# Patient Record
Sex: MALE | Race: WHITE | NOT HISPANIC OR LATINO
[De-identification: names, ages, dates, MRNs, and addresses within clinical notes are randomized per-mention and may not be internally consistent; named-entity substitution may affect disease eponyms.]

---

## 2017-02-16 ENCOUNTER — APPOINTMENT (OUTPATIENT)
Dept: ELECTROPHYSIOLOGY | Facility: CLINIC | Age: 82
End: 2017-02-16

## 2017-03-06 ENCOUNTER — APPOINTMENT (OUTPATIENT)
Dept: ELECTROPHYSIOLOGY | Facility: CLINIC | Age: 82
End: 2017-03-06

## 2017-03-30 ENCOUNTER — APPOINTMENT (OUTPATIENT)
Dept: SURGERY | Facility: CLINIC | Age: 82
End: 2017-03-30

## 2017-06-13 ENCOUNTER — NON-APPOINTMENT (OUTPATIENT)
Age: 82
End: 2017-06-13

## 2017-06-13 ENCOUNTER — APPOINTMENT (OUTPATIENT)
Dept: ELECTROPHYSIOLOGY | Facility: CLINIC | Age: 82
End: 2017-06-13

## 2017-06-13 VITALS — HEART RATE: 79 BPM | DIASTOLIC BLOOD PRESSURE: 72 MMHG | SYSTOLIC BLOOD PRESSURE: 136 MMHG

## 2017-09-18 ENCOUNTER — APPOINTMENT (OUTPATIENT)
Dept: ELECTROPHYSIOLOGY | Facility: CLINIC | Age: 82
End: 2017-09-18
Payer: COMMERCIAL

## 2017-09-18 PROCEDURE — 93294 REM INTERROG EVL PM/LDLS PM: CPT

## 2017-10-12 ENCOUNTER — APPOINTMENT (OUTPATIENT)
Dept: SURGERY | Facility: CLINIC | Age: 82
End: 2017-10-12
Payer: COMMERCIAL

## 2017-10-12 PROCEDURE — 99213 OFFICE O/P EST LOW 20 MIN: CPT | Mod: 25

## 2017-10-12 PROCEDURE — 31575 DIAGNOSTIC LARYNGOSCOPY: CPT

## 2017-12-01 ENCOUNTER — EMERGENCY (EMERGENCY)
Facility: HOSPITAL | Age: 82
LOS: 1 days | Discharge: HOME | End: 2017-12-01
Attending: EMERGENCY MEDICINE | Admitting: EMERGENCY MEDICINE
Payer: MEDICARE

## 2017-12-01 VITALS
HEART RATE: 65 BPM | SYSTOLIC BLOOD PRESSURE: 162 MMHG | RESPIRATION RATE: 16 BRPM | DIASTOLIC BLOOD PRESSURE: 100 MMHG | OXYGEN SATURATION: 94 %

## 2017-12-01 VITALS
TEMPERATURE: 98 F | DIASTOLIC BLOOD PRESSURE: 83 MMHG | HEART RATE: 76 BPM | SYSTOLIC BLOOD PRESSURE: 205 MMHG | RESPIRATION RATE: 18 BRPM | OXYGEN SATURATION: 94 %

## 2017-12-01 LAB
ALBUMIN SERPL ELPH-MCNC: 4.4 G/DL — SIGNIFICANT CHANGE UP (ref 3.3–5)
ALP SERPL-CCNC: 96 U/L — SIGNIFICANT CHANGE UP (ref 40–120)
ALT FLD-CCNC: 20 U/L RC — SIGNIFICANT CHANGE UP (ref 10–45)
ANION GAP SERPL CALC-SCNC: 15 MMOL/L — SIGNIFICANT CHANGE UP (ref 5–17)
APTT BLD: 45.4 SEC — HIGH (ref 27.5–37.4)
AST SERPL-CCNC: 24 U/L — SIGNIFICANT CHANGE UP (ref 10–40)
BASOPHILS # BLD AUTO: 0 K/UL — SIGNIFICANT CHANGE UP (ref 0–0.2)
BASOPHILS NFR BLD AUTO: 0.6 % — SIGNIFICANT CHANGE UP (ref 0–2)
BILIRUB SERPL-MCNC: 0.5 MG/DL — SIGNIFICANT CHANGE UP (ref 0.2–1.2)
BUN SERPL-MCNC: 18 MG/DL — SIGNIFICANT CHANGE UP (ref 7–23)
CALCIUM SERPL-MCNC: 9.4 MG/DL — SIGNIFICANT CHANGE UP (ref 8.4–10.5)
CHLORIDE SERPL-SCNC: 101 MMOL/L — SIGNIFICANT CHANGE UP (ref 96–108)
CO2 SERPL-SCNC: 26 MMOL/L — SIGNIFICANT CHANGE UP (ref 22–31)
CREAT SERPL-MCNC: 1.2 MG/DL — SIGNIFICANT CHANGE UP (ref 0.5–1.3)
EOSINOPHIL # BLD AUTO: 0 K/UL — SIGNIFICANT CHANGE UP (ref 0–0.5)
EOSINOPHIL NFR BLD AUTO: 0.7 % — SIGNIFICANT CHANGE UP (ref 0–6)
GLUCOSE SERPL-MCNC: 91 MG/DL — SIGNIFICANT CHANGE UP (ref 70–99)
HCT VFR BLD CALC: 45.5 % — SIGNIFICANT CHANGE UP (ref 39–50)
HGB BLD-MCNC: 14.8 G/DL — SIGNIFICANT CHANGE UP (ref 13–17)
INR BLD: 2.56 RATIO — HIGH (ref 0.88–1.16)
LYMPHOCYTES # BLD AUTO: 0.7 K/UL — LOW (ref 1–3.3)
LYMPHOCYTES # BLD AUTO: 10.1 % — LOW (ref 13–44)
MCHC RBC-ENTMCNC: 32.6 GM/DL — SIGNIFICANT CHANGE UP (ref 32–36)
MCHC RBC-ENTMCNC: 34 PG — SIGNIFICANT CHANGE UP (ref 27–34)
MCV RBC AUTO: 104 FL — HIGH (ref 80–100)
MONOCYTES # BLD AUTO: 0.9 K/UL — SIGNIFICANT CHANGE UP (ref 0–0.9)
MONOCYTES NFR BLD AUTO: 12.1 % — SIGNIFICANT CHANGE UP (ref 2–14)
NEUTROPHILS # BLD AUTO: 5.6 K/UL — SIGNIFICANT CHANGE UP (ref 1.8–7.4)
NEUTROPHILS NFR BLD AUTO: 76.5 % — SIGNIFICANT CHANGE UP (ref 43–77)
PLATELET # BLD AUTO: 153 K/UL — SIGNIFICANT CHANGE UP (ref 150–400)
POTASSIUM SERPL-MCNC: 5.1 MMOL/L — SIGNIFICANT CHANGE UP (ref 3.5–5.3)
POTASSIUM SERPL-SCNC: 5.1 MMOL/L — SIGNIFICANT CHANGE UP (ref 3.5–5.3)
PROT SERPL-MCNC: 8.2 G/DL — SIGNIFICANT CHANGE UP (ref 6–8.3)
PROTHROM AB SERPL-ACNC: 28.2 SEC — HIGH (ref 9.8–12.7)
RBC # BLD: 4.36 M/UL — SIGNIFICANT CHANGE UP (ref 4.2–5.8)
RBC # FLD: 12.8 % — SIGNIFICANT CHANGE UP (ref 10.3–14.5)
SODIUM SERPL-SCNC: 142 MMOL/L — SIGNIFICANT CHANGE UP (ref 135–145)
WBC # BLD: 7.3 K/UL — SIGNIFICANT CHANGE UP (ref 3.8–10.5)
WBC # FLD AUTO: 7.3 K/UL — SIGNIFICANT CHANGE UP (ref 3.8–10.5)

## 2017-12-01 PROCEDURE — 85027 COMPLETE CBC AUTOMATED: CPT

## 2017-12-01 PROCEDURE — 99284 EMERGENCY DEPT VISIT MOD MDM: CPT

## 2017-12-01 PROCEDURE — 99284 EMERGENCY DEPT VISIT MOD MDM: CPT | Mod: 25

## 2017-12-01 PROCEDURE — 93971 EXTREMITY STUDY: CPT | Mod: 26

## 2017-12-01 PROCEDURE — 85610 PROTHROMBIN TIME: CPT

## 2017-12-01 PROCEDURE — 80053 COMPREHEN METABOLIC PANEL: CPT

## 2017-12-01 PROCEDURE — 85730 THROMBOPLASTIN TIME PARTIAL: CPT

## 2017-12-01 PROCEDURE — 93971 EXTREMITY STUDY: CPT

## 2017-12-01 RX ORDER — LABETALOL HCL 100 MG
10 TABLET ORAL ONCE
Qty: 0 | Refills: 0 | Status: COMPLETED | OUTPATIENT
Start: 2017-12-01 | End: 2017-12-01

## 2017-12-01 NOTE — ED PROVIDER NOTE - PROGRESS NOTE DETAILS
discussed results of ct findings with family and pt. Note hematoma is the cause of swelling. Instructed pt and family on return precautions including rapidly expanding hematoma and compartment syndrome. Will recheck BP and treat in the ED if needed. F/u with PMD for hematoma and high BP. discussed results of ultrasound findings with family and pt. Noted likely hematoma is the cause of swelling. Instructed pt and family on return precautions including rapidly expanding hematoma and described signs/symptoms of compartment syndrome. Repeat BP remains elevated but patient asymptomatic, will have f/u with PMD for hematoma and high BP.  Compartments remain soft without change in size noted on exam.

## 2017-12-01 NOTE — ED PROVIDER NOTE - MEDICAL DECISION MAKING DETAILS
86 y/o M pt with PMHx of a-fib, HTN presents to the ED for nontraumatic left calf pain since yesterday. Sent in by PMD to r/o DVT. Nonspecific physical exam and pt already anti coagulated. Plan: labs, US LLE and re-evaluate

## 2017-12-01 NOTE — ED PROVIDER NOTE - LOWER EXTREMITY EXAM, LEFT
increased varicosities with point tenderness to the calf that is not extending to the popiteal fascia

## 2017-12-01 NOTE — ED ADULT NURSE NOTE - OBJECTIVE STATEMENT
85y male pt, hx of A-fib, valve replacement, HTN on Coumadin, BIBA from Dr's office r/o LLE DVT. Pt states that he started having calf pain since yesterday with minimal redness and swelling. Pt has been compliant with medications, had INR checked today, was told it was therapeutic level. No chest pain, sob, no other complaints. Normally ambulating without assistance.

## 2017-12-01 NOTE — ED PROVIDER NOTE - CARDIAC, MLM
Normal rate, regular rhythm.  Heart sounds S1, S2.  No murmurs, rubs or gallops. Irregular rate and rhythm

## 2017-12-01 NOTE — ED PROVIDER NOTE - OBJECTIVE STATEMENT
84 y/o M pt with PMHx of a-fib, HTN, PSHx of pacemaker, AVR c/o left leg pain since yesterday with no radiation. Pt went to PMD, suspects a DVT and referred pt to the ED. Pt is compliant with his medication. Pt gets INR levels checked once a week.  Denies SOB or any other complaints. Current medication: coumadin. 86 y/o M pt with PMHx of a-fib, HTN, PSHx of pacemaker, AVR c/o left leg pain since yesterday with no radiation. Pt went to PMD, suspects a DVT and referred pt to the ED. Pt is compliant with his medication. Pt gets INR levels checked once a week.  Denies SOB or any other complaints.  Pain is mild.  No noted trauma.  Current medication: coumadin.

## 2017-12-01 NOTE — ED PROVIDER NOTE - CARE PLAN
Principal Discharge DX:	Hematoma of left lower extremity, initial encounter  Secondary Diagnosis:	Hypertension

## 2017-12-01 NOTE — ED PROVIDER NOTE - PMH
A-fib    BPH (Benign Prostatic Hyperplasia)    Cardiac Dysrhythmia    Dyslipidemia    Hx TIA  x 8yrs    Hypertension

## 2018-01-10 ENCOUNTER — NON-APPOINTMENT (OUTPATIENT)
Age: 83
End: 2018-01-10

## 2018-01-10 ENCOUNTER — APPOINTMENT (OUTPATIENT)
Dept: ELECTROPHYSIOLOGY | Facility: CLINIC | Age: 83
End: 2018-01-10
Payer: COMMERCIAL

## 2018-01-10 VITALS — DIASTOLIC BLOOD PRESSURE: 74 MMHG | SYSTOLIC BLOOD PRESSURE: 185 MMHG | HEART RATE: 69 BPM

## 2018-01-10 PROCEDURE — 93279 PRGRMG DEV EVAL PM/LDLS PM: CPT

## 2018-03-04 ENCOUNTER — INPATIENT (INPATIENT)
Facility: HOSPITAL | Age: 83
LOS: 9 days | Discharge: HOME | DRG: 579 | End: 2018-03-14
Attending: INTERNAL MEDICINE | Admitting: STUDENT IN AN ORGANIZED HEALTH CARE EDUCATION/TRAINING PROGRAM
Payer: COMMERCIAL

## 2018-03-04 VITALS
SYSTOLIC BLOOD PRESSURE: 150 MMHG | HEART RATE: 70 BPM | OXYGEN SATURATION: 100 % | RESPIRATION RATE: 20 BRPM | WEIGHT: 160.06 LBS | DIASTOLIC BLOOD PRESSURE: 69 MMHG | TEMPERATURE: 98 F

## 2018-03-04 DIAGNOSIS — R13.10 DYSPHAGIA, UNSPECIFIED: ICD-10-CM

## 2018-03-04 DIAGNOSIS — N17.9 ACUTE KIDNEY FAILURE, UNSPECIFIED: ICD-10-CM

## 2018-03-04 DIAGNOSIS — E78.5 HYPERLIPIDEMIA, UNSPECIFIED: ICD-10-CM

## 2018-03-04 DIAGNOSIS — N18.9 CHRONIC KIDNEY DISEASE, UNSPECIFIED: ICD-10-CM

## 2018-03-04 DIAGNOSIS — Z29.9 ENCOUNTER FOR PROPHYLACTIC MEASURES, UNSPECIFIED: ICD-10-CM

## 2018-03-04 DIAGNOSIS — I10 ESSENTIAL (PRIMARY) HYPERTENSION: ICD-10-CM

## 2018-03-04 DIAGNOSIS — I48.91 UNSPECIFIED ATRIAL FIBRILLATION: ICD-10-CM

## 2018-03-04 DIAGNOSIS — L03.90 CELLULITIS, UNSPECIFIED: ICD-10-CM

## 2018-03-04 DIAGNOSIS — R91.8 OTHER NONSPECIFIC ABNORMAL FINDING OF LUNG FIELD: ICD-10-CM

## 2018-03-04 DIAGNOSIS — N40.0 BENIGN PROSTATIC HYPERPLASIA WITHOUT LOWER URINARY TRACT SYMPTOMS: ICD-10-CM

## 2018-03-04 DIAGNOSIS — L03.116 CELLULITIS OF LEFT LOWER LIMB: ICD-10-CM

## 2018-03-04 LAB
ALBUMIN SERPL ELPH-MCNC: 3.7 G/DL — SIGNIFICANT CHANGE UP (ref 3.3–5)
ALP SERPL-CCNC: 69 U/L — SIGNIFICANT CHANGE UP (ref 40–120)
ALT FLD-CCNC: 14 U/L RC — SIGNIFICANT CHANGE UP (ref 10–45)
ANION GAP SERPL CALC-SCNC: 12 MMOL/L — SIGNIFICANT CHANGE UP (ref 5–17)
APTT BLD: 42.1 SEC — HIGH (ref 27.5–37.4)
AST SERPL-CCNC: 21 U/L — SIGNIFICANT CHANGE UP (ref 10–40)
BASOPHILS # BLD AUTO: 0.1 K/UL — SIGNIFICANT CHANGE UP (ref 0–0.2)
BILIRUB SERPL-MCNC: 1.6 MG/DL — HIGH (ref 0.2–1.2)
BUN SERPL-MCNC: 28 MG/DL — HIGH (ref 7–23)
CALCIUM SERPL-MCNC: 9 MG/DL — SIGNIFICANT CHANGE UP (ref 8.4–10.5)
CHLORIDE SERPL-SCNC: 98 MMOL/L — SIGNIFICANT CHANGE UP (ref 96–108)
CK MB BLD-MCNC: 5.2 % — HIGH (ref 0–3.5)
CK MB CFR SERPL CALC: 9.1 NG/ML — HIGH (ref 0–6.7)
CK SERPL-CCNC: 174 U/L — SIGNIFICANT CHANGE UP (ref 30–200)
CO2 SERPL-SCNC: 26 MMOL/L — SIGNIFICANT CHANGE UP (ref 22–31)
CREAT SERPL-MCNC: 1.42 MG/DL — HIGH (ref 0.5–1.3)
CRP SERPL-MCNC: 10.5 MG/DL — HIGH (ref 0–0.4)
EOSINOPHIL # BLD AUTO: 0 K/UL — SIGNIFICANT CHANGE UP (ref 0–0.5)
ERYTHROCYTE [SEDIMENTATION RATE] IN BLOOD: 19 MM/HR — SIGNIFICANT CHANGE UP (ref 0–20)
GAS PNL BLDV: SIGNIFICANT CHANGE UP
GLUCOSE SERPL-MCNC: 81 MG/DL — SIGNIFICANT CHANGE UP (ref 70–99)
HCT VFR BLD CALC: 41.2 % — SIGNIFICANT CHANGE UP (ref 39–50)
HGB BLD-MCNC: 13.4 G/DL — SIGNIFICANT CHANGE UP (ref 13–17)
INR BLD: 5.01 RATIO — CRITICAL HIGH (ref 0.88–1.16)
LYMPHOCYTES # BLD AUTO: 0.4 K/UL — LOW (ref 1–3.3)
LYMPHOCYTES # BLD AUTO: 1 % — LOW (ref 13–44)
MCHC RBC-ENTMCNC: 32.4 GM/DL — SIGNIFICANT CHANGE UP (ref 32–36)
MCHC RBC-ENTMCNC: 33.5 PG — SIGNIFICANT CHANGE UP (ref 27–34)
MCV RBC AUTO: 103 FL — HIGH (ref 80–100)
MONOCYTES # BLD AUTO: 1.3 K/UL — HIGH (ref 0–0.9)
MONOCYTES NFR BLD AUTO: 5 % — SIGNIFICANT CHANGE UP (ref 2–14)
NEUTROPHILS # BLD AUTO: 20.7 K/UL — HIGH (ref 1.8–7.4)
NEUTROPHILS NFR BLD AUTO: 90 % — HIGH (ref 43–77)
PLATELET # BLD AUTO: 141 K/UL — LOW (ref 150–400)
POTASSIUM SERPL-MCNC: 5.3 MMOL/L — SIGNIFICANT CHANGE UP (ref 3.5–5.3)
POTASSIUM SERPL-SCNC: 5.3 MMOL/L — SIGNIFICANT CHANGE UP (ref 3.5–5.3)
PROT SERPL-MCNC: 7 G/DL — SIGNIFICANT CHANGE UP (ref 6–8.3)
PROTHROM AB SERPL-ACNC: 55.9 SEC — HIGH (ref 9.8–12.7)
RBC # BLD: 4 M/UL — LOW (ref 4.2–5.8)
RBC # FLD: 12.2 % — SIGNIFICANT CHANGE UP (ref 10.3–14.5)
SODIUM SERPL-SCNC: 136 MMOL/L — SIGNIFICANT CHANGE UP (ref 135–145)
TROPONIN T SERPL-MCNC: 0.02 NG/ML — SIGNIFICANT CHANGE UP (ref 0–0.06)
WBC # BLD: 22.5 K/UL — HIGH (ref 3.8–10.5)
WBC # FLD AUTO: 22.5 K/UL — HIGH (ref 3.8–10.5)

## 2018-03-04 PROCEDURE — 93970 EXTREMITY STUDY: CPT | Mod: 26

## 2018-03-04 PROCEDURE — 99285 EMERGENCY DEPT VISIT HI MDM: CPT | Mod: 25,GC

## 2018-03-04 PROCEDURE — 71045 X-RAY EXAM CHEST 1 VIEW: CPT | Mod: 26

## 2018-03-04 PROCEDURE — 73590 X-RAY EXAM OF LOWER LEG: CPT | Mod: 26,LT

## 2018-03-04 PROCEDURE — 93010 ELECTROCARDIOGRAM REPORT: CPT

## 2018-03-04 PROCEDURE — 99223 1ST HOSP IP/OBS HIGH 75: CPT | Mod: GC

## 2018-03-04 RX ORDER — ACETAMINOPHEN 500 MG
1000 TABLET ORAL ONCE
Qty: 0 | Refills: 0 | Status: COMPLETED | OUTPATIENT
Start: 2018-03-04 | End: 2018-03-04

## 2018-03-04 RX ORDER — TAMSULOSIN HYDROCHLORIDE 0.4 MG/1
0.4 CAPSULE ORAL AT BEDTIME
Qty: 0 | Refills: 0 | Status: DISCONTINUED | OUTPATIENT
Start: 2018-03-04 | End: 2018-03-14

## 2018-03-04 RX ORDER — AMPICILLIN SODIUM AND SULBACTAM SODIUM 250; 125 MG/ML; MG/ML
3 INJECTION, POWDER, FOR SUSPENSION INTRAMUSCULAR; INTRAVENOUS EVERY 6 HOURS
Qty: 0 | Refills: 0 | Status: DISCONTINUED | OUTPATIENT
Start: 2018-03-05 | End: 2018-03-05

## 2018-03-04 RX ORDER — FUROSEMIDE 40 MG
40 TABLET ORAL ONCE
Qty: 0 | Refills: 0 | Status: COMPLETED | OUTPATIENT
Start: 2018-03-04 | End: 2018-03-04

## 2018-03-04 RX ORDER — AMPICILLIN SODIUM AND SULBACTAM SODIUM 250; 125 MG/ML; MG/ML
3 INJECTION, POWDER, FOR SUSPENSION INTRAMUSCULAR; INTRAVENOUS ONCE
Qty: 0 | Refills: 0 | Status: COMPLETED | OUTPATIENT
Start: 2018-03-04 | End: 2018-03-04

## 2018-03-04 RX ORDER — FUROSEMIDE 40 MG
40 TABLET ORAL DAILY
Qty: 0 | Refills: 0 | Status: DISCONTINUED | OUTPATIENT
Start: 2018-03-05 | End: 2018-03-06

## 2018-03-04 RX ORDER — VANCOMYCIN HCL 1 G
1000 VIAL (EA) INTRAVENOUS ONCE
Qty: 0 | Refills: 0 | Status: COMPLETED | OUTPATIENT
Start: 2018-03-04 | End: 2018-03-04

## 2018-03-04 RX ORDER — VERAPAMIL HCL 240 MG
120 CAPSULE, EXTENDED RELEASE PELLETS 24 HR ORAL
Qty: 0 | Refills: 0 | Status: DISCONTINUED | OUTPATIENT
Start: 2018-03-04 | End: 2018-03-08

## 2018-03-04 RX ORDER — FINASTERIDE 5 MG/1
5 TABLET, FILM COATED ORAL DAILY
Qty: 0 | Refills: 0 | Status: DISCONTINUED | OUTPATIENT
Start: 2018-03-04 | End: 2018-03-14

## 2018-03-04 RX ORDER — AMPICILLIN SODIUM AND SULBACTAM SODIUM 250; 125 MG/ML; MG/ML
INJECTION, POWDER, FOR SUSPENSION INTRAMUSCULAR; INTRAVENOUS
Qty: 0 | Refills: 0 | Status: DISCONTINUED | OUTPATIENT
Start: 2018-03-04 | End: 2018-03-05

## 2018-03-04 RX ORDER — VERAPAMIL HCL 240 MG
240 CAPSULE, EXTENDED RELEASE PELLETS 24 HR ORAL DAILY
Qty: 0 | Refills: 0 | Status: DISCONTINUED | OUTPATIENT
Start: 2018-03-04 | End: 2018-03-04

## 2018-03-04 RX ORDER — ACETAMINOPHEN 500 MG
650 TABLET ORAL EVERY 6 HOURS
Qty: 0 | Refills: 0 | Status: DISCONTINUED | OUTPATIENT
Start: 2018-03-04 | End: 2018-03-14

## 2018-03-04 RX ORDER — WARFARIN SODIUM 2.5 MG/1
0 TABLET ORAL
Qty: 0 | Refills: 0 | COMMUNITY

## 2018-03-04 RX ORDER — VANCOMYCIN HCL 1 G
VIAL (EA) INTRAVENOUS
Qty: 0 | Refills: 0 | Status: DISCONTINUED | OUTPATIENT
Start: 2018-03-04 | End: 2018-03-04

## 2018-03-04 RX ORDER — VANCOMYCIN HCL 1 G
1000 VIAL (EA) INTRAVENOUS EVERY 24 HOURS
Qty: 0 | Refills: 0 | Status: DISCONTINUED | OUTPATIENT
Start: 2018-03-04 | End: 2018-03-06

## 2018-03-04 RX ORDER — ATORVASTATIN CALCIUM 80 MG/1
10 TABLET, FILM COATED ORAL AT BEDTIME
Qty: 0 | Refills: 0 | Status: DISCONTINUED | OUTPATIENT
Start: 2018-03-04 | End: 2018-03-14

## 2018-03-04 RX ORDER — FUROSEMIDE 40 MG
20 TABLET ORAL ONCE
Qty: 0 | Refills: 0 | Status: DISCONTINUED | OUTPATIENT
Start: 2018-03-04 | End: 2018-03-04

## 2018-03-04 RX ADMIN — Medication 250 MILLIGRAM(S): at 14:52

## 2018-03-04 RX ADMIN — Medication 40 MILLIGRAM(S): at 22:10

## 2018-03-04 RX ADMIN — AMPICILLIN SODIUM AND SULBACTAM SODIUM 200 GRAM(S): 250; 125 INJECTION, POWDER, FOR SUSPENSION INTRAMUSCULAR; INTRAVENOUS at 23:56

## 2018-03-04 RX ADMIN — Medication 120 MILLIGRAM(S): at 22:10

## 2018-03-04 RX ADMIN — FINASTERIDE 5 MILLIGRAM(S): 5 TABLET, FILM COATED ORAL at 18:20

## 2018-03-04 RX ADMIN — ATORVASTATIN CALCIUM 10 MILLIGRAM(S): 80 TABLET, FILM COATED ORAL at 22:10

## 2018-03-04 RX ADMIN — Medication 1000 MILLIGRAM(S): at 03:54

## 2018-03-04 RX ADMIN — Medication 400 MILLIGRAM(S): at 13:36

## 2018-03-04 RX ADMIN — Medication 400 MILLIGRAM(S): at 03:54

## 2018-03-04 RX ADMIN — Medication 100 MILLIGRAM(S): at 03:54

## 2018-03-04 RX ADMIN — TAMSULOSIN HYDROCHLORIDE 0.4 MILLIGRAM(S): 0.4 CAPSULE ORAL at 22:10

## 2018-03-04 RX ADMIN — AMPICILLIN SODIUM AND SULBACTAM SODIUM 200 GRAM(S): 250; 125 INJECTION, POWDER, FOR SUSPENSION INTRAMUSCULAR; INTRAVENOUS at 16:27

## 2018-03-04 NOTE — ED ADULT NURSE NOTE - OBJECTIVE STATEMENT
86 yo complaining of SOB and leg swelling with a opening on left lower leg "it hurts and some liquid is coming off of my leg, I also feel like I cannot breathe" 2L o2 for comfort, pt was 89%pulse ox.   IV placed on left AC 20G, blood drawn, sent to lab. Family at the bedside. Pt alerted and oriented x3, no signs of acute distress noted at this moment. Will continue to monitor closely. Heart sounds normal, lungs clear, abdomen soft, non distended.     MD note:  86 yo male with hx of aortic valve replacement, afib on coumadin pacemaker presenting with 1 day hx of left leg pain and swelling.  denies any trauma.  despite triage note, patient does not endorse any shortness of breath besides his baseline.  describes as hot achy pain, 5/10 in severity with no radaition.  did not take anything for pain.

## 2018-03-04 NOTE — H&P ADULT - NSHPSOCIALHISTORY_GEN_ALL_CORE
Patient lives at home with wife. He smoked for >50 years, about 1PPD but quit 15 years ago. He does not drink any alcohol.

## 2018-03-04 NOTE — ED ADULT NURSE REASSESSMENT NOTE - NS ED NURSE REASSESS COMMENT FT1
received report from Alena ARDON. pt resting comfortably in stretcher. A&Ox4. VSS. pt reports pain of L leg minimally relieved by pain medication administered by previous RN. MD aware. pt admitted to medicine, pending bed assignment. pt remains on 1L NC, 99%, nonlabored breathing. plan of care discussed. safety and comfort measures maintained.

## 2018-03-04 NOTE — H&P ADULT - PROBLEM SELECTOR PLAN 2
-Pt with Pro BNP, pleural effusions, BARON, LE edema  -Obtain TTE  -Gentle diuresis with Lasix as pt is Lasix naive  -Optimize medications if TTE consistent with HF -Pt with Pro BNP, pleural effusions, BARON, LE edema  -Obtain TTE  -Gentle diuresis with Lasix as pt is Lasix naive  -Optimize medications if TTE consistent with HF  -Troponins negative -Pt with Pro BNP, pleural effusions, BARON, LE edema  -Obtain TTE  -Gentle diuresis with Lasix as pt is Lasix naive  -Optimize medications if TTE consistent with HF  -Troponins negative  -Observe on telemetry  -Strict I's and O's  -Daily weights -+orthopnea, crackles, b/l pitting edema, b/l pleural effusions with elevated Pro BNP, grossly fluid overloaded with high suspicion for heart failure  -Obtain TTE  -will give 1 dose of IV lasix 20mg IV for today and monitor creatinine, consider additional dosing tomorrow  -Optimize medications if TTE consistent with HF  -Troponins negative  -Observe on telemetry  -Strict I's and O's  -Daily weights -+orthopnea, crackles, b/l pitting edema, b/l pleural effusions with elevated Pro BNP, grossly fluid overloaded with high suspicion for heart failure  -Obtain TTE  -will give 1 dose of IV lasix 40mg IV for today and monitor creatinine, consider additional dosing tomorrow  -Troponin negative x1 but no chest pain, will send 2nd level  -Observe on telemetry  -Strict I's and O's  -Daily weights

## 2018-03-04 NOTE — H&P ADULT - PROBLEM SELECTOR PLAN 1
-Patient with erythema, pain, and warmth in LE; erythema is not well demarcated  -X-ray LLE without evidence of nec fasciitis or osteomyelitis  -Patient s/p 1 dose of Clindamycin in ED  -Microbial etiologies include staph aureus vs strep; however, lack of well demarcation points away from strep  -Will start patient on Vanco  -Daily CBC with diff  -ESR, CRP  -Consider MRI to r/o osteo -Patient with erythema, pain, and warmth in LE; erythema is not well demarcated  -X-ray LLE without evidence of nec fasciitis or osteomyelitis  -Patient s/p 1 dose of Clindamycin in ED  -Microbial etiologies include staph aureus vs strep; however, lack of well demarcation points away from strep  -Will start patient on Vanco 1 gram now and then by level given roxana and age  -Daily CBC with diff  -ESR, CRP  -Consider MRI to r/o osteo erythema, swelling, tenderness of LLE consistent with cellulitis in setting of leukocytosis  -X-ray LLE without evidence of nec fasciitis or osteomyelitis  -Patient s/p 1 dose of Clindamycin in ED  -Will start patient on Vanco 1 gram q24, vanc trough pre 3rd level, monitor   creatinine  -start unasyn 3 gram q6  -f/u blood culture  -trend cbc  -ESR, CRP  -consider MRI however would have to followup with cardiology to see if pacemaker is MRI compatible, otherwise will need triple phase bone scan erythema, swelling, tenderness of LLE consistent with cellulitis in setting of leukocytosis, no fluctucance or sign of drainable abcess on exax  -X-ray LLE without evidence of nec fasciitis or osteomyelitis  -Patient s/p 1 dose of Clindamycin in ED  -Will start patient on Vanco 1 gram q24, vanc trough pre 3rd level, monitor   creatinine  -start unasyn 3 gram q6  -f/u blood culture  -trend cbc  -send ESR, CRP, if significantly elevated, consider MRI however would have to followup with cardiology to see if pacemaker is MRI compatible, otherwise will need triple phase bone scan

## 2018-03-04 NOTE — ED PROVIDER NOTE - OBJECTIVE STATEMENT
84 yo male with hx of aortic valve replacement, afib on coumadin pacemaker presenting with 1 day hx of left leg pain and swelling.  denies any trauma.  despite triage note, patient does not endorse any shortness of breath besides his baseline.  describes as hot achy pain, 5/10 in severity with no radaition.  did not take anything for pain.    pcp- dr abarca

## 2018-03-04 NOTE — ED PROVIDER NOTE - NS ED ROS FT
Constitutional: no fever  Eyes: no conjunctivitis  Ears: no ear pain   Nose: no nasal congestion, Mouth/Throat: no throat pain, Neck: no stiffness  Cardiovascular: no chest pain  Chest: no cough  Gastrointestinal: no abdominal pain, no vomiting and diarrhea  MSK: no joint pain +leg pain  : no dysuria  Skin: + rash  Neuro: no LOC

## 2018-03-04 NOTE — H&P ADULT - PROBLEM SELECTOR PLAN 3
-c/w with home lisinopril and B blocker -c/w with home B blocker, hold home lisinopril given HAYLIE -stable  -c/w with home B blocker  -hold home lisinopril given HAYLIE

## 2018-03-04 NOTE — ED PROVIDER NOTE - ATTENDING CONTRIBUTION TO CARE
Attending MD Ramirez. Agree with above.  Pt is an 85 yr old male with pmhx of aortic valve replacement, afib on Coumadin, pacer who presents to ED with complaint of 1 day L leg pain/swelling.  Denies hx of trauma.  Pt denies SOB increased from baseline.  Endorses a ‘hot achy’ pain that is 5/10 in severity without radiation from localized region.  Has not taken anything for this pain prior to presentation. No current CP. Attending MD Ramirez. Agree with above.  Pt is an 85 yr old male with pmhx of aortic valve replacement, afib on Coumadin, pacer who presents to ED with complaint of 1 day L leg pain/swelling.  Denies hx of trauma.  Pt denies SOB increased from baseline.  Endorses a ‘hot achy’ pain that is 5/10 in severity without radiation from localized region.  Has not taken anything for this pain prior to presentation. No current CP.  Pt's duplex negative for DVT.  WBC and exam c/w sig cellulitis.  Intact distal pulses, cap refill, sensation and warmth.  Warrants admission for tx of LLE cellulitis.

## 2018-03-04 NOTE — ED PROVIDER NOTE - MEDICAL DECISION MAKING DETAILS
86 yo male with leg pain and swelling; rule out dvt vs infection already on coumadin; labs ultrasound fluids --> admit

## 2018-03-04 NOTE — H&P ADULT - ASSESSMENT
85 y.o. M with PMH of HTN, HLD, TIA, throat cancer s/p radiation and chemo (2011), aortic valve replacement, afib on coumadin, BPH, s/p PMM who presents  with 1 day of L leg pain redness, pain, and swelling in the setting of cellulitis who also presents with BARON, LE edema, pleural effusion, and elevated Pro-BNP in setting of possible acute heart failure exacerbation 85 y.o. M with PMH of HTN, HLD, TIA, throat cancer s/p radiation and chemo (2011), aortic valve replacement, afib on coumadin, BPH, s/p PMM who presents  with 1 day of L leg redness, pain, and swelling in the setting of cellulitis who also presents with BARON, LE edema, pleural effusion, and elevated Pro-BNP in setting of possible acute heart failure exacerbation 85 y.o. M with PMH of HTN, HLD, TIA, throat cancer s/p radiation and chemo (2011), aortic valve replacement, afib on coumadin, BPH, s/p PMM who presents  with 1 day of L leg redness, pain, and swelling with leukocytosis BARON, LE edema, b/l pleural effusion, and elevated Pro-BNP in setting of possible acute heart failure exacerbation and left leg cellulitis.

## 2018-03-04 NOTE — H&P ADULT - NSHPLABSRESULTS_GEN_ALL_CORE
.                        13.4   22.5  )-----------( 141      ( 04 Mar 2018 03:22 )             41.2     Hgb Trend: 13.4<--  03-04    136  |  98  |  28<H>  ----------------------------<  81  5.3   |  26  |  1.42<H>    Ca    9.0      04 Mar 2018 03:23    TPro  7.0  /  Alb  3.7  /  TBili  1.6<H>  /  DBili  x   /  AST  21  /  ALT  14  /  AlkPhos  69  03-04    Creatinine Trend: 1.42<--  PT/INR - ( 04 Mar 2018 03:23 )   PT: 55.9 sec;   INR: 5.01 ratio         PTT - ( 04 Mar 2018 03:23 )  PTT:42.1 sec      Imaging reviewed personally.    Serum Pro-Brain Natriuretic Peptide: 4277 pg/mL (03.04.18 @ 03:23) personally reviewed labs:                         13.4   22.5  )-----------( 141      ( 04 Mar 2018 03:22 )             41.2     Hgb Trend: 13.4<--  03-04    136  |  98  |  28<H>  ----------------------------<  81  5.3   |  26  |  1.42<H>    Ca    9.0      04 Mar 2018 03:23    TPro  7.0  /  Alb  3.7  /  TBili  1.6<H>  /  DBili  x   /  AST  21  /  ALT  14  /  AlkPhos  69  03-04    Creatinine Trend: 1.42<--  PT/INR - ( 04 Mar 2018 03:23 )   PT: 55.9 sec;   INR: 5.01 ratio    PTT - ( 04 Mar 2018 03:23 )  PTT:42.1 sec  Serum Pro-Brain Natriuretic Peptide: 4277 pg/mL (03.04.18 @ 03:23)    personally reviewed CXR: IMPRESSION:  Small to moderate bilateral pleural effusions with adjacent passive   atelectasis.    personally reviewed EKG: personally reviewed labs:                         13.4   22.5  )-----------( 141      ( 04 Mar 2018 03:22 )             41.2     Hgb Trend: 13.4<--  03-04    136  |  98  |  28<H>  ----------------------------<  81  5.3   |  26  |  1.42<H>    Ca    9.0      04 Mar 2018 03:23    TPro  7.0  /  Alb  3.7  /  TBili  1.6<H>  /  DBili  x   /  AST  21  /  ALT  14  /  AlkPhos  69  03-04    Creatinine Trend: 1.42<--  PT/INR - ( 04 Mar 2018 03:23 )   PT: 55.9 sec;   INR: 5.01 ratio    PTT - ( 04 Mar 2018 03:23 )  PTT:42.1 sec  Serum Pro-Brain Natriuretic Peptide: 4277 pg/mL (03.04.18 @ 03:23)    personally reviewed CXR: IMPRESSION:  Small to moderate bilateral pleural effusions with adjacent passive atelectasis.    LLE xray :IMPRESSION:  No acute fracture or dislocation.  Diffuse soft tissue edema.  No tracking soft tissue gas collections, radiopaque foreign bodies, or   gross radiographic evidence for osteomyelitis.      VA duplex :IMPRESSION: No evidence of bilateral lower extremity deep venous thrombosis.      personally reviewed EKG: pacing with Interventricular delay, Q waves in v2-v4

## 2018-03-04 NOTE — H&P ADULT - NSHPREVIEWOFSYSTEMS_GEN_ALL_CORE
REVIEW OF SYSTEMS:    CONSTITUTIONAL: No weakness, fevers or chills  EYES/ENT: No visual changes;  No vertigo or throat pain   NECK: No pain or stiffness  RESPIRATORY: No cough, wheezing, hemoptysis; Positive for shortness of breath  CARDIOVASCULAR: No chest pain or palpitations  GASTROINTESTINAL: No abdominal or epigastric pain. No nausea, vomiting, or hematemesis; No diarrhea or constipation. No melena or hematochezia.  GENITOURINARY: No dysuria, frequency or hematuria  NEUROLOGICAL: No numbness or weakness  SKIN: No itching, rashes REVIEW OF SYSTEMS:    CONSTITUTIONAL: No weakness, fevers or chills  EYES/ENT: No visual changes;  No vertigo or throat pain   NECK: No pain or stiffness  RESPIRATORY: No cough, wheezing, hemoptysis; Positive for shortness of breath  CARDIOVASCULAR: No chest pain or palpitations  GASTROINTESTINAL: No abdominal or epigastric pain. No nausea, vomiting, or hematemesis; No diarrhea or constipation. No melena or hematochezia.  GENITOURINARY: No dysuria, frequency or hematuria  NEUROLOGICAL: No numbness or weakness  ENDOCRINE: No changes in temperature weight loss  BACK: no back pain

## 2018-03-04 NOTE — H&P ADULT - PROBLEM SELECTOR PLAN 5
-c/w Verapamil  -Given supratherapeutic INR, will hold coumadin dose tonight. Pt without active signs of bleeding; therefore, does not require Vitamin K or reversal agent -c/w Verapamil  -Given supratherapeutic INR, will hold coumadin dose (takes 1mg at home, 2mg on sundays) Pt without active signs of bleeding; therefore, does not require Vitamin K or reversal agent -c/w Verapamil 240mg ER  -Given supratherapeutic INR, will hold coumadin dose (takes 1mg at home, 2mg on sundays) Pt without active signs of bleeding; therefore, does not require Vitamin K or reversal agent  -monitor on tele

## 2018-03-04 NOTE — H&P ADULT - NSHPPHYSICALEXAM_GEN_ALL_CORE
General: WN/WD NAD  Neurology: A&Ox3, L sided facial droop present  Eyes: PERRLA/ EOMI, Gross vision intact  ENT/Neck: Neck supple, trachea midline, No JVD, Gross hearing intact  Respiratory: Scattered expiratory wheezes, mild bibasilar crackles  CV: RRR, S1S2, no murmurs, rubs or gallops  Abdominal: Soft, NT, ND +BS,   Extremities: 2+ LE edema b/l, L leg with erythema that is not well demarcated with area of desquamated skin   Skin: No Rashes, Hematoma, Ecchymosis: General: WN/WD NAD  Neurology: A&Ox3,   Eyes: PERRLA/ EOMI, Gross vision intact  ENT/Neck: Neck supple, trachea midline, No JVD, Gross hearing intact  Respiratory: ctab,  bibasilar crackles  CV: RRR, S1S2, no murmurs, rubs or gallops  Abdominal: Soft, NT, ND +BS,   Extremities: 2+ b/l LE pitting edema, L leg with erythema, tenderness from on anterior aspect spreading from 2cm above knee to ankle, not well demarcated General: NAD on 3L NC  Neurology: A&Ox3,   Eyes: PERRLA/ EOMI, Gross vision intact  ENT/Neck: Neck supple, trachea midline, No JVD, Gross hearing intact  Respiratory: ctab,  bibasilar crackles  CV: RRR, S1S2, no murmurs, rubs or gallops  Abdominal: Soft, NT, ND +BS,   Extremities: 2+ b/l LE pitting edema, L leg with erythema, tenderness from on anterior aspect spreading from 2cm above knee to ankle, not well demarcated. Not tense, No fluctuance or sign of abscess

## 2018-03-04 NOTE — H&P ADULT - HISTORY OF PRESENT ILLNESS
85 y.o. M with PMH of HTN, HLD, TIA, throat cancer s/p radiation and chemo (2011), aortic valve replacement, afib on coumadin, BPH, s/p PMM who presents  with 1 day of L leg pain redness, pain, and swelling. Patient has no prior history of cellulitis, 85 y.o. M with PMH of HTN, HLD, TIA, throat cancer s/p radiation and chemo (2011), aortic valve replacement, afib on coumadin, BPH, s/p PMM who presents  with 1 day of L leg pain redness, pain, and swelling. Patient has no prior history of cellulitis, but does have history of hematoma to that leg a few months ago. Patient denies trauma to the area. Patient denies fevers or chills at home. Of note, patient's family have noted patient to have become increasingly short of breath with exertion. Patient at baseline, due to his history of throat cancer, sleeps propped up on a few pillows, and so it is unable to tell whether patient is orthopneic. Patient denies PND. He does not carry a prior diagnosis of heart failure. Of note, patient had some outpatient imaging performed which indicated lung nodules and a pericardial effusion for which he is being worked up. Patient denies N/V, diarrhea, dysuria, hematuria, or melena. Pt endorses dysphagia has been present s/p radiation 7 years ago.     In the ED, patient's CBC was notable for leukocytosis of 22K with 4% bands, as well as mild thrombocytopenia of 141K. Pro-BNP was elevated to 4277. CXR was performed which showed small to moderate b/l pleural effusions. Lactate was somewhat elevated to 2.2.  X-ray of LLE was remarkable for soft tissue edema; no soft tissue gas collection or gross radiographic evidence for osteomyelitis. Doppler did not indicate DVT. Patient was treated with Clindamycin x 1 dose and given IV Tylenol for pain. 85 y.o. M with PMH of HTN, HLD, TIA, throat cancer s/p radiation and chemo (2011), aortic valve replacement, afib on coumadin, BPH, s/p PMM who presents  with 1 day of L leg redness, pain, and swelling. Patient has no prior history of cellulitis, but does have history of hematoma to that leg a few months ago. Patient denies trauma to the area. Patient denies fevers or chills at home. Of note, patient's family have noted patient to have become increasingly short of breath with exertion. Patient at baseline, due to his history of throat cancer, sleeps propped up on a few pillows, and so it is unable to tell whether patient is orthopneic. Patient denies PND. He does not carry a prior diagnosis of heart failure. Of note, patient had some outpatient imaging performed which indicated lung nodules and a pericardial effusion for which he is being worked up. Patient denies N/V, diarrhea, dysuria, hematuria, or melena. Pt endorses dysphagia has been present s/p radiation 7 years ago.     In the ED, patient's CBC was notable for leukocytosis of 22K with 4% bands, as well as mild thrombocytopenia of 141K. Pro-BNP was elevated to 4277. CXR was performed which showed small to moderate b/l pleural effusions. Lactate was somewhat elevated to 2.2.  X-ray of LLE was remarkable for soft tissue edema; no soft tissue gas collection or gross radiographic evidence for osteomyelitis. Doppler did not indicate DVT. Patient was treated with Clindamycin x 1 dose and given IV Tylenol for pain. 85 y.o. M with PMH of HTN, HLD, TIA, throat cancer s/p radiation and chemo (2011), aortic valve replacement, afib on coumadin, BPH, s/p PMM who presents  with 1 day of L leg redness, pain, and swelling. Patient has no prior history of cellulitis, but does have history of hematoma to that leg a few months ago. Patient a blister that popped earlier this week. Patient denies trauma to the area. Patient denies fevers or chills at home. Of note, patient's family have noted patient to have become increasingly short of breath with exertion. Patient at baseline, due to his history of throat cancer, sleeps propped up on a few pillows, and so it is unable to tell whether patient is orthopneic. Patient denies PND. He does not carry a prior diagnosis of heart failure. Of note, patient had some outpatient imaging performed which indicated lung nodules and a pericardial effusion for which he is being worked up. Patient denies N/V, diarrhea, dysuria, hematuria, or melena. Pt endorses dysphagia has been present s/p radiation 7 years ago.     In the ED, patient's CBC was notable for leukocytosis of 22K with 4% bands, as well as mild thrombocytopenia of 141K. Pro-BNP was elevated to 4277. CXR was performed which showed small to moderate b/l pleural effusions. Lactate was somewhat elevated to 2.2.  X-ray of LLE was remarkable for soft tissue edema; no soft tissue gas collection or gross radiographic evidence for osteomyelitis. Doppler did not indicate DVT. Patient was treated with Clindamycin x 1 dose and given IV Tylenol for pain.

## 2018-03-04 NOTE — ED PROVIDER NOTE - PHYSICAL EXAMINATION
skin- left leg- area of exposed mucosa with erythema tracking toward the groin with tenderness and swelling of anterior shin, right leg has 5 cm annular area of erythema with no mucosal exposure or tenderness

## 2018-03-04 NOTE — PROCEDURE NOTE - ADDITIONAL PROCEDURE DETAILS
UROLOGY PROGRESS NOTE:     Called to see patient for difficult rivas placement.  Unsuccessful attempt by nursing staff.  18F coude rivas placed under typical sterile technique.  No complications.  Patient tolerated procedure well.  ~700cc clear yellow urine drained.  Rivas plan as per primary team.

## 2018-03-04 NOTE — H&P ADULT - PROBLEM SELECTOR PLAN 8
-On coumadin with supratherapeutic INR -Ordered speech and swallow, f/u results  -Mechanical soft diet

## 2018-03-05 DIAGNOSIS — D69.6 THROMBOCYTOPENIA, UNSPECIFIED: ICD-10-CM

## 2018-03-05 LAB
ANION GAP SERPL CALC-SCNC: 17 MMOL/L — SIGNIFICANT CHANGE UP (ref 5–17)
BASOPHILS # BLD AUTO: 0 K/UL — SIGNIFICANT CHANGE UP (ref 0–0.2)
BASOPHILS NFR BLD AUTO: 0.1 % — SIGNIFICANT CHANGE UP (ref 0–2)
BUN SERPL-MCNC: 31 MG/DL — HIGH (ref 7–23)
CALCIUM SERPL-MCNC: 9 MG/DL — SIGNIFICANT CHANGE UP (ref 8.4–10.5)
CHLORIDE SERPL-SCNC: 99 MMOL/L — SIGNIFICANT CHANGE UP (ref 96–108)
CHLORIDE UR-SCNC: 61 MMOL/L — SIGNIFICANT CHANGE UP
CK MB BLD-MCNC: 3.7 % — HIGH (ref 0–3.5)
CK MB CFR SERPL CALC: 6.9 NG/ML — HIGH (ref 0–6.7)
CK SERPL-CCNC: 188 U/L — SIGNIFICANT CHANGE UP (ref 30–200)
CO2 SERPL-SCNC: 23 MMOL/L — SIGNIFICANT CHANGE UP (ref 22–31)
CREAT ?TM UR-MCNC: 62 MG/DL — SIGNIFICANT CHANGE UP
CREAT SERPL-MCNC: 1.43 MG/DL — HIGH (ref 0.5–1.3)
EOSINOPHIL # BLD AUTO: 0 K/UL — SIGNIFICANT CHANGE UP (ref 0–0.5)
EOSINOPHIL NFR BLD AUTO: 0.1 % — SIGNIFICANT CHANGE UP (ref 0–6)
GLUCOSE SERPL-MCNC: 106 MG/DL — HIGH (ref 70–99)
HCT VFR BLD CALC: 40.1 % — SIGNIFICANT CHANGE UP (ref 39–50)
HGB BLD-MCNC: 13.1 G/DL — SIGNIFICANT CHANGE UP (ref 13–17)
INR BLD: 6.5 RATIO — CRITICAL HIGH (ref 0.88–1.16)
LYMPHOCYTES # BLD AUTO: 0.5 K/UL — LOW (ref 1–3.3)
LYMPHOCYTES # BLD AUTO: 3.4 % — LOW (ref 13–44)
MAGNESIUM SERPL-MCNC: 1.5 MG/DL — LOW (ref 1.6–2.6)
MCHC RBC-ENTMCNC: 32.6 GM/DL — SIGNIFICANT CHANGE UP (ref 32–36)
MCHC RBC-ENTMCNC: 33.9 PG — SIGNIFICANT CHANGE UP (ref 27–34)
MCV RBC AUTO: 104 FL — HIGH (ref 80–100)
MONOCYTES # BLD AUTO: 1.2 K/UL — HIGH (ref 0–0.9)
MONOCYTES NFR BLD AUTO: 8.7 % — SIGNIFICANT CHANGE UP (ref 2–14)
NEUTROPHILS # BLD AUTO: 12.3 K/UL — HIGH (ref 1.8–7.4)
NEUTROPHILS NFR BLD AUTO: 87.8 % — HIGH (ref 43–77)
PHOSPHATE SERPL-MCNC: 3.2 MG/DL — SIGNIFICANT CHANGE UP (ref 2.5–4.5)
PLATELET # BLD AUTO: 91 K/UL — LOW (ref 150–400)
POTASSIUM SERPL-MCNC: 5.3 MMOL/L — SIGNIFICANT CHANGE UP (ref 3.5–5.3)
POTASSIUM SERPL-SCNC: 5.3 MMOL/L — SIGNIFICANT CHANGE UP (ref 3.5–5.3)
PROTHROM AB SERPL-ACNC: 72.9 SEC — HIGH (ref 9.8–12.7)
RBC # BLD: 3.86 M/UL — LOW (ref 4.2–5.8)
RBC # FLD: 12.2 % — SIGNIFICANT CHANGE UP (ref 10.3–14.5)
SODIUM SERPL-SCNC: 139 MMOL/L — SIGNIFICANT CHANGE UP (ref 135–145)
SODIUM UR-SCNC: 49 MMOL/L — SIGNIFICANT CHANGE UP
TROPONIN T SERPL-MCNC: 0.03 NG/ML — SIGNIFICANT CHANGE UP (ref 0–0.06)
WBC # BLD: 14 K/UL — HIGH (ref 3.8–10.5)
WBC # FLD AUTO: 14 K/UL — HIGH (ref 3.8–10.5)

## 2018-03-05 PROCEDURE — 99233 SBSQ HOSP IP/OBS HIGH 50: CPT | Mod: GC

## 2018-03-05 PROCEDURE — 76775 US EXAM ABDO BACK WALL LIM: CPT | Mod: 26

## 2018-03-05 RX ORDER — PHYTONADIONE (VIT K1) 5 MG
2.5 TABLET ORAL ONCE
Qty: 0 | Refills: 0 | Status: COMPLETED | OUTPATIENT
Start: 2018-03-05 | End: 2018-03-05

## 2018-03-05 RX ORDER — MAGNESIUM SULFATE 500 MG/ML
1 VIAL (ML) INJECTION ONCE
Qty: 0 | Refills: 0 | Status: COMPLETED | OUTPATIENT
Start: 2018-03-05 | End: 2018-03-05

## 2018-03-05 RX ORDER — CEFAZOLIN SODIUM 1 G
1000 VIAL (EA) INJECTION EVERY 12 HOURS
Qty: 0 | Refills: 0 | Status: DISCONTINUED | OUTPATIENT
Start: 2018-03-05 | End: 2018-03-11

## 2018-03-05 RX ADMIN — Medication 100 GRAM(S): at 08:57

## 2018-03-05 RX ADMIN — Medication 40 MILLIGRAM(S): at 05:19

## 2018-03-05 RX ADMIN — Medication 120 MILLIGRAM(S): at 05:19

## 2018-03-05 RX ADMIN — Medication 650 MILLIGRAM(S): at 15:02

## 2018-03-05 RX ADMIN — AMPICILLIN SODIUM AND SULBACTAM SODIUM 200 GRAM(S): 250; 125 INJECTION, POWDER, FOR SUSPENSION INTRAMUSCULAR; INTRAVENOUS at 05:19

## 2018-03-05 RX ADMIN — Medication 2.5 MILLIGRAM(S): at 09:40

## 2018-03-05 RX ADMIN — Medication 250 MILLIGRAM(S): at 15:02

## 2018-03-05 RX ADMIN — Medication 100 MILLIGRAM(S): at 21:26

## 2018-03-05 RX ADMIN — Medication 120 MILLIGRAM(S): at 17:11

## 2018-03-05 RX ADMIN — ATORVASTATIN CALCIUM 10 MILLIGRAM(S): 80 TABLET, FILM COATED ORAL at 21:27

## 2018-03-05 RX ADMIN — TAMSULOSIN HYDROCHLORIDE 0.4 MILLIGRAM(S): 0.4 CAPSULE ORAL at 21:27

## 2018-03-05 RX ADMIN — FINASTERIDE 5 MILLIGRAM(S): 5 TABLET, FILM COATED ORAL at 13:36

## 2018-03-05 RX ADMIN — Medication 100 MILLIGRAM(S): at 11:39

## 2018-03-05 NOTE — PROGRESS NOTE ADULT - PROBLEM SELECTOR PLAN 1
-erythema, swelling, tenderness of LLE consistent with cellulitis in setting of leukocytosis, no fluctucance or sign of drainable abcess on exam  -X-ray LLE without evidence of nec fasciitis or osteomyelitis  -Patient s/p 1 dose of Clindamycin in ED  - Vanco 1 gram q24, vanc trough pre 3rd level, monitor   creatinine  -d/donald Unasyn and beginning Ancef for better MSSA and strep coverage  -f/u blood culture  -trend cbc

## 2018-03-05 NOTE — PROGRESS NOTE ADULT - PROBLEM SELECTOR PLAN 3
-stable  -c/w with home B blocker  -hold home lisinopril given HAYLIE -Plts 141 K during admission, now plts dropped to 91K   -Pt not on any agents derived from heparin  -Could be 2/2 to drug side effect, unasyn has thrombocytopenia as reported side effect, Vanco could also cause thrombocytopenia, although more rare  -Unasyn d/donald; switching to Ancef and keeping Vanco  -Daily CBC  -Continue to monitor

## 2018-03-05 NOTE — PROGRESS NOTE ADULT - PROBLEM SELECTOR PLAN 7
-c/w with home finasteride -creatinine 1.4, baseline creatinine is 1.0-1.1, likely due to cardiorenal vs infection  -Avoid nephrotoxins  -hold ACE-I  -bladder scan to r/o retention

## 2018-03-05 NOTE — PROGRESS NOTE ADULT - PROBLEM SELECTOR PLAN 9
-f/u read of PET scan that was obtained as outpatient -Ordered speech and swallow, f/u results  -Mechanical soft diet

## 2018-03-05 NOTE — PROGRESS NOTE ADULT - PROBLEM SELECTOR PLAN 6
-creatinine 1.4, baseline creatinine is 1.0-1.1, likely due to cardiorenal vs infection  -Avoid nephrotoxins  -hold ACE-I  -bladder scan to r/o retention -c/w Verapamil 240mg ER  -Given supratherapeutic INR, will hold coumadin dose (takes 1mg at home, 2mg on sundays). Given hematuria, administered 2.5 mg Vitamin K.   -monitor on tele

## 2018-03-05 NOTE — PROGRESS NOTE ADULT - SUBJECTIVE AND OBJECTIVE BOX
Patient is a 85y old  Male who presents with a chief complaint of Swelling/Pain in left leg, SOB (04 Mar 2018 13:51)      Overnight Events:      REVIEW OF SYSTEMS:  CONSTITUTIONAL: No weakness, fevers or chills  EYES/ENT: No visual changes, no throat pain   RESPIRATORY: No cough, wheezing, hemoptysis; No shortness of breath  CARDIOVASCULAR: No chest pain or palpitations  GASTROINTESTINAL: No abdominal, nausea, vomiting, or hematemesis; No diarrhea or constipation. No melena or hematochezia.  GENITOURINARY: No dysuria, frequency or hematuria  NEUROLOGICAL: No dizziness, numbness, or weakness  SKIN: No itching, burning, rashes, or lesions   All other review of systems is negative unless indicated above.    VITAL SIGNS:  Vital Signs Last 24 Hrs  T(C): 36.7 (05 Mar 2018 09:00), Max: 36.9 (05 Mar 2018 04:53)  T(F): 98 (05 Mar 2018 09:00), Max: 98.5 (05 Mar 2018 04:53)  HR: 60 (05 Mar 2018 09:00) (60 - 61)  BP: 136/70 (05 Mar 2018 09:00) (116/71 - 146/75)  BP(mean): --  RR: 18 (05 Mar 2018 09:00) (18 - 18)  SpO2: 98% (05 Mar 2018 09:00) (96% - 98%)    PHYSICAL EXAM:   GENERAL: no acute distress  HEENT: NC/AT, EOMI, neck supple, MMM  RESPIRATORY: LCTAB/L, no rhonchi, rales, or wheezing  CARDIOVASCULAR: RRR, no murmurs, gallops, rubs  ABDOMINAL: soft, non-tender, non-distended, positive bowel sounds   EXTREMITIES: no clubbing, cyanosis, or edema  NEUROLOGICAL: alert and oriented x 3, non-focal  SKIN: no rashes or lesions   MUSCULOSKELETAL: no gross joint deformity                          13.1   14.0  )-----------( 91       ( 05 Mar 2018 07:00 )             40.1     03-05    139  |  99  |  31<H>  ----------------------------<  106<H>  5.3   |  23  |  1.43<H>    Ca    9.0      05 Mar 2018 07:00  Phos  3.2     03-05  Mg     1.5     03-05    TPro  7.0  /  Alb  3.7  /  TBili  1.6<H>  /  DBili  x   /  AST  21  /  ALT  14  /  AlkPhos  69  03-04    PT/INR - ( 05 Mar 2018 07:00 )   PT: 72.9 sec;   INR: 6.50 ratio         PTT - ( 04 Mar 2018 03:23 )  PTT:42.1 sec    CAPILLARY BLOOD GLUCOSE        I&O's Summary    04 Mar 2018 07:01  -  05 Mar 2018 07:00  --------------------------------------------------------  IN: 200 mL / OUT: 1000 mL / NET: -800 mL    05 Mar 2018 07:01  -  05 Mar 2018 11:28  --------------------------------------------------------  IN: 180 mL / OUT: 350 mL / NET: -170 mL        MEDICATIONS  (STANDING):  atorvastatin 10 milliGRAM(s) Oral at bedtime  ceFAZolin   IVPB 1000 milliGRAM(s) IV Intermittent every 12 hours  finasteride 5 milliGRAM(s) Oral daily  furosemide   Injectable 40 milliGRAM(s) IV Push daily  tamsulosin 0.4 milliGRAM(s) Oral at bedtime  vancomycin  IVPB 1000 milliGRAM(s) IV Intermittent every 24 hours  verapamil 120 milliGRAM(s) Oral two times a day Patient is a 85y old  Male who presents with a chief complaint of Swelling/Pain in left leg, SOB (04 Mar 2018 13:51)      Overnight Events:  No acute events ON.     REVIEW OF SYSTEMS:  CONSTITUTIONAL: No weakness, fevers or chills  EYES/ENT: No visual changes, no throat pain   RESPIRATORY: No cough, wheezing, hemoptysis; No shortness of breath  CARDIOVASCULAR: No chest pain or palpitations  GASTROINTESTINAL: No abdominal, nausea, vomiting, or hematemesis; No diarrhea or constipation. No melena or hematochezia.  GENITOURINARY: No dysuria, frequency or hematuria  NEUROLOGICAL: No dizziness, numbness, or weakness  SKIN: No itching, burning, rashes, or lesions   All other review of systems is negative unless indicated above.    VITAL SIGNS:  Vital Signs Last 24 Hrs  T(C): 36.7 (05 Mar 2018 09:00), Max: 36.9 (05 Mar 2018 04:53)  T(F): 98 (05 Mar 2018 09:00), Max: 98.5 (05 Mar 2018 04:53)  HR: 60 (05 Mar 2018 09:00) (60 - 61)  BP: 136/70 (05 Mar 2018 09:00) (116/71 - 146/75)  BP(mean): --  RR: 18 (05 Mar 2018 09:00) (18 - 18)  SpO2: 98% (05 Mar 2018 09:00) (96% - 98%)    PHYSICAL EXAM:   GENERAL: no acute distress  HEENT: NC/AT, EOMI, neck supple, MMM  RESPIRATORY: Mild bibasilar crackles  CARDIOVASCULAR: RRR, no m/r/g  ABDOMINAL: soft, non-tender, non-distended, positive bowel sounds   EXTREMITIES: no clubbing, cyanosis, or edema  NEUROLOGICAL: alert and oriented x 3, non-focal  SKIN: Mild erythema and desquamated skin on LLE  MUSCULOSKELETAL: no gross joint deformity                          13.1   14.0  )-----------( 91       ( 05 Mar 2018 07:00 )             40.1     03-05    139  |  99  |  31<H>  ----------------------------<  106<H>  5.3   |  23  |  1.43<H>    Ca    9.0      05 Mar 2018 07:00  Phos  3.2     03-05  Mg     1.5     03-05    TPro  7.0  /  Alb  3.7  /  TBili  1.6<H>  /  DBili  x   /  AST  21  /  ALT  14  /  AlkPhos  69  03-04    PT/INR - ( 05 Mar 2018 07:00 )   PT: 72.9 sec;   INR: 6.50 ratio         PTT - ( 04 Mar 2018 03:23 )  PTT:42.1 sec    CAPILLARY BLOOD GLUCOSE        I&O's Summary    04 Mar 2018 07:01  -  05 Mar 2018 07:00  --------------------------------------------------------  IN: 200 mL / OUT: 1000 mL / NET: -800 mL    05 Mar 2018 07:01  -  05 Mar 2018 11:28  --------------------------------------------------------  IN: 180 mL / OUT: 350 mL / NET: -170 mL        MEDICATIONS  (STANDING):  atorvastatin 10 milliGRAM(s) Oral at bedtime  ceFAZolin   IVPB 1000 milliGRAM(s) IV Intermittent every 12 hours  finasteride 5 milliGRAM(s) Oral daily  furosemide   Injectable 40 milliGRAM(s) IV Push daily  tamsulosin 0.4 milliGRAM(s) Oral at bedtime  vancomycin  IVPB 1000 milliGRAM(s) IV Intermittent every 24 hours  verapamil 120 milliGRAM(s) Oral two times a day

## 2018-03-05 NOTE — PROGRESS NOTE ADULT - PROBLEM SELECTOR PLAN 5
-c/w Verapamil 240mg ER  -Given supratherapeutic INR, will hold coumadin dose (takes 1mg at home, 2mg on sundays). Given hematuria, administered 2.5 mg Vitamin K.   -monitor on tele -c/w home atorvastatin

## 2018-03-05 NOTE — PROGRESS NOTE ADULT - PROBLEM SELECTOR PLAN 8
-Ordered speech and swallow, f/u results  -Mechanical soft diet -c/w with home finasteride  -s/p coudet tip rivas  -Renal US without evidence of hydronephrosis

## 2018-03-06 LAB
ANION GAP SERPL CALC-SCNC: 14 MMOL/L — SIGNIFICANT CHANGE UP (ref 5–17)
APTT BLD: 36.8 SEC — SIGNIFICANT CHANGE UP (ref 27.5–37.4)
BASOPHILS # BLD AUTO: 0 K/UL — SIGNIFICANT CHANGE UP (ref 0–0.2)
BASOPHILS NFR BLD AUTO: 0.1 % — SIGNIFICANT CHANGE UP (ref 0–2)
BUN SERPL-MCNC: 31 MG/DL — HIGH (ref 7–23)
CALCIUM SERPL-MCNC: 8.7 MG/DL — SIGNIFICANT CHANGE UP (ref 8.4–10.5)
CHLORIDE SERPL-SCNC: 99 MMOL/L — SIGNIFICANT CHANGE UP (ref 96–108)
CO2 SERPL-SCNC: 26 MMOL/L — SIGNIFICANT CHANGE UP (ref 22–31)
CREAT SERPL-MCNC: 1.34 MG/DL — HIGH (ref 0.5–1.3)
EOSINOPHIL # BLD AUTO: 0 K/UL — SIGNIFICANT CHANGE UP (ref 0–0.5)
EOSINOPHIL NFR BLD AUTO: 0.2 % — SIGNIFICANT CHANGE UP (ref 0–6)
GLUCOSE SERPL-MCNC: 104 MG/DL — HIGH (ref 70–99)
HCT VFR BLD CALC: 37.4 % — LOW (ref 39–50)
HGB BLD-MCNC: 12.2 G/DL — LOW (ref 13–17)
INR BLD: 2.02 RATIO — HIGH (ref 0.88–1.16)
LYMPHOCYTES # BLD AUTO: 0.6 K/UL — LOW (ref 1–3.3)
LYMPHOCYTES # BLD AUTO: 4.4 % — LOW (ref 13–44)
MAGNESIUM SERPL-MCNC: 1.6 MG/DL — SIGNIFICANT CHANGE UP (ref 1.6–2.6)
MCHC RBC-ENTMCNC: 32.6 GM/DL — SIGNIFICANT CHANGE UP (ref 32–36)
MCHC RBC-ENTMCNC: 33.6 PG — SIGNIFICANT CHANGE UP (ref 27–34)
MCV RBC AUTO: 103 FL — HIGH (ref 80–100)
MONOCYTES # BLD AUTO: 1.1 K/UL — HIGH (ref 0–0.9)
MONOCYTES NFR BLD AUTO: 8.1 % — SIGNIFICANT CHANGE UP (ref 2–14)
NEUTROPHILS # BLD AUTO: 11.7 K/UL — HIGH (ref 1.8–7.4)
NEUTROPHILS NFR BLD AUTO: 87.1 % — HIGH (ref 43–77)
PHOSPHATE SERPL-MCNC: 3.2 MG/DL — SIGNIFICANT CHANGE UP (ref 2.5–4.5)
PLATELET # BLD AUTO: 107 K/UL — LOW (ref 150–400)
POTASSIUM SERPL-MCNC: 4.4 MMOL/L — SIGNIFICANT CHANGE UP (ref 3.5–5.3)
POTASSIUM SERPL-SCNC: 4.4 MMOL/L — SIGNIFICANT CHANGE UP (ref 3.5–5.3)
PROTHROM AB SERPL-ACNC: 22.1 SEC — HIGH (ref 9.8–12.7)
RBC # BLD: 3.62 M/UL — LOW (ref 4.2–5.8)
RBC # FLD: 12.2 % — SIGNIFICANT CHANGE UP (ref 10.3–14.5)
SODIUM SERPL-SCNC: 139 MMOL/L — SIGNIFICANT CHANGE UP (ref 135–145)
UUN UR-MCNC: 417 MG/DL — SIGNIFICANT CHANGE UP
VANCOMYCIN TROUGH SERPL-MCNC: 9.4 UG/ML — LOW (ref 10–20)
WBC # BLD: 13.4 K/UL — HIGH (ref 3.8–10.5)
WBC # FLD AUTO: 13.4 K/UL — HIGH (ref 3.8–10.5)

## 2018-03-06 PROCEDURE — 99232 SBSQ HOSP IP/OBS MODERATE 35: CPT

## 2018-03-06 PROCEDURE — 93355 ECHO TRANSESOPHAGEAL (TEE): CPT

## 2018-03-06 PROCEDURE — 99233 SBSQ HOSP IP/OBS HIGH 50: CPT | Mod: GC

## 2018-03-06 RX ORDER — FUROSEMIDE 40 MG
40 TABLET ORAL
Qty: 0 | Refills: 0 | Status: DISCONTINUED | OUTPATIENT
Start: 2018-03-06 | End: 2018-03-08

## 2018-03-06 RX ORDER — POLYETHYLENE GLYCOL 3350 17 G/17G
17 POWDER, FOR SOLUTION ORAL DAILY
Qty: 0 | Refills: 0 | Status: DISCONTINUED | OUTPATIENT
Start: 2018-03-06 | End: 2018-03-14

## 2018-03-06 RX ORDER — VANCOMYCIN HCL 1 G
750 VIAL (EA) INTRAVENOUS EVERY 12 HOURS
Qty: 0 | Refills: 0 | Status: DISCONTINUED | OUTPATIENT
Start: 2018-03-06 | End: 2018-03-08

## 2018-03-06 RX ORDER — SENNA PLUS 8.6 MG/1
2 TABLET ORAL DAILY
Qty: 0 | Refills: 0 | Status: DISCONTINUED | OUTPATIENT
Start: 2018-03-06 | End: 2018-03-14

## 2018-03-06 RX ORDER — DOCUSATE SODIUM 100 MG
100 CAPSULE ORAL DAILY
Qty: 0 | Refills: 0 | Status: DISCONTINUED | OUTPATIENT
Start: 2018-03-06 | End: 2018-03-14

## 2018-03-06 RX ORDER — WARFARIN SODIUM 2.5 MG/1
1 TABLET ORAL ONCE
Qty: 0 | Refills: 0 | Status: COMPLETED | OUTPATIENT
Start: 2018-03-06 | End: 2018-03-06

## 2018-03-06 RX ADMIN — Medication 120 MILLIGRAM(S): at 17:23

## 2018-03-06 RX ADMIN — ATORVASTATIN CALCIUM 10 MILLIGRAM(S): 80 TABLET, FILM COATED ORAL at 22:49

## 2018-03-06 RX ADMIN — Medication 100 MILLIGRAM(S): at 05:17

## 2018-03-06 RX ADMIN — Medication 150 MILLIGRAM(S): at 22:49

## 2018-03-06 RX ADMIN — Medication 40 MILLIGRAM(S): at 17:23

## 2018-03-06 RX ADMIN — Medication 40 MILLIGRAM(S): at 05:16

## 2018-03-06 RX ADMIN — Medication 100 MILLIGRAM(S): at 17:23

## 2018-03-06 RX ADMIN — SENNA PLUS 2 TABLET(S): 8.6 TABLET ORAL at 11:35

## 2018-03-06 RX ADMIN — TAMSULOSIN HYDROCHLORIDE 0.4 MILLIGRAM(S): 0.4 CAPSULE ORAL at 22:49

## 2018-03-06 RX ADMIN — Medication 250 MILLIGRAM(S): at 16:24

## 2018-03-06 RX ADMIN — WARFARIN SODIUM 1 MILLIGRAM(S): 2.5 TABLET ORAL at 22:49

## 2018-03-06 RX ADMIN — Medication 120 MILLIGRAM(S): at 05:17

## 2018-03-06 RX ADMIN — FINASTERIDE 5 MILLIGRAM(S): 5 TABLET, FILM COATED ORAL at 11:35

## 2018-03-06 NOTE — PROGRESS NOTE ADULT - PROBLEM SELECTOR PLAN 3
-Improving  -Could be 2/2 to drug side effect, unasyn has thrombocytopenia as reported side effect, also potentially infection driven  -Unasyn d/donald; switching to Ancef and keeping Vanco  -Daily CBC  -Continue to monitor

## 2018-03-06 NOTE — PROGRESS NOTE ADULT - SUBJECTIVE AND OBJECTIVE BOX
Patient is a 85y old  Male who presents with a chief complaint of Swelling/Pain in left leg, SOB (04 Mar 2018 13:51)      Overnight Events:      REVIEW OF SYSTEMS:  CONSTITUTIONAL: No weakness, fevers or chills  EYES/ENT: No visual changes, no throat pain   RESPIRATORY: No cough, wheezing, hemoptysis; No shortness of breath  CARDIOVASCULAR: No chest pain or palpitations  GASTROINTESTINAL: No abdominal, nausea, vomiting, or hematemesis; No diarrhea or constipation. No melena or hematochezia.  GENITOURINARY: No dysuria, frequency or hematuria  NEUROLOGICAL: No dizziness, numbness, or weakness  SKIN: No itching, burning, rashes, or lesions   All other review of systems is negative unless indicated above.    VITAL SIGNS:  Vital Signs Last 24 Hrs  T(C): 36.8 (06 Mar 2018 04:00), Max: 36.8 (06 Mar 2018 04:00)  T(F): 98.2 (06 Mar 2018 04:00), Max: 98.2 (06 Mar 2018 04:00)  HR: 64 (06 Mar 2018 04:00) (60 - 66)  BP: 117/66 (06 Mar 2018 04:00) (117/56 - 139/69)  BP(mean): --  RR: 18 (06 Mar 2018 04:00) (17 - 18)  SpO2: 98% (06 Mar 2018 04:00) (95% - 98%)    PHYSICAL EXAM:   GENERAL: no acute distress  HEENT: NC/AT, EOMI, neck supple, MMM  RESPIRATORY: LCTAB/L, no rhonchi, rales, or wheezing  CARDIOVASCULAR: RRR, no murmurs, gallops, rubs  ABDOMINAL: soft, non-tender, non-distended, positive bowel sounds   EXTREMITIES: no clubbing, cyanosis, or edema  NEUROLOGICAL: alert and oriented x 3, non-focal  SKIN: no rashes or lesions   MUSCULOSKELETAL: no gross joint deformity                          12.2   13.4  )-----------( 107      ( 06 Mar 2018 07:13 )             37.4     03-06    139  |  99  |  31<H>  ----------------------------<  104<H>  4.4   |  26  |  1.34<H>    Ca    8.7      06 Mar 2018 07:13  Phos  3.2     03-06  Mg     1.6     03-06      PT/INR - ( 06 Mar 2018 07:13 )   PT: 22.1 sec;   INR: 2.02 ratio         PTT - ( 06 Mar 2018 07:13 )  PTT:36.8 sec    CAPILLARY BLOOD GLUCOSE        I&O's Summary    05 Mar 2018 07:01  -  06 Mar 2018 07:00  --------------------------------------------------------  IN: 940 mL / OUT: 1250 mL / NET: -310 mL        MEDICATIONS  (STANDING):  atorvastatin 10 milliGRAM(s) Oral at bedtime  ceFAZolin   IVPB 1000 milliGRAM(s) IV Intermittent every 12 hours  finasteride 5 milliGRAM(s) Oral daily  furosemide   Injectable 40 milliGRAM(s) IV Push daily  tamsulosin 0.4 milliGRAM(s) Oral at bedtime  vancomycin  IVPB 1000 milliGRAM(s) IV Intermittent every 24 hours  verapamil 120 milliGRAM(s) Oral two times a day Patient is a 85y old  Male who presents with a chief complaint of Swelling/Pain in left leg, SOB (04 Mar 2018 13:51)      Overnight Events:  Pt endorses he feels better and has reduced pain in his LLE. Telemetry shows V pacing; he had 6 beats of wide complex.     REVIEW OF SYSTEMS:  CONSTITUTIONAL: No weakness, fevers or chills  EYES/ENT: No visual changes, no throat pain   RESPIRATORY: No cough, wheezing, hemoptysis; No shortness of breath  CARDIOVASCULAR: No chest pain or palpitations  GASTROINTESTINAL: No abdominal, nausea, vomiting, or hematemesis; No diarrhea or constipation. No melena or hematochezia.  GENITOURINARY: No dysuria, frequency or hematuria  NEUROLOGICAL: No dizziness, numbness, or weakness  SKIN: No itching, burning, rashes, or lesions   All other review of systems is negative unless indicated above.    VITAL SIGNS:  Vital Signs Last 24 Hrs  T(C): 36.8 (06 Mar 2018 04:00), Max: 36.8 (06 Mar 2018 04:00)  T(F): 98.2 (06 Mar 2018 04:00), Max: 98.2 (06 Mar 2018 04:00)  HR: 64 (06 Mar 2018 04:00) (60 - 66)  BP: 117/66 (06 Mar 2018 04:00) (117/56 - 139/69)  BP(mean): --  RR: 18 (06 Mar 2018 04:00) (17 - 18)  SpO2: 98% (06 Mar 2018 04:00) (95% - 98%)    PHYSICAL EXAM:   GENERAL: no acute distress  HEENT: NC/AT, EOMI, neck supple, MMM  RESPIRATORY: Still has some appreciable crackles at bases b/l.   CARDIOVASCULAR: RRR, no murmurs, gallops, rubs  ABDOMINAL: soft, non-tender, non-distended, positive bowel sounds   EXTREMITIES: no clubbing, cyanosis, or edema  NEUROLOGICAL: alert and oriented x 3, non-focal  SKIN: Improving erythema on LLE; trace edema on LE b/l.    MUSCULOSKELETAL: no gross joint deformity                          12.2   13.4  )-----------( 107      ( 06 Mar 2018 07:13 )             37.4     03-06    139  |  99  |  31<H>  ----------------------------<  104<H>  4.4   |  26  |  1.34<H>    Ca    8.7      06 Mar 2018 07:13  Phos  3.2     03-06  Mg     1.6     03-06      PT/INR - ( 06 Mar 2018 07:13 )   PT: 22.1 sec;   INR: 2.02 ratio         PTT - ( 06 Mar 2018 07:13 )  PTT:36.8 sec    CAPILLARY BLOOD GLUCOSE        I&O's Summary    05 Mar 2018 07:01  -  06 Mar 2018 07:00  --------------------------------------------------------  IN: 940 mL / OUT: 1250 mL / NET: -310 mL        MEDICATIONS  (STANDING):  atorvastatin 10 milliGRAM(s) Oral at bedtime  ceFAZolin   IVPB 1000 milliGRAM(s) IV Intermittent every 12 hours  finasteride 5 milliGRAM(s) Oral daily  furosemide   Injectable 40 milliGRAM(s) IV Push daily  tamsulosin 0.4 milliGRAM(s) Oral at bedtime  vancomycin  IVPB 1000 milliGRAM(s) IV Intermittent every 24 hours  verapamil 120 milliGRAM(s) Oral two times a day

## 2018-03-06 NOTE — CONSULT NOTE ADULT - SUBJECTIVE AND OBJECTIVE BOX
Wound SURGERY CONSULT NOTE    HPI:  85 y.o. M with PMH of HTN, HLD, TIA, throat cancer s/p radiation and chemo (2011), aortic valve replacement, afib on coumadin, BPH, s/p PMM who presents  with 1 day of L leg redness, pain, and swelling. Patient has no prior history of cellulitis, but does have history of hematoma to that leg a few months ago. Patient a blister that popped earlier this week. Patient denies current trauma to the area. Pt noted many yrs ago as a teen in [place] he had an injury down to the bone in this area on his shin.  Patient denies fevers or chills at home. Of note, patient's family have noted patient to have become increasingly short of breath with exertion. Patient at baseline, due to his history of throat cancer, sleeps propped up on a few pillows, and so it is unable to tell whether patient is orthopneic. Patient denies PND. He does not carry a prior diagnosis of heart failure. Of note, patient had some outpatient imaging performed which indicated lung nodules and a pericardial effusion for which he is being worked up. Patient denies N/V, diarrhea, dysuria, hematuria, or melena. Pt endorses dysphagia has been present s/p radiation 7 years ago.  No f/c/s    In the ED, patient's CBC was notable for leukocytosis of 22K with 4% bands, as well as mild thrombocytopenia of 141K. Pro-BNP was elevated to 4277. CXR was performed which showed small to moderate b/l pleural effusions. Lactate was somewhat elevated to 2.2.  X-ray of LLE was remarkable for soft tissue edema; no soft tissue gas collection or gross radiographic evidence for osteomyelitis. Doppler did not indicate DVT. Patient was treated with Clindamycin x 1 dose and given IV Tylenol for pain. Wound is less painful currently, but has some yellow grey drainage, 'coating'.  DSD placed while awaiting wound consult.      PAST MEDICAL & SURGICAL HISTORY:  A-fib  TIA  x 8yrs  BPH (Benign Prostatic Hyperplasia)  Hypertension  Dyslipidemia  Cardiac Dysrhythmia  S/p AVR   Throat cancer s/p radiation and chemo   s/p Excision of Basal Cell Carcinoma of Nose  s/p cardioversion  of Atrial Fibrillation  s/p PMM      REVIEW OF SYSTEMS    Skin/Musculoskeletal:	see HPI  All other systems negative    MEDICATIONS  (STANDING):  atorvastatin 10 milliGRAM(s) Oral at bedtime  ceFAZolin   IVPB 1000 milliGRAM(s) IV Intermittent every 12 hours  docusate sodium 100 milliGRAM(s) Oral daily  finasteride 5 milliGRAM(s) Oral daily  furosemide   Injectable 40 milliGRAM(s) IV Push daily  senna 2 Tablet(s) Oral daily  tamsulosin 0.4 milliGRAM(s) Oral at bedtime  vancomycin  IVPB 1000 milliGRAM(s) IV Intermittent every 24 hours  verapamil 120 milliGRAM(s) Oral two times a day    MEDICATIONS  (PRN):  acetaminophen   Tablet 650 milliGRAM(s) Oral every 6 hours PRN for pain  polyethylene glycol 3350 17 Gram(s) Oral daily PRN Constipation    No Known Allergies    SOCIAL HISTORY:  ; Denies current smoking (stopped many years ago, denies ETOH, drugs    FAMILY HISTORY:  No pertinent family history in first degree relatives      Vital Signs Last 24 Hrs  T(C): 36.8 (06 Mar 2018 04:00), Max: 36.8 (06 Mar 2018 04:00)  T(F): 98.2 (06 Mar 2018 04:00), Max: 98.2 (06 Mar 2018 04:00)  HR: 64 (06 Mar 2018 04:00) (61 - 66)  BP: 117/66 (06 Mar 2018 04:00) (117/56 - 139/69)  BP(mean): --  RR: 18 (06 Mar 2018 04:00) (17 - 18)  SpO2: 98% (06 Mar 2018 04:00) (95% - 98%)    NAD / A&Ox3  WD/ WN/ WG  Versa Care P500 bed    Cardiovascular: RRR     Respiratory: CTA    Gastrointestinal soft NT/ND (+)BS    Neurology  strength & sensation grossly intact    Musculoskeletal/Vascular: FROM x4  Mild edema  (+) DP/PT pulses palpable  (+) mild hemosiderin staining  no deformities/ contractures  Wound cleansed of biofilm and serous material to reveal moist & granular wound  3.3cm x 3.5cm x 0.1cm  Scant serosanguinous drainage  No odor, erythema, increased warmth, tenderness, induration, fluctuance    Skin: Dry, frail,  ecchymosis w/o hematoma    LABS:                        12.2   13.4  )-----------( 107      ( 06 Mar 2018 07:13 )             37.4     03-06    139  |  99  |  31<H>  ----------------------------<  104<H>  4.4   |  26  |  1.34<H>    Ca    8.7      06 Mar 2018 07:13  Phos  3.2     03-06  Mg     1.6     03-06      PT/INR - ( 06 Mar 2018 07:13 )   PT: 22.1 sec;   INR: 2.02 ratio    PTT - ( 06 Mar 2018 07:13 )  PTT:36.8 sec      Albumin, Serum: 3.7 g/dL (03-04 @ 03:23)        RADIOLOGY & ADDITIONAL STUDIES:  < from: VA Duplex Lower Ext Vein Scan, Bilat (03.04.18 @ 04:50) >  FINDINGS:    There is normal compressibility of the bilateral common femoral, femoral   and popliteal veins. Limited evaluation of the right posterior tibial and   peroneal veins secondary to soft tissue edema, however, bilateral calf   veins are compressible.    Doppler examination shows normal spontaneous and phasic flow.    Diffuse bilateral soft tissue edema is noted.    IMPRESSION:     No evidence of bilateral lower extremity deep venous thrombosis.    Xray Tibia + Fibula 2 Views, Left (03.04.18 @ 03:23) >  FINDINGS:  No acute fracture or dislocation.  Preserved joint spaces.  Diffuse soft tissue edema.  No tracking soft tissue gas collections, radiopaque foreign bodies, or   gross radiographic evidence for osteomyelitis.    IMPRESSION:  No acute fracture or dislocation.  Diffuse soft tissue edema.  No tracking soft tissue gas collections, radiopaque foreign bodies, or   gross radiographic evidence for osteomyelitis.      Cultures:  Culture - Blood (03.04.18 @ 05:28)    Specimen Source: .Blood Blood-Venous    Culture Results:   No growth to date.

## 2018-03-06 NOTE — PROGRESS NOTE ADULT - PROBLEM SELECTOR PLAN 6
-c/w Verapamil 240mg ER  -INR 2 mg s/p dose of 2.5 mg Vitamin K  -will dose coumadin tonight   -monitor on tele

## 2018-03-06 NOTE — PHYSICAL THERAPY INITIAL EVALUATION ADULT - PERTINENT HX OF CURRENT PROBLEM, REHAB EVAL
85 y.o. M with PMH of HTN, HLD, TIA, throat cancer s/p radiation and chemo (2011), aortic valve replacement, afib on coumadin, BPH, s/p PMM who presents  with 1 day of L leg redness, pain, and swelling with leukocytosis BARON, LE edema, b/l pleural effusion, and elevated Pro-BNP in setting of possible acute heart failure exacerbation and left leg cellulitis.

## 2018-03-06 NOTE — PROGRESS NOTE ADULT - PROBLEM SELECTOR PLAN 1
-Improving  -erythema, swelling, tenderness of LLE consistent with cellulitis in setting of leukocytosis, no fluctuance or sign of drainable abcess on exam  -X-ray LLE without evidence of nec fasciitis or osteomyelitis  -Patient s/p 1 dose of Clindamycin in ED  - Vanco 1 gram q24, vanc trough pre 3rd level, monitor   creatinine  -d/donald Unasyn, now Ancef for better MSSA and strep coverage  -Will maintain antibiotic coverage for 5-7 days  -f/u blood culture  -trend cbc  -wound care following

## 2018-03-06 NOTE — PROGRESS NOTE ADULT - PROBLEM SELECTOR PLAN 7
-creatinine 1.4, baseline creatinine is 1.0-1.1, likely due to cardiorenal vs infection  -Avoid nephrotoxins  -hold ACE-I

## 2018-03-06 NOTE — PROGRESS NOTE ADULT - PROBLEM SELECTOR PLAN 8
-c/w with home finasteride, initiated flomax this hospitalization  -s/p coudet tip rivas  -Renal US without evidence of hydronephrosis

## 2018-03-06 NOTE — PHYSICAL THERAPY INITIAL EVALUATION ADULT - ACTIVE RANGE OF MOTION EXAMINATION, REHAB EVAL
bilateral lower extremity Active ROM was WNL (within normal limits)/except LLE hip flexion/bilateral  lower extremity Active ROM was WFL (within functional limits)

## 2018-03-06 NOTE — PHYSICAL THERAPY INITIAL EVALUATION ADULT - PLANNED THERAPY INTERVENTIONS, PT EVAL
gait training/stairs; Pt will neg 5 steps ind pendently using handrails in 2 weeks/strengthening/balance training

## 2018-03-06 NOTE — PROGRESS NOTE ADULT - PROBLEM SELECTOR PLAN 2
-+orthopnea, crackles, b/l pitting edema, b/l pleural effusions with elevated Pro BNP, grossly fluid overloaded with high suspicion for heart failure  -f/u TTE  -c/w with Lasix 40 mg IV  -Troponin negative x 3  -Observe on telemetry  -Strict I's and O's  -Daily weights

## 2018-03-06 NOTE — CONSULT NOTE ADULT - ATTENDING COMMENTS
Above noted  84 yo male, former smoker, retired [employer], with possible traumatic wound of left lower leg, and history of Coumadin use  Soft eschar mechanically debrided, with probable very small liquified hematoma residual noted  No odor  no cellulitis  Base of wound is clean  Wound  is found in area of probable stasis dermatitis, present bilaterally in a pre tibial location  H/o old traumatic wound in this area, with report that skin was always "different"  Wound is very superficial, no bone visible, but tibia in close proximity  Status d/w daughter and patient, in Canadian and English  Op f/u info provided Above noted  84 yo male, former smoker, retired [employer], with possible traumatic wound of left lower leg, and history of Coumadin use  Using nasal O2 at bedrest, but denies SOB  Soft eschar mechanically debrided, with probable very small liquified hematoma residual noted  No odor  no cellulitis  Base of wound is clean  Wound  is found in area of probable stasis dermatitis, present bilaterally in a pre tibial location  H/o old traumatic wound in this area, with report that skin was always "different"  Wound is very superficial, no bone visible, but tibia in close proximity  Status d/w daughter and patient, in [place] and English  Op f/u info provided

## 2018-03-07 LAB
ANION GAP SERPL CALC-SCNC: 12 MMOL/L — SIGNIFICANT CHANGE UP (ref 5–17)
APTT BLD: 31.9 SEC — SIGNIFICANT CHANGE UP (ref 27.5–37.4)
BUN SERPL-MCNC: 25 MG/DL — HIGH (ref 7–23)
CALCIUM SERPL-MCNC: 8.8 MG/DL — SIGNIFICANT CHANGE UP (ref 8.4–10.5)
CHLORIDE SERPL-SCNC: 97 MMOL/L — SIGNIFICANT CHANGE UP (ref 96–108)
CO2 SERPL-SCNC: 29 MMOL/L — SIGNIFICANT CHANGE UP (ref 22–31)
CREAT SERPL-MCNC: 1.27 MG/DL — SIGNIFICANT CHANGE UP (ref 0.5–1.3)
FOLATE SERPL-MCNC: 6.1 NG/ML — SIGNIFICANT CHANGE UP (ref 4.8–24.2)
GLUCOSE SERPL-MCNC: 96 MG/DL — SIGNIFICANT CHANGE UP (ref 70–99)
HCT VFR BLD CALC: 36.7 % — LOW (ref 39–50)
HGB BLD-MCNC: 12.1 G/DL — LOW (ref 13–17)
INR BLD: 1.36 RATIO — HIGH (ref 0.88–1.16)
MAGNESIUM SERPL-MCNC: 1.8 MG/DL — SIGNIFICANT CHANGE UP (ref 1.6–2.6)
MCHC RBC-ENTMCNC: 33.1 GM/DL — SIGNIFICANT CHANGE UP (ref 32–36)
MCHC RBC-ENTMCNC: 34.1 PG — HIGH (ref 27–34)
MCV RBC AUTO: 103 FL — HIGH (ref 80–100)
PHOSPHATE SERPL-MCNC: 3.2 MG/DL — SIGNIFICANT CHANGE UP (ref 2.5–4.5)
PLATELET # BLD AUTO: 130 K/UL — LOW (ref 150–400)
POTASSIUM SERPL-MCNC: 4 MMOL/L — SIGNIFICANT CHANGE UP (ref 3.5–5.3)
POTASSIUM SERPL-SCNC: 4 MMOL/L — SIGNIFICANT CHANGE UP (ref 3.5–5.3)
PROTHROM AB SERPL-ACNC: 14.9 SEC — HIGH (ref 9.8–12.7)
RBC # BLD: 3.56 M/UL — LOW (ref 4.2–5.8)
RBC # FLD: 12 % — SIGNIFICANT CHANGE UP (ref 10.3–14.5)
SODIUM SERPL-SCNC: 138 MMOL/L — SIGNIFICANT CHANGE UP (ref 135–145)
VIT B12 SERPL-MCNC: 1151 PG/ML — SIGNIFICANT CHANGE UP (ref 232–1245)
WBC # BLD: 10.8 K/UL — HIGH (ref 3.8–10.5)
WBC # FLD AUTO: 10.8 K/UL — HIGH (ref 3.8–10.5)

## 2018-03-07 PROCEDURE — 99233 SBSQ HOSP IP/OBS HIGH 50: CPT | Mod: GC

## 2018-03-07 RX ORDER — MAGNESIUM SULFATE 500 MG/ML
2 VIAL (ML) INJECTION ONCE
Qty: 0 | Refills: 0 | Status: COMPLETED | OUTPATIENT
Start: 2018-03-07 | End: 2018-03-07

## 2018-03-07 RX ORDER — METOPROLOL TARTRATE 50 MG
50 TABLET ORAL
Qty: 0 | Refills: 0 | Status: DISCONTINUED | OUTPATIENT
Start: 2018-03-08 | End: 2018-03-12

## 2018-03-07 RX ORDER — LISINOPRIL 2.5 MG/1
5 TABLET ORAL DAILY
Qty: 0 | Refills: 0 | Status: DISCONTINUED | OUTPATIENT
Start: 2018-03-07 | End: 2018-03-14

## 2018-03-07 RX ORDER — METOPROLOL TARTRATE 50 MG
25 TABLET ORAL ONCE
Qty: 0 | Refills: 0 | Status: COMPLETED | OUTPATIENT
Start: 2018-03-07 | End: 2018-03-07

## 2018-03-07 RX ORDER — ACETAMINOPHEN 500 MG
1000 TABLET ORAL ONCE
Qty: 0 | Refills: 0 | Status: COMPLETED | OUTPATIENT
Start: 2018-03-07 | End: 2018-03-07

## 2018-03-07 RX ORDER — WARFARIN SODIUM 2.5 MG/1
2 TABLET ORAL ONCE
Qty: 0 | Refills: 0 | Status: COMPLETED | OUTPATIENT
Start: 2018-03-07 | End: 2018-03-07

## 2018-03-07 RX ORDER — METOPROLOL TARTRATE 50 MG
25 TABLET ORAL DAILY
Qty: 0 | Refills: 0 | Status: DISCONTINUED | OUTPATIENT
Start: 2018-03-07 | End: 2018-03-07

## 2018-03-07 RX ADMIN — ATORVASTATIN CALCIUM 10 MILLIGRAM(S): 80 TABLET, FILM COATED ORAL at 21:53

## 2018-03-07 RX ADMIN — FINASTERIDE 5 MILLIGRAM(S): 5 TABLET, FILM COATED ORAL at 09:44

## 2018-03-07 RX ADMIN — Medication 40 MILLIGRAM(S): at 05:19

## 2018-03-07 RX ADMIN — Medication 400 MILLIGRAM(S): at 09:45

## 2018-03-07 RX ADMIN — LISINOPRIL 5 MILLIGRAM(S): 2.5 TABLET ORAL at 09:44

## 2018-03-07 RX ADMIN — Medication 25 MILLIGRAM(S): at 14:55

## 2018-03-07 RX ADMIN — Medication 100 MILLIGRAM(S): at 17:39

## 2018-03-07 RX ADMIN — Medication 50 GRAM(S): at 01:19

## 2018-03-07 RX ADMIN — Medication 120 MILLIGRAM(S): at 17:39

## 2018-03-07 RX ADMIN — Medication 40 MILLIGRAM(S): at 17:40

## 2018-03-07 RX ADMIN — TAMSULOSIN HYDROCHLORIDE 0.4 MILLIGRAM(S): 0.4 CAPSULE ORAL at 21:53

## 2018-03-07 RX ADMIN — Medication 150 MILLIGRAM(S): at 10:06

## 2018-03-07 RX ADMIN — Medication 25 MILLIGRAM(S): at 09:44

## 2018-03-07 RX ADMIN — SENNA PLUS 2 TABLET(S): 8.6 TABLET ORAL at 09:44

## 2018-03-07 RX ADMIN — Medication 150 MILLIGRAM(S): at 21:53

## 2018-03-07 RX ADMIN — Medication 120 MILLIGRAM(S): at 05:19

## 2018-03-07 RX ADMIN — Medication 100 MILLIGRAM(S): at 05:19

## 2018-03-07 RX ADMIN — WARFARIN SODIUM 2 MILLIGRAM(S): 2.5 TABLET ORAL at 21:53

## 2018-03-07 RX ADMIN — Medication 1000 MILLIGRAM(S): at 10:45

## 2018-03-07 NOTE — SWALLOW BEDSIDE ASSESSMENT ADULT - SLP PERTINENT HISTORY OF CURRENT PROBLEM
85 y.o. M with PMH of HTN, HLD, TIA, throat cancer s/p radiation and chemo (2011), aortic valve replacement, afib on coumadin, BPH, s/p PMM who presents  with 1 day of L leg redness, pain, and swelling. Patient has no prior history of cellulitis, but does have history of hematoma to that leg a few months ago. Patient a blister that popped earlier this week. Patient denies trauma to the area. Patient denies fevers or chills at home. Of note, patient's family have noted patient to have become increasingly short of breath with exertion. Patient at baseline, due to his history of throat cancer, sleeps propped up on a few pillows, and so it is unable to tell whether patient is orthopneic. Patient denies PND. He does not carry a prior diagnosis of heart failure.

## 2018-03-07 NOTE — PROGRESS NOTE ADULT - PROBLEM SELECTOR PLAN 2
-+orthopnea, crackles, b/l pitting edema, b/l pleural effusions with elevated Pro BNP, grossly fluid overloaded with high suspicion for heart failure  -f/u TTE  -c/w with Lasix 40 mg IV  -Troponin negative x 3  -Observe on telemetry  -Strict I's and O's  -Daily weights -+orthopnea, crackles, b/l pitting edema, b/l pleural effusions with elevated Pro BNP, grossly fluid overloaded with high suspicion for heart failure  -TTE showing mild to moderate MR, mild MS, TAVR, preserved systolic function, indeterminate diastolic function in the setting of afib and s/p PPM  -Given clinical presentation consistent with HF with evidence of MR, will optimize meds; restarte AceI and BB, d/c CCB as it is not ideal in HF   -c/w with Lasix 40 mg IV BID  -Observe on telemetry  -Strict I's and O's  -Daily weights

## 2018-03-07 NOTE — PROGRESS NOTE ADULT - PROBLEM SELECTOR PLAN 7
-creatinine 1.4, baseline creatinine is 1.0-1.1, likely due to cardiorenal vs infection  -Avoid nephrotoxins  -hold ACE-I -Improving  -creatinine 1.4, baseline creatinine is 1.0-1.1, likely due to cardiorenal vs infection  -Avoid nephrotoxins

## 2018-03-07 NOTE — PROGRESS NOTE ADULT - PROBLEM SELECTOR PLAN 1
-Erythema appears more pronounced today; however, leukocytosis resolving in setting of treatment with antibiotics.   - no fluctuance or sign of drainable abcess on exam  -X-ray LLE without evidence of nec fasciitis or osteomyelitis  - Changed vanco to   -d/donald Unasyn, now Ancef for better MSSA and strep coverage  -Will maintain antibiotic coverage for 5-7 days  -f/u blood culture  -trend cbc  -wound care following -Erythema appears more pronounced today; however, leukocytosis resolving in setting of treatment with antibiotics.   - no fluctuance or sign of drainable abscess on exam  -X-ray LLE without evidence of nec fasciitis or osteomyelitis  - Changed vanco to   -d/donald Unasyn, now Ancef for better MSSA and strep coverage  -Will maintain antibiotic coverage for 5-7 days  -f/u blood culture  -trend cbc  -wound care following

## 2018-03-07 NOTE — SWALLOW BEDSIDE ASSESSMENT ADULT - COMMENTS
Of note, patient had some outpatient imaging performed which indicated lung nodules and a pericardial effusion for which he is being worked up. Patient denies N/V, diarrhea, dysuria, hematuria, or melena. Pt endorses dysphagia has been present s/p radiation 7 years ago. In the ED, patient's CBC was notable for leukocytosis of 22K with 4% bands, as well as mild thrombocytopenia of 141K. Pro-BNP was elevated to 4277. CXR was performed which showed small to moderate b/l pleural effusions. Lactate was somewhat elevated to 2.2. X-ray of LLE was remarkable for soft tissue edema; no soft tissue gas collection or gross radiographic evidence for osteomyelitis. Doppler did not indicate DVT. Patient was treated with Clindamycin x 1 dose and given IV Tylenol for pain. 3/4 CXR: Small to moderate bilateral pleural effusions with adjacent passive atelectasis. XR: No acute fracture or dislocation. Diffuse soft tissue edema. No tracking soft tissue gas collections, radiopaque foreign bodies, or gross radiographic evidence for osteomyelitis.

## 2018-03-07 NOTE — SWALLOW BEDSIDE ASSESSMENT ADULT - SWALLOW EVAL: DIAGNOSIS
Attempted to see patient for bedside swallow evaluation. Pt off floor at echo. This service to f/u tomorrow, 3/7.
Case d/w MD. Hold off on evaluation at this time as pt with longstanding h/o dysphagia 2/2 H/N CA with no current concern for aspiration PNA.

## 2018-03-07 NOTE — PROGRESS NOTE ADULT - PROBLEM SELECTOR PLAN 6
-c/w Verapamil 240mg ER  -INR 2 mg s/p dose of 2.5 mg Vitamin K  -will dose coumadin tonight   -monitor on tele -c/w Verapamil 240mg ER; will d/c verapamil and optimize BB  -INR subtherapeutic today  -will dose coumadin tonight   -monitor on tele

## 2018-03-07 NOTE — PROGRESS NOTE ADULT - SUBJECTIVE AND OBJECTIVE BOX
Patient is a 85y old  Male who presents with a chief complaint of Swelling/Pain in left leg, SOB (04 Mar 2018 13:51)      Overnight Events:      REVIEW OF SYSTEMS:  CONSTITUTIONAL: No weakness, fevers or chills  EYES/ENT: No visual changes, no throat pain   RESPIRATORY: No cough, wheezing, hemoptysis; No shortness of breath  CARDIOVASCULAR: No chest pain or palpitations  GASTROINTESTINAL: No abdominal, nausea, vomiting, or hematemesis; No diarrhea or constipation. No melena or hematochezia.  GENITOURINARY: No dysuria, frequency or hematuria  NEUROLOGICAL: No dizziness, numbness, or weakness  SKIN: No itching, burning, rashes, or lesions   All other review of systems is negative unless indicated above.    VITAL SIGNS:  Vital Signs Last 24 Hrs  T(C): 36.5 (07 Mar 2018 04:08), Max: 36.7 (06 Mar 2018 20:41)  T(F): 97.7 (07 Mar 2018 04:08), Max: 98 (06 Mar 2018 20:41)  HR: 68 (07 Mar 2018 04:08) (64 - 78)  BP: 132/68 (07 Mar 2018 04:08) (118/62 - 136/61)  BP(mean): --  RR: 18 (07 Mar 2018 04:08) (17 - 18)  SpO2: 95% (07 Mar 2018 04:08) (94% - 98%)    PHYSICAL EXAM:   GENERAL: no acute distress  HEENT: NC/AT, EOMI, neck supple, MMM  RESPIRATORY: LCTAB/L, no rhonchi, rales, or wheezing  CARDIOVASCULAR: RRR, no murmurs, gallops, rubs  ABDOMINAL: soft, non-tender, non-distended, positive bowel sounds   EXTREMITIES: no clubbing, cyanosis, or edema  NEUROLOGICAL: alert and oriented x 3, non-focal  SKIN: no rashes or lesions   MUSCULOSKELETAL: no gross joint deformity                          12.1   10.8  )-----------( 130      ( 07 Mar 2018 06:56 )             36.7     03-07    138  |  97  |  25<H>  ----------------------------<  96  4.0   |  29  |  1.27    Ca    8.8      07 Mar 2018 06:56  Phos  3.2     03-07  Mg     1.8     03-07      PT/INR - ( 07 Mar 2018 06:56 )   PT: 14.9 sec;   INR: 1.36 ratio         PTT - ( 07 Mar 2018 06:56 )  PTT:31.9 sec    CAPILLARY BLOOD GLUCOSE        I&O's Summary    06 Mar 2018 07:01  -  07 Mar 2018 07:00  --------------------------------------------------------  IN: 600 mL / OUT: 1900 mL / NET: -1300 mL        MEDICATIONS  (STANDING):  acetaminophen  IVPB. 1000 milliGRAM(s) IV Intermittent once  atorvastatin 10 milliGRAM(s) Oral at bedtime  ceFAZolin   IVPB 1000 milliGRAM(s) IV Intermittent every 12 hours  docusate sodium 100 milliGRAM(s) Oral daily  finasteride 5 milliGRAM(s) Oral daily  furosemide   Injectable 40 milliGRAM(s) IV Push two times a day  lisinopril 5 milliGRAM(s) Oral daily  metoprolol succinate ER 25 milliGRAM(s) Oral daily  senna 2 Tablet(s) Oral daily  tamsulosin 0.4 milliGRAM(s) Oral at bedtime  vancomycin  IVPB 750 milliGRAM(s) IV Intermittent every 12 hours  verapamil 120 milliGRAM(s) Oral two times a day Patient is a 85y old  Male who presents with a chief complaint of Swelling/Pain in left leg, SOB (04 Mar 2018 13:51)      Overnight Events:  Pt with increased pain and redness in LLE yesterday after ambulating.     REVIEW OF SYSTEMS:  CONSTITUTIONAL: No weakness, fevers or chills  EYES/ENT: No visual changes, no throat pain   RESPIRATORY: No cough, wheezing, hemoptysis; No shortness of breath  CARDIOVASCULAR: No chest pain or palpitations  GASTROINTESTINAL: No abdominal, nausea, vomiting, or hematemesis; No diarrhea or constipation. No melena or hematochezia.  GENITOURINARY: No dysuria, frequency or hematuria  NEUROLOGICAL: No dizziness, numbness, or weakness  SKIN: Redness in LE bilaterally, wound on LLE   All other review of systems is negative unless indicated above.    VITAL SIGNS:  Vital Signs Last 24 Hrs  T(C): 36.5 (07 Mar 2018 04:08), Max: 36.7 (06 Mar 2018 20:41)  T(F): 97.7 (07 Mar 2018 04:08), Max: 98 (06 Mar 2018 20:41)  HR: 68 (07 Mar 2018 04:08) (64 - 78)  BP: 132/68 (07 Mar 2018 04:08) (118/62 - 136/61)  BP(mean): --  RR: 18 (07 Mar 2018 04:08) (17 - 18)  SpO2: 95% (07 Mar 2018 04:08) (94% - 98%)    PHYSICAL EXAM:   GENERAL: no acute distress  HEENT: NC/AT, EOMI, neck supple, MMM  RESPIRATORY: Mild crackles at bases b/l   CARDIOVASCULAR: RRR, no murmurs, gallops, rubs  ABDOMINAL: soft, non-tender, non-distended, positive bowel sounds   EXTREMITIES: no clubbing, cyanosis, or edema  NEUROLOGICAL: alert and oriented x 3, non-focal  SKIN: Erythema on LLE, leg wrapped in bandage; erythema also noted on dorsum of feet b/l  MUSCULOSKELETAL: no gross joint deformity                          12.1   10.8  )-----------( 130      ( 07 Mar 2018 06:56 )             36.7     03-07    138  |  97  |  25<H>  ----------------------------<  96  4.0   |  29  |  1.27    Ca    8.8      07 Mar 2018 06:56  Phos  3.2     03-07  Mg     1.8     03-07      PT/INR - ( 07 Mar 2018 06:56 )   PT: 14.9 sec;   INR: 1.36 ratio         PTT - ( 07 Mar 2018 06:56 )  PTT:31.9 sec    CAPILLARY BLOOD GLUCOSE        I&O's Summary    06 Mar 2018 07:01  -  07 Mar 2018 07:00  --------------------------------------------------------  IN: 600 mL / OUT: 1900 mL / NET: -1300 mL        MEDICATIONS  (STANDING):  acetaminophen  IVPB. 1000 milliGRAM(s) IV Intermittent once  atorvastatin 10 milliGRAM(s) Oral at bedtime  ceFAZolin   IVPB 1000 milliGRAM(s) IV Intermittent every 12 hours  docusate sodium 100 milliGRAM(s) Oral daily  finasteride 5 milliGRAM(s) Oral daily  furosemide   Injectable 40 milliGRAM(s) IV Push two times a day  lisinopril 5 milliGRAM(s) Oral daily  metoprolol succinate ER 25 milliGRAM(s) Oral daily  senna 2 Tablet(s) Oral daily  tamsulosin 0.4 milliGRAM(s) Oral at bedtime  vancomycin  IVPB 750 milliGRAM(s) IV Intermittent every 12 hours  verapamil 120 milliGRAM(s) Oral two times a day

## 2018-03-07 NOTE — PROGRESS NOTE ADULT - PROBLEM SELECTOR PLAN 8
-c/w with home finasteride, initiated flomax this hospitalization  -s/p coudet tip rivas  -Renal US without evidence of hydronephrosis -Plan to d/c rivas and attempt TOV tomorrow  -c/w with home finasteride, initiated flomax this hospitalization  -s/p coudet tip rivas  -Renal US without evidence of hydronephrosis

## 2018-03-08 LAB
ANION GAP SERPL CALC-SCNC: 11 MMOL/L — SIGNIFICANT CHANGE UP (ref 5–17)
APTT BLD: 41.1 SEC — HIGH (ref 27.5–37.4)
BUN SERPL-MCNC: 24 MG/DL — HIGH (ref 7–23)
CALCIUM SERPL-MCNC: 8.9 MG/DL — SIGNIFICANT CHANGE UP (ref 8.4–10.5)
CHLORIDE SERPL-SCNC: 93 MMOL/L — LOW (ref 96–108)
CO2 SERPL-SCNC: 33 MMOL/L — HIGH (ref 22–31)
CREAT SERPL-MCNC: 1.32 MG/DL — HIGH (ref 0.5–1.3)
GLUCOSE SERPL-MCNC: 78 MG/DL — SIGNIFICANT CHANGE UP (ref 70–99)
HCT VFR BLD CALC: 38.6 % — LOW (ref 39–50)
HGB BLD-MCNC: 12.6 G/DL — LOW (ref 13–17)
INR BLD: 1.31 RATIO — HIGH (ref 0.88–1.16)
MAGNESIUM SERPL-MCNC: 1.5 MG/DL — LOW (ref 1.6–2.6)
MCHC RBC-ENTMCNC: 32.8 GM/DL — SIGNIFICANT CHANGE UP (ref 32–36)
MCHC RBC-ENTMCNC: 33.6 PG — SIGNIFICANT CHANGE UP (ref 27–34)
MCV RBC AUTO: 103 FL — HIGH (ref 80–100)
PHOSPHATE SERPL-MCNC: 3.2 MG/DL — SIGNIFICANT CHANGE UP (ref 2.5–4.5)
PLATELET # BLD AUTO: 144 K/UL — LOW (ref 150–400)
POTASSIUM SERPL-MCNC: 3.9 MMOL/L — SIGNIFICANT CHANGE UP (ref 3.5–5.3)
POTASSIUM SERPL-SCNC: 3.9 MMOL/L — SIGNIFICANT CHANGE UP (ref 3.5–5.3)
PROTHROM AB SERPL-ACNC: 14.4 SEC — HIGH (ref 9.8–12.7)
RBC # BLD: 3.76 M/UL — LOW (ref 4.2–5.8)
RBC # FLD: 11.8 % — SIGNIFICANT CHANGE UP (ref 10.3–14.5)
SODIUM SERPL-SCNC: 137 MMOL/L — SIGNIFICANT CHANGE UP (ref 135–145)
VANCOMYCIN TROUGH SERPL-MCNC: 22.9 UG/ML — HIGH (ref 10–20)
WBC # BLD: 9.3 K/UL — SIGNIFICANT CHANGE UP (ref 3.8–10.5)
WBC # FLD AUTO: 9.3 K/UL — SIGNIFICANT CHANGE UP (ref 3.8–10.5)

## 2018-03-08 PROCEDURE — 99233 SBSQ HOSP IP/OBS HIGH 50: CPT | Mod: GC

## 2018-03-08 RX ORDER — VERAPAMIL HCL 240 MG
100 CAPSULE, EXTENDED RELEASE PELLETS 24 HR ORAL
Qty: 0 | Refills: 0 | Status: DISCONTINUED | OUTPATIENT
Start: 2018-03-08 | End: 2018-03-08

## 2018-03-08 RX ORDER — MAGNESIUM SULFATE 500 MG/ML
2 VIAL (ML) INJECTION ONCE
Qty: 0 | Refills: 0 | Status: COMPLETED | OUTPATIENT
Start: 2018-03-08 | End: 2018-03-08

## 2018-03-08 RX ORDER — VANCOMYCIN HCL 1 G
500 VIAL (EA) INTRAVENOUS EVERY 12 HOURS
Qty: 0 | Refills: 0 | Status: DISCONTINUED | OUTPATIENT
Start: 2018-03-08 | End: 2018-03-08

## 2018-03-08 RX ORDER — FUROSEMIDE 40 MG
40 TABLET ORAL DAILY
Qty: 0 | Refills: 0 | Status: DISCONTINUED | OUTPATIENT
Start: 2018-03-09 | End: 2018-03-09

## 2018-03-08 RX ORDER — VANCOMYCIN HCL 1 G
500 VIAL (EA) INTRAVENOUS EVERY 12 HOURS
Qty: 0 | Refills: 0 | Status: DISCONTINUED | OUTPATIENT
Start: 2018-03-08 | End: 2018-03-10

## 2018-03-08 RX ORDER — WARFARIN SODIUM 2.5 MG/1
2 TABLET ORAL ONCE
Qty: 0 | Refills: 0 | Status: COMPLETED | OUTPATIENT
Start: 2018-03-08 | End: 2018-03-08

## 2018-03-08 RX ORDER — VERAPAMIL HCL 240 MG
80 CAPSULE, EXTENDED RELEASE PELLETS 24 HR ORAL
Qty: 0 | Refills: 0 | Status: DISCONTINUED | OUTPATIENT
Start: 2018-03-08 | End: 2018-03-14

## 2018-03-08 RX ADMIN — Medication 50 GRAM(S): at 12:33

## 2018-03-08 RX ADMIN — Medication 100 MILLIGRAM(S): at 16:55

## 2018-03-08 RX ADMIN — Medication 50 MILLIGRAM(S): at 16:53

## 2018-03-08 RX ADMIN — LISINOPRIL 5 MILLIGRAM(S): 2.5 TABLET ORAL at 05:35

## 2018-03-08 RX ADMIN — TAMSULOSIN HYDROCHLORIDE 0.4 MILLIGRAM(S): 0.4 CAPSULE ORAL at 22:16

## 2018-03-08 RX ADMIN — ATORVASTATIN CALCIUM 10 MILLIGRAM(S): 80 TABLET, FILM COATED ORAL at 22:16

## 2018-03-08 RX ADMIN — FINASTERIDE 5 MILLIGRAM(S): 5 TABLET, FILM COATED ORAL at 12:32

## 2018-03-08 RX ADMIN — Medication 120 MILLIGRAM(S): at 05:35

## 2018-03-08 RX ADMIN — WARFARIN SODIUM 2 MILLIGRAM(S): 2.5 TABLET ORAL at 22:16

## 2018-03-08 RX ADMIN — Medication 100 MILLIGRAM(S): at 22:15

## 2018-03-08 RX ADMIN — Medication 40 MILLIGRAM(S): at 05:35

## 2018-03-08 RX ADMIN — Medication 80 MILLIGRAM(S): at 16:51

## 2018-03-08 RX ADMIN — Medication 50 MILLIGRAM(S): at 05:35

## 2018-03-08 RX ADMIN — Medication 100 MILLIGRAM(S): at 05:36

## 2018-03-08 NOTE — PROGRESS NOTE ADULT - PROBLEM SELECTOR PLAN 2
-Volume status improving; decreasing Lasix to 40 mg IV daily; if continued improvement, will transition to PO tomorrow  -TTE showing mild to moderate MR, mild MS, TAVR, preserved systolic function, indeterminate diastolic function in the setting of afib and s/p PPM  -Given clinical presentation consistent with HF with evidence of MR, will optimize meds; restarted AceI and BB, d/c CCB as it is not ideal in HF -Observe on telemetry  -Strict I's and O's  -Daily weights

## 2018-03-08 NOTE — PROGRESS NOTE ADULT - SUBJECTIVE AND OBJECTIVE BOX
called by team to replace rivas catheter on patient.  pt had rivas ([id number]) placed by urology service 3/4 for urinary retention->700cc returned.  rivas removed earlier today.  pt has voided 2x, small volumes. last bladder scan ~300cc.  pt sitting in bed.  no pain.  no suprapubic tenderness.    since bladder volume is not excessive and patient is not uncomfortable, continue to observe   re-evaluate later

## 2018-03-08 NOTE — PROGRESS NOTE ADULT - PROBLEM SELECTOR PLAN 6
-c/w Verapamil 240mg ER; will d/c verapamil and optimize BB  -INR subtherapeutic today  -will dose coumadin tonight   -monitor on tele

## 2018-03-08 NOTE — PROGRESS NOTE ADULT - PROBLEM SELECTOR PLAN 1
-Erythema stable, leukocytosis resolving in setting of treatment with antibiotics.   -X-ray LLE without evidence of nec fasciitis or osteomyelitis  -Vanco trough elevated to 23; held am dose of Vanco, now dosing is as follows: 500 mg Q12h   -d/donald Unasyn, now Ancef for better MSSA and strep coverage  -trend cbc  -wound care following

## 2018-03-08 NOTE — PROGRESS NOTE ADULT - SUBJECTIVE AND OBJECTIVE BOX
Patient is a 85y old  Male who presents with a chief complaint of Swelling/Pain in left leg, SOB (04 Mar 2018 13:51)      Overnight Events:      REVIEW OF SYSTEMS:  CONSTITUTIONAL: No weakness, fevers or chills  EYES/ENT: No visual changes, no throat pain   RESPIRATORY: No cough, wheezing, hemoptysis; No shortness of breath  CARDIOVASCULAR: No chest pain or palpitations  GASTROINTESTINAL: No abdominal, nausea, vomiting, or hematemesis; No diarrhea or constipation. No melena or hematochezia.  GENITOURINARY: No dysuria, frequency or hematuria  NEUROLOGICAL: No dizziness, numbness, or weakness  SKIN: No itching, burning, rashes, or lesions   All other review of systems is negative unless indicated above.    VITAL SIGNS:  Vital Signs Last 24 Hrs  T(C): 36.4 (08 Mar 2018 05:09), Max: 36.7 (07 Mar 2018 21:31)  T(F): 97.6 (08 Mar 2018 05:09), Max: 98 (07 Mar 2018 21:31)  HR: 61 (08 Mar 2018 05:09) (61 - 74)  BP: 120/62 (08 Mar 2018 05:09) (120/62 - 143/69)  BP(mean): --  RR: 18 (08 Mar 2018 05:09) (18 - 18)  SpO2: 93% (08 Mar 2018 05:09) (93% - 96%)    PHYSICAL EXAM:   GENERAL: no acute distress  HEENT: NC/AT, EOMI, neck supple, MMM  RESPIRATORY: LCTAB/L, no rhonchi, rales, or wheezing  CARDIOVASCULAR: RRR, no murmurs, gallops, rubs  ABDOMINAL: soft, non-tender, non-distended, positive bowel sounds   EXTREMITIES: no clubbing, cyanosis, or edema  NEUROLOGICAL: alert and oriented x 3, non-focal  SKIN: no rashes or lesions   MUSCULOSKELETAL: no gross joint deformity                          12.6   9.3   )-----------( 144      ( 08 Mar 2018 06:32 )             38.6     03-08    137  |  93<L>  |  24<H>  ----------------------------<  78  3.9   |  33<H>  |  1.32<H>    Ca    8.9      08 Mar 2018 06:31  Phos  3.2     03-08  Mg     1.5     03-08      PT/INR - ( 08 Mar 2018 06:32 )   PT: 14.4 sec;   INR: 1.31 ratio         PTT - ( 08 Mar 2018 06:32 )  PTT:41.1 sec    CAPILLARY BLOOD GLUCOSE        I&O's Summary    07 Mar 2018 07:01  -  08 Mar 2018 07:00  --------------------------------------------------------  IN: 1040 mL / OUT: 1760 mL / NET: -720 mL        MEDICATIONS  (STANDING):  atorvastatin 10 milliGRAM(s) Oral at bedtime  ceFAZolin   IVPB 1000 milliGRAM(s) IV Intermittent every 12 hours  docusate sodium 100 milliGRAM(s) Oral daily  finasteride 5 milliGRAM(s) Oral daily  furosemide   Injectable 40 milliGRAM(s) IV Push two times a day  lisinopril 5 milliGRAM(s) Oral daily  magnesium sulfate  IVPB 2 Gram(s) IV Intermittent once  metoprolol     tartrate 50 milliGRAM(s) Oral two times a day  senna 2 Tablet(s) Oral daily  tamsulosin 0.4 milliGRAM(s) Oral at bedtime  vancomycin  IVPB 500 milliGRAM(s) IV Intermittent every 12 hours  verapamil 100 milliGRAM(s) Oral two times a day Patient is a 85y old  Male who presents with a chief complaint of Swelling/Pain in left leg, SOB (04 Mar 2018 13:51)      Overnight Events:  Pt doing well ON. Telemetry shows A-fib, A paced with HR 60s-70s; 5 beats of wide complex.     REVIEW OF SYSTEMS:  CONSTITUTIONAL: No weakness, fevers or chills  EYES/ENT: No visual changes, no throat pain   RESPIRATORY: No cough, wheezing, hemoptysis; No shortness of breath  CARDIOVASCULAR: No chest pain or palpitations  GASTROINTESTINAL: No abdominal, nausea, vomiting, or hematemesis; No diarrhea or constipation. No melena or hematochezia.  GENITOURINARY: No dysuria, frequency or hematuria  NEUROLOGICAL: No dizziness, numbness, or weakness  SKIN: redness/pain in Left leg  All other review of systems is negative unless indicated above.    VITAL SIGNS:  Vital Signs Last 24 Hrs  T(C): 36.4 (08 Mar 2018 05:09), Max: 36.7 (07 Mar 2018 21:31)  T(F): 97.6 (08 Mar 2018 05:09), Max: 98 (07 Mar 2018 21:31)  HR: 61 (08 Mar 2018 05:09) (61 - 74)  BP: 120/62 (08 Mar 2018 05:09) (120/62 - 143/69)  BP(mean): --  RR: 18 (08 Mar 2018 05:09) (18 - 18)  SpO2: 93% (08 Mar 2018 05:09) (93% - 96%)    PHYSICAL EXAM:   GENERAL: no acute distress  HEENT: NC/AT, EOMI, neck supple, MMM  RESPIRATORY: LCTAB/L, no rhonchi, rales, or wheezing  CARDIOVASCULAR: RRR, no murmurs, gallops, rubs  ABDOMINAL: soft, non-tender, non-distended, positive bowel sounds   EXTREMITIES: no clubbing, cyanosis, or edema  NEUROLOGICAL: alert and oriented x 3, non-focal  SKIN: no rashes or lesions   MUSCULOSKELETAL: no gross joint deformity                          12.6   9.3   )-----------( 144      ( 08 Mar 2018 06:32 )             38.6     03-08    137  |  93<L>  |  24<H>  ----------------------------<  78  3.9   |  33<H>  |  1.32<H>    Ca    8.9      08 Mar 2018 06:31  Phos  3.2     03-08  Mg     1.5     03-08      PT/INR - ( 08 Mar 2018 06:32 )   PT: 14.4 sec;   INR: 1.31 ratio         PTT - ( 08 Mar 2018 06:32 )  PTT:41.1 sec    CAPILLARY BLOOD GLUCOSE        I&O's Summary    07 Mar 2018 07:01  -  08 Mar 2018 07:00  --------------------------------------------------------  IN: 1040 mL / OUT: 1760 mL / NET: -720 mL        MEDICATIONS  (STANDING):  atorvastatin 10 milliGRAM(s) Oral at bedtime  ceFAZolin   IVPB 1000 milliGRAM(s) IV Intermittent every 12 hours  docusate sodium 100 milliGRAM(s) Oral daily  finasteride 5 milliGRAM(s) Oral daily  furosemide   Injectable 40 milliGRAM(s) IV Push two times a day  lisinopril 5 milliGRAM(s) Oral daily  magnesium sulfate  IVPB 2 Gram(s) IV Intermittent once  metoprolol     tartrate 50 milliGRAM(s) Oral two times a day  senna 2 Tablet(s) Oral daily  tamsulosin 0.4 milliGRAM(s) Oral at bedtime  vancomycin  IVPB 500 milliGRAM(s) IV Intermittent every 12 hours  verapamil 100 milliGRAM(s) Oral two times a day

## 2018-03-08 NOTE — PROGRESS NOTE ADULT - PROBLEM SELECTOR PLAN 7
-Improving  -creatinine 1.4, baseline creatinine is 1.0-1.1, likely due to cardiorenal vs infection  -Avoid nephrotoxins

## 2018-03-08 NOTE — PROGRESS NOTE ADULT - PROBLEM SELECTOR PLAN 8
-D/donald rivas; will attempt TOV today  -c/w with home finasteride, initiated flomax this hospitalization  -Renal US without evidence of hydronephrosis

## 2018-03-09 LAB
ANION GAP SERPL CALC-SCNC: 12 MMOL/L — SIGNIFICANT CHANGE UP (ref 5–17)
APTT BLD: 33.2 SEC — SIGNIFICANT CHANGE UP (ref 27.5–37.4)
BUN SERPL-MCNC: 12 MG/DL — SIGNIFICANT CHANGE UP (ref 7–23)
CALCIUM SERPL-MCNC: 8.9 MG/DL — SIGNIFICANT CHANGE UP (ref 8.4–10.5)
CHLORIDE SERPL-SCNC: 105 MMOL/L — SIGNIFICANT CHANGE UP (ref 96–108)
CO2 SERPL-SCNC: 23 MMOL/L — SIGNIFICANT CHANGE UP (ref 22–31)
CREAT SERPL-MCNC: 0.68 MG/DL — SIGNIFICANT CHANGE UP (ref 0.5–1.3)
CULTURE RESULTS: SIGNIFICANT CHANGE UP
CULTURE RESULTS: SIGNIFICANT CHANGE UP
GLUCOSE SERPL-MCNC: 116 MG/DL — HIGH (ref 70–99)
HCT VFR BLD CALC: 40.3 % — SIGNIFICANT CHANGE UP (ref 39–50)
HGB BLD-MCNC: 14 G/DL — SIGNIFICANT CHANGE UP (ref 13–17)
INR BLD: 1.48 RATIO — HIGH (ref 0.88–1.16)
MAGNESIUM SERPL-MCNC: 1.7 MG/DL — SIGNIFICANT CHANGE UP (ref 1.6–2.6)
MCHC RBC-ENTMCNC: 28.6 PG — SIGNIFICANT CHANGE UP (ref 27–34)
MCHC RBC-ENTMCNC: 34.7 GM/DL — SIGNIFICANT CHANGE UP (ref 32–36)
MCV RBC AUTO: 82.5 FL — SIGNIFICANT CHANGE UP (ref 80–100)
PHOSPHATE SERPL-MCNC: 3.5 MG/DL — SIGNIFICANT CHANGE UP (ref 2.5–4.5)
PLATELET # BLD AUTO: 171 K/UL — SIGNIFICANT CHANGE UP (ref 150–400)
POTASSIUM SERPL-MCNC: 3.8 MMOL/L — SIGNIFICANT CHANGE UP (ref 3.5–5.3)
POTASSIUM SERPL-SCNC: 3.8 MMOL/L — SIGNIFICANT CHANGE UP (ref 3.5–5.3)
PROTHROM AB SERPL-ACNC: 16.3 SEC — HIGH (ref 9.8–12.7)
RBC # BLD: 4.88 M/UL — SIGNIFICANT CHANGE UP (ref 4.2–5.8)
RBC # FLD: 12.1 % — SIGNIFICANT CHANGE UP (ref 10.3–14.5)
SODIUM SERPL-SCNC: 140 MMOL/L — SIGNIFICANT CHANGE UP (ref 135–145)
SPECIMEN SOURCE: SIGNIFICANT CHANGE UP
SPECIMEN SOURCE: SIGNIFICANT CHANGE UP
VANCOMYCIN TROUGH SERPL-MCNC: 16.7 UG/ML — SIGNIFICANT CHANGE UP (ref 10–20)
WBC # BLD: 7.4 K/UL — SIGNIFICANT CHANGE UP (ref 3.8–10.5)
WBC # FLD AUTO: 7.4 K/UL — SIGNIFICANT CHANGE UP (ref 3.8–10.5)

## 2018-03-09 PROCEDURE — 99223 1ST HOSP IP/OBS HIGH 75: CPT

## 2018-03-09 PROCEDURE — 99233 SBSQ HOSP IP/OBS HIGH 50: CPT | Mod: GC

## 2018-03-09 RX ORDER — POTASSIUM CHLORIDE 20 MEQ
40 PACKET (EA) ORAL ONCE
Qty: 0 | Refills: 0 | Status: COMPLETED | OUTPATIENT
Start: 2018-03-09 | End: 2018-03-09

## 2018-03-09 RX ORDER — MAGNESIUM SULFATE 500 MG/ML
1 VIAL (ML) INJECTION ONCE
Qty: 0 | Refills: 0 | Status: COMPLETED | OUTPATIENT
Start: 2018-03-09 | End: 2018-03-09

## 2018-03-09 RX ORDER — WARFARIN SODIUM 2.5 MG/1
2 TABLET ORAL ONCE
Qty: 0 | Refills: 0 | Status: COMPLETED | OUTPATIENT
Start: 2018-03-09 | End: 2018-03-09

## 2018-03-09 RX ORDER — FUROSEMIDE 40 MG
40 TABLET ORAL DAILY
Qty: 0 | Refills: 0 | Status: DISCONTINUED | OUTPATIENT
Start: 2018-03-10 | End: 2018-03-13

## 2018-03-09 RX ADMIN — Medication 40 MILLIGRAM(S): at 05:24

## 2018-03-09 RX ADMIN — ATORVASTATIN CALCIUM 10 MILLIGRAM(S): 80 TABLET, FILM COATED ORAL at 21:09

## 2018-03-09 RX ADMIN — LISINOPRIL 5 MILLIGRAM(S): 2.5 TABLET ORAL at 05:25

## 2018-03-09 RX ADMIN — Medication 100 MILLIGRAM(S): at 23:48

## 2018-03-09 RX ADMIN — Medication 100 MILLIGRAM(S): at 05:24

## 2018-03-09 RX ADMIN — Medication 50 MILLIGRAM(S): at 05:26

## 2018-03-09 RX ADMIN — Medication 100 GRAM(S): at 08:08

## 2018-03-09 RX ADMIN — Medication 100 MILLIGRAM(S): at 17:17

## 2018-03-09 RX ADMIN — Medication 80 MILLIGRAM(S): at 05:25

## 2018-03-09 RX ADMIN — FINASTERIDE 5 MILLIGRAM(S): 5 TABLET, FILM COATED ORAL at 09:29

## 2018-03-09 RX ADMIN — Medication 50 MILLIGRAM(S): at 17:17

## 2018-03-09 RX ADMIN — Medication 650 MILLIGRAM(S): at 21:09

## 2018-03-09 RX ADMIN — Medication 40 MILLIEQUIVALENT(S): at 09:30

## 2018-03-09 RX ADMIN — Medication 80 MILLIGRAM(S): at 17:28

## 2018-03-09 RX ADMIN — TAMSULOSIN HYDROCHLORIDE 0.4 MILLIGRAM(S): 0.4 CAPSULE ORAL at 21:10

## 2018-03-09 RX ADMIN — Medication 650 MILLIGRAM(S): at 13:24

## 2018-03-09 RX ADMIN — WARFARIN SODIUM 2 MILLIGRAM(S): 2.5 TABLET ORAL at 21:10

## 2018-03-09 RX ADMIN — SENNA PLUS 2 TABLET(S): 8.6 TABLET ORAL at 09:29

## 2018-03-09 RX ADMIN — Medication 100 MILLIGRAM(S): at 10:38

## 2018-03-09 NOTE — CONSULT NOTE ADULT - ASSESSMENT
A/P: 84yo M w/ BLE PVD w/ LLE superficial Ulcer- ADAPTIC   BLE elevation  BLE ACE Wrapping  con't Nutrition (as tolerated)  con't Offloading   con't Pericare  Care as per medicine will follow w/ you  Upon discharge [facility] [address] [place] [address] [phone]  Seen w/ attng and D/w team  Thank you for this consult  Raquel Webb PA-C [id number]
85 y.o. M with PMH of HTN, HLD, TIA, throat cancer s/p radiation and chemo (2011), SONIDO 4/17, afib on coumadin, BPH, s/p PPM who presents with acute on chronic HFpEF and noted to have WCT.   ·	Volume status is improving. Agree with change to PO lasix to see how patient does.   ·	Tele reviewed. Short episodes of WCT. These episodes seem irregular. Patient has h/o AF w RVR and underlying conduction abnormality. On recent PPM checks rates would be as high as 200 bpm. These episodes seen on tele may represent AF abnormal conduction. This said patient has normal LV function and a recent ischemic evaluation in the form of a cath prior to his TAVR for which the family reports no significant blockages were seen. Therefore treatment for now would be to optimize medical therapy. Up titrate beta blocker as tolerated. Keep K >4 and Mg >2.   ·	Continue other cardiac therapies  ·	Discussed with family at bedside and on the phone.

## 2018-03-09 NOTE — PROGRESS NOTE ADULT - PROBLEM SELECTOR PLAN 2
-Volume status improving; decreasing Lasix to 40 mg IV daily; if continued improvement, will transition to PO tomorrow  -TTE showing mild to moderate MR, mild MS, TAVR, preserved systolic function, indeterminate diastolic function in the setting of afib and s/p PPM  -Given clinical presentation consistent with HF with evidence of MR, will optimize meds; restarted AceI and BB, d/c CCB as it is not ideal in HF   -Observe on telemetry  -Strict I's and O's  -Card following  -Daily weights

## 2018-03-09 NOTE — PROGRESS NOTE ADULT - SUBJECTIVE AND OBJECTIVE BOX
Patient is a 85y old  Male who presents with a chief complaint of Swelling/Pain in left leg, SOB (04 Mar 2018 13:51)      Overnight Events:  Patient failed TOV yesterday; rivas reinserted.     REVIEW OF SYSTEMS:  CONSTITUTIONAL: No weakness, fevers or chills  EYES/ENT: No visual changes, no throat pain   RESPIRATORY: No cough, wheezing, hemoptysis; No shortness of breath  CARDIOVASCULAR: No chest pain or palpitations  GASTROINTESTINAL: No abdominal, nausea, vomiting, or hematemesis; No diarrhea or constipation. No melena or hematochezia.  GENITOURINARY: No dysuria, frequency or hematuria  NEUROLOGICAL: No dizziness, numbness, or weakness  SKIN: No itching, burning, rashes, or lesions   All other review of systems is negative unless indicated above.    VITAL SIGNS:  Vital Signs Last 24 Hrs  T(C): 36.8 (09 Mar 2018 12:02), Max: 36.8 (09 Mar 2018 12:02)  T(F): 98.3 (09 Mar 2018 12:02), Max: 98.3 (09 Mar 2018 12:02)  HR: 65 (09 Mar 2018 12:02) (61 - 101)  BP: 118/65 (09 Mar 2018 12:02) (111/57 - 137/63)  BP(mean): --  RR: 18 (09 Mar 2018 12:02) (16 - 18)  SpO2: 95% (09 Mar 2018 12:02) (95% - 98%)    PHYSICAL EXAM:   GENERAL: no acute distress  HEENT: NC/AT, EOMI, neck supple, MMM  RESPIRATORY: LCTAB/L, no rhonchi, rales, or wheezing  CARDIOVASCULAR: RRR, no murmurs, gallops, rubs  ABDOMINAL: soft, non-tender, non-distended, positive bowel sounds   EXTREMITIES: no clubbing, cyanosis, or edema  NEUROLOGICAL: alert and oriented x 3, non-focal  SKIN: resolving erythema on LLE  MUSCULOSKELETAL: no gross joint deformity                          14.0   7.4   )-----------( 171      ( 09 Mar 2018 06:21 )             40.3     03-09    140  |  105  |  12  ----------------------------<  116<H>  3.8   |  23  |  0.68    Ca    8.9      09 Mar 2018 06:21  Phos  3.5     03-09  Mg     1.7     03-09      PT/INR - ( 09 Mar 2018 06:21 )   PT: 16.3 sec;   INR: 1.48 ratio         PTT - ( 09 Mar 2018 06:21 )  PTT:33.2 sec    CAPILLARY BLOOD GLUCOSE        I&O's Summary    08 Mar 2018 07:01  -  09 Mar 2018 07:00  --------------------------------------------------------  IN: 530 mL / OUT: 2020 mL / NET: -1490 mL    09 Mar 2018 07:01  -  09 Mar 2018 15:11  --------------------------------------------------------  IN: 560 mL / OUT: 800 mL / NET: -240 mL        MEDICATIONS  (STANDING):  atorvastatin 10 milliGRAM(s) Oral at bedtime  ceFAZolin   IVPB 1000 milliGRAM(s) IV Intermittent every 12 hours  docusate sodium 100 milliGRAM(s) Oral daily  finasteride 5 milliGRAM(s) Oral daily  lisinopril 5 milliGRAM(s) Oral daily  metoprolol     tartrate 50 milliGRAM(s) Oral two times a day  senna 2 Tablet(s) Oral daily  tamsulosin 0.4 milliGRAM(s) Oral at bedtime  vancomycin  IVPB 500 milliGRAM(s) IV Intermittent every 12 hours  verapamil 80 milliGRAM(s) Oral two times a day  warfarin 2 milliGRAM(s) Oral once

## 2018-03-09 NOTE — PROVIDER CONTACT NOTE (OTHER) - RECOMMENDATIONS
Assess pt review orders, call urology for rivas placement
Continue to monitor pt
Continue to monitor.
Notify MD

## 2018-03-09 NOTE — PROGRESS NOTE ADULT - PROBLEM SELECTOR PLAN 3
-Improving  -Could have been  2/2 to drug side effect, unasyn has thrombocytopenia as reported side effect, also potentially infection driven  -Unasyn d/donald; switching to Ancef and keeping Vanco  -Daily CBC  -Continue to monitor

## 2018-03-09 NOTE — PROGRESS NOTE ADULT - PROBLEM SELECTOR PLAN 6
-c/w Verapamil 80 mg BID; will taper off verapamil and optimize BB  -INR subtherapeutic today  -will dose coumadin tonight   -monitor on tele

## 2018-03-09 NOTE — CONSULT NOTE ADULT - SUBJECTIVE AND OBJECTIVE BOX
Chief Complaint: SOB, HFpEF, NSVT    HPI: HPI:  85 y.o. M with PMH of HTN, HLD, TIA, throat cancer s/p radiation and chemo (), SONIDO, afib on coumadin, BPH, s/p PPM who presents  with 1 day of L leg redness, pain, and swelling. Patient has no prior history of cellulitis, but does have history of hematoma to that leg a few months ago. Patient a blister that popped earlier this week. Patient denies trauma to the area. Patient denies fevers or chills at home. Of note, patient's family have noted patient to have become increasingly short of breath with exertion. Patient at baseline, due to his history of throat cancer, sleeps propped up on a few pillows, and so it is unable to tell whether patient is orthopneic. Patient denies PND. He does not carry a prior diagnosis of heart failure. Of note, patient had some outpatient imaging performed which indicated lung nodules and a pericardial effusion for which he is being worked up. Patient denies N/V, diarrhea, dysuria, hematuria, or melena. Pt endorses dysphagia has been present s/p radiation 7 years ago.     In the ED, patient's CBC was notable for leukocytosis of 22K with 4% bands, as well as mild thrombocytopenia of 141K. Pro-BNP was elevated to 4277. CXR was performed which showed small to moderate b/l pleural effusions. Lactate was somewhat elevated to 2.2.  X-ray of LLE was remarkable for soft tissue edema; no soft tissue gas collection or gross radiographic evidence for osteomyelitis. Doppler did not indicate DVT. Patient was treated with Clindamycin x 1 dose and given IV Tylenol for pain. (04 Mar 2018 13:51)    Patient has progressed well. His volume status has been optimized with IV diuretics and his breathing and LE edema have improved. On telemetry he was noted to have 2 runs of wide complex tachycardia. He denies symptoms related to this. No chest pain. No shortness of breath. No palpitations. No syncope.     PMH:   A-fib  Hx TIA  x 8yrs  BPH (Benign Prostatic Hyperplasia)  Hypertension  Dyslipidemia  Cardiac Dysrhythmia    PSH:   excision of Basal Cell Carcinoma of Nose  h/o cardioversion  of Atrial Fibrillation    Family History:  FAMILY HISTORY:  No pertinent family history in first degree relatives    Allergies:  No Known Allergies    Social History:  Smoking: No active.   Alcohol:  Drugs:    Medications:  acetaminophen   Tablet 650 milliGRAM(s) Oral every 6 hours PRN  atorvastatin 10 milliGRAM(s) Oral at bedtime  ceFAZolin   IVPB 1000 milliGRAM(s) IV Intermittent every 12 hours  docusate sodium 100 milliGRAM(s) Oral daily  finasteride 5 milliGRAM(s) Oral daily  lisinopril 5 milliGRAM(s) Oral daily  metoprolol     tartrate 50 milliGRAM(s) Oral two times a day  polyethylene glycol 3350 17 Gram(s) Oral daily PRN  senna 2 Tablet(s) Oral daily  tamsulosin 0.4 milliGRAM(s) Oral at bedtime  vancomycin  IVPB 500 milliGRAM(s) IV Intermittent every 12 hours  verapamil 80 milliGRAM(s) Oral two times a day  warfarin 2 milliGRAM(s) Oral once      Cardiovascular Diagnostic Testing:  ECG: AF demand V-pace.      Echo: < from: Transthoracic Echocardiogram (18 @ 13:14) >  1. Mitral annular calcification and calcified mitral  leaflets with decreased diastolic opening. Mild-moderate  mitral regurgitation. Peak mitral valve gradient equals 14  mm Hg, mean transmitral valve gradient equals 5 mm Hg,  consistent with mild  mitral stenosis.  2. Transcatheter aortic valve replacement. The valve  appears well-seated. Peak transaortic valve gradient equals  27 mm Hg, mean transaortic valve gradient equals 11 mm Hg,  which is probably normal in the presence of a transcatheter  aortic valve replacement.  Mild paravalvular aortic  regurgitation. There are two jets of paravalvular aortic  regurgitation at the 3 o'clock and 6 o'clock position.  3. Severely dilated left atrium.  LA volume index = 79  cc/m2.  4. Mild concentric left ventricular hypertrophy.  5. Overall preserved left ventricular systolic dysfunction.   Septal motion consistent with paced rhythm.  6. Unable to comment on diastolic function in the setting  of atrial fibrillation and paced rhythm  7. Enlarged right atrium. A device wire is noted in the  right heart.  8. Normal right ventricular size with decreased right  ventricular systolic function.  9. Estimated right ventricular systolic pressure equals 49  mm Hg, assuming right atrial pressure equals 8 mm Hg,  consistent with mild pulmonary hypertension.    < end of copied text >      Stress Testing:    Cath:     Imaging: < from: Xray Chest 1 View- PORTABLE-Urgent (18 @ 03:29) >  IMPRESSION:  Small to moderate bilateral pleural effusions with adjacent passive   atelectasis.      < end of copied text >      Labs:                        14.0   7.4   )-----------( 171      ( 09 Mar 2018 06:21 )             40.3         140  |  105  |  12  ----------------------------<  116<H>  3.8   |  23  |  0.68    Ca    8.9      09 Mar 2018 06:21  Phos  3.5       Mg     1.7           PT/INR - ( 09 Mar 2018 06:21 )   PT: 16.3 sec;   INR: 1.48 ratio         PTT - ( 09 Mar 2018 06:21 )  PTT:33.2 sec      Serum Pro-Brain Natriuretic Peptide: 4277 pg/mL ( @ 03:23)        Physical Exam:  T(C): 36.6 (18 @ 15:53), Max: 36.8 (18 @ 12:02)  HR: 74 (18 @ 15:53) (61 - 101)  BP: 139/75 (18 @ 15:53) (111/57 - 139/75)  RR: 18 (18 @ 15:53) (16 - 18)  SpO2: 100% (18 @ 15:53) (95% - 100%)  Wt(kg): --     @ 07:01  -   @ 07:00  --------------------------------------------------------  IN: 530 mL / OUT: 2020 mL / NET: -1490 mL     @ 07:01  -   @ 16:12  --------------------------------------------------------  IN: 560 mL / OUT: 800 mL / NET: -240 mL      Daily     Daily Weight in k.5 (09 Mar 2018 04:29)

## 2018-03-10 ENCOUNTER — TRANSCRIPTION ENCOUNTER (OUTPATIENT)
Age: 83
End: 2018-03-10

## 2018-03-10 LAB
ANION GAP SERPL CALC-SCNC: 13 MMOL/L — SIGNIFICANT CHANGE UP (ref 5–17)
APTT BLD: 35.9 SEC — SIGNIFICANT CHANGE UP (ref 27.5–37.4)
BUN SERPL-MCNC: 20 MG/DL — SIGNIFICANT CHANGE UP (ref 7–23)
CALCIUM SERPL-MCNC: 8.7 MG/DL — SIGNIFICANT CHANGE UP (ref 8.4–10.5)
CHLORIDE SERPL-SCNC: 94 MMOL/L — LOW (ref 96–108)
CO2 SERPL-SCNC: 30 MMOL/L — SIGNIFICANT CHANGE UP (ref 22–31)
CREAT SERPL-MCNC: 1.22 MG/DL — SIGNIFICANT CHANGE UP (ref 0.5–1.3)
GLUCOSE SERPL-MCNC: 85 MG/DL — SIGNIFICANT CHANGE UP (ref 70–99)
HCT VFR BLD CALC: 38.8 % — LOW (ref 39–50)
HGB BLD-MCNC: 13 G/DL — SIGNIFICANT CHANGE UP (ref 13–17)
INR BLD: 2.12 RATIO — HIGH (ref 0.88–1.16)
MAGNESIUM SERPL-MCNC: 1.8 MG/DL — SIGNIFICANT CHANGE UP (ref 1.6–2.6)
MCHC RBC-ENTMCNC: 33.6 GM/DL — SIGNIFICANT CHANGE UP (ref 32–36)
MCHC RBC-ENTMCNC: 34.6 PG — HIGH (ref 27–34)
MCV RBC AUTO: 103 FL — HIGH (ref 80–100)
PHOSPHATE SERPL-MCNC: 3 MG/DL — SIGNIFICANT CHANGE UP (ref 2.5–4.5)
PLATELET # BLD AUTO: 177 K/UL — SIGNIFICANT CHANGE UP (ref 150–400)
POTASSIUM SERPL-MCNC: 4.5 MMOL/L — SIGNIFICANT CHANGE UP (ref 3.5–5.3)
POTASSIUM SERPL-SCNC: 4.5 MMOL/L — SIGNIFICANT CHANGE UP (ref 3.5–5.3)
PROTHROM AB SERPL-ACNC: 23.2 SEC — HIGH (ref 9.8–12.7)
RBC # BLD: 3.76 M/UL — LOW (ref 4.2–5.8)
RBC # FLD: 11.6 % — SIGNIFICANT CHANGE UP (ref 10.3–14.5)
SODIUM SERPL-SCNC: 137 MMOL/L — SIGNIFICANT CHANGE UP (ref 135–145)
WBC # BLD: 7.1 K/UL — SIGNIFICANT CHANGE UP (ref 3.8–10.5)
WBC # FLD AUTO: 7.1 K/UL — SIGNIFICANT CHANGE UP (ref 3.8–10.5)

## 2018-03-10 PROCEDURE — 99232 SBSQ HOSP IP/OBS MODERATE 35: CPT | Mod: GC

## 2018-03-10 PROCEDURE — 99233 SBSQ HOSP IP/OBS HIGH 50: CPT

## 2018-03-10 RX ORDER — VERAPAMIL HCL 240 MG
1 CAPSULE, EXTENDED RELEASE PELLETS 24 HR ORAL
Qty: 60 | Refills: 0 | OUTPATIENT
Start: 2018-03-10 | End: 2018-04-08

## 2018-03-10 RX ORDER — VANCOMYCIN HCL 1 G
500 VIAL (EA) INTRAVENOUS EVERY 12 HOURS
Qty: 0 | Refills: 0 | Status: DISCONTINUED | OUTPATIENT
Start: 2018-03-10 | End: 2018-03-11

## 2018-03-10 RX ORDER — FUROSEMIDE 40 MG
1 TABLET ORAL
Qty: 30 | Refills: 0 | OUTPATIENT
Start: 2018-03-10 | End: 2018-04-08

## 2018-03-10 RX ORDER — VERAPAMIL HCL 240 MG
1 CAPSULE, EXTENDED RELEASE PELLETS 24 HR ORAL
Qty: 0 | Refills: 0 | COMMUNITY

## 2018-03-10 RX ORDER — LISINOPRIL 2.5 MG/1
1 TABLET ORAL
Qty: 0 | Refills: 0 | COMMUNITY
Start: 2018-03-10

## 2018-03-10 RX ORDER — LISINOPRIL 2.5 MG/1
1 TABLET ORAL
Qty: 0 | Refills: 0 | COMMUNITY

## 2018-03-10 RX ORDER — WARFARIN SODIUM 2.5 MG/1
2 TABLET ORAL ONCE
Qty: 0 | Refills: 0 | Status: COMPLETED | OUTPATIENT
Start: 2018-03-10 | End: 2018-03-10

## 2018-03-10 RX ADMIN — LISINOPRIL 5 MILLIGRAM(S): 2.5 TABLET ORAL at 05:07

## 2018-03-10 RX ADMIN — Medication 100 MILLIGRAM(S): at 05:06

## 2018-03-10 RX ADMIN — Medication 100 MILLIGRAM(S): at 16:02

## 2018-03-10 RX ADMIN — FINASTERIDE 5 MILLIGRAM(S): 5 TABLET, FILM COATED ORAL at 12:15

## 2018-03-10 RX ADMIN — Medication 50 MILLIGRAM(S): at 05:07

## 2018-03-10 RX ADMIN — WARFARIN SODIUM 2 MILLIGRAM(S): 2.5 TABLET ORAL at 22:35

## 2018-03-10 RX ADMIN — Medication 100 MILLIGRAM(S): at 17:02

## 2018-03-10 RX ADMIN — Medication 40 MILLIGRAM(S): at 05:09

## 2018-03-10 RX ADMIN — ATORVASTATIN CALCIUM 10 MILLIGRAM(S): 80 TABLET, FILM COATED ORAL at 22:34

## 2018-03-10 RX ADMIN — Medication 650 MILLIGRAM(S): at 12:15

## 2018-03-10 RX ADMIN — TAMSULOSIN HYDROCHLORIDE 0.4 MILLIGRAM(S): 0.4 CAPSULE ORAL at 22:35

## 2018-03-10 RX ADMIN — SENNA PLUS 2 TABLET(S): 8.6 TABLET ORAL at 22:35

## 2018-03-10 RX ADMIN — Medication 80 MILLIGRAM(S): at 17:02

## 2018-03-10 RX ADMIN — Medication 50 MILLIGRAM(S): at 17:01

## 2018-03-10 RX ADMIN — Medication 80 MILLIGRAM(S): at 05:07

## 2018-03-10 NOTE — DISCHARGE NOTE ADULT - PROVIDER TOKENS
FREE:[LAST:[Nehemias],FIRST:[],PHONE:[(   )    -],FAX:[(   )    -]] FREE:[LAST:[Brusonny],FIRST:[],PHONE:[(   )    -],FAX:[(   )    -]],FREE:[LAST:[UROLOGY],PHONE:[(   )    -],FAX:[(   )    -],ADDRESS:[PLEASE CALL  [phone] TO MAKE AN APPT FOR [facility] REMOVAL.]],TOKEN:'[id number]'

## 2018-03-10 NOTE — DISCHARGE NOTE ADULT - CARE PLAN
Principal Discharge DX:	Cellulitis  Goal:	Completed course of antibiotics  Assessment and plan of treatment:	You came in with cellulitis, or an infection of the skin, on your left leg. You received treatment with IV antibiotics for a week. The infection in your leg improved. You also were evaluated by wound care; they made recommendations about dressings to apply to your leg. Please see your primary care provider within a week of discharge so that he or she may continue to evaluate your progress.  Secondary Diagnosis:	Heart failure  Assessment and plan of treatment:	You were admitted with clinical signs of heart failure, such as shortness of breath and fluid in your lungs. Your medications were adjusted appropriately. Please continue taking medications as prescribed after discharge.  Secondary Diagnosis:	A-fib  Assessment and plan of treatment:	Please continue your medications, verapamil and metoprolol.  Secondary Diagnosis:	BPH (Benign Prostatic Hyperplasia)  Assessment and plan of treatment:	You have BPH, or an enlarged prostate. This makes it difficult for you to urinate. Please continue your home proscar as well as flomax, a new medication that was started in the hospital. Principal Discharge DX:	Cellulitis  Goal:	Completed course of antibiotics  Assessment and plan of treatment:	You came in with cellulitis, or an infection of the skin, on your left leg. You received treatment with IV antibiotics for a week. The infection in your leg improved. You also were evaluated by wound care; they made recommendations about dressings to apply to your leg. Please see your primary care provider within a week of discharge so that he or she may continue to evaluate your progress.  Secondary Diagnosis:	Heart failure  Assessment and plan of treatment:	You were admitted with clinical signs of heart failure, such as shortness of breath and fluid in your lungs. Your medications were adjusted appropriately. Please continue taking medications as prescribed after discharge.  Secondary Diagnosis:	A-fib  Assessment and plan of treatment:	Please continue your medications, verapamil and metoprolol.  Secondary Diagnosis:	BPH (Benign Prostatic Hyperplasia)  Assessment and plan of treatment:	You have BPH, or an enlarged prostate. This makes it difficult for you to urinate. Please continue your home proscar as well as flomax, a new medication that was started in the hospital. You will also be discharged with a rivas cathether. Please make an appointment with a Urologist at the [facility] at [phone] within 1 week after discharge. Principal Discharge DX:	Cellulitis  Goal:	Completed course of antibiotics  Assessment and plan of treatment:	You came in with cellulitis, or an infection of the skin, on your left leg. You received treatment with IV antibiotics for a week. The infection in your leg improved. You also were evaluated by wound care; they made recommendations about dressings to apply to your leg. Please see your primary care provider within a week of discharge so that he or she may continue to evaluate your progress.  Secondary Diagnosis:	Heart failure  Assessment and plan of treatment:	You were admitted with clinical signs of heart failure, such as shortness of breath and fluid in your lungs. Your medications were adjusted appropriately. Please continue taking medications as prescribed after discharge. Please STOP coumadin until the INR is checked on Friday 3/16.  Secondary Diagnosis:	A-fib  Assessment and plan of treatment:	Please continue your medications, verapamil and metoprolol.  Secondary Diagnosis:	BPH (Benign Prostatic Hyperplasia)  Assessment and plan of treatment:	You have BPH, or an enlarged prostate. This makes it difficult for you to urinate. Please continue your home proscar as well as flomax, a new medication that was started in the hospital. You will also be discharged with a rivas cathether. Please make an appointment with a Urologist at the [facility] at [phone] within 1 week after discharge. Principal Discharge DX:	Cellulitis  Goal:	Completed course of antibiotics  Assessment and plan of treatment:	You came in with cellulitis, or an infection of the skin, on your left leg. You received treatment with IV antibiotics for a week. The infection in your leg improved. You also were evaluated by wound care; they made recommendations about dressings to apply to your leg. Please see your primary care provider within a week of discharge so that he or she may continue to evaluate your progress.  Secondary Diagnosis:	Heart failure  Assessment and plan of treatment:	You were admitted with clinical signs of heart failure, such as shortness of breath and fluid in your lungs. Your medications were adjusted appropriately. Please continue taking medications as prescribed after discharge. Please STOP coumadin until the INR is checked on Friday 3/16.  Secondary Diagnosis:	A-fib  Assessment and plan of treatment:	Your Metoprolol was increased during this hospitalization to 150mg a day. Per discussion with Cardiology please continue to take Verapamil and Metoprolol as prescribed. Please follow up with your Cardiologist within 3-5 days to titrate these medications.    Of note, your Coumadin was stopped today because of INR level was elevated. It is important that you follow up with your primary care doctor on Friday for a repeat INR level to decide if your Coumadin can be continued.  Secondary Diagnosis:	BPH (Benign Prostatic Hyperplasia)  Assessment and plan of treatment:	You have BPH, or an enlarged prostate. This makes it difficult for you to urinate. Please continue your home proscar as well as flomax, a new medication that was started in the hospital. You will also be discharged with a rivas cathether. Please make an appointment with a Urologist at the [facility] at [phone] within 1 week after discharge.

## 2018-03-10 NOTE — DISCHARGE NOTE ADULT - CARE PROVIDERS DIRECT ADDRESSES
,DirectAddress_Unknown ,DirectAddress_Unknown,DirectAddress_Unknown,[email or web address]

## 2018-03-10 NOTE — PROGRESS NOTE ADULT - PROBLEM SELECTOR PLAN 8
-Failed TOV, thus rivas was re-inserted.  -c/w with home finasteride, initiated flomax this hospitalization  -Renal US without evidence of hydronephrosis

## 2018-03-10 NOTE — PROGRESS NOTE ADULT - SUBJECTIVE AND OBJECTIVE BOX
Consult PROGRESS NOTE:  · Requested by Name:	Dr. Ann	  · Date/Time:	10-Mar-2018 	  · Reason for Referral/Consultation:	SOB, HFpEF, NSVT	      · Subjective and Objective: 	  Chief Complaint: SOB, HFpEF, NSVT    HPI: HPI:  85 y.o. M with PMH of HTN, HLD, TIA, throat cancer s/p radiation and chemo (), SONIDO, afib on coumadin, BPH, s/p PPM who presents  with 1 day of L leg redness, pain, and swelling. Patient has no prior history of cellulitis, but does have history of hematoma to that leg a few months ago. Patient a blister that popped earlier this week. Patient denies trauma to the area. Patient denies fevers or chills at home. Of note, patient's family have noted patient to have become increasingly short of breath with exertion. Patient at baseline, due to his history of throat cancer, sleeps propped up on a few pillows, and so it is unable to tell whether patient is orthopneic. Patient denies PND. He does not carry a prior diagnosis of heart failure. Of note, patient had some outpatient imaging performed which indicated lung nodules and a pericardial effusion for which he is being worked up. Patient denies N/V, diarrhea, dysuria, hematuria, or melena. Pt endorses dysphagia has been present s/p radiation 7 years ago.     In the ED, patient's CBC was notable for leukocytosis of 22K with 4% bands, as well as mild thrombocytopenia of 141K. Pro-BNP was elevated to 4277. CXR was performed which showed small to moderate b/l pleural effusions. Lactate was somewhat elevated to 2.2.  X-ray of LLE was remarkable for soft tissue edema; no soft tissue gas collection or gross radiographic evidence for osteomyelitis. Doppler did not indicate DVT. Patient was treated with Clindamycin x 1 dose and given IV Tylenol for pain. (04 Mar 2018 13:51)    Patient has progressed well. His volume status has been optimized with IV diuretics and his breathing and LE edema have improved. On telemetry he was noted to have 2 runs of wide complex tachycardia. He denies symptoms related to this. No chest pain. No shortness of breath. No palpitations. No syncope.   =====================  Interval Events  - AF 60s-90s; No further episodes of WCTs  - Breathing improved; still with LE edema    =====================    PMH:   A-fib  Hx TIA  x 8yrs  BPH (Benign Prostatic Hyperplasia)  Hypertension  Dyslipidemia  Cardiac Dysrhythmia    PSH:   excision of Basal Cell Carcinoma of Nose  h/o cardioversion  of Atrial Fibrillation    Family History:  FAMILY HISTORY:  No pertinent family history in first degree relatives    Allergies:  No Known Allergies    Social History:  Smoking: No active.   Alcohol:  Drugs:    Medications:  acetaminophen   Tablet 650 milliGRAM(s) Oral every 6 hours PRN  atorvastatin 10 milliGRAM(s) Oral at bedtime  ceFAZolin   IVPB 1000 milliGRAM(s) IV Intermittent every 12 hours  docusate sodium 100 milliGRAM(s) Oral daily  finasteride 5 milliGRAM(s) Oral daily  lisinopril 5 milliGRAM(s) Oral daily  metoprolol     tartrate 50 milliGRAM(s) Oral two times a day  polyethylene glycol 3350 17 Gram(s) Oral daily PRN  senna 2 Tablet(s) Oral daily  tamsulosin 0.4 milliGRAM(s) Oral at bedtime  vancomycin  IVPB 500 milliGRAM(s) IV Intermittent every 12 hours  verapamil 80 milliGRAM(s) Oral two times a day  warfarin 2 milliGRAM(s) Oral once      Cardiovascular Diagnostic Testing:  ECG: AF demand V-pace.      Echo: < from: Transthoracic Echocardiogram (18 @ 13:14) >  1. Mitral annular calcification and calcified mitral  leaflets with decreased diastolic opening. Mild-moderate  mitral regurgitation. Peak mitral valve gradient equals 14  mm Hg, mean transmitral valve gradient equals 5 mm Hg,  consistent with mild  mitral stenosis.  2. Transcatheter aortic valve replacement. The valve  appears well-seated. Peak transaortic valve gradient equals  27 mm Hg, mean transaortic valve gradient equals 11 mm Hg,  which is probably normal in the presence of a transcatheter  aortic valve replacement.  Mild paravalvular aortic  regurgitation. There are two jets of paravalvular aortic  regurgitation at the 3 o'clock and 6 o'clock position.  3. Severely dilated left atrium.  LA volume index = 79  cc/m2.  4. Mild concentric left ventricular hypertrophy.  5. Overall preserved left ventricular systolic dysfunction.   Septal motion consistent with paced rhythm.  6. Unable to comment on diastolic function in the setting  of atrial fibrillation and paced rhythm  7. Enlarged right atrium. A device wire is noted in the  right heart.  8. Normal right ventricular size with decreased right  ventricular systolic function.  9. Estimated right ventricular systolic pressure equals 49  mm Hg, assuming right atrial pressure equals 8 mm Hg,  consistent with mild pulmonary hypertension.    < end of copied text >      Stress Testing:    Cath:     Imaging: < from: Xray Chest 1 View- PORTABLE-Urgent (18 @ 03:29) >  IMPRESSION:  Small to moderate bilateral pleural effusions with adjacent passive   atelectasis.      < end of copied text >      Labs:                        14.0   7.4   )-----------( 171      ( 09 Mar 2018 06:21 )             40.3         140  |  105  |  12  ----------------------------<  116<H>  3.8   |  23  |  0.68    Ca    8.9      09 Mar 2018 06:21  Phos  3.5       Mg     1.7           PT/INR - ( 09 Mar 2018 06:21 )   PT: 16.3 sec;   INR: 1.48 ratio         PTT - ( 09 Mar 2018 06:21 )  PTT:33.2 sec      Serum Pro-Brain Natriuretic Peptide: 4277 pg/mL ( @ 03:23)        Physical Exam:  T(C): 36.6 (18 @ 15:53), Max: 36.8 (18 @ 12:02)  HR: 74 (18 @ 15:53) (61 - 101)  BP: 139/75 (18 @ 15:53) (111/57 - 139/75)  RR: 18 (18 @ 15:53) (16 - 18)  SpO2: 100% (18 @ 15:53) (95% - 100%)  Wt(kg): --     @ 07:01  -   @ 07:00  --------------------------------------------------------  IN: 530 mL / OUT: 2020 mL / NET: -1490 mL     @ 07:01  -   @ 16:12  --------------------------------------------------------  IN: 560 mL / OUT: 800 mL / NET: -240 mL      Daily     Daily Weight in k.5 (09 Mar 2018 04:29)      Review of Systems:   · Additional ROS	As per HPI otherwise negative.	    Physical Exam:   · Constitutional	Well-developed, well nourished	  · Eyes	EOMI; PERRL; no drainage or redness	  · ENMT	No oral lesions; no gross abnormalities	  · Neck	No bruits; no thyromegaly or nodules	  · Back	No deformity or limitation of movement	  · Respiratory	detailed exam	  · Respiratory Comments	Decrease at bases. nl effort.	  · Cardiovascular	detailed exam	  · Cardiovascular Details	regular rate and rhythm	  · Cardiovascular Details	murmur	  · Murmur Timing	systolic	  · Character of Systolic Murmur	murmur loudness: II/VI	  · Cardiovascular Comments	Improved edema. R>L	  · Gastrointestinal	Soft, non-tender, no hepatosplenomegaly, normal bowel sounds	  · Extremities	detailed exam	  · Extremities Details	no clubbing; no cyanosis; Trace edema	  · Vascular	Equal and normal pulses (carotid, femoral, dorsalis pedis)	  · Neurological	Alert & oriented; no sensory, motor or coordination deficits, normal reflexes	  · Skin	detailed exam	  · Skin Details	warm and dry	  · Skin Comments	Redness of left shin	  · Musculoskeletal	No joint pain, swelling or deformity; no limitation of movement	  · Psychiatric	Affect and characteristics of appearance, verbalizations, behaviors are appropriate	    Assessment and Recommendation:   · Assessment		  85 y.o. M with PMH of HTN, HLD, TIA, throat cancer s/p radiation and chemo (), SONIDO , afib on coumadin, BPH, s/p PPM who presents with acute on chronic HFpEF and noted to have WCT.   ·	Volume status is improving.   ·	Continue PO Lasix (still with LE edema)   ·	Tele reviewed. No further episodes of WCT over last 24 hrs. Monitor  ·	Up titrate beta blocker as tolerated. Keep K >4 and Mg >2.   ·	Continue other cardiac therapies  ·	Discussed with patient    Teddy Licona MD  [phone]

## 2018-03-10 NOTE — DISCHARGE NOTE ADULT - ADDITIONAL INSTRUCTIONS
Please make an appointment with a Urologist at the [facility] at [phone] within 1 week after discharge.  Please have INR checked on Friday 3/16 since you are on coumadin. Please make an appointment with a Urologist at the [facility] at [phone] within 1 week after discharge.  Please have INR checked on Friday 3/16 since you are on coumadin. Please STOP coumadin until the INR is checked on Friday 3/16.

## 2018-03-10 NOTE — PROGRESS NOTE ADULT - PROBLEM SELECTOR PLAN 2
-On PO Lasix 40 mg daily  -Cardiology following  -TTE showing mild to moderate MR, mild MS, TAVR, preserved systolic function, indeterminate diastolic function in the setting of afib and s/p PPM  -Given clinical presentation consistent with HF with evidence of MR, will optimize meds; restarted AceI and BB, taper off CCB as it is not ideal in HF   -Observe on telemetry  -Strict I's and O's  -Daily weights

## 2018-03-10 NOTE — DISCHARGE NOTE ADULT - MEDICATION SUMMARY - MEDICATIONS TO CHANGE
I will SWITCH the dose or number of times a day I take the medications listed below when I get home from the hospital:    verapamil 240 mg/24 hours oral capsule, extended release  -- 1 cap(s) by mouth once a day I will SWITCH the dose or number of times a day I take the medications listed below when I get home from the hospital:    lisinopril 40 mg oral tablet  -- 1 tab(s) by mouth once a day    Toprol- mg oral tablet, extended release  -- 1 tab(s) by mouth once a day I will SWITCH the dose or number of times a day I take the medications listed below when I get home from the hospital:    lisinopril 40 mg oral tablet  -- 1 tab(s) by mouth once a day    Toprol- mg oral tablet, extended release  -- 1 tab(s) by mouth once a day    verapamil 240 mg/24 hours oral capsule, extended release  -- 1 cap(s) by mouth once a day

## 2018-03-10 NOTE — PROGRESS NOTE ADULT - SUBJECTIVE AND OBJECTIVE BOX
Patient is a 85y old  Male who presents with a chief complaint of Swelling/Pain in left leg, SOB (04 Mar 2018 13:51)      Overnight Events:      REVIEW OF SYSTEMS:  CONSTITUTIONAL: No weakness, fevers or chills  EYES/ENT: No visual changes, no throat pain   RESPIRATORY: No cough, wheezing, hemoptysis; No shortness of breath  CARDIOVASCULAR: No chest pain or palpitations  GASTROINTESTINAL: No abdominal, nausea, vomiting, or hematemesis; No diarrhea or constipation. No melena or hematochezia.  GENITOURINARY: No dysuria, frequency or hematuria  NEUROLOGICAL: No dizziness, numbness, or weakness  SKIN: No itching, burning, rashes, or lesions   All other review of systems is negative unless indicated above.    VITAL SIGNS:  Vital Signs Last 24 Hrs  T(C): 36.8 (10 Mar 2018 03:59), Max: 36.8 (09 Mar 2018 12:02)  T(F): 98.2 (10 Mar 2018 03:59), Max: 98.3 (09 Mar 2018 12:02)  HR: 83 (10 Mar 2018 03:59) (60 - 83)  BP: 130/71 (10 Mar 2018 05:13) (118/65 - 139/75)  BP(mean): --  RR: 18 (10 Mar 2018 03:59) (16 - 18)  SpO2: 91% (10 Mar 2018 03:59) (91% - 100%)    PHYSICAL EXAM:   GENERAL: no acute distress  HEENT: NC/AT, EOMI, neck supple, MMM  RESPIRATORY: LCTAB/L, no rhonchi, rales, or wheezing  CARDIOVASCULAR: RRR, no murmurs, gallops, rubs  ABDOMINAL: soft, non-tender, non-distended, positive bowel sounds   EXTREMITIES: no clubbing, cyanosis, or edema  NEUROLOGICAL: alert and oriented x 3, non-focal  SKIN: no rashes or lesions   MUSCULOSKELETAL: no gross joint deformity                          13.0   7.1   )-----------( 177      ( 10 Mar 2018 06:57 )             38.8     03-10    137  |  94<L>  |  20  ----------------------------<  85  4.5   |  30  |  1.22    Ca    8.7      10 Mar 2018 06:57  Phos  3.0     03-10  Mg     1.8     03-10      PT/INR - ( 10 Mar 2018 06:57 )   PT: 23.2 sec;   INR: 2.12 ratio         PTT - ( 10 Mar 2018 06:57 )  PTT:35.9 sec    CAPILLARY BLOOD GLUCOSE        I&O's Summary    09 Mar 2018 07:01  -  10 Mar 2018 07:00  --------------------------------------------------------  IN: 1430 mL / OUT: 1500 mL / NET: -70 mL        MEDICATIONS  (STANDING):  atorvastatin 10 milliGRAM(s) Oral at bedtime  ceFAZolin   IVPB 1000 milliGRAM(s) IV Intermittent every 12 hours  docusate sodium 100 milliGRAM(s) Oral daily  finasteride 5 milliGRAM(s) Oral daily  furosemide    Tablet 40 milliGRAM(s) Oral daily  lisinopril 5 milliGRAM(s) Oral daily  metoprolol     tartrate 50 milliGRAM(s) Oral two times a day  senna 2 Tablet(s) Oral daily  tamsulosin 0.4 milliGRAM(s) Oral at bedtime  vancomycin  IVPB 500 milliGRAM(s) IV Intermittent every 12 hours  verapamil 80 milliGRAM(s) Oral two times a day Patient is a 85y old  Male who presents with a chief complaint of Swelling/Pain in left leg, SOB (04 Mar 2018 13:51)      Overnight Events:  No acute events ON.     REVIEW OF SYSTEMS:  CONSTITUTIONAL: No weakness, fevers or chills  EYES/ENT: No visual changes, no throat pain   RESPIRATORY: No cough, wheezing, hemoptysis; No shortness of breath  CARDIOVASCULAR: No chest pain or palpitations  GASTROINTESTINAL: No abdominal, nausea, vomiting, or hematemesis; No diarrhea or constipation. No melena or hematochezia.  GENITOURINARY: No dysuria, frequency or hematuria  NEUROLOGICAL: No dizziness, numbness, or weakness  SKIN: No itching, burning, rashes, or lesions   All other review of systems is negative unless indicated above.    VITAL SIGNS:  Vital Signs Last 24 Hrs  T(C): 36.8 (10 Mar 2018 03:59), Max: 36.8 (09 Mar 2018 12:02)  T(F): 98.2 (10 Mar 2018 03:59), Max: 98.3 (09 Mar 2018 12:02)  HR: 83 (10 Mar 2018 03:59) (60 - 83)  BP: 130/71 (10 Mar 2018 05:13) (118/65 - 139/75)  BP(mean): --  RR: 18 (10 Mar 2018 03:59) (16 - 18)  SpO2: 91% (10 Mar 2018 03:59) (91% - 100%)    PHYSICAL EXAM:   GENERAL: no acute distress  HEENT: NC/AT, EOMI, neck supple, MMM  RESPIRATORY: LCTAB/L, no rhonchi, rales, or wheezing  CARDIOVASCULAR: RRR, no murmurs, gallops, rubs  ABDOMINAL: soft, non-tender, non-distended, positive bowel sounds   EXTREMITIES: no clubbing, cyanosis, or edema  NEUROLOGICAL: alert and oriented x 3, non-focal  SKIN: no rashes or lesions   MUSCULOSKELETAL: no gross joint deformity                          13.0   7.1   )-----------( 177      ( 10 Mar 2018 06:57 )             38.8     03-10    137  |  94<L>  |  20  ----------------------------<  85  4.5   |  30  |  1.22    Ca    8.7      10 Mar 2018 06:57  Phos  3.0     03-10  Mg     1.8     03-10      PT/INR - ( 10 Mar 2018 06:57 )   PT: 23.2 sec;   INR: 2.12 ratio         PTT - ( 10 Mar 2018 06:57 )  PTT:35.9 sec    CAPILLARY BLOOD GLUCOSE        I&O's Summary    09 Mar 2018 07:01  -  10 Mar 2018 07:00  --------------------------------------------------------  IN: 1430 mL / OUT: 1500 mL / NET: -70 mL        MEDICATIONS  (STANDING):  atorvastatin 10 milliGRAM(s) Oral at bedtime  ceFAZolin   IVPB 1000 milliGRAM(s) IV Intermittent every 12 hours  docusate sodium 100 milliGRAM(s) Oral daily  finasteride 5 milliGRAM(s) Oral daily  furosemide    Tablet 40 milliGRAM(s) Oral daily  lisinopril 5 milliGRAM(s) Oral daily  metoprolol     tartrate 50 milliGRAM(s) Oral two times a day  senna 2 Tablet(s) Oral daily  tamsulosin 0.4 milliGRAM(s) Oral at bedtime  vancomycin  IVPB 500 milliGRAM(s) IV Intermittent every 12 hours  verapamil 80 milliGRAM(s) Oral two times a day

## 2018-03-10 NOTE — DISCHARGE NOTE ADULT - MEDICATION SUMMARY - MEDICATIONS TO TAKE
I will START or STAY ON the medications listed below when I get home from the hospital:    Proscar 5 mg oral tablet  -- 1 tab(s) by mouth once a day  -- Indication: For BPH (Benign Prostatic Hyperplasia)    lisinopril 5 mg oral tablet  -- 1 tab(s) by mouth once a day  -- Indication: For Hypertension    tamsulosin 0.4 mg oral capsule  -- 1 cap(s) by mouth once a day (at bedtime)  -- Indication: For BPH (Benign Prostatic Hyperplasia)    verapamil 80 mg oral tablet  -- 1 tab(s) by mouth 2 times a day  -- Indication: For A-fib    Coumadin 1 mg oral tablet  -- Depending on INR levels: 1 tab(s) by mouth once a day Mon - Sat and 2 tab(s) by mouth once a day on Sunday  -- Indication: For A-fib    lovastatin 40 mg oral tablet  -- 1 tab(s) by mouth once a day  -- Indication: For Dyslipidemia    Toprol- mg oral tablet, extended release  -- 1 tab(s) by mouth once a day  -- Indication: For Heart failure    furosemide 40 mg oral tablet  -- 1 tab(s) by mouth once a day  -- Indication: For Heart failure I will START or STAY ON the medications listed below when I get home from the hospital:    Proscar 5 mg oral tablet  -- 1 tab(s) by mouth once a day  -- Indication: For BPH (Benign Prostatic Hyperplasia)    lisinopril 5 mg oral tablet  -- 1 tab(s) by mouth once a day  -- Indication: For Hypertension    tamsulosin 0.4 mg oral capsule  -- 1 cap(s) by mouth once a day (at bedtime)  -- Indication: For BPH (Benign Prostatic Hyperplasia)    verapamil 80 mg oral tablet  -- 1 tab(s) by mouth 2 times a day  -- Indication: For A-fib    lovastatin 40 mg oral tablet  -- 1 tab(s) by mouth once a day  -- Indication: For Dyslipidemia    Toprol- mg oral tablet, extended release  -- 1 tab(s) by mouth once a day  -- Indication: For Heart failure    furosemide 20 mg oral tablet  -- 3 tab(s) by mouth once a day  -- Indication: For Heart failure I will START or STAY ON the medications listed below when I get home from the hospital:    Proscar 5 mg oral tablet  -- 1 tab(s) by mouth once a day  -- Indication: For BPH (Benign Prostatic Hyperplasia)    lisinopril 5 mg oral tablet  -- 1 tab(s) by mouth once a day  -- Indication: For Hypertension    tamsulosin 0.4 mg oral capsule  -- 1 cap(s) by mouth once a day (at bedtime)  -- Indication: For BPH (Benign Prostatic Hyperplasia)    verapamil 240 mg/24 hours oral capsule, extended release  -- 1  by mouth once a day  -- Indication: For Atrial Fibrillation    lovastatin 40 mg oral tablet  -- 1 tab(s) by mouth once a day  -- Indication: For Dyslipidemia    metoprolol extended release  -- 150 milligram(s) by mouth once a day   -- Indication: For Atrial Fibrillation     furosemide 20 mg oral tablet  -- 3 tab(s) by mouth once a day  -- Indication: For Heart failure I will START or STAY ON the medications listed below when I get home from the hospital:    Proscar 5 mg oral tablet  -- 1 tab(s) by mouth once a day  -- Indication: For BPH (Benign Prostatic Hyperplasia)    lisinopril 5 mg oral tablet  -- 1 tab(s) by mouth once a day  -- Indication: For Hypertension    tamsulosin 0.4 mg oral capsule  -- 1 cap(s) by mouth once a day (at bedtime)  -- Indication: For BPH (Benign Prostatic Hyperplasia)    verapamil 240 mg/24 hours oral capsule, extended release  -- 1  by mouth once a day  -- Indication: For Hypertension    lovastatin 40 mg oral tablet  -- 1 tab(s) by mouth once a day  -- Indication: For Dyslipidemia    Toprol-XL 50 mg oral tablet, extended release  -- 3 tab(s) by mouth once a day   -- It is very important that you take or use this exactly as directed.  Do not skip doses or discontinue unless directed by your doctor.  May cause drowsiness.  Alcohol may intensify this effect.  Use care when operating dangerous machinery.  Some non-prescription drugs may aggravate your condition.  Read all labels carefully.  If a warning appears, check with your doctor before taking.  Swallow whole.  Do not crush.  Take with food or milk.  This drug may impair the ability to drive or operate machinery.  Use care until you become familiar with its effects.    -- Indication: For Heart failure    furosemide 20 mg oral tablet  -- 3 tab(s) by mouth once a day  -- Indication: For Heart failure I will START or STAY ON the medications listed below when I get home from the hospital:    Proscar 5 mg oral tablet  -- 1 tab(s) by mouth once a day  -- Indication: For BPH (Benign Prostatic Hyperplasia)    lisinopril 5 mg oral tablet  -- 1 tab(s) by mouth once a day  -- Indication: For Hypertension    tamsulosin 0.4 mg oral capsule  -- 1 cap(s) by mouth once a day (at bedtime)  -- Indication: For BPH (Benign Prostatic Hyperplasia)    verapamil 240 mg/24 hours oral capsule, extended release  -- 1  by mouth once a day  -- Indication: For Hypertension    lovastatin 40 mg oral tablet  -- 1 tab(s) by mouth once a day  -- Indication: For Dyslipidemia    metoprolol succinate 50 mg oral tablet, extended release  -- 3 tab(s) by mouth once a day   -- It is very important that you take or use this exactly as directed.  Do not skip doses or discontinue unless directed by your doctor.  May cause drowsiness.  Alcohol may intensify this effect.  Use care when operating dangerous machinery.  Some non-prescription drugs may aggravate your condition.  Read all labels carefully.  If a warning appears, check with your doctor before taking.  Swallow whole.  Do not crush.  Take with food or milk.  This drug may impair the ability to drive or operate machinery.  Use care until you become familiar with its effects.    -- Indication: For Heart failure    furosemide 20 mg oral tablet  -- 3 tab(s) by mouth once a day  -- Indication: For Heart failure I will START or STAY ON the medications listed below when I get home from the hospital:    Proscar 5 mg oral tablet  -- 1 tab(s) by mouth once a day  -- Indication: For BPH (Benign Prostatic Hyperplasia)    lisinopril 5 mg oral tablet  -- 1 tab(s) by mouth once a day  -- Indication: For Hypertension    tamsulosin 0.4 mg oral capsule  -- 1 cap(s) by mouth once a day (at bedtime)  -- Indication: For BPH (Benign Prostatic Hyperplasia)    verapamil 80 mg oral tablet  -- 1 tab(s) by mouth 2 times a day   -- Avoid grapefruit and grapefruit juice while taking this medication.  It is very important that you take or use this exactly as directed.  Do not skip doses or discontinue unless directed by your doctor.  Some non-prescription drugs may aggravate your condition.  Read all labels carefully.  If a warning appears, check with your doctor before taking.  Swallow whole.  Do not crush.    -- Indication: For Heart failure    lovastatin 40 mg oral tablet  -- 1 tab(s) by mouth once a day  -- Indication: For Dyslipidemia    metoprolol tartrate 50 mg oral tablet  -- 3 tab(s) by mouth once a day   -- It is very important that you take or use this exactly as directed.  Do not skip doses or discontinue unless directed by your doctor.  May cause drowsiness.  Alcohol may intensify this effect.  Use care when operating dangerous machinery.  Some non-prescription drugs may aggravate your condition.  Read all labels carefully.  If a warning appears, check with your doctor before taking.  Take with food or milk.  This drug may impair the ability to drive or operate machinery.  Use care until you become familiar with its effects.    -- Indication: For Heart failure    furosemide 20 mg oral tablet  -- 3 tab(s) by mouth once a day  -- Indication: For Heart failure I will START or STAY ON the medications listed below when I get home from the hospital:    Proscar 5 mg oral tablet  -- 1 tab(s) by mouth once a day  -- Indication: For BPH (Benign Prostatic Hyperplasia)    lisinopril 5 mg oral tablet  -- 1 tab(s) by mouth once a day  -- Indication: For Hypertension    tamsulosin 0.4 mg oral capsule  -- 1 cap(s) by mouth once a day (at bedtime)  -- Indication: For BPH (Benign Prostatic Hyperplasia)    verapamil 80 mg oral tablet  -- 1 tab(s) by mouth 2 times a day   -- Avoid grapefruit and grapefruit juice while taking this medication.  It is very important that you take or use this exactly as directed.  Do not skip doses or discontinue unless directed by your doctor.  Some non-prescription drugs may aggravate your condition.  Read all labels carefully.  If a warning appears, check with your doctor before taking.  Swallow whole.  Do not crush.    -- Indication: For Heart failure    lovastatin 40 mg oral tablet  -- 1 tab(s) by mouth once a day  -- Indication: For Dyslipidemia    metoprolol tartrate 50 mg oral tablet  -- 1 tab(s) by mouth 3 times a day   -- It is very important that you take or use this exactly as directed.  Do not skip doses or discontinue unless directed by your doctor.  May cause drowsiness.  Alcohol may intensify this effect.  Use care when operating dangerous machinery.  Some non-prescription drugs may aggravate your condition.  Read all labels carefully.  If a warning appears, check with your doctor before taking.  Take with food or milk.  This drug may impair the ability to drive or operate machinery.  Use care until you become familiar with its effects.    -- Indication: For Heart failure    furosemide 20 mg oral tablet  -- 3 tab(s) by mouth once a day  -- Indication: For Heart failure

## 2018-03-10 NOTE — DISCHARGE NOTE ADULT - CARE PROVIDER_API CALL
Dr. Nehemias  Phone: (   )    -  Fax: (   )    - Dr. Nehemias  Phone: (   )    -  Fax: (   )    -    UROLOGY,   PLEASE CALL  [phone] TO MAKE AN APPT FOR [facility] REMOVAL.  Phone: (   )    -  Fax: (   )    -    Yunior Sahni), Cardiac Electrophysiology; Cardiovascular Disease  [address] [place] [id number]  Phone: [phone]  Fax: [phone]

## 2018-03-10 NOTE — DISCHARGE NOTE ADULT - MEDICATION SUMMARY - MEDICATIONS TO STOP TAKING
I will STOP taking the medications listed below when I get home from the hospital:  None I will STOP taking the medications listed below when I get home from the hospital:    Coumadin 1 mg oral tablet  -- Depending on INR levels: 1 tab(s) by mouth once a day Mon - Sat and 2 tab(s) by mouth once a day on Sunday

## 2018-03-10 NOTE — PROGRESS NOTE ADULT - PROBLEM SELECTOR PLAN 1
-Erythema stable, leukocytosis resolving in setting of treatment with antibiotics.   -X-ray LLE without evidence of nec fasciitis or osteomyelitis  -Continue Vanco 500 mg Q12h with end date of 3/11 for a total 7 days of treatment  -c/w Ancef with end date of 3/11 for a total of 7 days of treatment  -wound care following

## 2018-03-10 NOTE — DISCHARGE NOTE ADULT - PLAN OF CARE
Completed course of antibiotics You came in with cellulitis, or an infection of the skin, on your left leg. You received treatment with IV antibiotics for a week. The infection in your leg improved. You also were evaluated by wound care; they made recommendations about dressings to apply to your leg. Please see your primary care provider within a week of discharge so that he or she may continue to evaluate your progress. You were admitted with clinical signs of heart failure, such as shortness of breath and fluid in your lungs. Your medications were adjusted appropriately. Please continue taking medications as prescribed after discharge. Please continue your medications, verapamil and metoprolol. You have BPH, or an enlarged prostate. This makes it difficult for you to urinate. Please continue your home proscar as well as flomax, a new medication that was started in the hospital. You have BPH, or an enlarged prostate. This makes it difficult for you to urinate. Please continue your home proscar as well as flomax, a new medication that was started in the hospital. You will also be discharged with a rivas cathether. Please make an appointment with a Urologist at the [facility] at [phone] within 1 week after discharge. You were admitted with clinical signs of heart failure, such as shortness of breath and fluid in your lungs. Your medications were adjusted appropriately. Please continue taking medications as prescribed after discharge. Please STOP coumadin until the INR is checked on Friday 3/16. Your Metoprolol was increased during this hospitalization to 150mg a day. Per discussion with Cardiology please continue to take Verapamil and Metoprolol as prescribed. Please follow up with your Cardiologist within 3-5 days to titrate these medications.    Of note, your Coumadin was stopped today because of INR level was elevated. It is important that you follow up with your primary care doctor on Friday for a repeat INR level to decide if your Coumadin can be continued.

## 2018-03-10 NOTE — PROGRESS NOTE ADULT - PROBLEM SELECTOR PLAN 6
-c/w Verapamil 80 mg BID; will taper off verapamil and optimize BB  -INR subtherapeutic today  -will dose coumadin tonight   -monitor on tele  -Uptitrate BB as necessary

## 2018-03-10 NOTE — DISCHARGE NOTE ADULT - HOSPITAL COURSE
History of Present Illness:  Chief Complaint/Reason for Admission: Swelling/Pain in left leg, SOB	  History of Present Illness: 	  85 y.o. M with PMH of HTN, HLD, TIA, throat cancer s/p radiation and chemo (2011), aortic valve replacement, afib on coumadin, BPH, s/p PMM who presents  with 1 day of L leg redness, pain, and swelling. Patient has no prior history of cellulitis, but does have history of hematoma to that leg a few months ago. Patient a blister that popped earlier this week. Patient denies trauma to the area. Patient denies fevers or chills at home. Of note, patient's family have noted patient to have become increasingly short of breath with exertion. Patient at baseline, due to his history of throat cancer, sleeps propped up on a few pillows, and so it is unable to tell whether patient is orthopneic. Patient denies PND. He does not carry a prior diagnosis of heart failure. Of note, patient had some outpatient imaging performed which indicated lung nodules and a pericardial effusion for which he is being worked up. Patient denies N/V, diarrhea, dysuria, hematuria, or melena. Pt endorses dysphagia has been present s/p radiation 7 years ago.     In the ED, patient's CBC was notable for leukocytosis of 22K with 4% bands, as well as mild thrombocytopenia of 141K. Pro-BNP was elevated to 4277. CXR was performed which showed small to moderate b/l pleural effusions. Lactate was somewhat elevated to 2.2.  X-ray of LLE was remarkable for soft tissue edema; no soft tissue gas collection or gross radiographic evidence for osteomyelitis. Doppler did not indicate DVT. Patient was treated with Clindamycin x 1 dose and given IV Tylenol for pain.     Hospital course:   Vancomycin was initiated as empiric treatment for cellulitis; patient was also initiated on Unasyn. On hospital day 2, Unasyn was d/donald and Ancef was initiated for better coverage of MSSA. Vancomycin was continued; dosing was adjusted based on trough results. Wound care followed and recommended dressings to the patient's LLE. Tylenol was used to control pain. Patient received a total of 7 days of treatment with IV antibiotics until 3/11. Patient's leukocytosis resolved by time of discharge and he remained afebrile.     With respect to patient's symptoms of heart failure, patient was diuresed with IV Lasix; he responded with adequate urine output. TTE showed preserved systolic function; however, diastolic function evaluation was indeterminate given s/p PPM and a fib. Patient's volume status improved and he appeared clinically euvolemic; he was transitioned to PO Lasix. At the time of discharge, patient was no longer dyspneic and was saturation well on RA.     With respect to atrial fibrillation, patient's beta blocker was uptitrated as tolerated; his dose of verapamil was down titrated.     Patient's hospital course was complicated by inability to urinate. Coudet tip rivas was placed on day of admission. He was also initiated on flomax. On 3/9, patient underwent TOV but failed. Rivas was reinserted and TOV was once again performed on  . . . .     At the time of discharge, patient was afebrile and hemodynamically stable. He was discharged home with recommendation for home PT. 85 y.o. M with PMH of HTN, HLD, TIA, throat cancer s/p radiation and chemo (2011), aortic valve replacement, afib on coumadin, BPH, s/p PMM who presents  with 1 day of L leg redness, pain, and swelling. Patient has no prior history of cellulitis, but does have history of hematoma to that leg a few months ago. Patient a blister that popped earlier this week. Patient denies trauma to the area. Patient denies fevers or chills at home. Of note, patient's family have noted patient to have become increasingly short of breath with exertion. Patient at baseline, due to his history of throat cancer, sleeps propped up on a few pillows, and so it is unable to tell whether patient is orthopneic. Patient denies PND. He does not carry a prior diagnosis of heart failure. Of note, patient had some outpatient imaging performed which indicated lung nodules and a pericardial effusion for which he is being worked up. Patient denies N/V, diarrhea, dysuria, hematuria, or melena. Pt endorses dysphagia has been present s/p radiation 7 years ago.     In the ED, patient's CBC was notable for leukocytosis of 22K with 4% bands, as well as mild thrombocytopenia of 141K. Pro-BNP was elevated to 4277. CXR was performed which showed small to moderate b/l pleural effusions. Lactate was somewhat elevated to 2.2.  X-ray of LLE was remarkable for soft tissue edema; no soft tissue gas collection or gross radiographic evidence for osteomyelitis. Doppler did not indicate DVT. Patient was treated with Clindamycin x 1 dose and given IV Tylenol for pain.     Hospital course:   Vancomycin was initiated as empiric treatment for cellulitis; patient was also initiated on Unasyn. On hospital day 2, Unasyn was d/donald and Ancef was initiated for better coverage of MSSA. Vancomycin was continued; dosing was adjusted based on trough results. Wound care followed and recommended dressings to the patient's LLE. Tylenol was used to control pain. Patient received a total of 7 days of treatment with IV antibiotics until 3/11. Patient's leukocytosis resolved by time of discharge and he remained afebrile.     With respect to patient's symptoms of heart failure, patient was diuresed with IV Lasix; he responded with adequate urine output. TTE showed preserved systolic function; however, diastolic function evaluation was indeterminate given s/p PPM and a fib. Patient's volume status improved and he appeared clinically euvolemic; he was transitioned to PO Lasix. At the time of discharge, patient was no longer dyspneic and was saturation well on RA.     With respect to atrial fibrillation, patient's beta blocker was uptitrated as tolerated; his dose of verapamil was down titrated.     Patient's hospital course was complicated by inability to urinate. Coudet tip rivas was placed on day of admission. He was also initiated on flomax. On 3/9, patient underwent TOV but failed. Rivas was reinserted and TOV was once again performed; however failed TOV. Patient will be discharged with a rivas catheter. Patient and family were counseled on the need to    make an appointment with a Urologist at the [facility] at [phone] within 1 week after discharge.    At the time of discharge, patient was afebrile and hemodynamically stable. He was discharged home with recommendation for home PT. 85 y.o. M with PMH of HTN, HLD, TIA, throat cancer s/p radiation and chemo (2011), aortic valve replacement, afib on coumadin, BPH, s/p PMM who presents  with 1 day of L leg redness, pain, and swelling. Patient has no prior history of cellulitis, but does have history of hematoma to that leg a few months ago. Patient a blister that popped earlier this week. Patient denies trauma to the area. Patient denies fevers or chills at home. Of note, patient's family have noted patient to have become increasingly short of breath with exertion. Patient at baseline, due to his history of throat cancer, sleeps propped up on a few pillows, and so it is unable to tell whether patient is orthopneic. Patient denies PND. He does not carry a prior diagnosis of heart failure. Of note, patient had some outpatient imaging performed which indicated lung nodules and a pericardial effusion for which he is being worked up. Patient denies N/V, diarrhea, dysuria, hematuria, or melena. Pt endorses dysphagia has been present s/p radiation 7 years ago.     In the ED, patient's CBC was notable for leukocytosis of 22K with 4% bands, as well as mild thrombocytopenia of 141K. Pro-BNP was elevated to 4277. CXR was performed which showed small to moderate b/l pleural effusions. Lactate was somewhat elevated to 2.2.  X-ray of LLE was remarkable for soft tissue edema; no soft tissue gas collection or gross radiographic evidence for osteomyelitis. Doppler did not indicate DVT. Patient was treated with Clindamycin x 1 dose and given IV Tylenol for pain.     Hospital course:   Vancomycin was initiated as empiric treatment for cellulitis; patient was also initiated on Unasyn. On hospital day 2, Unasyn was d/donald and Ancef was initiated for better coverage of MSSA. Vancomycin was continued; dosing was adjusted based on trough results. Wound care followed and recommended dressings to the patient's LLE. Tylenol was used to control pain. Patient received a total of 7 days of treatment with IV antibiotics until 3/11. Patient's leukocytosis resolved by time of discharge and he remained afebrile.     With respect to patient's symptoms of heart failure, patient was diuresed with IV Lasix; he responded with adequate urine output. TTE showed preserved systolic function; however, diastolic function evaluation was indeterminate given s/p PPM and a fib. Patient's volume status improved and he appeared clinically euvolemic; he was transitioned to PO Lasix. At the time of discharge, patient was no longer dyspneic and was saturation well on RA.     With respect to atrial fibrillation, patient's beta blocker was uptitrated as tolerated; his dose of verapamil was down titrated.     Patient's hospital course was complicated by inability to urinate. Coudet tip rivas was placed on day of admission. He was also initiated on flomax. On 3/9, patient underwent TOV but failed. Rivas was reinserted and TOV was once again performed; however failed TOV. Patient will be discharged with a rivas catheter. Patient and family were counseled on the need to    make an appointment with a Urologist at the [facility] at [phone] within 1 week after discharge.    Patient was originally going to be discharged on Toprol 150mg; however, the patient needs all meds crushed. Metoprolol succinate 75mg BID will be prescribed instead.    At the time of discharge, patient was afebrile and hemodynamically stable. He was discharged home with recommendation for home PT. 85 y.o. M with PMH of HTN, HLD, TIA, throat cancer s/p radiation and chemo (2011), aortic valve replacement, afib on coumadin, BPH, s/p PMM who presents  with 1 day of L leg redness, pain, and swelling. Patient has no prior history of cellulitis, but does have history of hematoma to that leg a few months ago. Patient a blister that popped earlier this week. Patient denies trauma to the area. Patient denies fevers or chills at home. Of note, patient's family have noted patient to have become increasingly short of breath with exertion. Patient at baseline, due to his history of throat cancer, sleeps propped up on a few pillows, and so it is unable to tell whether patient is orthopneic. Patient denies PND. He does not carry a prior diagnosis of heart failure. Of note, patient had some outpatient imaging performed which indicated lung nodules and a pericardial effusion for which he is being worked up. Patient denies N/V, diarrhea, dysuria, hematuria, or melena. Pt endorses dysphagia has been present s/p radiation 7 years ago.     In the ED, patient's CBC was notable for leukocytosis of 22K with 4% bands, as well as mild thrombocytopenia of 141K. Pro-BNP was elevated to 4277. CXR was performed which showed small to moderate b/l pleural effusions. Lactate was somewhat elevated to 2.2.  X-ray of LLE was remarkable for soft tissue edema; no soft tissue gas collection or gross radiographic evidence for osteomyelitis. Doppler did not indicate DVT. Patient was treated with Clindamycin x 1 dose and given IV Tylenol for pain.     Hospital course:   Vancomycin was initiated as empiric treatment for cellulitis; patient was also initiated on Unasyn. On hospital day 2, Unasyn was d/donald and Ancef was initiated for better coverage of MSSA. Vancomycin was continued; dosing was adjusted based on trough results. Wound care followed and recommended dressings to the patient's LLE. Tylenol was used to control pain. Patient received a total of 7 days of treatment with IV antibiotics until 3/11. Patient's leukocytosis resolved by time of discharge and he remained afebrile.     With respect to patient's symptoms of heart failure, patient was diuresed with IV Lasix; he responded with adequate urine output. TTE showed preserved systolic function; however, diastolic function evaluation was indeterminate given s/p PPM and a fib. Patient's volume status improved and he appeared clinically euvolemic; he was transitioned to PO Lasix. At the time of discharge, patient was no longer dyspneic and was saturation well on RA.     With respect to atrial fibrillation, patient's beta blocker was uptitrated as tolerated; his dose of verapamil was down titrated.     Patient's hospital course was complicated by inability to urinate. Coudet tip rivas was placed on day of admission. He was also initiated on flomax. On 3/9, patient underwent TOV but failed. Rivas was reinserted and TOV was once again performed; however failed TOV. Patient will be discharged with a rivas catheter. Patient and family were counseled on the need to    make an appointment with a Urologist at the [facility] at [phone] within 1 week after discharge.    Patient was originally going to be discharged on Toprol 150mg; however, the patient needs all meds crushed. Metoprolol succinate 75mg BID will be prescribed instead.    At the time of discharge, patient was afebrile and hemodynamically stable. He was discharged home with recommendation for home PT.     Patient will be d/c home  with home care services.  Patient will hold the COumadin since INR= 3.71 and will repeat INR in 2 days on Friday with PCP and PCP will dose coumadin accordingly.  Patient will have f/up with PCP within one week of hospital D/C .  Patient will go home with rivas and to have f/up with Urologist within 1-2 weeks of hospital D/C.  D/C time 50 minutes.

## 2018-03-11 LAB
ANION GAP SERPL CALC-SCNC: 12 MMOL/L — SIGNIFICANT CHANGE UP (ref 5–17)
BUN SERPL-MCNC: 18 MG/DL — SIGNIFICANT CHANGE UP (ref 7–23)
CALCIUM SERPL-MCNC: 8.6 MG/DL — SIGNIFICANT CHANGE UP (ref 8.4–10.5)
CHLORIDE SERPL-SCNC: 96 MMOL/L — SIGNIFICANT CHANGE UP (ref 96–108)
CO2 SERPL-SCNC: 30 MMOL/L — SIGNIFICANT CHANGE UP (ref 22–31)
CREAT SERPL-MCNC: 1.23 MG/DL — SIGNIFICANT CHANGE UP (ref 0.5–1.3)
GLUCOSE SERPL-MCNC: 105 MG/DL — HIGH (ref 70–99)
HCT VFR BLD CALC: 38.2 % — LOW (ref 39–50)
HGB BLD-MCNC: 12.3 G/DL — LOW (ref 13–17)
INR BLD: 2.72 RATIO — HIGH (ref 0.88–1.16)
MAGNESIUM SERPL-MCNC: 1.7 MG/DL — SIGNIFICANT CHANGE UP (ref 1.6–2.6)
MCHC RBC-ENTMCNC: 32.2 GM/DL — SIGNIFICANT CHANGE UP (ref 32–36)
MCHC RBC-ENTMCNC: 33.3 PG — SIGNIFICANT CHANGE UP (ref 27–34)
MCV RBC AUTO: 103 FL — HIGH (ref 80–100)
NT-PROBNP SERPL-SCNC: 2090 PG/ML — HIGH (ref 0–300)
PHOSPHATE SERPL-MCNC: 3.1 MG/DL — SIGNIFICANT CHANGE UP (ref 2.5–4.5)
PLATELET # BLD AUTO: 179 K/UL — SIGNIFICANT CHANGE UP (ref 150–400)
POTASSIUM SERPL-MCNC: 4.2 MMOL/L — SIGNIFICANT CHANGE UP (ref 3.5–5.3)
POTASSIUM SERPL-SCNC: 4.2 MMOL/L — SIGNIFICANT CHANGE UP (ref 3.5–5.3)
PROTHROM AB SERPL-ACNC: 30.3 SEC — HIGH (ref 9.8–12.7)
RBC # BLD: 3.69 M/UL — LOW (ref 4.2–5.8)
RBC # FLD: 11.7 % — SIGNIFICANT CHANGE UP (ref 10.3–14.5)
SODIUM SERPL-SCNC: 138 MMOL/L — SIGNIFICANT CHANGE UP (ref 135–145)
WBC # BLD: 6.9 K/UL — SIGNIFICANT CHANGE UP (ref 3.8–10.5)
WBC # FLD AUTO: 6.9 K/UL — SIGNIFICANT CHANGE UP (ref 3.8–10.5)

## 2018-03-11 PROCEDURE — 99233 SBSQ HOSP IP/OBS HIGH 50: CPT

## 2018-03-11 PROCEDURE — 99232 SBSQ HOSP IP/OBS MODERATE 35: CPT

## 2018-03-11 RX ORDER — WARFARIN SODIUM 2.5 MG/1
2 TABLET ORAL ONCE
Qty: 0 | Refills: 0 | Status: COMPLETED | OUTPATIENT
Start: 2018-03-11 | End: 2018-03-11

## 2018-03-11 RX ADMIN — ATORVASTATIN CALCIUM 10 MILLIGRAM(S): 80 TABLET, FILM COATED ORAL at 21:59

## 2018-03-11 RX ADMIN — Medication 100 MILLIGRAM(S): at 05:14

## 2018-03-11 RX ADMIN — Medication 100 MILLIGRAM(S): at 05:13

## 2018-03-11 RX ADMIN — SENNA PLUS 2 TABLET(S): 8.6 TABLET ORAL at 21:58

## 2018-03-11 RX ADMIN — Medication 50 MILLIGRAM(S): at 17:00

## 2018-03-11 RX ADMIN — TAMSULOSIN HYDROCHLORIDE 0.4 MILLIGRAM(S): 0.4 CAPSULE ORAL at 21:58

## 2018-03-11 RX ADMIN — Medication 80 MILLIGRAM(S): at 17:00

## 2018-03-11 RX ADMIN — WARFARIN SODIUM 2 MILLIGRAM(S): 2.5 TABLET ORAL at 21:58

## 2018-03-11 RX ADMIN — Medication 650 MILLIGRAM(S): at 12:04

## 2018-03-11 RX ADMIN — FINASTERIDE 5 MILLIGRAM(S): 5 TABLET, FILM COATED ORAL at 12:04

## 2018-03-11 RX ADMIN — Medication 50 MILLIGRAM(S): at 05:15

## 2018-03-11 RX ADMIN — Medication 40 MILLIGRAM(S): at 05:15

## 2018-03-11 RX ADMIN — Medication 80 MILLIGRAM(S): at 05:15

## 2018-03-11 RX ADMIN — LISINOPRIL 5 MILLIGRAM(S): 2.5 TABLET ORAL at 05:15

## 2018-03-11 NOTE — PROVIDER CONTACT NOTE (OTHER) - SITUATION
patient with 5 cc red color urine with clots . patient bladder palpable . bladder scan done 389 ml showing
Blood tinged urine draining from rivas
PSVT HR [id number]
Pt Hr went up to 134 nonsustained
Unable to straight cath pt, pt cannot tolerate vermapril
patient bladder scan after 6 hour 430 ml urine out put 20 ml still 430ml bladder scan Chang placed as ordered 400 ml out
pt had 6 beats wct
pt had 9 beats of wct
Patient had 11 beats of wide complex on the monitor

## 2018-03-11 NOTE — PROGRESS NOTE ADULT - PROBLEM SELECTOR PLAN 3
-Improving  -Could have been  2/2 to drug side effect, unasyn has thrombocytopenia as reported side effect, also potentially infection driven  -Unasyn d/donald; switched to Ancef and keeping Vanco  -Daily CBC  -Continue to monitor

## 2018-03-11 NOTE — PROGRESS NOTE ADULT - PROBLEM SELECTOR PLAN 8
-Failed TOV, thus rivas was re-inserted, TOV to be attempted today  -c/w with home finasteride, initiated flomax this hospitalization  -Renal US without evidence of hydronephrosis

## 2018-03-11 NOTE — PROGRESS NOTE ADULT - PROBLEM SELECTOR PLAN 6
-c/w Verapamil 80 mg BID  -INR 2.72  -will dose coumadin tonight   -monitor on tele  -Uptitrate BB as necessary

## 2018-03-11 NOTE — PROGRESS NOTE ADULT - SUBJECTIVE AND OBJECTIVE BOX
Progress Note:   · Provider Specialty	Cardiology	      · Subjective and Objective: 	  Consult PROGRESS NOTE:  · Requested by Name:	Dr. Ann	  · Date/Time:	10-Mar-2018 	  · Reason for Referral/Consultation:	SOB, HFpEF, NSVT	      · Subjective and Objective: 	  Chief Complaint: SOB, HFpEF, NSVT    HPI: HPI:  85 y.o. M with PMH of HTN, HLD, TIA, throat cancer s/p radiation and chemo (), SONIDO, afib on coumadin, BPH, s/p PPM who presents  with 1 day of L leg redness, pain, and swelling. Patient has no prior history of cellulitis, but does have history of hematoma to that leg a few months ago. Patient a blister that popped earlier this week. Patient denies trauma to the area. Patient denies fevers or chills at home. Of note, patient's family have noted patient to have become increasingly short of breath with exertion. Patient at baseline, due to his history of throat cancer, sleeps propped up on a few pillows, and so it is unable to tell whether patient is orthopneic. Patient denies PND. He does not carry a prior diagnosis of heart failure. Of note, patient had some outpatient imaging performed which indicated lung nodules and a pericardial effusion for which he is being worked up. Patient denies N/V, diarrhea, dysuria, hematuria, or melena. Pt endorses dysphagia has been present s/p radiation 7 years ago.     In the ED, patient's CBC was notable for leukocytosis of 22K with 4% bands, as well as mild thrombocytopenia of 141K. Pro-BNP was elevated to 4277. CXR was performed which showed small to moderate b/l pleural effusions. Lactate was somewhat elevated to 2.2.  X-ray of LLE was remarkable for soft tissue edema; no soft tissue gas collection or gross radiographic evidence for osteomyelitis. Doppler did not indicate DVT. Patient was treated with Clindamycin x 1 dose and given IV Tylenol for pain. (04 Mar 2018 13:51)    Patient has progressed well. His volume status has been optimized with IV diuretics and his breathing and LE edema have improved. On telemetry he was noted to have 2 runs of wide complex tachycardia. He denies symptoms related to this. No chest pain. No shortness of breath. No palpitations. No syncope.   =====================  Interval Events  - AF 60s-80s; increases when ambulates to 130s; 4 beats WCT  - Breathing improved; still with LE edema but improved    =====================    PMH:   A-fib  Hx TIA  x 8yrs  BPH (Benign Prostatic Hyperplasia)  Hypertension  Dyslipidemia  Cardiac Dysrhythmia    PSH:   excision of Basal Cell Carcinoma of Nose  h/o cardioversion  of Atrial Fibrillation    Family History:  FAMILY HISTORY:  No pertinent family history in first degree relatives    Allergies:  No Known Allergies    Social History:  Smoking: No active.   Alcohol:  Drugs:    Medications:  acetaminophen   Tablet 650 milliGRAM(s) Oral every 6 hours PRN  atorvastatin 10 milliGRAM(s) Oral at bedtime  ceFAZolin   IVPB 1000 milliGRAM(s) IV Intermittent every 12 hours  docusate sodium 100 milliGRAM(s) Oral daily  finasteride 5 milliGRAM(s) Oral daily  lisinopril 5 milliGRAM(s) Oral daily  metoprolol     tartrate 50 milliGRAM(s) Oral two times a day  polyethylene glycol 3350 17 Gram(s) Oral daily PRN  senna 2 Tablet(s) Oral daily  tamsulosin 0.4 milliGRAM(s) Oral at bedtime  vancomycin  IVPB 500 milliGRAM(s) IV Intermittent every 12 hours  verapamil 80 milliGRAM(s) Oral two times a day  warfarin 2 milliGRAM(s) Oral once      Cardiovascular Diagnostic Testing:  ECG: AF demand V-pace.      Echo: < from: Transthoracic Echocardiogram (18 @ 13:14) >  1. Mitral annular calcification and calcified mitral  leaflets with decreased diastolic opening. Mild-moderate  mitral regurgitation. Peak mitral valve gradient equals 14  mm Hg, mean transmitral valve gradient equals 5 mm Hg,  consistent with mild  mitral stenosis.  2. Transcatheter aortic valve replacement. The valve  appears well-seated. Peak transaortic valve gradient equals  27 mm Hg, mean transaortic valve gradient equals 11 mm Hg,  which is probably normal in the presence of a transcatheter  aortic valve replacement.  Mild paravalvular aortic  regurgitation. There are two jets of paravalvular aortic  regurgitation at the 3 o'clock and 6 o'clock position.  3. Severely dilated left atrium.  LA volume index = 79  cc/m2.  4. Mild concentric left ventricular hypertrophy.  5. Overall preserved left ventricular systolic dysfunction.   Septal motion consistent with paced rhythm.  6. Unable to comment on diastolic function in the setting  of atrial fibrillation and paced rhythm  7. Enlarged right atrium. A device wire is noted in the  right heart.  8. Normal right ventricular size with decreased right  ventricular systolic function.  9. Estimated right ventricular systolic pressure equals 49  mm Hg, assuming right atrial pressure equals 8 mm Hg,  consistent with mild pulmonary hypertension.    < end of copied text >      Stress Testing:    Cath:     Imaging: < from: Xray Chest 1 View- PORTABLE-Urgent (18 @ 03:29) >  IMPRESSION:  Small to moderate bilateral pleural effusions with adjacent passive   atelectasis.      < end of copied text >      Labs:                        14.0   7.4   )-----------( 171      ( 09 Mar 2018 06:21 )             40.3         140  |  105  |  12  ----------------------------<  116<H>  3.8   |  23  |  0.68    Ca    8.9      09 Mar 2018 06:21  Phos  3.5       Mg     1.7           PT/INR - ( 09 Mar 2018 06:21 )   PT: 16.3 sec;   INR: 1.48 ratio         PTT - ( 09 Mar 2018 06:21 )  PTT:33.2 sec      Serum Pro-Brain Natriuretic Peptide: 4277 pg/mL ( @ 03:23)        Physical Exam:  T(C): 36.6 (18 @ 15:53), Max: 36.8 (18 @ 12:02)  HR: 74 (18 @ 15:53) (61 - 101)  BP: 139/75 (18 @ 15:53) (111/57 - 139/75)  RR: 18 (18 @ 15:53) (16 - 18)  SpO2: 100% (18 @ 15:53) (95% - 100%)  Wt(kg): --     @ 07:01  -   @ 07:00  --------------------------------------------------------  IN: 530 mL / OUT: 2020 mL / NET: -1490 mL     @ 07:01  -   @ 16:12  --------------------------------------------------------  IN: 560 mL / OUT: 800 mL / NET: -240 mL      Daily     Daily Weight in k.5 (09 Mar 2018 04:29)      Review of Systems:   · Additional ROS	As per HPI otherwise negative.	    Physical Exam:   · Constitutional	Well-developed, well nourished	  · Eyes	EOMI; PERRL; no drainage or redness	  · ENMT	No oral lesions; no gross abnormalities	  · Neck	No bruits; no thyromegaly or nodules	  · Back	No deformity or limitation of movement	  · Respiratory	detailed exam	  · Respiratory Comments	Decrease at bases. nl effort.	  · Cardiovascular	detailed exam	  · Cardiovascular Details	regular rate and rhythm	  · Cardiovascular Details	murmur	  · Murmur Timing	systolic	  · Character of Systolic Murmur	murmur loudness: II/VI	  · Cardiovascular Comments	Improved edema. R>L	  · Gastrointestinal	Soft, non-tender, no hepatosplenomegaly, normal bowel sounds	  · Extremities	detailed exam	  · Extremities Details	no clubbing; no cyanosis; Trace edema	  · Vascular	Equal and normal pulses (carotid, femoral, dorsalis pedis)	  · Neurological	Alert & oriented; no sensory, motor or coordination deficits, normal reflexes	  · Skin	detailed exam	  · Skin Details	warm and dry	  · Skin Comments	Redness of left shin	  · Musculoskeletal	No joint pain, swelling or deformity; no limitation of movement	  · Psychiatric	Affect and characteristics of appearance, verbalizations, behaviors are appropriate	    Assessment and Recommendation:   · Assessment		  85 y.o. M with PMH of HTN, HLD, TIA, throat cancer s/p radiation and chemo (), SONIDO , afib on coumadin, BPH, s/p PPM who presents with acute on chronic HFpEF and noted to have WCT.   ·	Volume status is improving.   ·	Continue PO Lasix   ·	Would change beta blocker to Toprol 150XL daily (for improved rate control). Keep K >4 and Mg >2.   ·	Continue other cardiac therapies    Teddy Licona MD  [phone]

## 2018-03-11 NOTE — PROGRESS NOTE ADULT - SUBJECTIVE AND OBJECTIVE BOX
BREANNA ANNE  85y  MRN: [id number]    Subjective:    Patient is a 85y old  Male who presents with a chief complaint of Swelling/Pain in left leg, SOB (10 Mar 2018 19:13)      Overnight Events: no complaints overnight  CONSTITUTIONAL: No fever, weight loss, or fatigue  EYES: No eye pain, visual disturbances, or discharge  RESPIRATORY: No cough, wheezing, chills. No shortness of breath  CARDIOVASCULAR: No chest pain, palpitations, dizziness, or leg swelling  GASTROINTESTINAL: No abdominal or epigastric pain. No nausea, vomiting, or hematemesis; No diarrhea or constipation.  GENITOURINARY: No dysuria, frequency, hematuria, or incontinence  SKIN: No itching, burning  LYMPH NODES: No enlarged glands  MUSCULOSKELETAL: No joint pain or swelling; No muscle, back, or extremity pain  Tele: v-paced, 50s-70s, 4 beats WCT        MEDICATIONS  (STANDING):  atorvastatin 10 milliGRAM(s) Oral at bedtime  ceFAZolin   IVPB 1000 milliGRAM(s) IV Intermittent every 12 hours  docusate sodium 100 milliGRAM(s) Oral daily  finasteride 5 milliGRAM(s) Oral daily  furosemide    Tablet 40 milliGRAM(s) Oral daily  lisinopril 5 milliGRAM(s) Oral daily  metoprolol     tartrate 50 milliGRAM(s) Oral two times a day  senna 2 Tablet(s) Oral daily  tamsulosin 0.4 milliGRAM(s) Oral at bedtime  vancomycin  IVPB 500 milliGRAM(s) IV Intermittent every 12 hours  verapamil 80 milliGRAM(s) Oral two times a day    MEDICATIONS  (PRN):  acetaminophen   Tablet 650 milliGRAM(s) Oral every 6 hours PRN for pain  polyethylene glycol 3350 17 Gram(s) Oral daily PRN Constipation        Objective:    Vitals: Vital Signs Last 24 Hrs  T(C): 36.7 (03-11-18 @ 10:00), Max: 37 (03-11-18 @ 00:09)  T(F): 98 (03-11-18 @ 10:00), Max: 98.6 (03-11-18 @ 00:09)  HR: 88 (03-11-18 @ 10:00) (62 - 103)  BP: 132/70 (03-11-18 @ 10:00) (132/62 - 157/78)  BP(mean): --  RR: 18 (03-11-18 @ 10:00) (18 - 18)  SpO2: 94% (03-11-18 @ 10:00) (90% - 96%)            I&O's Summary    10 Mar 2018 06:01  -  11 Mar 2018 07:00  --------------------------------------------------------  IN: 970 mL / OUT: 900 mL / NET: 70 mL    11 Mar 2018 07:01  -  11 Mar 2018 11:18  --------------------------------------------------------  IN: 240 mL / OUT: 0 mL / NET: 240 mL        PHYSICAL EXAM:   GENERAL: no acute distress  HEENT: NC/AT, EOMI, neck supple, MMM  RESPIRATORY: LCTAB/L, no rhonchi, rales, or wheezing  CARDIOVASCULAR: RRR, no murmurs, gallops, rubs  ABDOMINAL: soft, non-tender, non-distended, positive bowel sounds   EXTREMITIES: no clubbing, cyanosis, or edema  NEUROLOGICAL: alert and oriented x 3, non-focal  SKIN: no rashes or lesions   MUSCULOSKELETAL: no gross joint deformity                                          LABS:  03-11    138  |  96  |  18  ----------------------------<  105<H>  4.2   |  30  |  1.23  03-10    137  |  94<L>  |  20  ----------------------------<  85  4.5   |  30  |  1.22  03-09    140  |  105  |  12  ----------------------------<  116<H>  3.8   |  23  |  0.68    Ca    8.6      11 Mar 2018 07:14  Ca    8.7      10 Mar 2018 06:57  Ca    8.9      09 Mar 2018 06:21  Phos  3.1     03-11  Mg     1.7     03-11        PT/INR - ( 11 Mar 2018 07:15 )   PT: 30.3 sec;   INR: 2.72 ratio         PTT - ( 10 Mar 2018 06:57 )  PTT:35.9 sec                                        12.3   6.9   )-----------( 179      ( 11 Mar 2018 07:15 )             38.2                         13.0   7.1   )-----------( 177      ( 10 Mar 2018 06:57 )             38.8                         14.0   7.4   )-----------( 171      ( 09 Mar 2018 06:21 )             40.3     CAPILLARY BLOOD GLUCOSE          RADIOLOGY & ADDITIONAL TESTS:    Imaging Personally Reviewed:  [ ] YES  [ ] NO      Consultants involved in case: Cardiology  Consultant(s) Notes Reviewed:  [X] YES  [ ] NO:           Dr. Maylin Wilson, PGY2  [facility] Pager: [phone]  [facility] Pager: [phone]

## 2018-03-12 LAB
ANION GAP SERPL CALC-SCNC: 13 MMOL/L — SIGNIFICANT CHANGE UP (ref 5–17)
BUN SERPL-MCNC: 17 MG/DL — SIGNIFICANT CHANGE UP (ref 7–23)
CALCIUM SERPL-MCNC: 8.8 MG/DL — SIGNIFICANT CHANGE UP (ref 8.4–10.5)
CHLORIDE SERPL-SCNC: 97 MMOL/L — SIGNIFICANT CHANGE UP (ref 96–108)
CO2 SERPL-SCNC: 28 MMOL/L — SIGNIFICANT CHANGE UP (ref 22–31)
CREAT SERPL-MCNC: 1.27 MG/DL — SIGNIFICANT CHANGE UP (ref 0.5–1.3)
GLUCOSE SERPL-MCNC: 91 MG/DL — SIGNIFICANT CHANGE UP (ref 70–99)
INR BLD: 3.46 RATIO — HIGH (ref 0.88–1.16)
MAGNESIUM SERPL-MCNC: 1.7 MG/DL — SIGNIFICANT CHANGE UP (ref 1.6–2.6)
PHOSPHATE SERPL-MCNC: 3.3 MG/DL — SIGNIFICANT CHANGE UP (ref 2.5–4.5)
POTASSIUM SERPL-MCNC: 4.4 MMOL/L — SIGNIFICANT CHANGE UP (ref 3.5–5.3)
POTASSIUM SERPL-SCNC: 4.4 MMOL/L — SIGNIFICANT CHANGE UP (ref 3.5–5.3)
PROTHROM AB SERPL-ACNC: 38.3 SEC — HIGH (ref 9.8–12.7)
SODIUM SERPL-SCNC: 138 MMOL/L — SIGNIFICANT CHANGE UP (ref 135–145)

## 2018-03-12 PROCEDURE — 71045 X-RAY EXAM CHEST 1 VIEW: CPT | Mod: 26

## 2018-03-12 PROCEDURE — 99233 SBSQ HOSP IP/OBS HIGH 50: CPT | Mod: GC

## 2018-03-12 PROCEDURE — 99232 SBSQ HOSP IP/OBS MODERATE 35: CPT

## 2018-03-12 RX ORDER — METOPROLOL TARTRATE 50 MG
50 TABLET ORAL THREE TIMES A DAY
Qty: 0 | Refills: 0 | Status: DISCONTINUED | OUTPATIENT
Start: 2018-03-12 | End: 2018-03-14

## 2018-03-12 RX ORDER — MAGNESIUM OXIDE 400 MG ORAL TABLET 241.3 MG
400 TABLET ORAL
Qty: 0 | Refills: 0 | Status: DISCONTINUED | OUTPATIENT
Start: 2018-03-12 | End: 2018-03-14

## 2018-03-12 RX ORDER — WARFARIN SODIUM 2.5 MG/1
0.5 TABLET ORAL ONCE
Qty: 0 | Refills: 0 | Status: DISCONTINUED | OUTPATIENT
Start: 2018-03-12 | End: 2018-03-12

## 2018-03-12 RX ORDER — WARFARIN SODIUM 2.5 MG/1
1 TABLET ORAL ONCE
Qty: 0 | Refills: 0 | Status: COMPLETED | OUTPATIENT
Start: 2018-03-12 | End: 2018-03-12

## 2018-03-12 RX ORDER — FUROSEMIDE 40 MG
20 TABLET ORAL ONCE
Qty: 0 | Refills: 0 | Status: COMPLETED | OUTPATIENT
Start: 2018-03-12 | End: 2018-03-12

## 2018-03-12 RX ADMIN — MAGNESIUM OXIDE 400 MG ORAL TABLET 400 MILLIGRAM(S): 241.3 TABLET ORAL at 13:33

## 2018-03-12 RX ADMIN — Medication 40 MILLIGRAM(S): at 04:14

## 2018-03-12 RX ADMIN — WARFARIN SODIUM 1 MILLIGRAM(S): 2.5 TABLET ORAL at 22:05

## 2018-03-12 RX ADMIN — FINASTERIDE 5 MILLIGRAM(S): 5 TABLET, FILM COATED ORAL at 12:32

## 2018-03-12 RX ADMIN — Medication 100 MILLIGRAM(S): at 12:32

## 2018-03-12 RX ADMIN — Medication 20 MILLIGRAM(S): at 19:04

## 2018-03-12 RX ADMIN — Medication 650 MILLIGRAM(S): at 12:32

## 2018-03-12 RX ADMIN — SENNA PLUS 2 TABLET(S): 8.6 TABLET ORAL at 22:06

## 2018-03-12 RX ADMIN — TAMSULOSIN HYDROCHLORIDE 0.4 MILLIGRAM(S): 0.4 CAPSULE ORAL at 22:06

## 2018-03-12 RX ADMIN — Medication 80 MILLIGRAM(S): at 18:52

## 2018-03-12 RX ADMIN — Medication 50 MILLIGRAM(S): at 04:14

## 2018-03-12 RX ADMIN — Medication 50 MILLIGRAM(S): at 18:52

## 2018-03-12 RX ADMIN — Medication 80 MILLIGRAM(S): at 04:14

## 2018-03-12 RX ADMIN — MAGNESIUM OXIDE 400 MG ORAL TABLET 400 MILLIGRAM(S): 241.3 TABLET ORAL at 18:52

## 2018-03-12 RX ADMIN — LISINOPRIL 5 MILLIGRAM(S): 2.5 TABLET ORAL at 04:14

## 2018-03-12 RX ADMIN — ATORVASTATIN CALCIUM 10 MILLIGRAM(S): 80 TABLET, FILM COATED ORAL at 22:05

## 2018-03-12 RX ADMIN — Medication 50 MILLIGRAM(S): at 22:05

## 2018-03-12 NOTE — DIETITIAN INITIAL EVALUATION ADULT. - PROBLEM SELECTOR PLAN 2
-+orthopnea, crackles, b/l pitting edema, b/l pleural effusions with elevated Pro BNP, grossly fluid overloaded with high suspicion for heart failure  -Obtain TTE  -will give 1 dose of IV lasix 40mg IV for today and monitor creatinine, consider additional dosing tomorrow  -Troponin negative x1 but no chest pain, will send 2nd level  -Observe on telemetry  -Strict I's and O's  -Daily weights

## 2018-03-12 NOTE — PROGRESS NOTE ADULT - PROBLEM SELECTOR PLAN 3
-Improving  -Could have been  2/2 to drug side effect, unasyn has thrombocytopenia as reported side effect, also potentially infection driven  -Unasyn d/donald; switched to Ancef and keeping Vanco  -Daily CBC  -Continue to monitor -Improving  -Could have been  2/2 to drug side effect, unasyn has thrombocytopenia as reported side effect, also potentially infection driven  -Unasyn d/donald; s/p Ancef and Vanco  -Daily CBC  -Continue to monitor

## 2018-03-12 NOTE — PROGRESS NOTE ADULT - SUBJECTIVE AND OBJECTIVE BOX
Patient is a 85y old  Male who presents with a chief complaint of Swelling/Pain in left leg, SOB (10 Mar 2018 19:13)    Chandler Cardoso MD PGY-1  [phone]    SUBJECTIVE / OVERNIGHT EVENTS:    MEDICATIONS  (STANDING):  atorvastatin 10 milliGRAM(s) Oral at bedtime  docusate sodium 100 milliGRAM(s) Oral daily  finasteride 5 milliGRAM(s) Oral daily  furosemide    Tablet 40 milliGRAM(s) Oral daily  lisinopril 5 milliGRAM(s) Oral daily  metoprolol     tartrate 50 milliGRAM(s) Oral two times a day  senna 2 Tablet(s) Oral daily  tamsulosin 0.4 milliGRAM(s) Oral at bedtime  verapamil 80 milliGRAM(s) Oral two times a day    MEDICATIONS  (PRN):  acetaminophen   Tablet 650 milliGRAM(s) Oral every 6 hours PRN for pain  polyethylene glycol 3350 17 Gram(s) Oral daily PRN Constipation      T(C): 36.9 (03-12-18 @ 04:37), Max: 37.2 (03-11-18 @ 12:20)  HR: 113 (03-12-18 @ 04:37) (69 - 113)  BP: 135/69 (03-12-18 @ 04:37) (132/70 - 167/72)  RR: 18 (03-12-18 @ 04:37) (18 - 18)  SpO2: 93% (03-12-18 @ 04:37) (93% - 96%)    CAPILLARY BLOOD GLUCOSE        I&O's Summary    10 Mar 2018 06:01  -  11 Mar 2018 07:00  --------------------------------------------------------  IN: 970 mL / OUT: 900 mL / NET: 70 mL    11 Mar 2018 07:01  -  12 Mar 2018 06:26  --------------------------------------------------------  IN: 1000 mL / OUT: 1270 mL / NET: -270 mL        PHYSICAL EXAM:   GENERAL: no acute distress  HEENT: NC/AT, EOMI, neck supple, MMM  RESPIRATORY: LCTAB/L, no rhonchi, rales, or wheezing  CARDIOVASCULAR: RRR, no murmurs, gallops, rubs  ABDOMINAL: soft, non-tender, non-distended, positive bowel sounds   EXTREMITIES: no clubbing, cyanosis, or edema  NEUROLOGICAL: alert and oriented x 3, non-focal  SKIN: no rashes or lesions   MUSCULOSKELETAL: no gross joint deformity    LABS:                        12.3   6.9   )-----------( 179      ( 11 Mar 2018 07:15 )             38.2     03-11    138  |  96  |  18  ----------------------------<  105<H>  4.2   |  30  |  1.23    Ca    8.6      11 Mar 2018 07:14  Phos  3.1     03-11  Mg     1.7     03-11      PT/INR - ( 11 Mar 2018 07:15 )   PT: 30.3 sec;   INR: 2.72 ratio         PTT - ( 10 Mar 2018 06:57 )  PTT:35.9 sec          RADIOLOGY & ADDITIONAL TESTS:    Imaging Personally Reviewed:     Consultant(s) Notes Reviewed:     Care Discussed with Consultants/Other Providers: Patient is a 85y old  Male who presents with a chief complaint of Swelling/Pain in left leg, SOB (10 Mar 2018 19:13)    Chandler Cardoso MD PGY-1  [phone]    SUBJECTIVE / OVERNIGHT EVENTS: Patient failed TOV yesterday and rivas catheter was subsequently replaced.  Pt has no complaints this AM.     MEDICATIONS  (STANDING):  atorvastatin 10 milliGRAM(s) Oral at bedtime  docusate sodium 100 milliGRAM(s) Oral daily  finasteride 5 milliGRAM(s) Oral daily  furosemide    Tablet 40 milliGRAM(s) Oral daily  lisinopril 5 milliGRAM(s) Oral daily  metoprolol     tartrate 50 milliGRAM(s) Oral two times a day  senna 2 Tablet(s) Oral daily  tamsulosin 0.4 milliGRAM(s) Oral at bedtime  verapamil 80 milliGRAM(s) Oral two times a day    MEDICATIONS  (PRN):  acetaminophen   Tablet 650 milliGRAM(s) Oral every 6 hours PRN for pain  polyethylene glycol 3350 17 Gram(s) Oral daily PRN Constipation      T(C): 36.9 (03-12-18 @ 04:37), Max: 37.2 (03-11-18 @ 12:20)  HR: 113 (03-12-18 @ 04:37) (69 - 113)  BP: 135/69 (03-12-18 @ 04:37) (132/70 - 167/72)  RR: 18 (03-12-18 @ 04:37) (18 - 18)  SpO2: 93% (03-12-18 @ 04:37) (93% - 96%)    CAPILLARY BLOOD GLUCOSE        I&O's Summary    10 Mar 2018 06:01  -  11 Mar 2018 07:00  --------------------------------------------------------  IN: 970 mL / OUT: 900 mL / NET: 70 mL    11 Mar 2018 07:01  -  12 Mar 2018 06:26  --------------------------------------------------------  IN: 1000 mL / OUT: 1270 mL / NET: -270 mL        PHYSICAL EXAM:   GENERAL: no acute distress  HEENT: NC/AT, EOMI, neck supple, MMM  RESPIRATORY: LCTAB/L, no rhonchi, rales, or wheezing  CARDIOVASCULAR: RRR, no murmurs, gallops, rubs  ABDOMINAL: soft, non-tender, non-distended, positive bowel sounds   EXTREMITIES: no clubbing, cyanosis, or edema  NEUROLOGICAL: alert and oriented x 3, non-focal  SKIN: no rashes or lesions   MUSCULOSKELETAL: no gross joint deformity    LABS:                        12.3   6.9   )-----------( 179      ( 11 Mar 2018 07:15 )             38.2     03-11    138  |  96  |  18  ----------------------------<  105<H>  4.2   |  30  |  1.23    Ca    8.6      11 Mar 2018 07:14  Phos  3.1     03-11  Mg     1.7     03-11      PT/INR - ( 11 Mar 2018 07:15 )   PT: 30.3 sec;   INR: 2.72 ratio         PTT - ( 10 Mar 2018 06:57 )  PTT:35.9 sec          RADIOLOGY & ADDITIONAL TESTS:    Imaging Personally Reviewed:     Consultant(s) Notes Reviewed:     Care Discussed with Consultants/Other Providers: Patient is a 85y old  Male who presents with a chief complaint of Swelling/Pain in left leg, SOB (10 Mar 2018 19:13)    Chandler Cardoso MD PGY-1  [phone]    SUBJECTIVE / OVERNIGHT EVENTS: Patient failed TOV yesterday and rivas catheter was subsequently replaced.  Pt has no complaints this AM.     MEDICATIONS  (STANDING):  atorvastatin 10 milliGRAM(s) Oral at bedtime  docusate sodium 100 milliGRAM(s) Oral daily  finasteride 5 milliGRAM(s) Oral daily  furosemide    Tablet 40 milliGRAM(s) Oral daily  lisinopril 5 milliGRAM(s) Oral daily  metoprolol     tartrate 50 milliGRAM(s) Oral two times a day  senna 2 Tablet(s) Oral daily  tamsulosin 0.4 milliGRAM(s) Oral at bedtime  verapamil 80 milliGRAM(s) Oral two times a day    MEDICATIONS  (PRN):  acetaminophen   Tablet 650 milliGRAM(s) Oral every 6 hours PRN for pain  polyethylene glycol 3350 17 Gram(s) Oral daily PRN Constipation      T(C): 36.9 (03-12-18 @ 04:37), Max: 37.2 (03-11-18 @ 12:20)  HR: 113 (03-12-18 @ 04:37) (69 - 113)  BP: 135/69 (03-12-18 @ 04:37) (132/70 - 167/72)  RR: 18 (03-12-18 @ 04:37) (18 - 18)  SpO2: 93% (03-12-18 @ 04:37) (93% - 96%)    CAPILLARY BLOOD GLUCOSE        I&O's Summary    10 Mar 2018 06:01  -  11 Mar 2018 07:00  --------------------------------------------------------  IN: 970 mL / OUT: 900 mL / NET: 70 mL    11 Mar 2018 07:01  -  12 Mar 2018 06:26  --------------------------------------------------------  IN: 1000 mL / OUT: 1270 mL / NET: -270 mL        PHYSICAL EXAM:   GENERAL: no acute distress  HEENT: NC/AT, EOMI, neck supple, MMM  RESPIRATORY: LCTAB/L, no rhonchi, rales, or wheezing  CARDIOVASCULAR: RRR, no murmurs, gallops, rubs  ABDOMINAL: soft, non-tender, non-distended, positive bowel sounds   EXTREMITIES: no clubbing, cyanosis, or edema  NEUROLOGICAL: alert and oriented x 3, non-focal  SKIN: resolving erythema on LLE, dressing C/D/I  MUSCULOSKELETAL: no gross joint deformity    LABS:                        12.3   6.9   )-----------( 179      ( 11 Mar 2018 07:15 )             38.2     03-11    138  |  96  |  18  ----------------------------<  105<H>  4.2   |  30  |  1.23    Ca    8.6      11 Mar 2018 07:14  Phos  3.1     03-11  Mg     1.7     03-11      PT/INR - ( 11 Mar 2018 07:15 )   PT: 30.3 sec;   INR: 2.72 ratio         PTT - ( 10 Mar 2018 06:57 )  PTT:35.9 sec          RADIOLOGY & ADDITIONAL TESTS:    Imaging Personally Reviewed:     Consultant(s) Notes Reviewed:     Care Discussed with Consultants/Other Providers:

## 2018-03-12 NOTE — DIETITIAN INITIAL EVALUATION ADULT. - PROBLEM SELECTOR PLAN 5
-c/w Verapamil 240mg ER  -Given supratherapeutic INR, will hold coumadin dose (takes 1mg at home, 2mg on sundays) Pt without active signs of bleeding; therefore, does not require Vitamin K or reversal agent  -monitor on tele

## 2018-03-12 NOTE — PROGRESS NOTE ADULT - PROBLEM SELECTOR PLAN 6
-c/w Verapamil 80 mg BID  -INR 2.72  -will dose coumadin tonight   -monitor on tele  -Uptitrate BB as necessary -c/w Verapamil 80 mg BID  -INR 3.46  -will dose coumadin tonight   -monitor on tele  -Uptitrate BB as necessary -c/w Verapamil 80 mg BID  -INR 3.46  -will dose coumadin 1mg tonight   -monitor on tele  -Uptitrate BB as necessary

## 2018-03-12 NOTE — PROVIDER CONTACT NOTE (OTHER) - ASSESSMENT
128/68 71
131/20884
Pt is asymptomatic. /63 
Pt A&Ox4, rivas d/c at approximately 1000 AM on 3/8 but patient voiding only small amounts, bladder scan at 2145 showed 415 ml and bladder scan at 0225 showed 556 ml. Rivas ordered. Rivas inserted without resistance, blood tinged urine draining, no dawna bleeding.
Pt A+O x4, pmh of BPH
Pt A.Ox4. Asymptomatic, denies any chest pain, headache or any discomfort. Pt Afib on the cardiac monitor 70s. /62 HR 78. Mg 1.6 K4.4
Pt is asleep, asymptomatic, Pt now 75 bpm on tele
Patient is asymptomatic. VSS. T-97.7 P-61 R-16 Bp-123/61 O2-98% on 2 liter nasal canula

## 2018-03-12 NOTE — PROVIDER CONTACT NOTE (OTHER) - ACTION/TREATMENT ORDERED:
urology to see patient. will continue to monitor
will continue to monitor patient.
MD made aware. Continue monitor.
no action at this time. continue on telemetry monitoring
pt asymptomatic resting comfortably in bed no complaints voiced. continue to monitor on telemetry.
MD aware. Ordered Mag sulfate & AM labs. will continue to monitor patient
Continue to monitor and intervene as needed.
MD aware MD to consult urology.
MD made aware of high HR. Continue to monitor pt on tele monitoring
MD notified and aware. Will continue to monitor

## 2018-03-12 NOTE — PROVIDER CONTACT NOTE (OTHER) - DATE AND TIME:
08-Mar-2018 16:00
04-Mar-2018 18:35
06-Mar-2018 08:40
06-Mar-2018 12:50
07-Mar-2018 01:00
08-Mar-2018 03:39
09-Mar-2018 03:27
11-Mar-2018 15:00
12-Mar-2018 09:00
09-Mar-2018 07:45

## 2018-03-12 NOTE — PROGRESS NOTE ADULT - PROBLEM SELECTOR PLAN 7
-Improving  -creatinine 1.2, baseline creatinine is 1.0-1.1, at baseline  -Avoid nephrotoxins -Improving  -creatinine 1.27, baseline creatinine is 1.0-1.1, at baseline  -Avoid nephrotoxins

## 2018-03-12 NOTE — PROGRESS NOTE ADULT - SUBJECTIVE AND OBJECTIVE BOX
CC:    Interval History:     MEDICATIONS:  acetaminophen   Tablet 650 milliGRAM(s) Oral every 6 hours PRN  atorvastatin 10 milliGRAM(s) Oral at bedtime  docusate sodium 100 milliGRAM(s) Oral daily  finasteride 5 milliGRAM(s) Oral daily  furosemide    Tablet 40 milliGRAM(s) Oral daily  lisinopril 5 milliGRAM(s) Oral daily  magnesium oxide 400 milliGRAM(s) Oral three times a day with meals  metoprolol     tartrate 50 milliGRAM(s) Oral two times a day  polyethylene glycol 3350 17 Gram(s) Oral daily PRN  senna 2 Tablet(s) Oral daily  tamsulosin 0.4 milliGRAM(s) Oral at bedtime  verapamil 80 milliGRAM(s) Oral two times a day  warfarin 1 milliGRAM(s) Oral once      LABS:      138  |  97  |  17  ----------------------------<  91  4.4   |  28  |  1.27    Ca    8.8      12 Mar 2018 06:29  Phos  3.3       Mg     1.7                                 12.3   6.9   )-----------( 179      ( 11 Mar 2018 07:15 )             38.2     PT/INR - ( 12 Mar 2018 06:29 )   PT: 38.3 sec;   INR: 3.46 ratio           VITAL SIGNS:   T(C): 36.3 (18 @ 12:31), Max: 36.9 (18 @ 04:37)  HR: 102 (18 @ 14:58) (57 - 113)  BP: 118/58 (18 @ 14:58) (116/58 - 167/72)  RR: 18 (18 @ 14:58) (18 - 18)  SpO2: 93% (18 @ 14:58) (92% - 96%)  Daily     Daily Weight in k.5 (12 Mar 2018 11:15)  I&O's Summary    11 Mar 2018 07:01  -  12 Mar 2018 07:00  --------------------------------------------------------  IN: 1000 mL / OUT: 1270 mL / NET: -270 mL    12 Mar 2018 07:01  -  12 Mar 2018 16:15  --------------------------------------------------------  IN: 600 mL / OUT: 1200 mL / NET: -600 mL        TELE: CC: Acute on chronic HFpEF. AF. NSVT    Interval History: No significant cardiac events overnight.     MEDICATIONS:  acetaminophen   Tablet 650 milliGRAM(s) Oral every 6 hours PRN  atorvastatin 10 milliGRAM(s) Oral at bedtime  docusate sodium 100 milliGRAM(s) Oral daily  finasteride 5 milliGRAM(s) Oral daily  furosemide    Tablet 40 milliGRAM(s) Oral daily  lisinopril 5 milliGRAM(s) Oral daily  magnesium oxide 400 milliGRAM(s) Oral three times a day with meals  metoprolol     tartrate 50 milliGRAM(s) Oral two times a day  polyethylene glycol 3350 17 Gram(s) Oral daily PRN  senna 2 Tablet(s) Oral daily  tamsulosin 0.4 milliGRAM(s) Oral at bedtime  verapamil 80 milliGRAM(s) Oral two times a day  warfarin 1 milliGRAM(s) Oral once      LABS:      138  |  97  |  17  ----------------------------<  91  4.4   |  28  |  1.27    Ca    8.8      12 Mar 2018 06:29  Phos  3.3       Mg     1.7                                 12.3   6.9   )-----------( 179      ( 11 Mar 2018 07:15 )             38.2     PT/INR - ( 12 Mar 2018 06:29 )   PT: 38.3 sec;   INR: 3.46 ratio           VITAL SIGNS:   T(C): 36.3 (18 @ 12:31), Max: 36.9 (18 @ 04:37)  HR: 102 (18 @ 14:58) (57 - 113)  BP: 118/58 (18 @ 14:58) (116/58 - 167/72)  RR: 18 (18 @ 14:58) (18 - 18)  SpO2: 93% (18 @ 14:58) (92% - 96%)  Daily     Daily Weight in k.5 (12 Mar 2018 11:15)  I&O's Summary    11 Mar 2018 07:01  -  12 Mar 2018 07:00  --------------------------------------------------------  IN: 1000 mL / OUT: 1270 mL / NET: -270 mL    12 Mar 2018 07:01  -  12 Mar 2018 16:15  --------------------------------------------------------  IN: 600 mL / OUT: 1200 mL / NET: -600 mL        TELE: 12 bts wct.

## 2018-03-12 NOTE — PROVIDER CONTACT NOTE (OTHER) - BACKGROUND
Pt admitted with cellulitis. 3/9 tele monitor showed 11 beats of wide complex
Patient admitted for cellulitis with history of BPH previously with rivas cath. Rivas discontinued 3/8/18.
Pt admitted for Cellulitis
Pt admitted with cellulitis CHF excerbation
Pt is in AFIB, pt received warfarin.
Patient admitted with cellulitis

## 2018-03-12 NOTE — PROGRESS NOTE ADULT - PROBLEM SELECTOR PLAN 1
-Erythema stable, leukocytosis resolving in setting of treatment with antibiotics.   -X-ray LLE without evidence of nec fasciitis or osteomyelitis  -Continue Vanco 500 mg Q12h with end date of 3/11 for a total 7 days of treatment  -c/w Ancef with end date of 3/11 for a total of 7 days of treatment  -wound care following -Erythema stable, leukocytosis resolving in setting of treatment with antibiotics.   -X-ray LLE without evidence of nec fasciitis or osteomyelitis  -s/p Vanco 500 mg Q12h with last dose on 3/11 for a total 7 days of treatment  -s/p Ancef with last dose 3/11 for a total of 7 days of treatment  -wound care following

## 2018-03-12 NOTE — DIETITIAN INITIAL EVALUATION ADULT. - ENERGY NEEDS
HT 63 inches,  pounds,  pounds, BMI 28.4,  pounds.  Dxd with Cellulitis, HDHF, BARON and leg edema with need for diuresis.   Skin intact. Fluids per team.

## 2018-03-12 NOTE — DIETITIAN INITIAL EVALUATION ADULT. - OTHER INFO
Patient seen for routine LOS assessment.  Patient found sitting up in chair feeding self cheerio cereal.  Wife and son in law resistant to answering questions. In one breath reports that patient doesn't eat.  When prompted about what he does eat, reports pasta, soft foods, chicken as per wife.  Reports weight has been stable and that "he is fine". Family somewhat protective of patient and unable to consent for NFPE.  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  Wife reports scarring in throat.   At that time, patient weighed 149 pound per EMR.  Current weight of 160 pounds likely due to leg edema, noted with cellulitis and swelling in left leg.  Discussed coumadin and family reports that doctor monitors it.  When asked about green leafy vegetables and Vit K, family dismissed any problems as he does not eat vegetables.

## 2018-03-12 NOTE — DIETITIAN INITIAL EVALUATION ADULT. - NS AS NUTRI INTERV ED CONTENT
Recommended modifications/Reinforced need for soft chopped up proteins, use of dairy foods, yogurts, shakes, ice cream.  Limited reception of suggestions.

## 2018-03-12 NOTE — DIETITIAN INITIAL EVALUATION ADULT. - PROBLEM SELECTOR PLAN 6
-creatinine 1.4, baseline creatinine is 1.0-1.1, likely due to cardiorenal vs infection  -Avoid nephrotoxins  -hold ACE-I  -bladder scan to r/o retention

## 2018-03-12 NOTE — PROGRESS NOTE ADULT - PROBLEM SELECTOR PLAN 8
-Failed TOV, thus rivas was re-inserted, TOV to be attempted today  -c/w with home finasteride, initiated flomax this hospitalization  -Renal US without evidence of hydronephrosis -Failed TOV on 3/11, thus rivas was re-inserted.  -c/w with home finasteride, initiated flomax this hospitalization  -Renal US without evidence of hydronephrosis -Failed TOV on 3/11, thus rivas was re-inserted. F/up as outpatient with urology.  -c/w with home finasteride, initiated flomax this hospitalization  -Renal US without evidence of hydronephrosis

## 2018-03-12 NOTE — DIETITIAN INITIAL EVALUATION ADULT. - PROBLEM SELECTOR PLAN 1
erythema, swelling, tenderness of LLE consistent with cellulitis in setting of leukocytosis, no fluctucance or sign of drainable abcess on exax  -X-ray LLE without evidence of nec fasciitis or osteomyelitis  -Patient s/p 1 dose of Clindamycin in ED  -Will start patient on Vanco 1 gram q24, vanc trough pre 3rd level, monitor   creatinine  -start unasyn 3 gram q6  -f/u blood culture  -trend cbc  -send ESR, CRP, if significantly elevated, consider MRI however would have to followup with cardiology to see if pacemaker is MRI compatible, otherwise will need triple phase bone scan

## 2018-03-13 LAB
ANION GAP SERPL CALC-SCNC: 9 MMOL/L — SIGNIFICANT CHANGE UP (ref 5–17)
APTT BLD: 42.1 SEC — HIGH (ref 27.5–37.4)
BUN SERPL-MCNC: 16 MG/DL — SIGNIFICANT CHANGE UP (ref 7–23)
CALCIUM SERPL-MCNC: 8.9 MG/DL — SIGNIFICANT CHANGE UP (ref 8.4–10.5)
CHLORIDE SERPL-SCNC: 99 MMOL/L — SIGNIFICANT CHANGE UP (ref 96–108)
CO2 SERPL-SCNC: 31 MMOL/L — SIGNIFICANT CHANGE UP (ref 22–31)
CREAT SERPL-MCNC: 1.2 MG/DL — SIGNIFICANT CHANGE UP (ref 0.5–1.3)
GLUCOSE SERPL-MCNC: 80 MG/DL — SIGNIFICANT CHANGE UP (ref 70–99)
HCT VFR BLD CALC: 40 % — SIGNIFICANT CHANGE UP (ref 39–50)
HGB BLD-MCNC: 13.2 G/DL — SIGNIFICANT CHANGE UP (ref 13–17)
INR BLD: 3.79 RATIO — HIGH (ref 0.88–1.16)
MAGNESIUM SERPL-MCNC: 1.6 MG/DL — SIGNIFICANT CHANGE UP (ref 1.6–2.6)
MCHC RBC-ENTMCNC: 33.1 GM/DL — SIGNIFICANT CHANGE UP (ref 32–36)
MCHC RBC-ENTMCNC: 34 PG — SIGNIFICANT CHANGE UP (ref 27–34)
MCV RBC AUTO: 103 FL — HIGH (ref 80–100)
PHOSPHATE SERPL-MCNC: 3.6 MG/DL — SIGNIFICANT CHANGE UP (ref 2.5–4.5)
PLATELET # BLD AUTO: 185 K/UL — SIGNIFICANT CHANGE UP (ref 150–400)
POTASSIUM SERPL-MCNC: 4.1 MMOL/L — SIGNIFICANT CHANGE UP (ref 3.5–5.3)
POTASSIUM SERPL-SCNC: 4.1 MMOL/L — SIGNIFICANT CHANGE UP (ref 3.5–5.3)
PROTHROM AB SERPL-ACNC: 42.5 SEC — HIGH (ref 9.8–12.7)
RBC # BLD: 3.89 M/UL — LOW (ref 4.2–5.8)
RBC # FLD: 11.7 % — SIGNIFICANT CHANGE UP (ref 10.3–14.5)
SODIUM SERPL-SCNC: 139 MMOL/L — SIGNIFICANT CHANGE UP (ref 135–145)
WBC # BLD: 6.5 K/UL — SIGNIFICANT CHANGE UP (ref 3.8–10.5)
WBC # FLD AUTO: 6.5 K/UL — SIGNIFICANT CHANGE UP (ref 3.8–10.5)

## 2018-03-13 PROCEDURE — 99233 SBSQ HOSP IP/OBS HIGH 50: CPT | Mod: GC

## 2018-03-13 PROCEDURE — 99232 SBSQ HOSP IP/OBS MODERATE 35: CPT

## 2018-03-13 RX ORDER — FUROSEMIDE 40 MG
1 TABLET ORAL
Qty: 30 | Refills: 0 | OUTPATIENT
Start: 2018-03-13 | End: 2018-04-11

## 2018-03-13 RX ORDER — WARFARIN SODIUM 2.5 MG/1
0.5 TABLET ORAL ONCE
Qty: 0 | Refills: 0 | Status: COMPLETED | OUTPATIENT
Start: 2018-03-13 | End: 2018-03-13

## 2018-03-13 RX ORDER — FUROSEMIDE 40 MG
60 TABLET ORAL DAILY
Qty: 0 | Refills: 0 | Status: DISCONTINUED | OUTPATIENT
Start: 2018-03-13 | End: 2018-03-14

## 2018-03-13 RX ORDER — VERAPAMIL HCL 240 MG
1 CAPSULE, EXTENDED RELEASE PELLETS 24 HR ORAL
Qty: 60 | Refills: 0 | OUTPATIENT
Start: 2018-03-13 | End: 2018-04-11

## 2018-03-13 RX ORDER — ACETAMINOPHEN 500 MG
650 TABLET ORAL ONCE
Qty: 0 | Refills: 0 | Status: COMPLETED | OUTPATIENT
Start: 2018-03-13 | End: 2018-03-13

## 2018-03-13 RX ORDER — TAMSULOSIN HYDROCHLORIDE 0.4 MG/1
1 CAPSULE ORAL
Qty: 0 | Refills: 0 | DISCHARGE
Start: 2018-03-13

## 2018-03-13 RX ORDER — MAGNESIUM SULFATE 500 MG/ML
1 VIAL (ML) INJECTION ONCE
Qty: 0 | Refills: 0 | Status: COMPLETED | OUTPATIENT
Start: 2018-03-13 | End: 2018-03-13

## 2018-03-13 RX ADMIN — MAGNESIUM OXIDE 400 MG ORAL TABLET 400 MILLIGRAM(S): 241.3 TABLET ORAL at 17:57

## 2018-03-13 RX ADMIN — WARFARIN SODIUM 0.5 MILLIGRAM(S): 2.5 TABLET ORAL at 21:37

## 2018-03-13 RX ADMIN — SENNA PLUS 2 TABLET(S): 8.6 TABLET ORAL at 21:33

## 2018-03-13 RX ADMIN — Medication 80 MILLIGRAM(S): at 05:45

## 2018-03-13 RX ADMIN — Medication 50 MILLIGRAM(S): at 05:45

## 2018-03-13 RX ADMIN — Medication 100 MILLIGRAM(S): at 10:59

## 2018-03-13 RX ADMIN — ATORVASTATIN CALCIUM 10 MILLIGRAM(S): 80 TABLET, FILM COATED ORAL at 21:33

## 2018-03-13 RX ADMIN — Medication 650 MILLIGRAM(S): at 14:27

## 2018-03-13 RX ADMIN — Medication 650 MILLIGRAM(S): at 13:57

## 2018-03-13 RX ADMIN — TAMSULOSIN HYDROCHLORIDE 0.4 MILLIGRAM(S): 0.4 CAPSULE ORAL at 21:33

## 2018-03-13 RX ADMIN — FINASTERIDE 5 MILLIGRAM(S): 5 TABLET, FILM COATED ORAL at 10:59

## 2018-03-13 RX ADMIN — Medication 80 MILLIGRAM(S): at 17:57

## 2018-03-13 RX ADMIN — MAGNESIUM OXIDE 400 MG ORAL TABLET 400 MILLIGRAM(S): 241.3 TABLET ORAL at 12:15

## 2018-03-13 RX ADMIN — MAGNESIUM OXIDE 400 MG ORAL TABLET 400 MILLIGRAM(S): 241.3 TABLET ORAL at 10:59

## 2018-03-13 RX ADMIN — Medication 50 MILLIGRAM(S): at 13:06

## 2018-03-13 RX ADMIN — Medication 100 GRAM(S): at 13:28

## 2018-03-13 RX ADMIN — Medication 50 MILLIGRAM(S): at 21:33

## 2018-03-13 RX ADMIN — Medication 40 MILLIGRAM(S): at 05:45

## 2018-03-13 RX ADMIN — LISINOPRIL 5 MILLIGRAM(S): 2.5 TABLET ORAL at 05:45

## 2018-03-13 NOTE — PROGRESS NOTE ADULT - PROBLEM SELECTOR PLAN 8
-Failed TOV on 3/11, thus rivas was re-inserted. F/up as outpatient with urology.  -c/w with home finasteride, initiated flomax this hospitalization  -Renal US without evidence of hydronephrosis

## 2018-03-13 NOTE — PROGRESS NOTE ADULT - PROBLEM SELECTOR PLAN 3
-Improving  -Could have been  2/2 to drug side effect, unasyn has thrombocytopenia as reported side effect, also potentially infection driven  -Unasyn d/donald; s/p Ancef and Vanco  -Daily CBC  -Continue to monitor -resolved   -Could have been  2/2 to drug side effect, unasyn has thrombocytopenia as reported side effect, also potentially infection driven  -Unasyn d/donald; s/p Ancef and Vanco  -Daily CBC  -Continue to monitor

## 2018-03-13 NOTE — PROGRESS NOTE ADULT - PROBLEM SELECTOR PLAN 7
-Improving  -creatinine 1.27, baseline creatinine is 1.0-1.1, at baseline  -Avoid nephrotoxins -Improving  -baseline creatinine is 1.0-1.1  -Avoid nephrotoxins

## 2018-03-13 NOTE — PROGRESS NOTE ADULT - SUBJECTIVE AND OBJECTIVE BOX
Patient is a 85y old  Male who presents with a chief complaint of Swelling/Pain in left leg, SOB (10 Mar 2018 19:13)    Chandler Cardoso MD PGY-1  [phone]    SUBJECTIVE / OVERNIGHT EVENTS:    MEDICATIONS  (STANDING):  atorvastatin 10 milliGRAM(s) Oral at bedtime  docusate sodium 100 milliGRAM(s) Oral daily  finasteride 5 milliGRAM(s) Oral daily  furosemide    Tablet 40 milliGRAM(s) Oral daily  lisinopril 5 milliGRAM(s) Oral daily  magnesium oxide 400 milliGRAM(s) Oral three times a day with meals  metoprolol     tartrate 50 milliGRAM(s) Oral three times a day  senna 2 Tablet(s) Oral daily  tamsulosin 0.4 milliGRAM(s) Oral at bedtime  verapamil 80 milliGRAM(s) Oral two times a day    MEDICATIONS  (PRN):  acetaminophen   Tablet 650 milliGRAM(s) Oral every 6 hours PRN for pain  polyethylene glycol 3350 17 Gram(s) Oral daily PRN Constipation      T(C): 36.9 (03-13-18 @ 04:47), Max: 36.9 (03-13-18 @ 04:47)  HR: 71 (03-13-18 @ 04:47) (57 - 105)  BP: 142/70 (03-13-18 @ 04:47) (113/60 - 142/70)  RR: 18 (03-13-18 @ 04:47) (18 - 18)  SpO2: 96% (03-13-18 @ 04:47) (92% - 96%)    CAPILLARY BLOOD GLUCOSE        I&O's Summary    12 Mar 2018 07:01  -  13 Mar 2018 07:00  --------------------------------------------------------  IN: 940 mL / OUT: 2700 mL / NET: -1760 mL      PHYSICAL EXAM:   GENERAL: no acute distress  HEENT: clear conjunctivaie, neck supple, MMM  RESPIRATORY: CTAB, no rhonchi, rales, or wheezing  CARDIOVASCULAR: RRR, no murmurs, gallops, rubs  ABDOMINAL: soft, non-tender, non-distended, positive bowel sounds   EXTREMITIES: no clubbing, cyanosis, or edema  NEUROLOGICAL: alert and oriented x 3, non-focal  SKIN: no rashes or lesions   MUSCULOSKELETAL: no gross joint deformity       LABS:                        13.2   6.5   )-----------( 185      ( 13 Mar 2018 06:37 )             40.0     03-13    139  |  99  |  16  ----------------------------<  80  4.1   |  31  |  1.20    Ca    8.9      13 Mar 2018 06:37  Phos  3.6     03-13  Mg     1.6     03-13      PT/INR - ( 13 Mar 2018 06:37 )   PT: 42.5 sec;   INR: 3.79 ratio         PTT - ( 13 Mar 2018 06:37 )  PTT:42.1 sec          RADIOLOGY & ADDITIONAL TESTS:    Imaging Personally Reviewed:     Consultant(s) Notes Reviewed:     Care Discussed with Consultants/Other Providers: Patient is a 85y old  Male who presents with a chief complaint of Swelling/Pain in left leg, SOB (10 Mar 2018 19:13)    Chandler Cardoso MD PGY-1  [phone]    SUBJECTIVE / OVERNIGHT EVENTS: Patient states he slept well. He had 2 well-formed stools yesterday. Denies pain, discomfort, SOB, CP. Patient states he has no trouble breathing at rest and with exertion.    MEDICATIONS  (STANDING):  atorvastatin 10 milliGRAM(s) Oral at bedtime  docusate sodium 100 milliGRAM(s) Oral daily  finasteride 5 milliGRAM(s) Oral daily  furosemide    Tablet 40 milliGRAM(s) Oral daily  lisinopril 5 milliGRAM(s) Oral daily  magnesium oxide 400 milliGRAM(s) Oral three times a day with meals  metoprolol     tartrate 50 milliGRAM(s) Oral three times a day  senna 2 Tablet(s) Oral daily  tamsulosin 0.4 milliGRAM(s) Oral at bedtime  verapamil 80 milliGRAM(s) Oral two times a day    MEDICATIONS  (PRN):  acetaminophen   Tablet 650 milliGRAM(s) Oral every 6 hours PRN for pain  polyethylene glycol 3350 17 Gram(s) Oral daily PRN Constipation      T(C): 36.9 (03-13-18 @ 04:47), Max: 36.9 (03-13-18 @ 04:47)  HR: 71 (03-13-18 @ 04:47) (57 - 105)  BP: 142/70 (03-13-18 @ 04:47) (113/60 - 142/70)  RR: 18 (03-13-18 @ 04:47) (18 - 18)  SpO2: 96% (03-13-18 @ 04:47) (92% - 96%)    CAPILLARY BLOOD GLUCOSE        I&O's Summary    12 Mar 2018 07:01  -  13 Mar 2018 07:00  --------------------------------------------------------  IN: 940 mL / OUT: 2700 mL / NET: -1760 mL      PHYSICAL EXAM:   GENERAL: no acute distress  HEENT: clear conjunctivaie, neck supple, MMM  RESPIRATORY: CTAB, no rhonchi, rales, or wheezing  CARDIOVASCULAR: RRR, no murmurs, gallops, rubs  ABDOMINAL: soft, non-tender, non-distended, positive bowel sounds   EXTREMITIES: no clubbing, cyanosis, or edema  NEUROLOGICAL: alert and oriented x 3, non-focal  SKIN: no rashes or lesions   MUSCULOSKELETAL: no gross joint deformity       LABS:                        13.2   6.5   )-----------( 185      ( 13 Mar 2018 06:37 )             40.0     03-13    139  |  99  |  16  ----------------------------<  80  4.1   |  31  |  1.20    Ca    8.9      13 Mar 2018 06:37  Phos  3.6     03-13  Mg     1.6     03-13      PT/INR - ( 13 Mar 2018 06:37 )   PT: 42.5 sec;   INR: 3.79 ratio         PTT - ( 13 Mar 2018 06:37 )  PTT:42.1 sec          RADIOLOGY & ADDITIONAL TESTS:    Imaging Personally Reviewed:     Consultant(s) Notes Reviewed:     Care Discussed with Consultants/Other Providers: Patient is a 85y old  Male who presents with a chief complaint of Swelling/Pain in left leg, SOB (10 Mar 2018 19:13)    Chandler Cardoso MD PGY-1  [phone]    SUBJECTIVE / OVERNIGHT EVENTS: Patient states he slept well. He had 2 well-formed stools yesterday. Denies pain, discomfort, SOB, CP. Patient states he has no trouble breathing at rest and with exertion.    MEDICATIONS  (STANDING):  atorvastatin 10 milliGRAM(s) Oral at bedtime  docusate sodium 100 milliGRAM(s) Oral daily  finasteride 5 milliGRAM(s) Oral daily  furosemide    Tablet 40 milliGRAM(s) Oral daily  lisinopril 5 milliGRAM(s) Oral daily  magnesium oxide 400 milliGRAM(s) Oral three times a day with meals  metoprolol     tartrate 50 milliGRAM(s) Oral three times a day  senna 2 Tablet(s) Oral daily  tamsulosin 0.4 milliGRAM(s) Oral at bedtime  verapamil 80 milliGRAM(s) Oral two times a day    MEDICATIONS  (PRN):  acetaminophen   Tablet 650 milliGRAM(s) Oral every 6 hours PRN for pain  polyethylene glycol 3350 17 Gram(s) Oral daily PRN Constipation      T(C): 36.9 (03-13-18 @ 04:47), Max: 36.9 (03-13-18 @ 04:47)  HR: 71 (03-13-18 @ 04:47) (57 - 105)  BP: 142/70 (03-13-18 @ 04:47) (113/60 - 142/70)  RR: 18 (03-13-18 @ 04:47) (18 - 18)  SpO2: 96% (03-13-18 @ 04:47) (92% - 96%)    CAPILLARY BLOOD GLUCOSE        I&O's Summary    12 Mar 2018 07:01  -  13 Mar 2018 07:00  --------------------------------------------------------  IN: 940 mL / OUT: 2700 mL / NET: -1760 mL      PHYSICAL EXAM:   GENERAL: no acute distress  HEENT: clear conjunctivaie, neck supple, MMM  RESPIRATORY: CTAB, no rhonchi, rales, or wheezing  CARDIOVASCULAR: RRR, no murmurs, gallops, rubs  ABDOMINAL: soft, non-tender, non-distended, positive bowel sounds   EXTREMITIES: no clubbing, cyanosis, or edema  NEUROLOGICAL: alert and oriented x 3, non-focal  SKIN: no rashes or lesions   MUSCULOSKELETAL: no gross joint deformity       LABS:                        13.2   6.5   )-----------( 185      ( 13 Mar 2018 06:37 )             40.0     03-13    139  |  99  |  16  ----------------------------<  80  4.1   |  31  |  1.20    Ca    8.9      13 Mar 2018 06:37  Phos  3.6     03-13  Mg     1.6     03-13      PT/INR - ( 13 Mar 2018 06:37 )   PT: 42.5 sec;   INR: 3.79 ratio         PTT - ( 13 Mar 2018 06:37 )  PTT:42.1 sec          RADIOLOGY & ADDITIONAL TESTS:  < from: Xray Chest 1 View- PORTABLE-Urgent (03.12.18 @ 14:30) >  IMPRESSION:   Unchanged bilateral pleural effusions.    < end of copied text >    Imaging Personally Reviewed:     Consultant(s) Notes Reviewed:     Care Discussed with Consultants/Other Providers:

## 2018-03-13 NOTE — PROGRESS NOTE ADULT - PROBLEM SELECTOR PLAN 1
-Erythema stable, leukocytosis resolving in setting of treatment with antibiotics.   -X-ray LLE without evidence of nec fasciitis or osteomyelitis  -s/p Vanco 500 mg Q12h with last dose on 3/11 for a total 7 days of treatment  -s/p Ancef with last dose 3/11 for a total of 7 days of treatment  -wound care following

## 2018-03-13 NOTE — PROGRESS NOTE ADULT - PROBLEM SELECTOR PLAN 6
-c/w Verapamil 80 mg BID  -INR 3.46  -will dose coumadin 1mg tonight   -monitor on tele  -Uptitrate BB as necessary -c/w Verapamil 80 mg BID  -INR 3.79.  -will dose coumadin 0.5mg tonight   -monitor on tele  -Uptitrate BB as necessary

## 2018-03-13 NOTE — PROGRESS NOTE ADULT - PROBLEM SELECTOR PLAN 2
-On PO Lasix 40 mg daily  -Cardiology following  -TTE showing mild to moderate MR, mild MS, TAVR, preserved systolic function, indeterminate diastolic function in the setting of afib and s/p PPM  -Given clinical presentation consistent with HF with evidence of MR, will optimize meds; restarted AceI and BB, taper off CCB as it is not ideal in HF   -Observe on telemetry  -Strict I's and O's  -Daily weights -On PO Lasix 40 mg daily  -Cardiology following  -TTE showing mild to moderate MR, mild MS, TAVR, preserved systolic function, indeterminate diastolic function in the setting of afib and s/p PPM  -Given clinical presentation consistent with HF with evidence of MR, will optimize meds; restarted AceI and BB, taper off CCB as it is not ideal in HF   -Observe on telemetry  -Strict I's and O's  -Daily weights  -will walk with patient and check pulse ox -On PO Lasix 40 mg daily  -On 3/12, patient desatted to 86% during 5 minute walk. CXR revealed unchanged bilateral pleural effusions. lasix 20mg IV was given.  -will walk with patient and check pulse ox today  -Cardiology following  -TTE showing mild to moderate MR, mild MS, TAVR, preserved systolic function, indeterminate diastolic function in the setting of afib and s/p PPM  -Given clinical presentation consistent with HF with evidence of MR, will optimize meds; restarted AceI and BB, taper off CCB as it is not ideal in HF   -Observe on telemetry  -Strict I's and O's  -Daily weights -was on  PO Lasix 40 mg daily/ will increase to 60 mg oral and monitor BP and SPO2  -On 3/12, patient desatted to 86% during 5 minute walk. CXR revealed unchanged bilateral pleural effusions. lasix 20mg IV was given   -will walk with patient and check pulse ox today  -Cardiology following  -TTE showing mild to moderate MR, mild MS, TAVR, preserved systolic function, indeterminate diastolic function in the setting of afib and s/p PPM  -Given clinical presentation consistent with HF with evidence of MR, will optimize meds; restarted AceI and BB, taper off CCB as it is not ideal in HF   -Observe on telemetry  -Strict I's and O's  -Daily weights fair minus

## 2018-03-13 NOTE — PROGRESS NOTE ADULT - SUBJECTIVE AND OBJECTIVE BOX
SUBJECTIVE: Pt seen, chart reviewed.  Pt w/o complaints   no draiange, odor, redness, warmth, pain      REVIEW OF SYSTEMS    Skin/Musculoskeletal:	see HPI  All other systems negative    warfarin 0.5 milliGRAM(s) Oral once      Physical Exam:  Vital Signs Last 24 Hrs  T(C): 36.7 (13 Mar 2018 12:25), Max: 36.9 (13 Mar 2018 04:47)  T(F): 98.1 (13 Mar 2018 12:25), Max: 98.5 (13 Mar 2018 04:47)  HR: 83 (13 Mar 2018 12:28) (71 - 105)  BP: 123/66 (13 Mar 2018 12:28) (113/60 - 149/64)  BP(mean): --  RR: 18 (13 Mar 2018 12:25) (18 - 18)  SpO2: 97% (13 Mar 2018 12:28) (95% - 97%)  General Appearance:    NAD / A&Ox3  WD/ WN/ WG  Versa Care P500 bed    Musculoskeletal/Vascular: FROM x4  Mild edema  (+) DP/PT pulses palpable  (+) mild hemosiderin staining  no deformities/ contractures  moist & granular wound  Scant serosanguinous drainage  No odor, erythema, increased warmth, tenderness, induration, fluctuance    LABS:                        13.2   6.5   )-----------( 185      ( 13 Mar 2018 06:37 )             40.0     PT/INR - ( 13 Mar 2018 06:37 )   PT: 42.5 sec;   INR: 3.79 ratio    PTT - ( 13 Mar 2018 06:37 )  PTT:42.1 sec    RADIOLOGY & ADDITIONAL STUDIES:  CULTURES:  < from: VA Duplex Lower Ext Vein Scan, Bilat (03.04.18 @ 04:50) >  FINDINGS:    There is normal compressibility of the bilateral common femoral, femoral   and popliteal veins. Limited evaluation of the right posterior tibial and   peroneal veins secondary to soft tissue edema, however, bilateral calf   veins are compressible.    Doppler examination shows normal spontaneous and phasic flow.    Diffuse bilateral soft tissue edema is noted.    IMPRESSION:     No evidence of bilateral lower extremity deep venous thrombosis.    < from: Xray Tibia + Fibula 2 Views, Left (03.04.18 @ 03:23) >  FINDINGS:  No acute fracture or dislocation.  Preserved joint spaces.  Diffuse soft tissue edema.  No tracking soft tissue gas collections, radiopaque foreign bodies, or   gross radiographic evidence for osteomyelitis.    IMPRESSION:  No acute fracture or dislocation.  Diffuse soft tissue edema.  No tracking soft tissue gas collections, radiopaque foreign bodies, or   gross radiographic evidence for osteomyelitis.

## 2018-03-14 VITALS
OXYGEN SATURATION: 96 % | SYSTOLIC BLOOD PRESSURE: 110 MMHG | DIASTOLIC BLOOD PRESSURE: 60 MMHG | TEMPERATURE: 98 F | HEART RATE: 70 BPM | RESPIRATION RATE: 18 BRPM

## 2018-03-14 LAB
APTT BLD: 43.3 SEC — HIGH (ref 27.5–37.4)
INR BLD: 3.71 RATIO — HIGH (ref 0.88–1.16)
PROTHROM AB SERPL-ACNC: 41.2 SEC — HIGH (ref 9.8–12.7)

## 2018-03-14 PROCEDURE — 80202 ASSAY OF VANCOMYCIN: CPT

## 2018-03-14 PROCEDURE — 97162 PT EVAL MOD COMPLEX 30 MIN: CPT

## 2018-03-14 PROCEDURE — 82550 ASSAY OF CK (CPK): CPT

## 2018-03-14 PROCEDURE — 80053 COMPREHEN METABOLIC PANEL: CPT

## 2018-03-14 PROCEDURE — 84100 ASSAY OF PHOSPHORUS: CPT

## 2018-03-14 PROCEDURE — 85014 HEMATOCRIT: CPT

## 2018-03-14 PROCEDURE — 82607 VITAMIN B-12: CPT

## 2018-03-14 PROCEDURE — 76775 US EXAM ABDO BACK WALL LIM: CPT

## 2018-03-14 PROCEDURE — 96375 TX/PRO/DX INJ NEW DRUG ADDON: CPT

## 2018-03-14 PROCEDURE — 82947 ASSAY GLUCOSE BLOOD QUANT: CPT

## 2018-03-14 PROCEDURE — 84300 ASSAY OF URINE SODIUM: CPT

## 2018-03-14 PROCEDURE — 84295 ASSAY OF SERUM SODIUM: CPT

## 2018-03-14 PROCEDURE — 73590 X-RAY EXAM OF LOWER LEG: CPT

## 2018-03-14 PROCEDURE — 82436 ASSAY OF URINE CHLORIDE: CPT

## 2018-03-14 PROCEDURE — 93355 ECHO TRANSESOPHAGEAL (TEE): CPT

## 2018-03-14 PROCEDURE — 86140 C-REACTIVE PROTEIN: CPT

## 2018-03-14 PROCEDURE — 93970 EXTREMITY STUDY: CPT

## 2018-03-14 PROCEDURE — 85027 COMPLETE CBC AUTOMATED: CPT

## 2018-03-14 PROCEDURE — 97530 THERAPEUTIC ACTIVITIES: CPT

## 2018-03-14 PROCEDURE — 93005 ELECTROCARDIOGRAM TRACING: CPT

## 2018-03-14 PROCEDURE — 82803 BLOOD GASES ANY COMBINATION: CPT

## 2018-03-14 PROCEDURE — 85730 THROMBOPLASTIN TIME PARTIAL: CPT

## 2018-03-14 PROCEDURE — 71045 X-RAY EXAM CHEST 1 VIEW: CPT

## 2018-03-14 PROCEDURE — 83735 ASSAY OF MAGNESIUM: CPT

## 2018-03-14 PROCEDURE — 99239 HOSP IP/OBS DSCHRG MGMT >30: CPT

## 2018-03-14 PROCEDURE — 97116 GAIT TRAINING THERAPY: CPT

## 2018-03-14 PROCEDURE — 82435 ASSAY OF BLOOD CHLORIDE: CPT

## 2018-03-14 PROCEDURE — 84132 ASSAY OF SERUM POTASSIUM: CPT

## 2018-03-14 PROCEDURE — 82330 ASSAY OF CALCIUM: CPT

## 2018-03-14 PROCEDURE — 83605 ASSAY OF LACTIC ACID: CPT

## 2018-03-14 PROCEDURE — 85610 PROTHROMBIN TIME: CPT

## 2018-03-14 PROCEDURE — 83880 ASSAY OF NATRIURETIC PEPTIDE: CPT

## 2018-03-14 PROCEDURE — 82746 ASSAY OF FOLIC ACID SERUM: CPT

## 2018-03-14 PROCEDURE — 87040 BLOOD CULTURE FOR BACTERIA: CPT

## 2018-03-14 PROCEDURE — 80048 BASIC METABOLIC PNL TOTAL CA: CPT

## 2018-03-14 PROCEDURE — 84484 ASSAY OF TROPONIN QUANT: CPT

## 2018-03-14 PROCEDURE — 82553 CREATINE MB FRACTION: CPT

## 2018-03-14 PROCEDURE — 82570 ASSAY OF URINE CREATININE: CPT

## 2018-03-14 PROCEDURE — 84540 ASSAY OF URINE/UREA-N: CPT

## 2018-03-14 PROCEDURE — 96374 THER/PROPH/DIAG INJ IV PUSH: CPT

## 2018-03-14 PROCEDURE — 99285 EMERGENCY DEPT VISIT HI MDM: CPT | Mod: 25

## 2018-03-14 PROCEDURE — 85652 RBC SED RATE AUTOMATED: CPT

## 2018-03-14 PROCEDURE — 97110 THERAPEUTIC EXERCISES: CPT

## 2018-03-14 RX ORDER — VERAPAMIL HCL 240 MG
1 CAPSULE, EXTENDED RELEASE PELLETS 24 HR ORAL
Qty: 28 | Refills: 0
Start: 2018-03-14 | End: 2018-03-27

## 2018-03-14 RX ORDER — WARFARIN SODIUM 2.5 MG/1
1 TABLET ORAL
Qty: 0 | Refills: 0 | COMMUNITY

## 2018-03-14 RX ORDER — FUROSEMIDE 40 MG
4 TABLET ORAL
Qty: 0 | Refills: 0 | DISCHARGE
Start: 2018-03-14 | End: 2018-03-27

## 2018-03-14 RX ORDER — FUROSEMIDE 40 MG
3 TABLET ORAL
Qty: 42 | Refills: 0 | OUTPATIENT
Start: 2018-03-14 | End: 2018-03-27

## 2018-03-14 RX ORDER — FUROSEMIDE 40 MG
3 TABLET ORAL
Qty: 0 | Refills: 0 | COMMUNITY
Start: 2018-03-14

## 2018-03-14 RX ORDER — METOPROLOL TARTRATE 50 MG
1 TABLET ORAL
Qty: 0 | Refills: 0 | COMMUNITY

## 2018-03-14 RX ORDER — METOPROLOL TARTRATE 50 MG
150 TABLET ORAL
Qty: 4500 | Refills: 0 | OUTPATIENT
Start: 2018-03-14 | End: 2018-04-12

## 2018-03-14 RX ORDER — VERAPAMIL HCL 240 MG
1 CAPSULE, EXTENDED RELEASE PELLETS 24 HR ORAL
Qty: 30 | Refills: 0 | OUTPATIENT
Start: 2018-03-14 | End: 2018-04-12

## 2018-03-14 RX ORDER — TAMSULOSIN HYDROCHLORIDE 0.4 MG/1
1 CAPSULE ORAL
Qty: 30 | Refills: 0 | OUTPATIENT
Start: 2018-03-14 | End: 2018-04-12

## 2018-03-14 RX ORDER — FUROSEMIDE 40 MG
2 TABLET ORAL
Qty: 0 | Refills: 0 | DISCHARGE
Start: 2018-03-14 | End: 2018-03-27

## 2018-03-14 RX ORDER — METOPROLOL TARTRATE 50 MG
3 TABLET ORAL
Qty: 42 | Refills: 0 | OUTPATIENT
Start: 2018-03-14 | End: 2018-03-27

## 2018-03-14 RX ORDER — VERAPAMIL HCL 240 MG
1 CAPSULE, EXTENDED RELEASE PELLETS 24 HR ORAL
Qty: 28 | Refills: 0 | OUTPATIENT
Start: 2018-03-14 | End: 2018-03-27

## 2018-03-14 RX ORDER — FUROSEMIDE 40 MG
3 TABLET ORAL
Qty: 42 | Refills: 0
Start: 2018-03-14 | End: 2018-03-27

## 2018-03-14 RX ORDER — METOPROLOL TARTRATE 50 MG
1 TABLET ORAL
Qty: 0 | Refills: 0 | DISCHARGE
Start: 2018-03-14 | End: 2018-03-27

## 2018-03-14 RX ORDER — VERAPAMIL HCL 240 MG
1 CAPSULE, EXTENDED RELEASE PELLETS 24 HR ORAL
Qty: 0 | Refills: 0 | COMMUNITY
Start: 2018-03-14

## 2018-03-14 RX ORDER — LISINOPRIL 2.5 MG/1
1 TABLET ORAL
Qty: 30 | Refills: 0 | OUTPATIENT
Start: 2018-03-14 | End: 2018-04-12

## 2018-03-14 RX ORDER — METOPROLOL TARTRATE 50 MG
1 TABLET ORAL
Qty: 42 | Refills: 0
Start: 2018-03-14 | End: 2018-03-27

## 2018-03-14 RX ORDER — METOPROLOL TARTRATE 50 MG
3 TABLET ORAL
Qty: 90 | Refills: 0 | OUTPATIENT
Start: 2018-03-14 | End: 2018-04-12

## 2018-03-14 RX ADMIN — Medication 50 MILLIGRAM(S): at 05:15

## 2018-03-14 RX ADMIN — FINASTERIDE 5 MILLIGRAM(S): 5 TABLET, FILM COATED ORAL at 12:25

## 2018-03-14 RX ADMIN — Medication 50 MILLIGRAM(S): at 14:16

## 2018-03-14 RX ADMIN — LISINOPRIL 5 MILLIGRAM(S): 2.5 TABLET ORAL at 05:16

## 2018-03-14 RX ADMIN — MAGNESIUM OXIDE 400 MG ORAL TABLET 400 MILLIGRAM(S): 241.3 TABLET ORAL at 09:00

## 2018-03-14 RX ADMIN — SENNA PLUS 2 TABLET(S): 8.6 TABLET ORAL at 12:25

## 2018-03-14 RX ADMIN — Medication 60 MILLIGRAM(S): at 05:16

## 2018-03-14 RX ADMIN — Medication 80 MILLIGRAM(S): at 05:16

## 2018-03-14 RX ADMIN — MAGNESIUM OXIDE 400 MG ORAL TABLET 400 MILLIGRAM(S): 241.3 TABLET ORAL at 14:16

## 2018-03-14 RX ADMIN — Medication 650 MILLIGRAM(S): at 12:26

## 2018-03-14 RX ADMIN — Medication 80 MILLIGRAM(S): at 17:45

## 2018-03-14 RX ADMIN — MAGNESIUM OXIDE 400 MG ORAL TABLET 400 MILLIGRAM(S): 241.3 TABLET ORAL at 17:47

## 2018-03-14 NOTE — PROGRESS NOTE ADULT - PROBLEM SELECTOR PROBLEM 1
Cellulitis of left lower extremity

## 2018-03-14 NOTE — PROGRESS NOTE ADULT - SUBJECTIVE AND OBJECTIVE BOX
Patient is a 85y old  Male who presents with a chief complaint of Swelling/Pain in left leg, SOB (10 Mar 2018 19:13)    Chandler Cardoso MD PGY-1  [phone]    SUBJECTIVE / OVERNIGHT EVENTS:    MEDICATIONS  (STANDING):  atorvastatin 10 milliGRAM(s) Oral at bedtime  docusate sodium 100 milliGRAM(s) Oral daily  finasteride 5 milliGRAM(s) Oral daily  furosemide    Tablet 60 milliGRAM(s) Oral daily  lisinopril 5 milliGRAM(s) Oral daily  magnesium oxide 400 milliGRAM(s) Oral three times a day with meals  metoprolol     tartrate 50 milliGRAM(s) Oral three times a day  senna 2 Tablet(s) Oral daily  tamsulosin 0.4 milliGRAM(s) Oral at bedtime  verapamil 80 milliGRAM(s) Oral two times a day    MEDICATIONS  (PRN):  acetaminophen   Tablet 650 milliGRAM(s) Oral every 6 hours PRN for pain  polyethylene glycol 3350 17 Gram(s) Oral daily PRN Constipation      T(C): 36.8 (03-14-18 @ 04:32), Max: 36.8 (03-14-18 @ 04:32)  HR: 74 (03-14-18 @ 04:32) (70 - 83)  BP: 127/75 (03-14-18 @ 04:32) (123/66 - 149/64)  RR: 18 (03-14-18 @ 04:32) (18 - 18)  SpO2: 96% (03-14-18 @ 04:32) (93% - 97%)    CAPILLARY BLOOD GLUCOSE        I&O's Summary    13 Mar 2018 07:01  -  14 Mar 2018 07:00  --------------------------------------------------------  IN: 820 mL / OUT: 850 mL / NET: -30 mL      PHYSICAL EXAM:   GENERAL: no acute distress  HEENT: clear conjunctivaie, neck supple, MMM  RESPIRATORY: CTAB, no rhonchi, rales, or wheezing  CARDIOVASCULAR: RRR, no murmurs, gallops, rubs  ABDOMINAL: soft, non-tender, non-distended, positive bowel sounds   EXTREMITIES: no clubbing, cyanosis, or edema  NEUROLOGICAL: alert and oriented x 3, non-focal  SKIN: no rashes or lesions   MUSCULOSKELETAL: no gross joint deformity         LABS:                        13.2   6.5   )-----------( 185      ( 13 Mar 2018 06:37 )             40.0     03-13    139  |  99  |  16  ----------------------------<  80  4.1   |  31  |  1.20    Ca    8.9      13 Mar 2018 06:37  Phos  3.6     03-13  Mg     1.6     03-13      PT/INR - ( 14 Mar 2018 05:57 )   PT: 41.2 sec;   INR: 3.71 ratio         PTT - ( 14 Mar 2018 05:57 )  PTT:43.3 sec          RADIOLOGY & ADDITIONAL TESTS:    Imaging Personally Reviewed:     Consultant(s) Notes Reviewed:     Care Discussed with Consultants/Other Providers: Patient is a 85y old  Male who presents with a chief complaint of Swelling/Pain in left leg, SOB (10 Mar 2018 19:13)    Chandler Cardoso MD PGY-1  [phone]    SUBJECTIVE / OVERNIGHT EVENTS: Patient feels well. No new complaints. Orange tinged urine in rivas.     MEDICATIONS  (STANDING):  atorvastatin 10 milliGRAM(s) Oral at bedtime  docusate sodium 100 milliGRAM(s) Oral daily  finasteride 5 milliGRAM(s) Oral daily  furosemide    Tablet 60 milliGRAM(s) Oral daily  lisinopril 5 milliGRAM(s) Oral daily  magnesium oxide 400 milliGRAM(s) Oral three times a day with meals  metoprolol     tartrate 50 milliGRAM(s) Oral three times a day  senna 2 Tablet(s) Oral daily  tamsulosin 0.4 milliGRAM(s) Oral at bedtime  verapamil 80 milliGRAM(s) Oral two times a day    MEDICATIONS  (PRN):  acetaminophen   Tablet 650 milliGRAM(s) Oral every 6 hours PRN for pain  polyethylene glycol 3350 17 Gram(s) Oral daily PRN Constipation      T(C): 36.8 (03-14-18 @ 04:32), Max: 36.8 (03-14-18 @ 04:32)  HR: 74 (03-14-18 @ 04:32) (70 - 83)  BP: 127/75 (03-14-18 @ 04:32) (123/66 - 149/64)  RR: 18 (03-14-18 @ 04:32) (18 - 18)  SpO2: 96% (03-14-18 @ 04:32) (93% - 97%)    CAPILLARY BLOOD GLUCOSE        I&O's Summary    13 Mar 2018 07:01  -  14 Mar 2018 07:00  --------------------------------------------------------  IN: 820 mL / OUT: 850 mL / NET: -30 mL      PHYSICAL EXAM:   GENERAL: no acute distress  HEENT: clear conjunctivaie, neck supple, MMM  RESPIRATORY: CTAB, no rhonchi, rales, or wheezing  CARDIOVASCULAR: RRR, no murmurs, gallops, rubs  ABDOMINAL: soft, non-tender, non-distended, positive bowel sounds   EXTREMITIES: no clubbing, cyanosis, or edema  NEUROLOGICAL: alert and oriented x 3, non-focal  SKIN: no rashes or lesions   MUSCULOSKELETAL: no gross joint deformity         LABS:                        13.2   6.5   )-----------( 185      ( 13 Mar 2018 06:37 )             40.0     03-13    139  |  99  |  16  ----------------------------<  80  4.1   |  31  |  1.20    Ca    8.9      13 Mar 2018 06:37  Phos  3.6     03-13  Mg     1.6     03-13      PT/INR - ( 14 Mar 2018 05:57 )   PT: 41.2 sec;   INR: 3.71 ratio         PTT - ( 14 Mar 2018 05:57 )  PTT:43.3 sec          RADIOLOGY & ADDITIONAL TESTS:    Imaging Personally Reviewed:     Consultant(s) Notes Reviewed:     Care Discussed with Consultants/Other Providers:

## 2018-03-14 NOTE — PROGRESS NOTE ADULT - ATTENDING COMMENTS
Cellulitis improving. WBC now normalized  Still appears slightly volume overloaded, c/w Lasix 40mg IV, will decrease to QD today. TOV today   Tapering Verapamil insetting of started Metoprolol  INR remains subtherapeutic, dose Coumadin. no need to bridge
Please page [phone] with questions or call the office [phone].
Afebrile, doing better with LLE cellulitis- less erythema, and swelling  - Reviewed labs, imaging  - c/w IV anbx. Spoke to Family at bedside  - TOV today. if pass will d/c home with home PT. Otherwise urology consult and d/c on Legbag, outpatient urology f/u    c/w Flomax, Proscar  ** spoke to family
Afebrile, doing better with LLE cellulitis- less erythema, and swelling  - Reviewed labs, imaging  - c/w IV anbx. Spoke to Family at bedside  - TOV tomorrow and d/c planning with home PT
Failed TOV, rivas reinserted overnight.   Lasix transitioned to PO today. Cardiology consulted.   Repeat TOV. Anticipate discharge tomorrow with home with home P/T.   Would treat for total 10 day course of abx therapy
Pain in LLE improved. Erythema relatively unchanged. Continuing on Vancomycin and Ancef. Check Vanc level  INR therapeutic today. Dose coumadin  Hematuria resolving. c/w Flomax and Finasteride. c/w rivas for strict I/O monitoring. TOV likely tomorrow  Echocardiogram today. c/w diuresis. Increase lasix to 40mg BID
Cellulitis appears unchanged from yesterday. Vanc trough subtherapeutic. Dosage adjusted.   Echo results noted. Pt with likely diastolic disfunction. c/w Lasix 40mg BID. c/w rob for I/O monitoring  Restart Lisinopril
Patient is seen with no issues, no SOB, rivas with yellow urine .  Patient will be d/c home today with home care services.  Patient will hold the COumadin since INR= 3.71 and will repeat INR in 2 days on Friday with PCP and PCP will dose coumadin accordingly.  Patient will have f/up with PCP within one week of hospital D/C .  Patient will go home with rivas and to have f/up with Urologist within 1-2 weeks of hospital D/C.        Deborah Navarro   Hospitalist   [phone]
Patient SPO2 on ambulation has improved s/p one dose of lasix IV yesterday .  SPO2 remains around 94% on ambulation as per nurse.  WIll increase lasix to 60 mg oral daily and monitor output and  accurate weight. Urine from the rivas is clear today .  WIll continue rivas and f/up outpatient with Urologist / Monitor INR and any evidence for bleed.  INR is supratherapeutic ( pt was on 1 mg oral daily at home and his coumadin dose was increased to 2 mg with resultant supratherapeutic INR / will give very low dose today of coumadin 0.5 mg so that his INR does not plunge to subtherapeutic level.  IF INR continues to increase tomorrow, will hold coumadin.  Plan is to d/c tomorrow if there is no issues .  Plan is discussed with patient's 2 daughters.      Deborah Navarro   Hospitalist 
Multiple beats of wide complex tachycardia , patient has AICD , might need interrogation of aicd.  COnt to monitor electrolytes.  Chang was reinserted due to failed TOV with gross hematuria noted, will cont to monitor for any improvement in the gross hematuria.      Deborah Navarro  Hospitalist   [phone]
Continuing on Abx for cellulitis. Will c/w Vanc, d/c Unasyn and start Ancef do not suspect anaerobic infection clinically.  Echo pending, c/w lasix for diuresis, clinically still appears volume overloaded  Vit K given for supra-therapeutic INR and hematuria likely in setting of traumatic rivas

## 2018-03-14 NOTE — PROGRESS NOTE ADULT - PROBLEM SELECTOR PLAN 2
-was on  PO Lasix 40 mg daily/ will increase to 60 mg oral and monitor BP and SPO2  -On 3/12, patient desatted to 86% during 5 minute walk. CXR revealed unchanged bilateral pleural effusions. lasix 20mg IV was given   -will walk with patient and check pulse ox today  -Cardiology following  -TTE showing mild to moderate MR, mild MS, TAVR, preserved systolic function, indeterminate diastolic function in the setting of afib and s/p PPM  -Given clinical presentation consistent with HF with evidence of MR, will optimize meds; restarted AceI and BB, taper off CCB as it is not ideal in HF   -Observe on telemetry  -Strict I's and O's  -Daily weights -c/w PO Lasix 60 mg and monitor BP and SPO2  -On 3/12, patient desatted to 86% during 5 minute walk. CXR unchanged bilateral pleural effusions. lasix 20mg IV was given   -On 3/13 patient satted at 94% during 5 minute walk.   -Cardiology following  -TTE showing mild to moderate MR, mild MS, TAVR, preserved systolic function, indeterminate diastolic function in the setting of afib and s/p PPM  -Given clinical presentation consistent with HF with evidence of MR, will optimize meds; restarted AceI and BB, taper off CCB as it is not ideal in HF   -Observe on telemetry  -Strict I's and O's  -Daily weights

## 2018-03-14 NOTE — PROGRESS NOTE ADULT - PROBLEM SELECTOR PROBLEM 2
R/O Heart failure

## 2018-03-14 NOTE — PROGRESS NOTE ADULT - PROBLEM SELECTOR PLAN 3
-resolved   -Could have been  2/2 to drug side effect, unasyn has thrombocytopenia as reported side effect, also potentially infection driven  -Unasyn d/donald; s/p Ancef and Vanco  -Daily CBC  -Continue to monitor

## 2018-03-14 NOTE — PROGRESS NOTE ADULT - ASSESSMENT
85 y.o. M with PMH of HTN, HLD, TIA, throat cancer s/p radiation and chemo (2011), aortic valve replacement, afib on coumadin, BPH, s/p PMM who presents  with 1 day of L leg redness, pain, and swelling with leukocytosis BARON, LE edema, b/l pleural effusion, and elevated Pro-BNP in setting of possible acute heart failure exacerbation and left leg cellulitis.
A/P: 84yo M w/ BLE PVD w/ LLE superficial Ulcer- ADAPTIC   BLE elevation  BLE ACE Wrapping  con't Nutrition (as tolerated)  con't Offloading   con't Pericare  Care as per medicine will follow w/ you  Upon discharge f/u as outpatient at [facility] [phone]  D/w team and attending  Raquel Webb PA-C [id number]  I spent  20 minutes w/ this pt of which more than 50% of the time was spent counseling & coordinating care of this pt.
85 y.o. M with PMH of HTN, HLD, TIA, throat cancer s/p radiation and chemo (2011), aortic valve replacement, afib on coumadin, BPH, s/p PMM who presents  with 1 day of L leg redness, pain, and swelling with leukocytosis BARON, LE edema, b/l pleural effusion, and elevated Pro-BNP in setting of possible acute heart failure exacerbation and left leg cellulitis.
85 y.o. M with PMH of HTN, HLD, TIA, throat cancer s/p radiation and chemo (2011), aortic valve replacement, afib on coumadin, BPH, s/p PMM who presents  with 1 day of L leg redness, pain, and swelling with leukocytosis BARON, LE edema, b/l pleural effusion, and elevated Pro-BNP in setting of possible acute heart failure exacerbation and left leg cellulitis.
85 y.o. M with PMH of HTN, HLD, TIA, throat cancer s/p radiation and chemo (2011), SONIDO 4/17, afib on coumadin, BPH, s/p PPM who presents with acute on chronic HFpEF and noted to have WCT.   ·	Patient had other episodes of WCT. Some are clearly AF with RVR with underlying conduction abnormality or aberrant conduction. Others may be NSVT. As stated given recent cath with out obstructive CAD and NL EF would manage medically. Keep k>4 and mg>2.  ·	Increase lopressor to 50 mg BID. Increase at BP allows.   ·	Continue other medications.   ·	Discussed with medical team.
85 y.o. M with PMH of HTN, HLD, TIA, throat cancer s/p radiation and chemo (2011), aortic valve replacement, afib on coumadin, BPH, s/p PMM who presents  with 1 day of L leg redness, pain, and swelling with leukocytosis BARON, LE edema, b/l pleural effusion, and elevated Pro-BNP in setting of possible acute heart failure exacerbation and left leg cellulitis.
85 y.o. M with PMH of HTN, HLD, TIA, throat cancer s/p radiation and chemo (2011), aortic valve replacement, afib on coumadin, BPH, s/p PMM who presents  with 1 day of L leg redness, pain, and swelling with leukocytosis BARON, LE edema, b/l pleural effusion, and elevated Pro-BNP in setting of possible acute heart failure exacerbation and left leg cellulitis.

## 2018-03-14 NOTE — PROGRESS NOTE ADULT - PROBLEM SELECTOR PLAN 6
-c/w Verapamil 80 mg BID  -INR 3.79.  -will dose coumadin 0.5mg tonight   -monitor on tele  -Uptitrate BB as necessary -c/w Verapamil 80 mg BID  -INR 3.71.   -will hold coumadin   -Pt counseled to f/up on INR with PMD within 2 days   -monitor on tele  -Uptitrate BB as necessary

## 2018-03-14 NOTE — PROGRESS NOTE ADULT - PROVIDER SPECIALTY LIST ADULT
Cardiology
Internal Medicine
Urology
Urology
Wound Care
Internal Medicine
Internal Medicine

## 2018-03-15 RX ORDER — TAMSULOSIN HYDROCHLORIDE 0.4 MG/1
1 CAPSULE ORAL
Qty: 14 | Refills: 0
Start: 2018-03-15 | End: 2018-03-28

## 2018-03-15 RX ORDER — LISINOPRIL 2.5 MG/1
1 TABLET ORAL
Qty: 14 | Refills: 0
Start: 2018-03-15 | End: 2018-03-28

## 2018-03-15 RX ORDER — LISINOPRIL 2.5 MG/1
1 TABLET ORAL
Qty: 30 | Refills: 0 | OUTPATIENT
Start: 2018-03-15 | End: 2018-04-13

## 2018-03-15 RX ORDER — TAMSULOSIN HYDROCHLORIDE 0.4 MG/1
2 CAPSULE ORAL
Qty: 0 | Refills: 0 | DISCHARGE
Start: 2018-03-15 | End: 2018-03-28

## 2018-03-15 RX ORDER — LISINOPRIL 2.5 MG/1
1 TABLET ORAL
Qty: 0 | Refills: 0 | DISCHARGE
Start: 2018-03-15 | End: 2018-03-28

## 2018-03-15 RX ORDER — LISINOPRIL 2.5 MG/1
1 TABLET ORAL
Qty: 14 | Refills: 0 | OUTPATIENT
Start: 2018-03-15 | End: 2018-03-28

## 2018-03-15 RX ORDER — TAMSULOSIN HYDROCHLORIDE 0.4 MG/1
1 CAPSULE ORAL
Qty: 30 | Refills: 0
Start: 2018-03-15 | End: 2018-04-13

## 2018-03-19 ENCOUNTER — APPOINTMENT (OUTPATIENT)
Dept: UROLOGY | Facility: CLINIC | Age: 83
End: 2018-03-19

## 2018-03-19 ENCOUNTER — APPOINTMENT (OUTPATIENT)
Dept: UROLOGY | Facility: CLINIC | Age: 83
End: 2018-03-19
Payer: COMMERCIAL

## 2018-03-19 VITALS
RESPIRATION RATE: 18 BRPM | HEART RATE: 90 BPM | DIASTOLIC BLOOD PRESSURE: 61 MMHG | SYSTOLIC BLOOD PRESSURE: 95 MMHG | TEMPERATURE: 97.4 F

## 2018-03-19 PROCEDURE — 99204 OFFICE O/P NEW MOD 45 MIN: CPT

## 2018-03-23 ENCOUNTER — OUTPATIENT (OUTPATIENT)
Dept: OUTPATIENT SERVICES | Facility: HOSPITAL | Age: 83
LOS: 1 days | End: 2018-03-23
Payer: COMMERCIAL

## 2018-03-23 ENCOUNTER — APPOINTMENT (OUTPATIENT)
Dept: UROLOGY | Facility: CLINIC | Age: 83
End: 2018-03-23
Payer: COMMERCIAL

## 2018-03-23 DIAGNOSIS — R35.0 FREQUENCY OF MICTURITION: ICD-10-CM

## 2018-03-23 PROCEDURE — 76775 US EXAM ABDO BACK WALL LIM: CPT | Mod: 26

## 2018-03-23 PROCEDURE — 99213 OFFICE O/P EST LOW 20 MIN: CPT | Mod: 25

## 2018-03-23 PROCEDURE — 76775 US EXAM ABDO BACK WALL LIM: CPT

## 2018-03-26 ENCOUNTER — APPOINTMENT (OUTPATIENT)
Dept: ELECTROPHYSIOLOGY | Facility: CLINIC | Age: 83
End: 2018-03-26
Payer: COMMERCIAL

## 2018-03-26 PROCEDURE — 93294 REM INTERROG EVL PM/LDLS PM: CPT

## 2018-03-28 DIAGNOSIS — N40.1 BENIGN PROSTATIC HYPERPLASIA WITH LOWER URINARY TRACT SYMPTOMS: ICD-10-CM

## 2018-03-28 DIAGNOSIS — R33.9 RETENTION OF URINE, UNSPECIFIED: ICD-10-CM

## 2018-04-02 ENCOUNTER — APPOINTMENT (OUTPATIENT)
Dept: WOUND CARE | Facility: CLINIC | Age: 83
End: 2018-04-02
Payer: COMMERCIAL

## 2018-04-02 ENCOUNTER — APPOINTMENT (OUTPATIENT)
Dept: UROLOGY | Facility: CLINIC | Age: 83
End: 2018-04-02
Payer: COMMERCIAL

## 2018-04-02 VITALS — SYSTOLIC BLOOD PRESSURE: 111 MMHG | HEART RATE: 91 BPM | DIASTOLIC BLOOD PRESSURE: 66 MMHG | TEMPERATURE: 98.2 F

## 2018-04-02 DIAGNOSIS — I83.892 VARICOSE VEINS OF LEFT LOWER EXTREMITY WITH OTHER COMPLICATIONS: ICD-10-CM

## 2018-04-02 PROCEDURE — 99213 OFFICE O/P EST LOW 20 MIN: CPT

## 2018-04-04 ENCOUNTER — APPOINTMENT (OUTPATIENT)
Dept: UROLOGY | Facility: CLINIC | Age: 83
End: 2018-04-04
Payer: COMMERCIAL

## 2018-04-04 PROCEDURE — 99213 OFFICE O/P EST LOW 20 MIN: CPT

## 2018-04-04 PROCEDURE — 51798 US URINE CAPACITY MEASURE: CPT

## 2018-04-10 ENCOUNTER — MESSAGE (OUTPATIENT)
Age: 83
End: 2018-04-10

## 2018-04-10 LAB
ANION GAP SERPL CALC-SCNC: 14 MMOL/L
BUN SERPL-MCNC: 29 MG/DL
CALCIUM SERPL-MCNC: 8.8 MG/DL
CHLORIDE SERPL-SCNC: 96 MMOL/L
CO2 SERPL-SCNC: 29 MMOL/L
CREAT SERPL-MCNC: 1.49 MG/DL
GLUCOSE SERPL-MCNC: 115 MG/DL
POTASSIUM SERPL-SCNC: 4.1 MMOL/L
SODIUM SERPL-SCNC: 139 MMOL/L

## 2018-04-12 ENCOUNTER — FORM ENCOUNTER (OUTPATIENT)
Age: 83
End: 2018-04-12

## 2018-04-13 ENCOUNTER — APPOINTMENT (OUTPATIENT)
Dept: ULTRASOUND IMAGING | Facility: CLINIC | Age: 83
End: 2018-04-13
Payer: COMMERCIAL

## 2018-04-13 ENCOUNTER — OUTPATIENT (OUTPATIENT)
Dept: OUTPATIENT SERVICES | Facility: HOSPITAL | Age: 83
LOS: 1 days | End: 2018-04-13
Payer: COMMERCIAL

## 2018-04-13 DIAGNOSIS — Z00.8 ENCOUNTER FOR OTHER GENERAL EXAMINATION: ICD-10-CM

## 2018-04-13 PROCEDURE — 76775 US EXAM ABDO BACK WALL LIM: CPT | Mod: 26

## 2018-04-13 PROCEDURE — 76775 US EXAM ABDO BACK WALL LIM: CPT

## 2018-04-19 ENCOUNTER — APPOINTMENT (OUTPATIENT)
Dept: SURGERY | Facility: CLINIC | Age: 83
End: 2018-04-19
Payer: COMMERCIAL

## 2018-04-20 ENCOUNTER — APPOINTMENT (OUTPATIENT)
Dept: UROLOGY | Facility: CLINIC | Age: 83
End: 2018-04-20
Payer: COMMERCIAL

## 2018-04-20 ENCOUNTER — APPOINTMENT (OUTPATIENT)
Dept: UROLOGY | Facility: CLINIC | Age: 83
End: 2018-04-20

## 2018-04-20 DIAGNOSIS — N17.9 ACUTE KIDNEY FAILURE, UNSPECIFIED: ICD-10-CM

## 2018-04-20 PROCEDURE — 99213 OFFICE O/P EST LOW 20 MIN: CPT

## 2018-04-23 LAB
ANION GAP SERPL CALC-SCNC: 15 MMOL/L
BUN SERPL-MCNC: 36 MG/DL
CALCIUM SERPL-MCNC: 9.3 MG/DL
CHLORIDE SERPL-SCNC: 96 MMOL/L
CO2 SERPL-SCNC: 30 MMOL/L
CREAT SERPL-MCNC: 1.7 MG/DL
GLUCOSE SERPL-MCNC: 152 MG/DL
POTASSIUM SERPL-SCNC: 4.8 MMOL/L
SODIUM SERPL-SCNC: 141 MMOL/L

## 2018-06-27 ENCOUNTER — APPOINTMENT (OUTPATIENT)
Dept: ELECTROPHYSIOLOGY | Facility: CLINIC | Age: 83
End: 2018-06-27
Payer: COMMERCIAL

## 2018-06-27 VITALS — HEART RATE: 76 BPM | SYSTOLIC BLOOD PRESSURE: 129 MMHG | DIASTOLIC BLOOD PRESSURE: 66 MMHG

## 2018-06-27 PROCEDURE — 93280 PM DEVICE PROGR EVAL DUAL: CPT

## 2018-07-13 ENCOUNTER — APPOINTMENT (OUTPATIENT)
Dept: UROLOGY | Facility: CLINIC | Age: 83
End: 2018-07-13
Payer: COMMERCIAL

## 2018-07-13 DIAGNOSIS — M79.89 OTHER SPECIFIED SOFT TISSUE DISORDERS: ICD-10-CM

## 2018-07-13 PROCEDURE — 99213 OFFICE O/P EST LOW 20 MIN: CPT

## 2018-07-16 ENCOUNTER — OTHER (OUTPATIENT)
Age: 83
End: 2018-07-16

## 2018-07-18 PROBLEM — M79.89 LEG SWELLING: Status: ACTIVE | Noted: 2018-04-02

## 2018-07-18 LAB
ANION GAP SERPL CALC-SCNC: 16 MMOL/L
BUN SERPL-MCNC: 24 MG/DL
CALCIUM SERPL-MCNC: 8.8 MG/DL
CHLORIDE SERPL-SCNC: 98 MMOL/L
CO2 SERPL-SCNC: 26 MMOL/L
CREAT SERPL-MCNC: 1.39 MG/DL
GLUCOSE SERPL-MCNC: 94 MG/DL
POTASSIUM SERPL-SCNC: 4.9 MMOL/L
SODIUM SERPL-SCNC: 141 MMOL/L

## 2018-08-07 ENCOUNTER — APPOINTMENT (OUTPATIENT)
Dept: SURGERY | Facility: CLINIC | Age: 83
End: 2018-08-07
Payer: COMMERCIAL

## 2018-08-22 ENCOUNTER — APPOINTMENT (OUTPATIENT)
Dept: UROLOGY | Facility: CLINIC | Age: 83
End: 2018-08-22

## 2018-08-23 ENCOUNTER — APPOINTMENT (OUTPATIENT)
Dept: SURGERY | Facility: CLINIC | Age: 83
End: 2018-08-23
Payer: COMMERCIAL

## 2018-08-23 PROBLEM — I48.91 UNSPECIFIED ATRIAL FIBRILLATION: Chronic | Status: ACTIVE | Noted: 2017-12-01

## 2018-08-23 PROCEDURE — 99213 OFFICE O/P EST LOW 20 MIN: CPT | Mod: 25

## 2018-08-23 PROCEDURE — 31575 DIAGNOSTIC LARYNGOSCOPY: CPT

## 2018-09-26 ENCOUNTER — APPOINTMENT (OUTPATIENT)
Dept: ELECTROPHYSIOLOGY | Facility: CLINIC | Age: 83
End: 2018-09-26
Payer: COMMERCIAL

## 2018-09-26 DIAGNOSIS — I48.91 UNSPECIFIED ATRIAL FIBRILLATION: ICD-10-CM

## 2018-09-26 PROCEDURE — 93294 REM INTERROG EVL PM/LDLS PM: CPT

## 2018-09-26 PROCEDURE — 93296 REM INTERROG EVL PM/IDS: CPT

## 2019-02-01 ENCOUNTER — APPOINTMENT (OUTPATIENT)
Dept: ELECTROPHYSIOLOGY | Facility: CLINIC | Age: 84
End: 2019-02-01
Payer: COMMERCIAL

## 2019-02-01 ENCOUNTER — APPOINTMENT (OUTPATIENT)
Dept: ELECTROPHYSIOLOGY | Facility: CLINIC | Age: 84
End: 2019-02-01

## 2019-02-01 VITALS
HEIGHT: 62 IN | HEART RATE: 86 BPM | OXYGEN SATURATION: 94 % | DIASTOLIC BLOOD PRESSURE: 62 MMHG | WEIGHT: 156 LBS | BODY MASS INDEX: 28.71 KG/M2 | SYSTOLIC BLOOD PRESSURE: 99 MMHG

## 2019-02-01 PROCEDURE — 93279 PRGRMG DEV EVAL PM/LDLS PM: CPT

## 2019-02-01 RX ORDER — VERAPAMIL HYDROCHLORIDE 80 MG/1
80 TABLET ORAL TWICE DAILY
Refills: 0 | Status: DISCONTINUED | COMMUNITY
End: 2019-02-01

## 2019-02-21 ENCOUNTER — APPOINTMENT (OUTPATIENT)
Dept: SURGERY | Facility: CLINIC | Age: 84
End: 2019-02-21
Payer: COMMERCIAL

## 2019-02-21 PROCEDURE — 31575 DIAGNOSTIC LARYNGOSCOPY: CPT

## 2019-02-21 PROCEDURE — 99213 OFFICE O/P EST LOW 20 MIN: CPT | Mod: 25

## 2019-02-21 NOTE — ASSESSMENT
[FreeTextEntry1] : will observe. to return earlier if any change.   no indication for any studies at this time

## 2019-02-21 NOTE — PROCEDURE
[None] : none [Flexible Endoscope] : examined with the flexible endoscope [Normal] : normal vallecula [de-identified] : changes from prior treatment with distorted epiglottis, adequate airway

## 2019-02-21 NOTE — PHYSICAL EXAM
[de-identified] : changes from prior treatment [de-identified] : no palpable thyroid nodules [Nasal Endoscopy Performed] : nasal endoscopy was performed, see procedure section for findings [Laryngoscopy Performed] : laryngoscopy was performed, see procedure section for findings [Midline] : located in midline position [Normal] : orientation to person, place, and time: normal

## 2019-02-21 NOTE — HISTORY OF PRESENT ILLNESS
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  denies dysphagia, weight change, hemoptysis. no new lesions noted.  no further fatigue.  no changes medically since last visit

## 2019-05-07 ENCOUNTER — APPOINTMENT (OUTPATIENT)
Dept: ELECTROPHYSIOLOGY | Facility: CLINIC | Age: 84
End: 2019-05-07
Payer: COMMERCIAL

## 2019-05-07 PROCEDURE — 93294 REM INTERROG EVL PM/LDLS PM: CPT

## 2019-05-07 PROCEDURE — 93296 REM INTERROG EVL PM/IDS: CPT

## 2019-05-08 ENCOUNTER — APPOINTMENT (OUTPATIENT)
Dept: WOUND CARE | Facility: CLINIC | Age: 84
End: 2019-05-08

## 2019-05-13 ENCOUNTER — APPOINTMENT (OUTPATIENT)
Dept: WOUND CARE | Facility: CLINIC | Age: 84
End: 2019-05-13

## 2019-06-10 ENCOUNTER — APPOINTMENT (OUTPATIENT)
Dept: UROLOGY | Facility: CLINIC | Age: 84
End: 2019-06-10

## 2019-08-14 ENCOUNTER — APPOINTMENT (OUTPATIENT)
Dept: ELECTROPHYSIOLOGY | Facility: CLINIC | Age: 84
End: 2019-08-14
Payer: COMMERCIAL

## 2019-08-14 VITALS
OXYGEN SATURATION: 98 % | WEIGHT: 150 LBS | BODY MASS INDEX: 27.6 KG/M2 | SYSTOLIC BLOOD PRESSURE: 114 MMHG | HEART RATE: 75 BPM | HEIGHT: 62 IN | DIASTOLIC BLOOD PRESSURE: 66 MMHG

## 2019-08-14 PROCEDURE — 93279 PRGRMG DEV EVAL PM/LDLS PM: CPT

## 2019-08-14 RX ORDER — METOPROLOL TARTRATE 50 MG/1
50 TABLET ORAL 3 TIMES DAILY
Refills: 0 | Status: DISCONTINUED | COMMUNITY
End: 2019-08-14

## 2019-08-14 RX ORDER — FUROSEMIDE 20 MG/1
20 TABLET ORAL
Refills: 0 | Status: DISCONTINUED | COMMUNITY
End: 2019-08-14

## 2019-08-14 RX ORDER — ALLOPURINOL 100 MG/1
100 TABLET ORAL DAILY
Qty: 30 | Refills: 0 | Status: ACTIVE | COMMUNITY
Start: 2019-08-14

## 2019-08-29 ENCOUNTER — APPOINTMENT (OUTPATIENT)
Dept: SURGERY | Facility: CLINIC | Age: 84
End: 2019-08-29
Payer: COMMERCIAL

## 2019-08-29 PROCEDURE — 31575 DIAGNOSTIC LARYNGOSCOPY: CPT

## 2019-08-29 PROCEDURE — 99213 OFFICE O/P EST LOW 20 MIN: CPT | Mod: 25

## 2019-08-29 NOTE — PROCEDURE
[None] : none [Flexible Endoscope] : examined with the flexible endoscope [Normal] : normal vallecula [de-identified] : changes from prior treatment with distorted epiglottis, adequate airway

## 2019-08-29 NOTE — PHYSICAL EXAM
[de-identified] : changes from prior treatment [de-identified] : no palpable thyroid nodules [Nasal Endoscopy Performed] : nasal endoscopy was performed, see procedure section for findings [Laryngoscopy Performed] : laryngoscopy was performed, see procedure section for findings [Midline] : located in midline position [Normal] : orientation to person, place, and time: normal

## 2019-08-29 NOTE — HISTORY OF PRESENT ILLNESS
[de-identified] : 8  1/2  years s/p chemo/RT treatment of supraglottic malignancy.  denies dysphagia, weight change, hemoptysis. no new lesions noted.  no further fatigue.  no changes medically since last visit

## 2019-11-14 ENCOUNTER — APPOINTMENT (OUTPATIENT)
Dept: ELECTROPHYSIOLOGY | Facility: CLINIC | Age: 84
End: 2019-11-14
Payer: COMMERCIAL

## 2019-11-14 PROCEDURE — 93294 REM INTERROG EVL PM/LDLS PM: CPT

## 2019-11-14 PROCEDURE — 93296 REM INTERROG EVL PM/IDS: CPT

## 2020-02-19 ENCOUNTER — APPOINTMENT (OUTPATIENT)
Dept: ELECTROPHYSIOLOGY | Facility: CLINIC | Age: 85
End: 2020-02-19
Payer: COMMERCIAL

## 2020-02-19 VITALS
DIASTOLIC BLOOD PRESSURE: 65 MMHG | HEIGHT: 62 IN | WEIGHT: 150 LBS | SYSTOLIC BLOOD PRESSURE: 101 MMHG | BODY MASS INDEX: 27.6 KG/M2 | OXYGEN SATURATION: 94 % | HEART RATE: 86 BPM

## 2020-02-19 PROCEDURE — 93280 PM DEVICE PROGR EVAL DUAL: CPT

## 2020-03-05 NOTE — PROGRESS NOTE ADULT - PROBLEM SELECTOR PLAN 9
Consultation  Patient:                 MRN#: [id number] FIN: [id number]  REINIER THIBODEAUX   Age:         38         Sex: F          : 1981  Author:      Dinesh Huitron M.D.  ATTENDING PHYSICIAN: DIANNA FRANKLIN MD  DATE OF SERVICE:   HISTORY OF PRESENT ILLNESS: A 38-year-old  female with no significant past medical history except for ectopic pregnancy.  She was admitted for right-sided abdominal pain for 3 days duration associated with decreased oral intake, loose bowel movement, chills but no fever.  She denies similar episode in the past.  She has frequency of urination but denies any dysuria or urgency.  The patient has been febrile with temperature up to  39.4.  Her WBC count lately has increased to 35.  CT abdomen shows moderate right hydroureteronephrosis with 11 mm calcified stone, distal right ureter and mild right perinephric stranding.  Bilateral nonobstructing renal stones suggestive of early medullary nephrocalcinosis.  There is slight increased density of osseous structures with subtle erosive changes in bilateral sacroiliac joints suggestive of  hyperparathyroidism.  The patient also  became thrombocytopenic due to this reason her antibiotic was changed from Zosyn to meropenem.  She underwent right nephrostomy insertion and currently scheduled for cystoscopy.  PAST MEDICAL HISTORY: Ectopic pregnancy.  ALLERGIES: None.  FAMILY HISTORY: Unremarkable.  SOCIAL HISTORY: Smokes marijuana.  Otherwise unremarkable.  MEDICATION: Meropenem, Calcitonin.  REVIEW OF SYSTEMS: HEENT:  Denies headache, visual change.  Neck:  Denies neck pain.  Cardiac:  Denies chest pain, palpitation.  Pulmonary:  Denies shortness of breath or cough.  GI:  As above :  As above. Musculoskeletal:  No joint ache or effusion.  Neuropsychiatric:  Unremarkable.  PHYSICAL EXAMINATION: Vital Signs: /71, pulse 94, respirations 18, temperature 37.  She is well developed, well nourished, not in distress HEENT:   Extraocular muscles intact.  RANDEE.  No oral lesions.  Neck:  Supple.  No JVD.  No bruit.  Lungs:  Clear to auscultation.  Heart:  Regular rate S1, S2.  No murmur.  Abdomen:  Soft.  Normoactive bowel sounds.  No organomegaly.  No masses.  Right nephrostomy tube in place with richelle urine.   Extremities:  No cyanosis, clubbing, or edema.  Skin:  No rashes.  Neuro:  No focal deficits.  LABORATORY DATA: WBC 35, hemoglobin 9.3, platelets 75.  BUN 22, creatinine 1.26.  IMPRESSION:   1. Sepsis secondary to complicated urinary tract infection and pyelonephritis.  2. Right hydroureteronephrosis secondary to nephrolithiasis.  3. Early medullary nephrocalcinosis.  4. Hypercalcemia, rule out hyperparathyroidism, rule out sarcoidosis.  5. Thrombocytopenia due to sepsis.  RECOMMENDATION:   1. Blood culture.  2. Await urine culture.  3. I agree with meropenem.  4. We will check angiotensin-converting enzyme level.  5. Management of hypercalcemia per Primary and Urology Service.  6. Await cystoscopy findings.   Thank you for allowing me to participate in the care of this patient.  Dinesh Huitron M.D.  KYLE/Ann  DP:  [id number]  DD:  03/05/2020 10:24:53  DT:  03/05/2020 15:10:46  Job #:  [id number]   -Ordered speech and swallow, f/u results  -Mechanical soft diet

## 2020-05-19 ENCOUNTER — APPOINTMENT (OUTPATIENT)
Dept: ELECTROPHYSIOLOGY | Facility: CLINIC | Age: 85
End: 2020-05-19
Payer: COMMERCIAL

## 2020-05-19 PROCEDURE — 93294 REM INTERROG EVL PM/LDLS PM: CPT

## 2020-05-19 PROCEDURE — 93296 REM INTERROG EVL PM/IDS: CPT

## 2020-06-18 ENCOUNTER — APPOINTMENT (OUTPATIENT)
Dept: SURGERY | Facility: CLINIC | Age: 85
End: 2020-06-18
Payer: COMMERCIAL

## 2020-06-18 DIAGNOSIS — C32.9 MALIGNANT NEOPLASM OF LARYNX, UNSPECIFIED: ICD-10-CM

## 2020-06-18 PROCEDURE — 31575 DIAGNOSTIC LARYNGOSCOPY: CPT

## 2020-06-18 PROCEDURE — 99213 OFFICE O/P EST LOW 20 MIN: CPT | Mod: 25

## 2020-06-18 NOTE — PHYSICAL EXAM
[de-identified] : no palpable thyroid nodules [de-identified] : changes from prior treatment [Nasal Endoscopy Performed] : nasal endoscopy was performed, see procedure section for findings [Laryngoscopy Performed] : laryngoscopy was performed, see procedure section for findings [Midline] : located in midline position [Normal] : no neck adenopathy

## 2020-06-18 NOTE — PROCEDURE
[None] : none [Flexible Endoscope] : examined with the flexible endoscope [de-identified] : changes from prior treatment with distorted epiglottis, adequate airway [Normal] : normal vallecula

## 2020-06-18 NOTE — HISTORY OF PRESENT ILLNESS
[de-identified] : 9  1/2  years s/p chemo/RT treatment of supraglottic malignancy.  denies dysphagia, weight change, hemoptysis. no new lesions noted.  no further fatigue.  no changes medically since last visit

## 2020-07-21 ENCOUNTER — APPOINTMENT (OUTPATIENT)
Dept: UROLOGY | Facility: CLINIC | Age: 85
End: 2020-07-21

## 2020-07-22 ENCOUNTER — APPOINTMENT (OUTPATIENT)
Dept: UROLOGY | Facility: CLINIC | Age: 85
End: 2020-07-22
Payer: COMMERCIAL

## 2020-07-22 ENCOUNTER — OUTPATIENT (OUTPATIENT)
Dept: OUTPATIENT SERVICES | Facility: HOSPITAL | Age: 85
LOS: 1 days | End: 2020-07-22
Payer: COMMERCIAL

## 2020-07-22 VITALS — DIASTOLIC BLOOD PRESSURE: 60 MMHG | RESPIRATION RATE: 19 BRPM | HEART RATE: 103 BPM | SYSTOLIC BLOOD PRESSURE: 104 MMHG

## 2020-07-22 VITALS — TEMPERATURE: 98.3 F

## 2020-07-22 PROCEDURE — 99213 OFFICE O/P EST LOW 20 MIN: CPT | Mod: 25

## 2020-07-22 PROCEDURE — 51700 IRRIGATION OF BLADDER: CPT

## 2020-07-24 NOTE — ASSESSMENT
[FreeTextEntry1] : Urinary retention and BPH. Passed VT today\par --Cont finasteride and flomax\par --retention precautions\par --RTC next week for PVR and BMP\par \par

## 2020-07-24 NOTE — PHYSICAL EXAM
[General Appearance - Well Developed] : well developed [General Appearance - Well Nourished] : well nourished [Abdomen Soft] : soft [General Appearance - In No Acute Distress] : no acute distress [Costovertebral Angle Tenderness] : no ~M costovertebral angle tenderness [Abdomen Tenderness] : non-tender [Oriented To Time, Place, And Person] : oriented to person, place, and time [Exaggerated Use Of Accessory Muscles For Inspiration] : no accessory muscle use [No Focal Deficits] : no focal deficits [FreeTextEntry1] : radiation changes to neck

## 2020-07-24 NOTE — REVIEW OF SYSTEMS
[Eyesight Problems] : eyesight problems [Seen by urologist before (Name)  ___] : Preciously seen by a urologist: [unfilled] [see HPI] : see HPI [Wake up at night to urinate  How many times?  ___] : wakes up to urinate [unfilled] times during the night [Negative] : Endocrine [FreeTextEntry2] : htn

## 2020-07-24 NOTE — HISTORY OF PRESENT ILLNESS
[FreeTextEntry1] : 86yo gentleman with cc of urinary retention. Pt last seen 2y ago for urinary retention found during hospitalization for LE cellulitis. Pt also noted to have SOB and suspicion of heart failure (CHF). He had slight elevation in Cr at 1.4 (from baseline of ~1) and had bladder scan that showed 700cc of urine. Chang was placed. He failed VT in hospotal and was started on flomax and finasteride. Eventually he was able to pass VT. Follow-up US showed no hydro. PVR was 144 (not unexpected for patients age). \par \par He comes in today and reports again with retention. Pt with recent bump in Cr. He was taken off of lasix and went into CHF. He was admitted to the hospital and was told he had a UTI. He failed VT. He is still taking medications. No issues with constipation. Moving around on his own but using a walker now. He had VT today and able to void.

## 2020-07-30 DIAGNOSIS — R33.9 RETENTION OF URINE, UNSPECIFIED: ICD-10-CM

## 2020-07-30 DIAGNOSIS — N40.1 BENIGN PROSTATIC HYPERPLASIA WITH LOWER URINARY TRACT SYMPTOMS: ICD-10-CM

## 2020-08-19 ENCOUNTER — APPOINTMENT (OUTPATIENT)
Dept: UROLOGY | Facility: CLINIC | Age: 85
End: 2020-08-19

## 2020-08-19 ENCOUNTER — NON-APPOINTMENT (OUTPATIENT)
Age: 85
End: 2020-08-19

## 2020-08-19 ENCOUNTER — APPOINTMENT (OUTPATIENT)
Dept: ELECTROPHYSIOLOGY | Facility: CLINIC | Age: 85
End: 2020-08-19
Payer: COMMERCIAL

## 2020-08-19 VITALS
OXYGEN SATURATION: 98 % | HEART RATE: 65 BPM | DIASTOLIC BLOOD PRESSURE: 65 MMHG | HEIGHT: 62 IN | BODY MASS INDEX: 26.68 KG/M2 | WEIGHT: 145 LBS | SYSTOLIC BLOOD PRESSURE: 120 MMHG

## 2020-08-19 PROCEDURE — 93279 PRGRMG DEV EVAL PM/LDLS PM: CPT

## 2020-09-13 DIAGNOSIS — Z01.818 ENCOUNTER FOR OTHER PREPROCEDURAL EXAMINATION: ICD-10-CM

## 2020-09-14 ENCOUNTER — APPOINTMENT (OUTPATIENT)
Dept: DISASTER EMERGENCY | Facility: CLINIC | Age: 85
End: 2020-09-14

## 2020-09-15 LAB — SARS-COV-2 N GENE NPH QL NAA+PROBE: NOT DETECTED

## 2020-09-17 ENCOUNTER — INPATIENT (INPATIENT)
Facility: HOSPITAL | Age: 85
LOS: 0 days | Discharge: HOME | DRG: 259 | End: 2020-09-18
Attending: HOSPITALIST | Admitting: INTERNAL MEDICINE
Payer: COMMERCIAL

## 2020-09-17 VITALS
RESPIRATION RATE: 16 BRPM | SYSTOLIC BLOOD PRESSURE: 136 MMHG | WEIGHT: 145.06 LBS | DIASTOLIC BLOOD PRESSURE: 63 MMHG | HEART RATE: 72 BPM | OXYGEN SATURATION: 97 % | TEMPERATURE: 98 F | HEIGHT: 63 IN

## 2020-09-17 DIAGNOSIS — I48.91 UNSPECIFIED ATRIAL FIBRILLATION: ICD-10-CM

## 2020-09-17 DIAGNOSIS — Z95.2 PRESENCE OF PROSTHETIC HEART VALVE: Chronic | ICD-10-CM

## 2020-09-17 LAB
ALBUMIN SERPL ELPH-MCNC: 4.2 G/DL — SIGNIFICANT CHANGE UP (ref 3.3–5)
ALP SERPL-CCNC: 87 U/L — SIGNIFICANT CHANGE UP (ref 40–120)
ALT FLD-CCNC: 23 U/L — SIGNIFICANT CHANGE UP (ref 10–45)
ANION GAP SERPL CALC-SCNC: 12 MMOL/L — SIGNIFICANT CHANGE UP (ref 5–17)
APTT BLD: 42.4 SEC — HIGH (ref 27.5–35.5)
AST SERPL-CCNC: 30 U/L — SIGNIFICANT CHANGE UP (ref 10–40)
BILIRUB SERPL-MCNC: 0.4 MG/DL — SIGNIFICANT CHANGE UP (ref 0.2–1.2)
BUN SERPL-MCNC: 47 MG/DL — HIGH (ref 7–23)
CALCIUM SERPL-MCNC: 9.3 MG/DL — SIGNIFICANT CHANGE UP (ref 8.4–10.5)
CHLORIDE SERPL-SCNC: 99 MMOL/L — SIGNIFICANT CHANGE UP (ref 96–108)
CO2 SERPL-SCNC: 26 MMOL/L — SIGNIFICANT CHANGE UP (ref 22–31)
CREAT SERPL-MCNC: 1.57 MG/DL — HIGH (ref 0.5–1.3)
GLUCOSE SERPL-MCNC: 97 MG/DL — SIGNIFICANT CHANGE UP (ref 70–99)
HCT VFR BLD CALC: 37 % — LOW (ref 39–50)
HGB BLD-MCNC: 11.7 G/DL — LOW (ref 13–17)
INR BLD: 2.93 RATIO — HIGH (ref 0.88–1.16)
MCHC RBC-ENTMCNC: 31.5 PG — SIGNIFICANT CHANGE UP (ref 27–34)
MCHC RBC-ENTMCNC: 31.6 GM/DL — LOW (ref 32–36)
MCV RBC AUTO: 99.7 FL — SIGNIFICANT CHANGE UP (ref 80–100)
NRBC # BLD: 0 /100 WBCS — SIGNIFICANT CHANGE UP (ref 0–0)
PLATELET # BLD AUTO: 140 K/UL — LOW (ref 150–400)
POTASSIUM SERPL-MCNC: 4.8 MMOL/L — SIGNIFICANT CHANGE UP (ref 3.5–5.3)
POTASSIUM SERPL-SCNC: 4.8 MMOL/L — SIGNIFICANT CHANGE UP (ref 3.5–5.3)
PROT SERPL-MCNC: 7.4 G/DL — SIGNIFICANT CHANGE UP (ref 6–8.3)
PROTHROM AB SERPL-ACNC: 33.1 SEC — HIGH (ref 10.6–13.6)
RBC # BLD: 3.71 M/UL — LOW (ref 4.2–5.8)
RBC # FLD: 14.1 % — SIGNIFICANT CHANGE UP (ref 10.3–14.5)
SODIUM SERPL-SCNC: 137 MMOL/L — SIGNIFICANT CHANGE UP (ref 135–145)
WBC # BLD: 7.02 K/UL — SIGNIFICANT CHANGE UP (ref 3.8–10.5)
WBC # FLD AUTO: 7.02 K/UL — SIGNIFICANT CHANGE UP (ref 3.8–10.5)

## 2020-09-17 PROCEDURE — 99223 1ST HOSP IP/OBS HIGH 75: CPT | Mod: AI

## 2020-09-17 PROCEDURE — 33227 REMOVE&REPLACE PM GEN SINGL: CPT

## 2020-09-17 PROCEDURE — 93010 ELECTROCARDIOGRAM REPORT: CPT

## 2020-09-17 RX ORDER — ALLOPURINOL 300 MG
1 TABLET ORAL
Qty: 0 | Refills: 0 | DISCHARGE

## 2020-09-17 RX ORDER — ALLOPURINOL 300 MG
100 TABLET ORAL DAILY
Refills: 0 | Status: DISCONTINUED | OUTPATIENT
Start: 2020-09-17 | End: 2020-09-18

## 2020-09-17 RX ORDER — DILTIAZEM HCL 120 MG
90 CAPSULE, EXT RELEASE 24 HR ORAL
Refills: 0 | Status: DISCONTINUED | OUTPATIENT
Start: 2020-09-17 | End: 2020-09-18

## 2020-09-17 RX ORDER — FINASTERIDE 5 MG/1
1 TABLET, FILM COATED ORAL
Qty: 0 | Refills: 0 | DISCHARGE

## 2020-09-17 RX ORDER — INFLUENZA VIRUS VACCINE 15; 15; 15; 15 UG/.5ML; UG/.5ML; UG/.5ML; UG/.5ML
0.5 SUSPENSION INTRAMUSCULAR ONCE
Refills: 0 | Status: COMPLETED | OUTPATIENT
Start: 2020-09-17 | End: 2020-09-17

## 2020-09-17 RX ORDER — TAMSULOSIN HYDROCHLORIDE 0.4 MG/1
0.4 CAPSULE ORAL AT BEDTIME
Refills: 0 | Status: DISCONTINUED | OUTPATIENT
Start: 2020-09-17 | End: 2020-09-18

## 2020-09-17 RX ORDER — LOVASTATIN 20 MG
1 TABLET ORAL
Qty: 0 | Refills: 0 | DISCHARGE

## 2020-09-17 RX ORDER — METOPROLOL TARTRATE 50 MG
50 TABLET ORAL
Refills: 0 | Status: DISCONTINUED | OUTPATIENT
Start: 2020-09-17 | End: 2020-09-18

## 2020-09-17 RX ORDER — LISINOPRIL 2.5 MG/1
5 TABLET ORAL DAILY
Refills: 0 | Status: DISCONTINUED | OUTPATIENT
Start: 2020-09-17 | End: 2020-09-18

## 2020-09-17 RX ORDER — ATORVASTATIN CALCIUM 80 MG/1
10 TABLET, FILM COATED ORAL AT BEDTIME
Refills: 0 | Status: DISCONTINUED | OUTPATIENT
Start: 2020-09-17 | End: 2020-09-18

## 2020-09-17 RX ORDER — FINASTERIDE 5 MG/1
5 TABLET, FILM COATED ORAL DAILY
Refills: 0 | Status: DISCONTINUED | OUTPATIENT
Start: 2020-09-17 | End: 2020-09-18

## 2020-09-17 RX ADMIN — TAMSULOSIN HYDROCHLORIDE 0.4 MILLIGRAM(S): 0.4 CAPSULE ORAL at 21:10

## 2020-09-17 RX ADMIN — Medication 90 MILLIGRAM(S): at 17:12

## 2020-09-17 RX ADMIN — Medication 50 MILLIGRAM(S): at 17:12

## 2020-09-17 RX ADMIN — ATORVASTATIN CALCIUM 10 MILLIGRAM(S): 80 TABLET, FILM COATED ORAL at 21:10

## 2020-09-17 NOTE — CHART NOTE - NSCHARTNOTEFT_GEN_A_CORE
Notified by RN for PPM site swelling.  mild to moderate swelling at PPM pocket was noted with small amount of serosanguinous drainage on the dressing.  Dr. Patel evaluated the patient and placed a pressure dressing on the stie.  Will hold coumadin tonight and admit to monitor patient overnight.

## 2020-09-17 NOTE — ASU DISCHARGE PLAN (ADULT/PEDIATRIC) - CARE PROVIDER_API CALL
Victor Manuel Patel)  Cardiac Electrophysiology; Cardiovascular Disease; Internal Medicine  [address] [place] [address]  Phone: [phone]  Fax: [phone]  Established Patient  Scheduled Appointment: 09/30/2020 01:00 PM

## 2020-09-17 NOTE — ASU DISCHARGE PLAN (ADULT/PEDIATRIC) - CALL YOUR DOCTOR IF YOU HAVE ANY OF THE FOLLOWING:
Swelling that gets worse/Bleeding that does not stop/Numbness, tingling, color or temperature change to extremity

## 2020-09-17 NOTE — H&P CARDIOLOGY - PSH
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

## 2020-09-17 NOTE — H&P ADULT - NSHPREVIEWOFSYSTEMS_GEN_ALL_CORE
REVIEW OF SYSTEMS:  CONSTITUTIONAL: No weakness. No fevers. No chills. No rigors. No weight loss. No night sweats. No poor appetite.  EYES: No blurry or double vision. No eye pain.  ENT: No hearing difficulty. No vertigo. No dysphagia. No sore throat. No Sinusitis/rhinorrhea.   NECK: No pain. No stiffness/rigidity.  CARDIAC: +costochondral tenderness; No palpitations. No lightheadedness. No syncope.  RESPIRATORY: No cough. No SOB. No hemoptysis.  GASTROINTESTINAL: No abdominal pain. No nausea. No vomiting. No hematemesis. No diarrhea. No constipation. No melena. No hematochezia.  GENITOURINARY: No dysuria. No frequency. No hesitancy. No hematuria. No oliguria.  NEUROLOGICAL: No numbness/tingling. No focal weakness. No urinary or fecal incontinence. No headache. No unsteady gait.  BACK: No back pain. No flank pain.  EXTREMITIES: No lower extremity edema. Full ROM. No joint pain.  SKIN: No rashes. No itching. No other lesions.  PSYCHIATRIC: No depression. No anxiety. No SI/HI.  ALLERGIC: No lip swelling. No hives.  All other review of systems is negative unless indicated above.  Unless indicated above, unable to assess ROS 2/2

## 2020-09-17 NOTE — H&P CARDIOLOGY - PMH
A-fib    BPH (Benign Prostatic Hyperplasia)    Cardiac Dysrhythmia    Dyslipidemia    Hx TIA  x 8yrs    Hypertension     A-fib    BPH (Benign Prostatic Hyperplasia)    Cardiac Dysrhythmia    Dyslipidemia    Hx TIA  x 8yrs    Hypertension    Larynx neoplasm

## 2020-09-17 NOTE — H&P ADULT - NSHPPHYSICALEXAM_GEN_ALL_CORE
PHYSICAL EXAM:   GENERAL: Alert. Not confused. No acute distress. Not thin. Not cachectic. Not obese.  HEAD:  Atraumatic. Normocephalic.  EYES: EOMI. PERRLA. Normal conjunctiva/sclera.  ENT: Neck supple. No JVD. Moist oral mucosa. Not edentulous. No thrush.  LYMPH: Normal supraclavicular/cervical lymph nodes.   CARDIAC: +left upper chest w/ dressing over ppm site, serosanguinous drainage below bandage. Regular rate. Regular rhythm. Not irregularly irregular. S1. S2. +systolic murmur. No rub. No distant heart sounds.  LUNG/CHEST: CTAB. BS equal bilaterally. No wheezes. No rales. No rhonchi.  ABDOMEN: Soft. No tenderness. No distension. No fluid wave. Normal bowel sounds.  BACK: No midline/vertebral tenderness. No flank tenderness.  VASCULAR: +2 b/l radial pulses. Palpable DP pulses.  EXTREMITIES:  No clubbing. No cyanosis. No edema. Moving all 4.  NEUROLOGY: A&Ox3. Non-focal exam. Cranial nerves intact. Normal speech. Sensation intact.  PSYCH: Normal behavior. Normal affect.  SKIN: No jaundice. No erythema. No rash/lesion.  Vascular Access:     ICU Vital Signs Last 24 Hrs  T(C): 36.4 (17 Sep 2020 21:37), Max: 36.6 (17 Sep 2020 17:55)  T(F): 97.6 (17 Sep 2020 21:37), Max: 97.8 (17 Sep 2020 17:55)  HR: 60 (17 Sep 2020 21:37) (60 - 72)  BP: 132/69 (17 Sep 2020 21:37) (102/50 - 146/62)  BP(mean): --  ABP: --  ABP(mean): --  RR: 18 (17 Sep 2020 21:37) (16 - 18)  SpO2: 95% (17 Sep 2020 21:37) (94% - 98%)      I&O's Summary    17 Sep 2020 07:01  -  18 Sep 2020 01:40  --------------------------------------------------------  IN: 480 mL / OUT: 0 mL / NET: 480 mL

## 2020-09-17 NOTE — H&P ADULT - ASSESSMENT
87 y.o.  M, Azeri speaking only, with PMH of HTN, HLD, TIA presented with PPM generator at end of life, now s/p PPM generator replacement. Post op noted to have serosanguinous drainage and swelling at site of procedure. Continue to monitor post op overnight.

## 2020-09-17 NOTE — ASU DISCHARGE PLAN (ADULT/PEDIATRIC) - ASU DC SPECIAL INSTRUCTIONSFT
WOUND CARE:  Do NOT scrub, rub, or pick at your incision site  AFTER 3 DAYS you may SHOWER  - use mild soap and gentle warm, water stream, pat dry  DO NOT apply lotions, creams, ointments, powder, parfumes to your incision site  DO NOT SOAK your site for 4-6 weeks ( no baths, no pools, no tubs, etc...)  wear loose clothing around site for 1-2 weeks  IF surgical tape was used DO NOT remove the strips, they will fall off after 7days, if glue was used, it will naturally fall off within 3 weeks  if staples were used, they will be removed in 7-10 days by your doctor  ACTIVITY:for 2 weeks AFTER  your procedure  - DO NOT RAISE your arm above shoulder level ( on the same side of your incision)  for 4 weeks AFTER your procedure   - DO NOT LIFT anything 10 lbs or heavier ( on the side of your implant)   - certain activities may be limited longer, those that involve swinging your arm, and will be discussed with your EP doctor  DO NOT DRIVE until your EP Doctor or nurse practitioner/ physician assistant states it is safe to do so  A follow up appointment in 7-14 days will be arranged before your discharge  ID CARD:   you will receive an ID CARD and device company booklet   - please carry that card with you at all times  ***CALL YOUR DOCTOR ***  IF you have fever, chills, body aches, or severe pain, swelling, redness, heat, yellow drainage from your incision site  IF bleeding  or significant new swelling from your puncture site  IF your experience lightheadedness, dizziness, or fainting spell.  IF unable to ge tin contact with your doctor, you may call the Cardiology Office at [facility] at [phone]

## 2020-09-17 NOTE — H&P ADULT - HISTORY OF PRESENT ILLNESS
87 y.o.  M, Luxembourgish speaking only, with PMH of HTN, HLD, TIA ( with no residual deficits),  throat cancer ( larynx neoplasm)  s/p radiation and chemo (2011), severe AS s/p TAVR for generator change, AFib on coumadin (last dose 9/16), BPH, UTI, urinary retention, s/p single chamber [employer] PPM (on 4/11/2011) who presented for a PPM generator change.    He is now s/p generator change. He developed a hematoma post procedure. He was noted to have mild to moderate swelling at the PPM pocket with a small amount of serosanguinous drainage on the dressing. Admitted for monitoring.  Currently endorses tenderness at site of ppm. Otherwise, denies fever and chills. Denies palpitations, shortness of breath.

## 2020-09-17 NOTE — H&P ADULT - NSHPLABSRESULTS_GEN_ALL_CORE
Personally reviewed labs.   Personally reviewed EKG.                           11.7   7.02  )-----------( 140      ( 17 Sep 2020 07:00 )             37.0       09-17    137  |  99  |  47<H>  ----------------------------<  97  4.8   |  26  |  1.57<H>    Ca    9.3      17 Sep 2020 07:00    TPro  7.4  /  Alb  4.2  /  TBili  0.4  /  DBili  x   /  AST  30  /  ALT  23  /  AlkPhos  87  09-17      LIVER FUNCTIONS - ( 17 Sep 2020 07:00 )  Alb: 4.2 g/dL / Pro: 7.4 g/dL / ALK PHOS: 87 U/L / ALT: 23 U/L / AST: 30 U/L / GGT: x           PT/INR - ( 17 Sep 2020 07:00 )   PT: 33.1 sec;   INR: 2.93 ratio    PTT - ( 17 Sep 2020 07:00 )  PTT:42.4 sec      EKG: paced rhythm, no ischemic changes, no axis deviation

## 2020-09-17 NOTE — ASU DISCHARGE PLAN (ADULT/PEDIATRIC) - MEDICATION INSTRUCTIONS
Tylenol 500 mg every six hours for mild pain as needed Do not take more than 4 grams in a 24 hour period

## 2020-09-17 NOTE — H&P ADULT - PROBLEM SELECTOR PLAN 1
s/p pacemaker change. EKG earlier w/ paced rhythm, no ischemic changes. PPM site w/ serosanguinous discharge and swelling. Pressure dressing applied.  Monitor for signs of infection  continue dressing changes  hold coumadin dose tonight

## 2020-09-17 NOTE — H&P CARDIOLOGY - HISTORY OF PRESENT ILLNESS
This is a 87 y.o.  M, Malay speaking,  with PMH of HTN, HLD, TIA, throat cancer s/p radiation and chemo (2011), aortic valve replacement, afib on coumadin ( last dose?), BPH, s/p PMM who presents  with 1 day of L leg redness, pain, and swelling. Patient has no prior history of cellulitis, but does have history of hematoma to that leg a few months ago. Patient a blister that popped earlier this week. Patient denies trauma to the area. Patient denies fevers or chills at home. Of note, patient's family have noted patient to have become increasingly short of breath with exertion. Patient at baseline, due to his history of throat cancer, sleeps propped up on a few pillows, and so it is unable to tell whether patient is orthopneic. Patient denies PND. He does not carry a prior diagnosis of heart failure. Of note, patient had some outpatient imaging performed which indicated lung nodules and a pericardial effusion for which he is being worked up. Patient denies N/V, diarrhea, dysuria, hematuria, or melena. Pt endorses dysphagia has been present s/p radiation 7 years ago.     In the ED, patient's CBC was notable for leukocytosis of 22K with 4% bands, as well as mild thrombocytopenia of 141K. Pro-BNP was elevated to 4277. CXR was performed which showed small to moderate b/l pleural effusions. Lactate was somewhat elevated to 2.2.  X-ray of LLE was remarkable for soft tissue edema; no soft tissue gas collection or gross radiographic evidence for osteomyelitis. Doppler did not indicate DVT. Patient was treated with Clindamycin x 1 dose and given IV Tylenol for pain. This is a 87 y.o.  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  Patient presently denies: chest pain, dyspnea, dizziness, palpitations,  N/V, diarrhea, dysuria, hematuria, or melena. PPM is ANGEL presents here today for PPM gen change.     wound care appt 9/30 AT  13:00    e< from: Transthoracic Echocardiogram (03.06.18 @ 13:14) >  EF (Visual Estimate): 65 %  Doppler Peak Velocity (m/sec): MV=1.9 AoV=2.6  ------------------------------------------------------------------------  Observations:  Mitral Valve: Mitral annular calcification and calcified  mitral leaflets with decreased diastolic opening.  Mild-moderate mitral regurgitation. Peak mitral valve  gradient equals 14 mm Hg,mean transmitral valve gradient  equals 5 mm Hg, consistent with mild  mitral stenosis.  Aortic Valve/Aorta: Transcatheter aortic valve replacement.  The valve appears well-seated. Peak transaortic valve  gradient equals 27 mm Hg, mean transaortic valve gradient  equals 11 mm Hg, which is probably normal in the presence  of a transcatheter aortic valve replacement.  Mild  paravalvular aortic regurgitation. There are two jets of  paravalvular aortic regurgitation at the 3 o'clock and 6  o'clock position.  Aortic Root: 3.2 cm.  Left Atrium: Severely dilated left atrium.  LA volume index  = 79 cc/m2.  Left Ventricle: Overall preserved left ventricular systolic  dysfunction.  Septal motion consistent with paced rhythm.  Mild concentric left ventricular hypertrophy. Unable to  comment on diastolic function in the setting of atrial  fibrillation and paced rhythm  Right Heart: Enlarged right atrium. A device wire is noted  in the right heart. Normal right ventricular size with  decreased right ventricular systolic function. Normal  tricuspid valve. Mild tricuspid regurgitation. Normal  pulmonic valve. Minimal pulmonic regurgitation.  Pericardium/Pleura: Normal pericardium with no pericardial  effusion.  Hemodynamic: Estimated right atrial pressure is 8 mm Hg.  Estimated right ventricular systolic pressure equals 49 mm  Hg, assuming right atrial pressure equals 8 mm Hg,  consistent with mild pulmonary hypertension.  ------------------------------------------------------------------------  Conclusions:  1. Mitral annular calcification and calcified mitral  leaflets with decreased diastolic opening. Mild-moderate  mitral regurgitation. Peak mitral valve gradient equals 14  mm Hg, mean transmitral valve gradient equals 5 mm Hg,  consistent with mild  mitral stenosis.  2. Transcatheter aortic valve replacement. The valve  appears well-seated. Peak transaortic valve gradient equals  27 mm Hg, mean transaortic valve gradient equals 11 mm Hg,  which is probably normal in the presence of a transcatheter  aortic valve replacement.  Mild paravalvular aortic  regurgitation. There are two jets of paravalvular aortic  regurgitation at the 3 o'clock and 6 o'clock position.  3. Severely dilated left atrium.  LA volume index = 79  cc/m2.  4. Mild concentric left ventricular hypertrophy.  5. Overall preserved left ventricular systolic dysfunction.   Septal motion consistent with paced rhythm.  6. Unable to comment on diastolic function in the setting  of atrial fibrillation and paced rhythm  7. Enlarged right atrium. A device wire is noted in the  right heart.  8. Normal right ventricular size with decreased right  ventricular systolic function.  9. Estimated right ventricular systolic pressure equals 49  mm Hg, assuming right atrial pressure equals 8 mm Hg,  consistent with mild pulmonary hypertension.    < end of copied text >

## 2020-09-17 NOTE — H&P ADULT - NSICDXPASTMEDICALHX_GEN_ALL_CORE_FT
PAST MEDICAL HISTORY:  A-fib     BPH (Benign Prostatic Hyperplasia)     Cardiac Dysrhythmia     Dyslipidemia     Hx TIA  x 8yrs     Hypertension     Larynx neoplasm

## 2020-09-17 NOTE — H&P ADULT - NSICDXPASTSURGICALHX_GEN_ALL_CORE_FT
PAST SURGICAL HISTORY:  excision of Basal Cell Carcinoma of Nose     h/o cardioversion  of Atrial Fibrillation     S/P TAVR (transcatheter aortic valve replacement)

## 2020-09-18 ENCOUNTER — TRANSCRIPTION ENCOUNTER (OUTPATIENT)
Age: 85
End: 2020-09-18

## 2020-09-18 VITALS
HEART RATE: 61 BPM | DIASTOLIC BLOOD PRESSURE: 64 MMHG | TEMPERATURE: 97 F | SYSTOLIC BLOOD PRESSURE: 109 MMHG | RESPIRATION RATE: 18 BRPM | OXYGEN SATURATION: 98 %

## 2020-09-18 DIAGNOSIS — I10 ESSENTIAL (PRIMARY) HYPERTENSION: ICD-10-CM

## 2020-09-18 DIAGNOSIS — I50.42 CHRONIC COMBINED SYSTOLIC (CONGESTIVE) AND DIASTOLIC (CONGESTIVE) HEART FAILURE: ICD-10-CM

## 2020-09-18 DIAGNOSIS — Z29.9 ENCOUNTER FOR PROPHYLACTIC MEASURES, UNSPECIFIED: ICD-10-CM

## 2020-09-18 DIAGNOSIS — N40.0 BENIGN PROSTATIC HYPERPLASIA WITHOUT LOWER URINARY TRACT SYMPTOMS: ICD-10-CM

## 2020-09-18 DIAGNOSIS — E78.5 HYPERLIPIDEMIA, UNSPECIFIED: ICD-10-CM

## 2020-09-18 DIAGNOSIS — Z45.010 ENCOUNTER FOR CHECKING AND TESTING OF CARDIAC PACEMAKER PULSE GENERATOR [BATTERY]: ICD-10-CM

## 2020-09-18 DIAGNOSIS — I48.20 CHRONIC ATRIAL FIBRILLATION, UNSPECIFIED: ICD-10-CM

## 2020-09-18 PROBLEM — D49.1 NEOPLASM OF UNSPECIFIED BEHAVIOR OF RESPIRATORY SYSTEM: Chronic | Status: ACTIVE | Noted: 2020-09-17

## 2020-09-18 LAB
ANION GAP SERPL CALC-SCNC: 11 MMOL/L — SIGNIFICANT CHANGE UP (ref 5–17)
APTT BLD: 40.7 SEC — HIGH (ref 27.5–35.5)
BUN SERPL-MCNC: 39 MG/DL — HIGH (ref 7–23)
CALCIUM SERPL-MCNC: 8.8 MG/DL — SIGNIFICANT CHANGE UP (ref 8.4–10.5)
CHLORIDE SERPL-SCNC: 103 MMOL/L — SIGNIFICANT CHANGE UP (ref 96–108)
CO2 SERPL-SCNC: 26 MMOL/L — SIGNIFICANT CHANGE UP (ref 22–31)
CREAT SERPL-MCNC: 1.36 MG/DL — HIGH (ref 0.5–1.3)
GLUCOSE SERPL-MCNC: 95 MG/DL — SIGNIFICANT CHANGE UP (ref 70–99)
HCT VFR BLD CALC: 33.6 % — LOW (ref 39–50)
HGB BLD-MCNC: 10.8 G/DL — LOW (ref 13–17)
INR BLD: 2.48 RATIO — HIGH (ref 0.88–1.16)
MCHC RBC-ENTMCNC: 32.1 GM/DL — SIGNIFICANT CHANGE UP (ref 32–36)
MCHC RBC-ENTMCNC: 32.4 PG — SIGNIFICANT CHANGE UP (ref 27–34)
MCV RBC AUTO: 100.9 FL — HIGH (ref 80–100)
NRBC # BLD: 0 /100 WBCS — SIGNIFICANT CHANGE UP (ref 0–0)
PLATELET # BLD AUTO: 140 K/UL — LOW (ref 150–400)
POTASSIUM SERPL-MCNC: 4.4 MMOL/L — SIGNIFICANT CHANGE UP (ref 3.5–5.3)
POTASSIUM SERPL-SCNC: 4.4 MMOL/L — SIGNIFICANT CHANGE UP (ref 3.5–5.3)
PROTHROM AB SERPL-ACNC: 28 SEC — HIGH (ref 10.6–13.6)
RBC # BLD: 3.33 M/UL — LOW (ref 4.2–5.8)
RBC # FLD: 14.1 % — SIGNIFICANT CHANGE UP (ref 10.3–14.5)
SARS-COV-2 IGG SERPL QL IA: NEGATIVE — SIGNIFICANT CHANGE UP
SARS-COV-2 IGM SERPL IA-ACNC: <0.1 INDEX — SIGNIFICANT CHANGE UP
SODIUM SERPL-SCNC: 140 MMOL/L — SIGNIFICANT CHANGE UP (ref 135–145)
WBC # BLD: 8.06 K/UL — SIGNIFICANT CHANGE UP (ref 3.8–10.5)
WBC # FLD AUTO: 8.06 K/UL — SIGNIFICANT CHANGE UP (ref 3.8–10.5)

## 2020-09-18 PROCEDURE — 86769 SARS-COV-2 COVID-19 ANTIBODY: CPT

## 2020-09-18 PROCEDURE — C1786: CPT

## 2020-09-18 PROCEDURE — 93005 ELECTROCARDIOGRAM TRACING: CPT

## 2020-09-18 PROCEDURE — 85610 PROTHROMBIN TIME: CPT

## 2020-09-18 PROCEDURE — 33227 REMOVE&REPLACE PM GEN SINGL: CPT

## 2020-09-18 PROCEDURE — 97161 PT EVAL LOW COMPLEX 20 MIN: CPT

## 2020-09-18 PROCEDURE — 80048 BASIC METABOLIC PNL TOTAL CA: CPT

## 2020-09-18 PROCEDURE — 80053 COMPREHEN METABOLIC PANEL: CPT

## 2020-09-18 PROCEDURE — 85027 COMPLETE CBC AUTOMATED: CPT

## 2020-09-18 PROCEDURE — 85730 THROMBOPLASTIN TIME PARTIAL: CPT

## 2020-09-18 PROCEDURE — 99239 HOSP IP/OBS DSCHRG MGMT >30: CPT

## 2020-09-18 RX ORDER — LOVASTATIN 20 MG
1 TABLET ORAL
Qty: 0 | Refills: 0 | DISCHARGE

## 2020-09-18 RX ORDER — WARFARIN SODIUM 2.5 MG/1
1 TABLET ORAL
Qty: 0 | Refills: 0 | DISCHARGE

## 2020-09-18 RX ORDER — FINASTERIDE 5 MG/1
5 TABLET, FILM COATED ORAL DAILY
Refills: 0 | Status: DISCONTINUED | OUTPATIENT
Start: 2020-09-18 | End: 2020-09-18

## 2020-09-18 RX ADMIN — Medication 50 MILLIGRAM(S): at 05:32

## 2020-09-18 RX ADMIN — LISINOPRIL 5 MILLIGRAM(S): 2.5 TABLET ORAL at 05:32

## 2020-09-18 RX ADMIN — FINASTERIDE 5 MILLIGRAM(S): 5 TABLET, FILM COATED ORAL at 13:02

## 2020-09-18 RX ADMIN — Medication 100 MILLIGRAM(S): at 13:02

## 2020-09-18 RX ADMIN — Medication 90 MILLIGRAM(S): at 05:29

## 2020-09-18 NOTE — CHART NOTE - NSCHARTNOTEFT_GEN_A_CORE
He has a moderate sized hematoma following pacemaker generator change yesterday. We had to implant the generator in a deeper plane due to superficial nature of his original device.    He is not in any pain and the hematoma seems solidified and not tense. I removed the pressure bandage and applied new dressing.    He can be allowed home today off coumadin and see in pacing clinic next Monday to re-evaluate.       Victor Manuel Patel MD  Attending Cardiac Electrophysiologist

## 2020-09-18 NOTE — CHART NOTE - NSCHARTNOTEFT_GEN_A_CORE
Medicine Attending - Discharge Day Note:  ================================================    # Pacemaker generator end of life - s/p pacemaker change  # Chronic atrial fibrillation  # Chronic combined systolic and diastolic heart failure  # Essential hypertension    The patient is medically optimized for discharge.     Discharge time spent 35 minutes.     Greg No MD, HELLEN  [phone]

## 2020-09-18 NOTE — DISCHARGE NOTE PROVIDER - NSDCMRMEDTOKEN_GEN_ALL_CORE_FT
allopurinol 100 mg oral tablet: 1 tab(s) orally once a day  dilTIAZem 90 mg/12 hours oral capsule, extended release: 1 cap(s) orally every 12 hours  Flomax 0.4 mg oral capsule: 1 cap(s) orally once a day   furosemide 20 mg oral tablet: on Mondays and Thursday ONLY   lisinopril 5 mg oral tablet: 1 tab(s) orally every other day (at bedtime)  lovastatin 20 mg oral tablet: 1 tab(s) orally once a day  lovastatin 20 mg oral tablet: 1 tab(s) orally once a day  metoprolol tartrate 50 mg oral tablet: 1 tab(s) orally 2 times a day  Proscar 5 mg oral tablet: 1 tab(s) orally once a day  warfarin 1 mg oral tablet: 1 tab(s) orally once a day   allopurinol 100 mg oral tablet: 1 tab(s) orally once a day  dilTIAZem 90 mg/12 hours oral capsule, extended release: 1 cap(s) orally every 12 hours  Flomax 0.4 mg oral capsule: 1 cap(s) orally once a day   furosemide 20 mg oral tablet: on Mondays and Thursday ONLY   lisinopril 5 mg oral tablet: 1 tab(s) orally every other day (at bedtime)  lovastatin 20 mg oral tablet: 1 tab(s) orally once a day  metoprolol tartrate 50 mg oral tablet: 1 tab(s) orally 2 times a day  Proscar 5 mg oral tablet: 1 tab(s) orally once a day

## 2020-09-18 NOTE — DISCHARGE NOTE NURSING/CASE MANAGEMENT/SOCIAL WORK - PATIENT PORTAL LINK FT
You can access the FollowMyHealth Patient Portal offered by [facility] by registering at the following website: [email or web address]. By joining [facility]’s FollowMyHealth portal, you will also be able to view your health information using other applications (apps) compatible with our system.

## 2020-09-18 NOTE — PHYSICAL THERAPY INITIAL EVALUATION ADULT - ADDITIONAL COMMENTS
As per pt, pt lives in a private house w/ spouse and 2 steps to enter w/ [facility]. PTA pt was independent w/ all mobility w/ Rw and ADLs.

## 2020-09-18 NOTE — DISCHARGE NOTE PROVIDER - NSDCFUSCHEDAPPT_GEN_ALL_CORE_FT
BREANNA ANNE ; 09/30/2020 ; NPP [facility] [address] BREANNA Smith ; 12/15/2020 ; NPP [place] [facility] BREANNA ANNE ; 09/22/2020 ; NPP [facility] [address] BREANNA Smith ; 12/15/2020 ; NPP [place] [facility]

## 2020-09-18 NOTE — PHYSICAL THERAPY INITIAL EVALUATION ADULT - PERTINENT HX OF CURRENT PROBLEM, REHAB EVAL
Pt is a 87 y.o.  M, Argentine speaking only, with PMH of HTN, HLD, TIA presented with PPM generator at end of life, now s/p PPM generator replacement. C/b serosanguinous drainage and swelling at site of procedure.

## 2020-09-18 NOTE — PROGRESS NOTE ADULT - SUBJECTIVE AND OBJECTIVE BOX
24H hour events:   No events on tele  Pt denies pain    MEDICATIONS:  diltiazem    Tablet 90 milliGRAM(s) Oral two times a day  lisinopril 5 milliGRAM(s) Oral daily  metoprolol tartrate 50 milliGRAM(s) Oral two times a day  tamsulosin 0.4 milliGRAM(s) Oral at bedtime            allopurinol 100 milliGRAM(s) Oral daily  atorvastatin 10 milliGRAM(s) Oral at bedtime  finasteride 5 milliGRAM(s) Oral daily    influenza   Vaccine 0.5 milliLiter(s) IntraMuscular once      REVIEW OF SYSTEMS:  Complete 10point ROS negative.    PHYSICAL EXAM:  T(C): 36.3 (09-18-20 @ 11:38), Max: 36.6 (09-17-20 @ 17:55)  HR: 61 (09-18-20 @ 11:38) (60 - 69)  BP: 109/64 (09-18-20 @ 11:38) (109/64 - 146/62)  RR: 18 (09-18-20 @ 11:38) (17 - 18)  SpO2: 98% (09-18-20 @ 11:38) (95% - 98%)  Wt(kg): --  I&O's Summary    17 Sep 2020 07:01  -  18 Sep 2020 07:00  --------------------------------------------------------  IN: 480 mL / OUT: 200 mL / NET: 280 mL        Appearance: Normal	  HEENT:   Normal oral mucosa, PERRL, EOMI	  Lymphatic: No lymphadenopathy  Cardiovascular: Normal S1 S2, No JVD, No murmurs, No edema  Respiratory: Lungs clear to auscultation	  Psychiatry: A & O x 3, Mood & affect appropriate  Gastrointestinal:  Soft, Non-tender, + BS	  Skin: ecchymosis to left axilla	  Neurologic: Non-focal  Extremities: Normal range of motion, No clubbing, cyanosis or edema  Vascular: Peripheral pulses palpable 2+ bilaterally  Site: left infraclavicular hematoma        LABS:	 	    CBC Full  -  ( 18 Sep 2020 07:26 )  WBC Count : 8.06 K/uL  Hemoglobin : 10.8 g/dL  Hematocrit : 33.6 %  Platelet Count - Automated : 140 K/uL  Mean Cell Volume : 100.9 fl  Mean Cell Hemoglobin : 32.4 pg  Mean Cell Hemoglobin Concentration : 32.1 gm/dL  Auto Neutrophil # : x  Auto Lymphocyte # : x  Auto Monocyte # : x  Auto Eosinophil # : x  Auto Basophil # : x  Auto Neutrophil % : x  Auto Lymphocyte % : x  Auto Monocyte % : x  Auto Eosinophil % : x  Auto Basophil % : x    09-18    140  |  103  |  39<H>  ----------------------------<  95  4.4   |  26  |  1.36<H>  09-17    137  |  99  |  47<H>  ----------------------------<  97  4.8   |  26  |  1.57<H>    Ca    8.8      18 Sep 2020 07:26  Ca    9.3      17 Sep 2020 07:00    TPro  7.4  /  Alb  4.2  /  TBili  0.4  /  DBili  x   /  AST  30  /  ALT  23  /  AlkPhos  87  09-17        TELEMETRY: Vpaced 60s-70s    	  	     24H hour events:   No events on tele  Pt denies pain    MEDICATIONS:  diltiazem    Tablet 90 milliGRAM(s) Oral two times a day  lisinopril 5 milliGRAM(s) Oral daily  metoprolol tartrate 50 milliGRAM(s) Oral two times a day  tamsulosin 0.4 milliGRAM(s) Oral at bedtime            allopurinol 100 milliGRAM(s) Oral daily  atorvastatin 10 milliGRAM(s) Oral at bedtime  finasteride 5 milliGRAM(s) Oral daily    influenza   Vaccine 0.5 milliLiter(s) IntraMuscular once      REVIEW OF SYSTEMS:  Complete 10point ROS negative.    PHYSICAL EXAM:  T(C): 36.3 (09-18-20 @ 11:38), Max: 36.6 (09-17-20 @ 17:55)  HR: 61 (09-18-20 @ 11:38) (60 - 69)  BP: 109/64 (09-18-20 @ 11:38) (109/64 - 146/62)  RR: 18 (09-18-20 @ 11:38) (17 - 18)  SpO2: 98% (09-18-20 @ 11:38) (95% - 98%)  Wt(kg): --  I&O's Summary    17 Sep 2020 07:01  -  18 Sep 2020 07:00  --------------------------------------------------------  IN: 480 mL / OUT: 200 mL / NET: 280 mL        Appearance: Normal	  HEENT:   Normal oral mucosa, PERRL, EOMI	  Lymphatic: No lymphadenopathy  Cardiovascular: Normal S1 S2, No JVD, No murmurs, No edema  Respiratory: Lungs clear to auscultation	  Psychiatry: A & O x 3, Mood & affect appropriate  Gastrointestinal:  Soft, Non-tender, + BS	  Skin: ecchymosis to left axilla	  Neurologic: Non-focal  Extremities: Normal range of motion, No clubbing, cyanosis or edema  Vascular: Peripheral pulses palpable 2+ bilaterally  Site: left infraclavicular hematoma, no active bleeding, dressing in place is clean and dry        LABS:	 	    CBC Full  -  ( 18 Sep 2020 07:26 )  WBC Count : 8.06 K/uL  Hemoglobin : 10.8 g/dL  Hematocrit : 33.6 %  Platelet Count - Automated : 140 K/uL  Mean Cell Volume : 100.9 fl  Mean Cell Hemoglobin : 32.4 pg  Mean Cell Hemoglobin Concentration : 32.1 gm/dL  Auto Neutrophil # : x  Auto Lymphocyte # : x  Auto Monocyte # : x  Auto Eosinophil # : x  Auto Basophil # : x  Auto Neutrophil % : x  Auto Lymphocyte % : x  Auto Monocyte % : x  Auto Eosinophil % : x  Auto Basophil % : x    09-18    140  |  103  |  39<H>  ----------------------------<  95  4.4   |  26  |  1.36<H>  09-17    137  |  99  |  47<H>  ----------------------------<  97  4.8   |  26  |  1.57<H>    Ca    8.8      18 Sep 2020 07:26  Ca    9.3      17 Sep 2020 07:00    TPro  7.4  /  Alb  4.2  /  TBili  0.4  /  DBili  x   /  AST  30  /  ALT  23  /  AlkPhos  87  09-17        TELEMETRY: Vpaced 60s-70s

## 2020-09-18 NOTE — DISCHARGE NOTE PROVIDER - HOSPITAL COURSE
87 y.o.  M, Hebrew speaking only, with PMH of HTN, HLD, TIA presented with PPM generator at end of life, now s/p PPM generator replacement. Post op noted to have serosanguinous drainage and swelling at site of procedure and hematoma. Seen by EP Dr. Patel, cleared for discharge, off Coumadin, to follow up on Monday   Problem/Plan - 1:  ·  Problem: Pacemaker generator end of life.  Plan: s/p pacemaker change. EKG earlier w/ paced rhythm, no ischemic changes. PPM site w/ serosanguinous discharge and swelling. Pressure dressing applied.  Monitor for signs of infection  continue dressing changes  hold coumadin dose tonight.      Problem/Plan - 2:  ·  Problem: Chronic atrial fibrillation.  Plan: Rate controlled.  -c/w Verapamil  - INR therapeutic; hold coumadin tonight.      Problem/Plan - 3:  ·  Problem: Chronic combined systolic and diastolic heart failure.  Plan: - w/o acute exacerbation. Euvolemic.  - continue metoprolol, lisinopril, and lasix.      Problem/Plan - 4:  ·  Problem: Essential hypertension.  Plan: - Controlled  - Continue meds as above.      Problem/Plan - 5:  ·  Problem: Hyperlipidemia, unspecified hyperlipidemia type.  Plan: - continue atorvastatin.      Problem/Plan - 6:  Problem: Benign prostatic hyperplasia without lower urinary tract symptoms. Plan: - continue tamsulosin and proscar.          He has a moderate sized hematoma following pacemaker generator change yesterday. We had to implant the generator in a deeper plane due to superficial nature of his original device.    He is not in any pain and the hematoma seems solidified and not tense. I removed the pressure bandage and applied new dressing.    He can be allowed home today off coumadin and see in pacing clinic next Monday to re-evaluate.        87 y.o.  M, Czech speaking only, with PMH of HTN, HLD, TIA presented with PPM generator at end of life, now s/p PPM generator replacement. Post op noted to have serosanguinous drainage and swelling at site of procedure and hematoma. Seen by EP Dr. Patel, cleared for discharge, off Coumadin  -Follow up in clinic for wound check 9/22/20 @0920  -Hold Coumadin until follow up appt on 9/22  - 87 y.o.  M, Albanian speaking only, with PMH of HTN, HLD, TIA presented with PPM generator at end of life, now s/p PPM generator replacement. Post op noted to have serosanguinous drainage and swelling at site of procedure and hematoma. Seen by EP Dr. Patel, cleared for discharge, off Coumadin  -Follow up in clinic for wound check 9/22/20 @0920  -Hold Coumadin until follow up appt on 9/22    Discharge Diagnoses:  # Pacemaker generator end of life - s/p pacemaker change  # Chronic atrial fibrillation  # Chronic combined systolic and diastolic heart failure  # Essential hypertension

## 2020-09-18 NOTE — DISCHARGE NOTE PROVIDER - CARE PROVIDER_API CALL
LISBET CHADWICK  Internal Medicine  [address] [place] [address]  Phone: [phone]  Fax: [phone]  Follow Up Time: 1 week

## 2020-09-18 NOTE — DISCHARGE NOTE PROVIDER - NSDCCPCAREPLAN_GEN_ALL_CORE_FT
PRINCIPAL DISCHARGE DIAGNOSIS  Diagnosis: Pacemaker generator end of life  Assessment and Plan of Treatment: s/p PPM generator exchange 9/18  Developed a moderate sized hematoma following pacemaker generator change  You were seen by Dr. Patel today, cleared for discharge today  Pt may remove regular dressing in 48hrs on Sunday  Please Follow up in clinic for wound check 9/22/20 @0920  ***Hold Coumadin until follow up appt on 9/22  Please follow up with your primray care physician in one week          SECONDARY DISCHARGE DIAGNOSES  Diagnosis: Chronic atrial fibrillation  Assessment and Plan of Treatment: Hold Coumdadin for now   please follow up with EP Dr. Patel to resume Coumadin    Diagnosis: Hyperlipidemia, unspecified hyperlipidemia type  Assessment and Plan of Treatment: Continue current medications as directed  Low cholesterol diet    Diagnosis: Chronic combined systolic and diastolic heart failure  Assessment and Plan of Treatment: Weigh yourself daily.  If you gain 3lbs in 3 days, or 5lbs in a week call your Health Care Provider.  Do not eat or drink foods containing more than 2000mg of salt (sodium) in your diet every day.  Call your Health Care Provider if you have any swelling or increased swelling in your feet, ankles, and/or stomach.  The Pt was provided with CHF diet instruction (low sodium diet, daily weights, label reading, Heart Healthy Cooking Tips & Heart Healthy shopping Tips).  Take all of your medication as directed.  If you become dizzy call your Health Care Provider.    Diagnosis: Essential hypertension  Assessment and Plan of Treatment: Continue current medications as directed    Diagnosis: Benign prostatic hyperplasia without lower urinary tract symptoms  Assessment and Plan of Treatment: Continue current medications as directed

## 2020-09-22 ENCOUNTER — NON-APPOINTMENT (OUTPATIENT)
Age: 85
End: 2020-09-22

## 2020-09-22 ENCOUNTER — APPOINTMENT (OUTPATIENT)
Dept: ELECTROPHYSIOLOGY | Facility: CLINIC | Age: 85
End: 2020-09-22
Payer: COMMERCIAL

## 2020-09-22 VITALS
WEIGHT: 137 LBS | BODY MASS INDEX: 25.21 KG/M2 | DIASTOLIC BLOOD PRESSURE: 52 MMHG | SYSTOLIC BLOOD PRESSURE: 147 MMHG | OXYGEN SATURATION: 92 % | HEIGHT: 62 IN | HEART RATE: 66 BPM

## 2020-09-22 PROCEDURE — 99024 POSTOP FOLLOW-UP VISIT: CPT

## 2020-09-22 PROCEDURE — 93279 PRGRMG DEV EVAL PM/LDLS PM: CPT

## 2020-09-29 ENCOUNTER — APPOINTMENT (OUTPATIENT)
Dept: ELECTROPHYSIOLOGY | Facility: CLINIC | Age: 85
End: 2020-09-29
Payer: COMMERCIAL

## 2020-09-29 ENCOUNTER — NON-APPOINTMENT (OUTPATIENT)
Age: 85
End: 2020-09-29

## 2020-09-29 VITALS
HEIGHT: 62 IN | OXYGEN SATURATION: 97 % | DIASTOLIC BLOOD PRESSURE: 62 MMHG | HEART RATE: 71 BPM | SYSTOLIC BLOOD PRESSURE: 130 MMHG

## 2020-09-29 DIAGNOSIS — T81.40XA INFECTION FOLLOWING A PROCEDURE, UNSPECIFIED, INITIAL ENCOUNTER: ICD-10-CM

## 2020-09-29 PROCEDURE — 99024 POSTOP FOLLOW-UP VISIT: CPT

## 2020-09-29 RX ORDER — CEPHALEXIN 250 MG/1
250 CAPSULE ORAL
Qty: 10 | Refills: 0 | Status: DISCONTINUED | COMMUNITY
Start: 2020-09-29 | End: 2020-09-29

## 2020-09-30 ENCOUNTER — APPOINTMENT (OUTPATIENT)
Dept: ELECTROPHYSIOLOGY | Facility: CLINIC | Age: 85
End: 2020-09-30

## 2020-10-14 ENCOUNTER — APPOINTMENT (OUTPATIENT)
Dept: ELECTROPHYSIOLOGY | Facility: CLINIC | Age: 85
End: 2020-10-14
Payer: COMMERCIAL

## 2020-10-14 ENCOUNTER — NON-APPOINTMENT (OUTPATIENT)
Age: 85
End: 2020-10-14

## 2020-10-14 VITALS
HEART RATE: 61 BPM | DIASTOLIC BLOOD PRESSURE: 75 MMHG | SYSTOLIC BLOOD PRESSURE: 138 MMHG | HEIGHT: 62 IN | OXYGEN SATURATION: 98 %

## 2020-10-14 PROCEDURE — 99024 POSTOP FOLLOW-UP VISIT: CPT

## 2020-10-28 ENCOUNTER — APPOINTMENT (OUTPATIENT)
Dept: ELECTROPHYSIOLOGY | Facility: CLINIC | Age: 85
End: 2020-10-28
Payer: COMMERCIAL

## 2020-10-28 VITALS — DIASTOLIC BLOOD PRESSURE: 69 MMHG | OXYGEN SATURATION: 99 % | HEART RATE: 66 BPM | SYSTOLIC BLOOD PRESSURE: 163 MMHG

## 2020-10-28 PROCEDURE — 93279 PRGRMG DEV EVAL PM/LDLS PM: CPT

## 2020-10-28 PROCEDURE — 99024 POSTOP FOLLOW-UP VISIT: CPT

## 2020-12-03 ENCOUNTER — APPOINTMENT (OUTPATIENT)
Dept: SURGICAL ONCOLOGY | Facility: CLINIC | Age: 85
End: 2020-12-03
Payer: COMMERCIAL

## 2020-12-03 VITALS
HEIGHT: 62 IN | HEART RATE: 85 BPM | RESPIRATION RATE: 18 BRPM | TEMPERATURE: 98.1 F | BODY MASS INDEX: 25.21 KG/M2 | WEIGHT: 137 LBS | DIASTOLIC BLOOD PRESSURE: 72 MMHG | OXYGEN SATURATION: 97 % | SYSTOLIC BLOOD PRESSURE: 123 MMHG

## 2020-12-03 PROCEDURE — 99072 ADDL SUPL MATRL&STAF TM PHE: CPT

## 2020-12-03 PROCEDURE — 99205 OFFICE O/P NEW HI 60 MIN: CPT

## 2020-12-03 PROCEDURE — 99214 OFFICE O/P EST MOD 30 MIN: CPT

## 2020-12-03 NOTE — REVIEW OF SYSTEMS
[Negative] : Heme/Lymph [FreeTextEntry3] : macular degeneration [FreeTextEntry5] : pacemaker on coumadin

## 2020-12-03 NOTE — ASSESSMENT
[FreeTextEntry1] : IMP: 88 year-old male who presents for initial consultation for skin cancer of the arm and head.\par \par PLAN: \par I will excise the right arm lesion in the office next week.\par The scalp lesion will require a skin graft so the patient and his daughter would like to observe that lesion for now.

## 2020-12-03 NOTE — CONSULT LETTER
[Dear  ___] : Dear  [unfilled], [Consult Letter:] : I had the pleasure of evaluating your patient, [unfilled]. [Please see my note below.] : Please see my note below. [Consult Closing:] : Thank you very much for allowing me to participate in the care of this patient.  If you have any questions, please do not hesitate to contact me. [Sincerely,] : Sincerely, [FreeTextEntry1] : i will keep you informed of the pathology of the arm and my follow-up of the scalp lesion. [FreeTextEntry3] : Alejandro Damian MD FACS\par Chief of Surgical Oncology\par \par  [DrRosamaria  ___] : Dr. RAMOS

## 2020-12-03 NOTE — HISTORY OF PRESENT ILLNESS
[de-identified] : Mr. Romel Aquino is an 88 year-old male who presents today for initial consultation for skin cancer of the arm and head. He was referred to me by Dr. Tello Adam.\par \par He has a past medical history of pacemaker (09/17/20), acute renal failure, a-fib, BPH, hypertension, laryngeal cancer, urinary retention, varicose veins with chronic edema. 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 He is a current everyday smoker, denies alcohol use. \par \par Referring MD: Tello Adam

## 2020-12-03 NOTE — PHYSICAL EXAM
[Normal] : supple, no neck mass and thyroid not enlarged [Normal Axillary Lymph Nodes] : normal axillary lymph nodes [Normal] : oriented to person, place and time, with appropriate affect [de-identified] : Normal epitrochlear lymph nodes [de-identified] : 1.5 cm exophytic lesion on right forearm with ulceration; 1 cm exophytic lesion on vertex of scalp without ulceration

## 2020-12-07 ENCOUNTER — APPOINTMENT (OUTPATIENT)
Dept: SURGICAL ONCOLOGY | Facility: CLINIC | Age: 85
End: 2020-12-07
Payer: COMMERCIAL

## 2020-12-07 ENCOUNTER — LABORATORY RESULT (OUTPATIENT)
Age: 85
End: 2020-12-07

## 2020-12-07 PROCEDURE — 14020 TIS TRNFR S/A/L 10 SQ CM/<: CPT

## 2020-12-07 PROCEDURE — 25077 RESECT FOREARM/WRIST TUM<3CM: CPT | Mod: RT

## 2020-12-07 PROCEDURE — 99072 ADDL SUPL MATRL&STAF TM PHE: CPT

## 2020-12-07 NOTE — ASSESSMENT
[FreeTextEntry1] : right forearm lesion excised and closed with adjacent tissue transfer\par Plan:\par Check pathology\par RTO 2 weeks for suture removal\par Restart coumadin tonight

## 2020-12-07 NOTE — PROCEDURE
[Excision Of Lesion] : excision of lesion [Melanoma In Situ Suspected] : melanoma in situ suspected [Patient] : patient [Risks] : risks [Consent Obtained] : written consent was obtained prior to the procedure [___ ml Inj] : [unfilled] ~Uml [1%] : 1% [With Epi] : with epinephrine [Betadine] : using Betadine [Excisional: Margin ___mm] : the lesion was excised with a  [unfilled] ~Umm margin in an elliptical fashion [Simple Sutures] : simple sutures were used for the skin closure [Chromic] : chromic suture(s) [___ # of Sutures] : [unfilled] [Size: ___-0] : [unfilled]-0 [Interrupted] : interrupted [Nylon] : nylon suture(s) [Polysporin Ointment] : Polysporin ointment was applied [Sent to Pathology] : the excised lesion was place in buffered formalin and sent for pathology [Tolerated Well] : the patient tolerated the procedure well [No Complications] : there were no complications [___ Week(s)] : in [unfilled] week(s) [FreeTextEntry5] : right forearm - 4 x 2 cm

## 2020-12-16 PROBLEM — C44.90 SKIN CANCER: Status: ACTIVE | Noted: 2020-12-02

## 2020-12-22 NOTE — ED PROVIDER NOTE - CPE EDP EYES NORM
[FreeTextEntry1] : MRI cervical spine from 10/2/19 reviewed by Dr. Richards with patient demonstrates multilevel degenerative changes; left sided foraminal stenosis at C3-4, C4-5.\par Recommend flexion/extension x-rays to rule out cervical instability\par Recommend MRI cervical spine without contrast due to worsening symptoms\par Recommend EMG \par After MRI cervical spine and EMG complete, return to the office to review ordered imaging studies and finalize treatment plan.\par \par Patient verbalizes agreement and understanding with plan of care.\par 
normal...

## 2020-12-23 ENCOUNTER — APPOINTMENT (OUTPATIENT)
Dept: SURGICAL ONCOLOGY | Facility: CLINIC | Age: 85
End: 2020-12-23
Payer: COMMERCIAL

## 2020-12-23 VITALS
OXYGEN SATURATION: 96 % | TEMPERATURE: 97.9 F | RESPIRATION RATE: 18 BRPM | WEIGHT: 137 LBS | HEART RATE: 92 BPM | SYSTOLIC BLOOD PRESSURE: 109 MMHG | DIASTOLIC BLOOD PRESSURE: 66 MMHG | BODY MASS INDEX: 25.21 KG/M2 | HEIGHT: 62 IN

## 2020-12-23 DIAGNOSIS — C44.90 UNSPECIFIED MALIGNANT NEOPLASM OF SKIN, UNSPECIFIED: ICD-10-CM

## 2020-12-23 DIAGNOSIS — C44.612 BASAL CELL CARCINOMA OF SKIN OF RIGHT UPPER LIMB, INCLUDING SHOULDER: ICD-10-CM

## 2020-12-23 PROCEDURE — 99024 POSTOP FOLLOW-UP VISIT: CPT

## 2020-12-23 NOTE — REASON FOR VISIT
[Follow-Up Visit] : a follow-up visit for [Skin Cancer] : skin caner [FreeTextEntry2] : basal cell carcinoma

## 2020-12-23 NOTE — ASSESSMENT
[FreeTextEntry1] : IMP: 88 year-old male s/p excision of right forearm lesion on 12/7/20. Core biopsy results indicated basal cell carcinoma, pigmented type, completely excised, and scar.\par The scalp lesion will require a skin graft so the patient and his daughter would like to observe that lesion for now.\par \par PLAN: \par RTO q 3 months\par Continue to observe scalp lesion

## 2020-12-23 NOTE — CONSULT LETTER
[Dear  ___] : Dear  [unfilled], [Courtesy Letter:] : I had the pleasure of seeing your patient, [unfilled], in my office today. [Please see my note below.] : Please see my note below. [Consult Closing:] : Thank you very much for allowing me to participate in the care of this patient.  If you have any questions, please do not hesitate to contact me. [Sincerely,] : Sincerely, [FreeTextEntry1] : I will keep you informed of my follow-up of the scalp lesion. [FreeTextEntry3] : Alejandro Damian MD FACS\par Chief of Surgical Oncology\par \par  [DrRosamaria  ___] : Dr. RAMOS

## 2020-12-23 NOTE — HISTORY OF PRESENT ILLNESS
[de-identified] : Mr. Romel Aquino is an 88 year-old male who presents today for follow up. He is s/p excision of right forearm lesion on 12/7/20. Core biopsy results indicated basal cell carcinoma, pigmented type, completely excised.\par \par \par He has a past medical history of pacemaker (09/17/20), acute renal failure, a-fib, BPH, hypertension, laryngeal cancer, urinary retention, varicose veins with chronic edema. 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 He is a current everyday smoker, denies alcohol use. \par \par Referring MD: Tello Adam

## 2021-02-16 ENCOUNTER — APPOINTMENT (OUTPATIENT)
Dept: SURGERY | Facility: CLINIC | Age: 86
End: 2021-02-16

## 2021-03-30 ENCOUNTER — APPOINTMENT (OUTPATIENT)
Dept: SURGERY | Facility: CLINIC | Age: 86
End: 2021-03-30

## 2021-04-26 ENCOUNTER — OUTPATIENT (OUTPATIENT)
Dept: OUTPATIENT SERVICES | Facility: HOSPITAL | Age: 86
LOS: 1 days | End: 2021-04-26
Payer: COMMERCIAL

## 2021-04-26 ENCOUNTER — NON-APPOINTMENT (OUTPATIENT)
Age: 86
End: 2021-04-26

## 2021-04-26 ENCOUNTER — APPOINTMENT (OUTPATIENT)
Dept: UROLOGY | Facility: CLINIC | Age: 86
End: 2021-04-26
Payer: COMMERCIAL

## 2021-04-26 DIAGNOSIS — Z95.2 PRESENCE OF PROSTHETIC HEART VALVE: Chronic | ICD-10-CM

## 2021-04-26 PROCEDURE — 99213 OFFICE O/P EST LOW 20 MIN: CPT | Mod: 25

## 2021-04-26 PROCEDURE — 51700 IRRIGATION OF BLADDER: CPT

## 2021-04-26 PROCEDURE — 99072 ADDL SUPL MATRL&STAF TM PHE: CPT

## 2021-04-28 NOTE — REVIEW OF SYSTEMS
PT IS HERE WITH FEVER THAT STARTED TODAY(HIGHEST .7F). PER MOM PT HAD RUNNY NOSE & COUGH FOR 2 WEEKS. MOM STS THAT PT WAS ACTIVE, EATING AND WAS WETTING DIAPERS. MOM SAYS THAT THE FAMILY WAS SICK AS WELL.  TYLENOL WAS GIVEN AT 4PM. [Eyesight Problems] : eyesight problems [see HPI] : see HPI [Seen by urologist before (Name)  ___] : Preciously seen by a urologist: [unfilled] [Wake up at night to urinate  How many times?  ___] : wakes up to urinate [unfilled] times during the night [Negative] : Heme/Lymph [FreeTextEntry2] : htn

## 2021-04-28 NOTE — PHYSICAL EXAM
[General Appearance - Well Developed] : well developed [General Appearance - Well Nourished] : well nourished [General Appearance - In No Acute Distress] : no acute distress [Abdomen Soft] : soft [Abdomen Tenderness] : non-tender [Costovertebral Angle Tenderness] : no ~M costovertebral angle tenderness [Exaggerated Use Of Accessory Muscles For Inspiration] : no accessory muscle use [Oriented To Time, Place, And Person] : oriented to person, place, and time [No Focal Deficits] : no focal deficits [FreeTextEntry1] : radiation changes to neck

## 2021-04-28 NOTE — HISTORY OF PRESENT ILLNESS
[FreeTextEntry1] : 87yo gentleman with cc of urinary retention. Pt last seen last year for same thing with urinary retention found during hospitalization for CHF. He had slight elevation in Cr at 1.4 (from baseline of ~1) and had bladder scan that showed 700cc of urine. Rivas was placed. He passed VT last year here. Was fine until 2 weeks ago. Per daughter was weak with decreased UOP. Went to [facility] and admitted for CHF exacerbation. He had traumatic rivas and now is in place for 2 weeks. \par \par He is seeing cards this week for repeat BMP to see if Cr back to baseline. Unable to review. He has been taking the flomax and finasteride.

## 2021-04-28 NOTE — ASSESSMENT
[FreeTextEntry1] : Urinary retention and BPH. Passed VT today\par --Cont finasteride and flomax\par --retention precautions\par --abx ppx with keflex\par --RTC next week for PVR\par \par

## 2021-05-18 DIAGNOSIS — R33.9 RETENTION OF URINE, UNSPECIFIED: ICD-10-CM

## 2021-05-18 DIAGNOSIS — N40.1 BENIGN PROSTATIC HYPERPLASIA WITH LOWER URINARY TRACT SYMPTOMS: ICD-10-CM

## 2021-05-21 ENCOUNTER — APPOINTMENT (OUTPATIENT)
Dept: UROLOGY | Facility: CLINIC | Age: 86
End: 2021-05-21

## 2021-05-21 ENCOUNTER — OUTPATIENT (OUTPATIENT)
Dept: OUTPATIENT SERVICES | Facility: HOSPITAL | Age: 86
LOS: 1 days | End: 2021-05-21
Payer: COMMERCIAL

## 2021-05-21 ENCOUNTER — APPOINTMENT (OUTPATIENT)
Dept: UROLOGY | Facility: CLINIC | Age: 86
End: 2021-05-21
Payer: COMMERCIAL

## 2021-05-21 VITALS
RESPIRATION RATE: 16 BRPM | SYSTOLIC BLOOD PRESSURE: 129 MMHG | HEART RATE: 96 BPM | DIASTOLIC BLOOD PRESSURE: 70 MMHG | TEMPERATURE: 97.3 F

## 2021-05-21 DIAGNOSIS — Z95.2 PRESENCE OF PROSTHETIC HEART VALVE: Chronic | ICD-10-CM

## 2021-05-21 DIAGNOSIS — R33.9 RETENTION OF URINE, UNSPECIFIED: ICD-10-CM

## 2021-05-21 DIAGNOSIS — R35.0 FREQUENCY OF MICTURITION: ICD-10-CM

## 2021-05-21 PROCEDURE — 99213 OFFICE O/P EST LOW 20 MIN: CPT | Mod: 25

## 2021-05-21 PROCEDURE — 51702 INSERT TEMP BLADDER CATH: CPT

## 2021-05-24 ENCOUNTER — APPOINTMENT (OUTPATIENT)
Dept: UROLOGY | Facility: CLINIC | Age: 86
End: 2021-05-24
Payer: COMMERCIAL

## 2021-05-24 ENCOUNTER — OUTPATIENT (OUTPATIENT)
Dept: OUTPATIENT SERVICES | Facility: HOSPITAL | Age: 86
LOS: 1 days | End: 2021-05-24
Payer: COMMERCIAL

## 2021-05-24 DIAGNOSIS — Z95.2 PRESENCE OF PROSTHETIC HEART VALVE: Chronic | ICD-10-CM

## 2021-05-24 PROCEDURE — 51700 IRRIGATION OF BLADDER: CPT

## 2021-06-11 ENCOUNTER — APPOINTMENT (OUTPATIENT)
Dept: UROLOGY | Facility: CLINIC | Age: 86
End: 2021-06-11

## 2021-06-14 ENCOUNTER — APPOINTMENT (OUTPATIENT)
Dept: UROLOGY | Facility: CLINIC | Age: 86
End: 2021-06-14

## 2021-06-17 DIAGNOSIS — R33.9 RETENTION OF URINE, UNSPECIFIED: ICD-10-CM

## 2021-06-24 ENCOUNTER — APPOINTMENT (OUTPATIENT)
Dept: SURGERY | Facility: CLINIC | Age: 86
End: 2021-06-24

## 2021-07-01 NOTE — H&P ADULT - PROBLEM SELECTOR PLAN 6
-c/w with home finasteride -unclear what baseline Cr is  -GFR 45 consistent with Stage III  -Avoid nephrotoxins -creatinine 1.4, baseline creatinine is 1.0-1.1, likely due to cardiorenal vs infection  -Avoid nephrotoxins  -hold ACE-I  -bladder scan to r/o retention Erivedge Counseling- I discussed with the patient the risks of Erivedge including but not limited to nausea, vomiting, diarrhea, constipation, weight loss, changes in the sense of taste, decreased appetite, muscle spasms, and hair loss.  The patient verbalized understanding of the proper use and possible adverse effects of Erivedge.  All of the patient's questions and concerns were addressed.

## 2021-08-22 NOTE — ED ADULT NURSE NOTE - ATTEMPT TO OOB
Implemented All Universal Safety Interventions:  [place] to call system. Call bell, personal items and telephone within reach. Instruct patient to call for assistance. Room bathroom lighting operational. Non-slip footwear when patient is off stretcher. Physically safe environment: no spills, clutter or unnecessary equipment. Stretcher in lowest position, wheels locked, appropriate side rails in place. no

## 2021-10-28 ENCOUNTER — NON-APPOINTMENT (OUTPATIENT)
Age: 86
End: 2021-10-28

## 2021-10-28 ENCOUNTER — APPOINTMENT (OUTPATIENT)
Dept: ELECTROPHYSIOLOGY | Facility: CLINIC | Age: 86
End: 2021-10-28
Payer: COMMERCIAL

## 2021-10-28 PROCEDURE — 93296 REM INTERROG EVL PM/IDS: CPT | Mod: NC

## 2021-10-28 PROCEDURE — 93294 REM INTERROG EVL PM/LDLS PM: CPT | Mod: NC

## 2021-11-23 ENCOUNTER — APPOINTMENT (OUTPATIENT)
Dept: ELECTROPHYSIOLOGY | Facility: CLINIC | Age: 86
End: 2021-11-23
Payer: COMMERCIAL

## 2021-11-23 ENCOUNTER — NON-APPOINTMENT (OUTPATIENT)
Age: 86
End: 2021-11-23

## 2021-11-23 VITALS — DIASTOLIC BLOOD PRESSURE: 74 MMHG | HEART RATE: 83 BPM | OXYGEN SATURATION: 91 % | SYSTOLIC BLOOD PRESSURE: 160 MMHG

## 2021-11-23 PROCEDURE — 93279 PRGRMG DEV EVAL PM/LDLS PM: CPT

## 2021-11-23 PROCEDURE — 93000 ELECTROCARDIOGRAM COMPLETE: CPT | Mod: 59

## 2021-11-23 RX ORDER — DILTIAZEM HYDROCHLORIDE 90 MG/1
90 TABLET ORAL
Refills: 0 | Status: DISCONTINUED | COMMUNITY
Start: 2019-08-14 | End: 2021-11-23

## 2021-11-23 RX ORDER — ATORVASTATIN CALCIUM 10 MG/1
10 TABLET, FILM COATED ORAL
Refills: 0 | Status: ACTIVE | COMMUNITY
Start: 2021-11-23

## 2021-11-23 RX ORDER — DIGOXIN 125 UG/1
125 TABLET ORAL
Refills: 0 | Status: ACTIVE | COMMUNITY
Start: 2021-11-23

## 2021-11-23 RX ORDER — SACUBITRIL AND VALSARTAN 24; 26 MG/1; MG/1
24-26 TABLET, FILM COATED ORAL TWICE DAILY
Refills: 0 | Status: ACTIVE | COMMUNITY
Start: 2021-11-23

## 2021-11-23 RX ORDER — METOPROLOL TARTRATE 100 MG/1
100 TABLET, FILM COATED ORAL TWICE DAILY
Refills: 0 | Status: ACTIVE | COMMUNITY
Start: 2019-08-14

## 2021-11-23 RX ORDER — LOVASTATIN 20 MG/1
20 TABLET ORAL
Qty: 1 | Refills: 0 | Status: DISCONTINUED | COMMUNITY
Start: 2019-08-14 | End: 2021-11-23

## 2022-01-01 ENCOUNTER — NON-APPOINTMENT (OUTPATIENT)
Age: 87
End: 2022-01-01

## 2022-01-01 ENCOUNTER — APPOINTMENT (OUTPATIENT)
Dept: ELECTROPHYSIOLOGY | Facility: CLINIC | Age: 87
End: 2022-01-01
Payer: COMMERCIAL

## 2022-01-01 ENCOUNTER — APPOINTMENT (OUTPATIENT)
Dept: UROLOGY | Facility: CLINIC | Age: 87
End: 2022-01-01
Payer: COMMERCIAL

## 2022-01-01 ENCOUNTER — OUTPATIENT (OUTPATIENT)
Dept: OUTPATIENT SERVICES | Facility: HOSPITAL | Age: 87
LOS: 1 days | End: 2022-01-01
Payer: COMMERCIAL

## 2022-01-01 ENCOUNTER — APPOINTMENT (OUTPATIENT)
Dept: UROLOGY | Facility: CLINIC | Age: 87
End: 2022-01-01

## 2022-01-01 ENCOUNTER — APPOINTMENT (OUTPATIENT)
Dept: ELECTROPHYSIOLOGY | Facility: CLINIC | Age: 87
End: 2022-01-01

## 2022-01-01 ENCOUNTER — RESULT CHARGE (OUTPATIENT)
Age: 87
End: 2022-01-01

## 2022-01-01 VITALS
SYSTOLIC BLOOD PRESSURE: 143 MMHG | DIASTOLIC BLOOD PRESSURE: 78 MMHG | OXYGEN SATURATION: 94 % | RESPIRATION RATE: 16 BRPM | TEMPERATURE: 97.3 F | HEART RATE: 91 BPM

## 2022-01-01 VITALS — RESPIRATION RATE: 14 BRPM | OXYGEN SATURATION: 95 % | TEMPERATURE: 98.2 F | HEART RATE: 73 BPM

## 2022-01-01 VITALS
HEIGHT: 63 IN | WEIGHT: 132 LBS | DIASTOLIC BLOOD PRESSURE: 66 MMHG | RESPIRATION RATE: 17 BRPM | TEMPERATURE: 97.2 F | BODY MASS INDEX: 23.39 KG/M2 | SYSTOLIC BLOOD PRESSURE: 129 MMHG | HEART RATE: 69 BPM

## 2022-01-01 VITALS
HEIGHT: 63 IN | OXYGEN SATURATION: 96 % | DIASTOLIC BLOOD PRESSURE: 61 MMHG | HEART RATE: 79 BPM | SYSTOLIC BLOOD PRESSURE: 100 MMHG

## 2022-01-01 VITALS
RESPIRATION RATE: 16 BRPM | OXYGEN SATURATION: 99 % | TEMPERATURE: 97.5 F | DIASTOLIC BLOOD PRESSURE: 82 MMHG | SYSTOLIC BLOOD PRESSURE: 157 MMHG | HEART RATE: 82 BPM

## 2022-01-01 VITALS — DIASTOLIC BLOOD PRESSURE: 72 MMHG | HEART RATE: 91 BPM | SYSTOLIC BLOOD PRESSURE: 131 MMHG

## 2022-01-01 VITALS — DIASTOLIC BLOOD PRESSURE: 74 MMHG | SYSTOLIC BLOOD PRESSURE: 157 MMHG

## 2022-01-01 VITALS
HEIGHT: 63 IN | HEART RATE: 93 BPM | WEIGHT: 132 LBS | TEMPERATURE: 97.3 F | BODY MASS INDEX: 23.39 KG/M2 | SYSTOLIC BLOOD PRESSURE: 120 MMHG | DIASTOLIC BLOOD PRESSURE: 69 MMHG

## 2022-01-01 VITALS
SYSTOLIC BLOOD PRESSURE: 135 MMHG | HEIGHT: 63 IN | DIASTOLIC BLOOD PRESSURE: 69 MMHG | HEART RATE: 91 BPM | BODY MASS INDEX: 23.39 KG/M2 | WEIGHT: 132 LBS

## 2022-01-01 VITALS
HEIGHT: 63 IN | WEIGHT: 132 LBS | HEART RATE: 89 BPM | SYSTOLIC BLOOD PRESSURE: 144 MMHG | DIASTOLIC BLOOD PRESSURE: 72 MMHG | BODY MASS INDEX: 23.39 KG/M2 | TEMPERATURE: 98.2 F | RESPIRATION RATE: 17 BRPM

## 2022-01-01 VITALS — DIASTOLIC BLOOD PRESSURE: 83 MMHG | HEART RATE: 79 BPM | SYSTOLIC BLOOD PRESSURE: 173 MMHG

## 2022-01-01 DIAGNOSIS — N47.2 PARAPHIMOSIS: ICD-10-CM

## 2022-01-01 DIAGNOSIS — Z95.2 PRESENCE OF PROSTHETIC HEART VALVE: Chronic | ICD-10-CM

## 2022-01-01 DIAGNOSIS — R33.9 RETENTION OF URINE, UNSPECIFIED: ICD-10-CM

## 2022-01-01 DIAGNOSIS — N40.1 BENIGN PROSTATIC HYPERPLASIA WITH LOWER URINARY TRACT SYMPTOMS: ICD-10-CM

## 2022-01-01 DIAGNOSIS — A49.8 OTHER BACTERIAL INFECTIONS OF UNSPECIFIED SITE: ICD-10-CM

## 2022-01-01 DIAGNOSIS — N32.81 OVERACTIVE BLADDER: ICD-10-CM

## 2022-01-01 DIAGNOSIS — R35.0 FREQUENCY OF MICTURITION: ICD-10-CM

## 2022-01-01 DIAGNOSIS — N40.1 BENIGN PROSTATIC HYPERPLASIA WITH LOWER URINARY TRACT SYMPMS: ICD-10-CM

## 2022-01-01 DIAGNOSIS — N13.8 OTHER OBSTRUCTIVE AND REFLUX UROPATHY: ICD-10-CM

## 2022-01-01 DIAGNOSIS — N13.8 BENIGN PROSTATIC HYPERPLASIA WITH LOWER URINARY TRACT SYMPMS: ICD-10-CM

## 2022-01-01 DIAGNOSIS — N47.1 PHIMOSIS: ICD-10-CM

## 2022-01-01 LAB
APPEARANCE: ABNORMAL
APPEARANCE: ABNORMAL
APPEARANCE: CLEAR
BACTERIA UR CULT: ABNORMAL
BACTERIA UR CULT: ABNORMAL
BACTERIA: ABNORMAL
BACTERIA: ABNORMAL
BACTERIA: NEGATIVE
BILIRUBIN URINE: ABNORMAL
BILIRUBIN URINE: NEGATIVE
BLOOD URINE: ABNORMAL
BLOOD URINE: NEGATIVE
COLOR: ABNORMAL
COLOR: COLORLESS
COLOR: NORMAL
COLOR: NORMAL
COLOR: YELLOW
GLUCOSE QUALITATIVE U: ABNORMAL
GLUCOSE QUALITATIVE U: NEGATIVE
HYALINE CASTS: 0 /LPF
HYALINE CASTS: 1 /LPF
KETONES URINE: ABNORMAL
KETONES URINE: NEGATIVE
LEUKOCYTE ESTERASE URINE: ABNORMAL
LEUKOCYTE ESTERASE URINE: NEGATIVE
MICROSCOPIC-UA: NORMAL
NITRITE URINE: ABNORMAL
NITRITE URINE: NEGATIVE
NITRITE URINE: POSITIVE
PH URINE: 6
PH URINE: 6.5
PH URINE: ABNORMAL
PROTEIN URINE: ABNORMAL
PROTEIN URINE: NEGATIVE
RED BLOOD CELLS URINE: 1 /HPF
RED BLOOD CELLS URINE: 4 /HPF
RED BLOOD CELLS URINE: 5 /HPF
RED BLOOD CELLS URINE: 67 /HPF
RED BLOOD CELLS URINE: >720 /HPF
SPECIFIC GRAVITY URINE: 1
SPECIFIC GRAVITY URINE: 1.01
SPECIFIC GRAVITY URINE: ABNORMAL
SQUAMOUS EPITHELIAL CELLS: 0 /HPF
SQUAMOUS EPITHELIAL CELLS: 2 /HPF
SQUAMOUS EPITHELIAL CELLS: 5 /HPF
UROBILINOGEN URINE: ABNORMAL
UROBILINOGEN URINE: NORMAL
WHITE BLOOD CELLS URINE: 1 /HPF
WHITE BLOOD CELLS URINE: 14 /HPF
WHITE BLOOD CELLS URINE: 96 /HPF
WHITE BLOOD CELLS URINE: >720 /HPF
WHITE BLOOD CELLS URINE: >720 /HPF

## 2022-01-01 PROCEDURE — 51797 INTRAABDOMINAL PRESSURE TEST: CPT | Mod: 26

## 2022-01-01 PROCEDURE — 51700 IRRIGATION OF BLADDER: CPT

## 2022-01-01 PROCEDURE — 51741 ELECTRO-UROFLOWMETRY FIRST: CPT

## 2022-01-01 PROCEDURE — 51702 INSERT TEMP BLADDER CATH: CPT

## 2022-01-01 PROCEDURE — 93280 PM DEVICE PROGR EVAL DUAL: CPT

## 2022-01-01 PROCEDURE — 93296 REM INTERROG EVL PM/IDS: CPT

## 2022-01-01 PROCEDURE — 51784 ANAL/URINARY MUSCLE STUDY: CPT

## 2022-01-01 PROCEDURE — 51798 US URINE CAPACITY MEASURE: CPT

## 2022-01-01 PROCEDURE — 51741 ELECTRO-UROFLOWMETRY FIRST: CPT | Mod: 26

## 2022-01-01 PROCEDURE — 51797 INTRAABDOMINAL PRESSURE TEST: CPT

## 2022-01-01 PROCEDURE — 99213 OFFICE O/P EST LOW 20 MIN: CPT | Mod: 25

## 2022-01-01 PROCEDURE — 51728 CYSTOMETROGRAM W/VP: CPT | Mod: 26

## 2022-01-01 PROCEDURE — 93000 ELECTROCARDIOGRAM COMPLETE: CPT | Mod: 59

## 2022-01-01 PROCEDURE — 93294 REM INTERROG EVL PM/LDLS PM: CPT

## 2022-01-01 PROCEDURE — 99214 OFFICE O/P EST MOD 30 MIN: CPT | Mod: 95

## 2022-01-01 PROCEDURE — 51784 ANAL/URINARY MUSCLE STUDY: CPT | Mod: 26

## 2022-01-01 PROCEDURE — 51728 CYSTOMETROGRAM W/VP: CPT

## 2022-01-01 RX ORDER — TAMSULOSIN HYDROCHLORIDE 0.4 MG/1
0.4 CAPSULE ORAL
Qty: 60 | Refills: 11 | Status: ACTIVE | COMMUNITY
Start: 1900-01-01 | End: 1900-01-01

## 2022-01-01 RX ORDER — AMOXICILLIN AND CLAVULANATE POTASSIUM 875; 125 MG/1; MG/1
875-125 TABLET, COATED ORAL
Qty: 17 | Refills: 0 | Status: COMPLETED | COMMUNITY
Start: 2022-02-24

## 2022-01-01 RX ORDER — CEPHALEXIN 500 MG/1
500 TABLET ORAL
Qty: 14 | Refills: 0 | Status: COMPLETED | COMMUNITY
Start: 2022-01-01 | End: 2022-01-01

## 2022-01-01 RX ORDER — MUPIROCIN 20 MG/G
2 OINTMENT TOPICAL
Qty: 30 | Refills: 0 | Status: COMPLETED | COMMUNITY
Start: 2022-02-24

## 2022-01-01 RX ORDER — FUROSEMIDE 40 MG/1
40 TABLET ORAL
Refills: 0 | Status: ACTIVE | COMMUNITY
Start: 2019-08-14

## 2022-01-01 RX ORDER — CEPHALEXIN 500 MG/1
500 TABLET ORAL 3 TIMES DAILY
Qty: 21 | Refills: 0 | Status: COMPLETED | COMMUNITY
Start: 2022-01-01 | End: 2022-01-01

## 2022-01-01 RX ORDER — NITROFURANTOIN (MONOHYDRATE/MACROCRYSTALS) 25; 75 MG/1; MG/1
100 CAPSULE ORAL
Qty: 14 | Refills: 0 | Status: ACTIVE | COMMUNITY
Start: 2022-01-01 | End: 1900-01-01

## 2022-01-01 RX ORDER — CEPHALEXIN 500 MG/1
500 TABLET ORAL
Qty: 4 | Refills: 0 | Status: COMPLETED | COMMUNITY
Start: 2021-04-26 | End: 2022-01-01

## 2022-01-01 RX ORDER — CIPROFLOXACIN HYDROCHLORIDE 500 MG/1
500 TABLET, FILM COATED ORAL
Qty: 14 | Refills: 0 | Status: COMPLETED | COMMUNITY
Start: 2022-01-01

## 2022-01-01 RX ORDER — BACITRACIN 500 [IU]/G
500 OINTMENT TOPICAL 3 TIMES DAILY
Qty: 1 | Refills: 0 | Status: ACTIVE | COMMUNITY
Start: 2022-01-01

## 2022-01-03 NOTE — PROGRESS NOTE ADULT - PROBLEM SELECTOR PROBLEM 7
[address] [facility] Adult  Hospitalist Group                                                                                          Hospitalist Progress Note  Kath Liu MD  Answering service: [phone] from in house phone        Date of Service:  1/3/2022  NAME:  Francine Alexander  :  1947  MRN:  [id number]      [facility] [address] ICD-10-CM: I63.40  ICD-9-CM: 434.11  1/2/2022 Unknown        PAD (peripheral artery disease) ([facility] [address] Nishant Garcia MD Acute kidney injury superimposed on CKD

## 2022-02-21 ENCOUNTER — EMERGENCY (EMERGENCY)
Facility: HOSPITAL | Age: 87
LOS: 1 days | Discharge: HOME | End: 2022-02-21
Attending: STUDENT IN AN ORGANIZED HEALTH CARE EDUCATION/TRAINING PROGRAM
Payer: COMMERCIAL

## 2022-02-21 VITALS
WEIGHT: 132.06 LBS | TEMPERATURE: 97 F | DIASTOLIC BLOOD PRESSURE: 63 MMHG | HEART RATE: 62 BPM | OXYGEN SATURATION: 98 % | SYSTOLIC BLOOD PRESSURE: 121 MMHG | HEIGHT: 63 IN | RESPIRATION RATE: 20 BRPM

## 2022-02-21 VITALS
OXYGEN SATURATION: 99 % | SYSTOLIC BLOOD PRESSURE: 131 MMHG | TEMPERATURE: 98 F | DIASTOLIC BLOOD PRESSURE: 75 MMHG | RESPIRATION RATE: 16 BRPM | HEART RATE: 70 BPM

## 2022-02-21 DIAGNOSIS — Z95.2 PRESENCE OF PROSTHETIC HEART VALVE: Chronic | ICD-10-CM

## 2022-02-21 PROCEDURE — 10060 I&D ABSCESS SIMPLE/SINGLE: CPT

## 2022-02-21 PROCEDURE — 99283 EMERGENCY DEPT VISIT LOW MDM: CPT | Mod: 25

## 2022-02-21 PROCEDURE — 99284 EMERGENCY DEPT VISIT MOD MDM: CPT | Mod: 25

## 2022-02-21 NOTE — ED PROVIDER NOTE - CLINICAL SUMMARY MEDICAL DECISION MAKING FREE TEXT BOX
Romel Brito MD. pt presents for a right upper back abscess/cyst x10 years. 2 days ago, noted more pain, erythema, and purulent/bloody drainage. denies fevers. on exam, there is an abscess on the right upper back, not on midline. it is already spontaneously draining purulent material. no sx or exam findings to indicate spinal cord involvement. will perform I&D, abx, reassess

## 2022-02-21 NOTE — ED PROVIDER NOTE - NSFOLLOWUPINSTRUCTIONS_ED_ALL_ED_FT
Please follow up with the dermatologist.    Keep an eye on the wound. Return to the ED for signs of infection including worsening redness, pain, swelling, or any other concerns. Keep the wound clean and dry. Change the dressing twice daily.    Follow up with your primary care doctor within 3 days. Let them know you were seen in the ED.    Abscess    WHAT YOU NEED TO KNOW:  A warm compress may help your abscess drain. Your healthcare provider may make a cut in the abscess so it can drain. You may need surgery to remove an abscess that is on your hands or buttocks.    DISCHARGE INSTRUCTIONS:  Return to the emergency department if:  The area around your abscess becomes very painful, warm, or has red streaks.  You have a fever and chills.  Your heart is beating faster than usual.  You feel faint or confused.  Contact your healthcare provider if:  Your abscess gets bigger or does not get better.  Your abscess returns.  You have questions or concerns about your condition or care.  Medicines: You may need any of the following:  Antibiotics help treat a bacterial infection.  Acetaminophen decreases pain and fever. It is available without a doctor's order. Ask how much to take and how often to take it. Follow directions. Acetaminophen can cause liver damage if not taken correctly.  NSAIDs, such as ibuprofen, help decrease swelling, pain, and fever. This medicine is available with or without a doctor's order. NSAIDs can cause stomach bleeding or kidney problems in certain people. If you take blood thinner medicine, always ask your healthcare provider if NSAIDs are safe for you. Always read the medicine label and follow directions.  Take your medicine as directed. Contact your healthcare provider if you think your medicine is not helping or if you have side effects. Tell him or her if you are allergic to any medicine. Keep a list of the medicines, vitamins, and herbs you take. Include the amounts, and when and why you take them. Bring the list or the pill bottles to follow-up visits. Carry your medicine list with you in case of an emergency.  Self-care:  Apply a warm compress to your abscess. This will help it open and drain. Wet a washcloth in warm, but not hot, water. Apply the compress for 10 minutes. Repeat this 4 times each day. Do not press on an abscess or try to open it with a needle. You may push the bacteria deeper or into your blood.  Do not share your clothes, towels, or sheets with anyone. This can spread the infection to others.  Wash your hands often. This can help prevent the spread of germs. Use soap and water or an alcohol-based hand rub.  Care for your wound after it is drained:  Care for your wound as directed. If your healthcare provider says it is okay, carefully remove the bandage and gauze packing. You may need to soak the gauze to get it out of your wound. Clean your wound and the area around it as directed. Dry the area and put on new, clean bandages. Change your bandages when they get wet or dirty.  Ask your healthcare provider how to change the gauze in your wound. Keep track of how many pieces of gauze are placed inside the wound. Do not put too much packing in the wound. Do not pack the gauze too tightly in your wound.  Follow up with your healthcare provider in 1 to 3 days: You may need to have your packing removed or your bandage changed. Write down your questions so you remember to ask them during your visits.

## 2022-02-21 NOTE — ED PROVIDER NOTE - OBJECTIVE STATEMENT
89 yo M Cym[place] speaking, HTN, HLD, TIA, severe AS s/p TAVR on Coumadin, AFib, esophageal and lung CA (not currently being tx'd), presents to the ED for a right upper back cyst/abscess for 10 years, worsening over the past 2 days. States over past 2 days, has been having worsening pain causing difficulty sleeping, redness, and spontaneous bloody/purulent drainage. He denies urinary retention, incontinence, fevers, n/v/d, cp, sob, saddle anesthesia, difficulty ambulating. Last INR was 2.2 checked 4 days ago.

## 2022-02-21 NOTE — ED ADULT NURSE NOTE - OBJECTIVE STATEMENT
Pt is an ambulatory 89 yr old male a/o X 3 c/o abscess to lateral aspect of right scapula.  Abscess is about the size of a golf ball, tender on palpation, no drainage.  PERRL wnl, mills with equal strength.  LUNGS CTA.  No chest pain or sob.  Denies fevers, chills or N/V.  Abdomen NT ND.  No urinary symptoms.  Peripheral pulses +2bl no edema

## 2022-02-21 NOTE — ED PROVIDER NOTE - ATTENDING CONTRIBUTION TO CARE
I, Jewel Resendiz, performed a history and physical exam of the patient and discussed their management with the resident and/or advanced care provider. I reviewed the resident and/or advanced care provider's note and agree with the documented findings and plan of care. I was present and available for all procedures.

## 2022-02-21 NOTE — ED ADULT TRIAGE NOTE - CHIEF COMPLAINT QUOTE
right upper back cyst for many years. For the past 4 days it's red, with bloody discharge and pain radiating to his right arm right upper back cyst for many years. For the past 4 days it's red, with bloody discharge and pain radiating to his right arm, on Coumadin

## 2022-02-21 NOTE — ED PROVIDER NOTE - PATIENT PORTAL LINK FT
You can access the FollowMyHealth Patient Portal offered by [facility] by registering at the following website: [email or web address]. By joining [facility]’s FollowMyHealth portal, you will also be able to view your health information using other applications (apps) compatible with our system.

## 2022-02-21 NOTE — ED PROVIDER NOTE - PHYSICAL EXAMINATION
PHYSICAL EXAM:  GENERAL: non-toxic appearing; in no respiratory distress  HEAD Atraumatic, Normocephalic  NECK: No JVD; trachea midline  EYES: PERRL, EOMs intact b/l w/out deficits; normal conjunctiva  CHEST/LUNG: CTAB no wheezes/rhonchi/rales  HEART: RRR no murmur/gallops/rubs  ABDOMEN: +BS, soft, NT, ND  EXTREMITIES: No LE edema, +2 radial pulses b/l, +2 DP/PT pulses b/l  MUSCULOSKELETAL: FROM of all 4 extremities;  NERVOUS SYSTEM:  A&Ox3, No motor deficits or sensory deficits; CNII-XII intact; Speech is fluent and appropriate  SKIN:  Warm and dry as visualized; on the right upper back by the scapula, there is an approximately 1.5cm x1.5cm fluctuance circular abscess with purulent material drainage; erythema overlying the area; no induration;

## 2022-02-21 NOTE — ED ADULT NURSE NOTE - NSIMPLEMENTINTERV_GEN_ALL_ED
Implemented All Fall Risk Interventions:  [place] to call system. Call bell, personal items and telephone within reach. Instruct patient to call for assistance. Room bathroom lighting operational. Non-slip footwear when patient is off stretcher. Physically safe environment: no spills, clutter or unnecessary equipment. Stretcher in lowest position, wheels locked, appropriate side rails in place. Provide visual cue, wrist band, yellow gown, etc. Monitor gait and stability. Monitor for mental status changes and reorient to person, place, and time. Review medications for side effects contributing to fall risk. Reinforce activity limits and safety measures with patient and family.

## 2022-02-21 NOTE — ED ADULT NURSE NOTE - CHIEF COMPLAINT QUOTE
right upper back cyst for many years. For the past 4 days it's red, with bloody discharge and pain radiating to his right arm, on Coumadin

## 2022-02-21 NOTE — ED PROCEDURE NOTE - CPROC ED TIME OUT STATEMENT1
07-Sep-2019 14:38
“Patient's name, , procedure and correct site were confirmed during the [place] Timeout.”
“Patient's name, , procedure and correct site were confirmed during the [place] Timeout.”

## 2022-02-21 NOTE — ED PROVIDER NOTE - NS ED ROS FT
Constitutional: no fevers; no chills  HEENT: no visual changes, no sore throat, no rhinorrhea  CV: no cp; no palpitations  Resp: no sob; no cough  GI: no abd pain, no nausea, no vomiting, no diarrhea, no constipation  : no dysuria, no hematuria  MSK: no myalgias; no arthralgias  skin: no rashes; abscess  neuro: no HA, no numbness; no weakness, no tingling  endo: no polyuria, no polydipsia

## 2022-02-21 NOTE — ED PROVIDER NOTE - PROGRESS NOTE DETAILS
Rose Marie Saeed DO PGY-1  Spoke with pt's daughter. Last INR 2.0 on 2/16. Patient isn't receiving treatment for cancer. Rose Marie Saeed DO PGY-1  Spoke with patient's daughter, given strict return precautions and plan to f/u with dermatologist. Plan for dc home, daughter will  pt from ED. All questions/concerns were addressed.

## 2022-02-23 ENCOUNTER — APPOINTMENT (OUTPATIENT)
Dept: ELECTROPHYSIOLOGY | Facility: CLINIC | Age: 87
End: 2022-02-23
Payer: COMMERCIAL

## 2022-02-23 ENCOUNTER — NON-APPOINTMENT (OUTPATIENT)
Age: 87
End: 2022-02-23

## 2022-02-23 PROCEDURE — 93294 REM INTERROG EVL PM/LDLS PM: CPT

## 2022-02-23 PROCEDURE — 93296 REM INTERROG EVL PM/IDS: CPT

## 2022-05-02 NOTE — HISTORY OF PRESENT ILLNESS
[FreeTextEntry1] : 89yo gentleman with cc of urinary retention. Pt last seen last year for same thing with urinary retention found during hospitalization for CHF. He had slight elevation in Cr at 1.4 (from baseline of ~1) and had bladder scan that showed 700cc of urine. Rivas was placed. He passed VT last year here. Was fine until 2 weeks ago. Per daughter was weak with decreased UOP. Went to [facility] and admitted for CHF exacerbation. He had traumatic rivas and now is in place for 2 weeks. \par \par He is seeing cards this week for repeat BMP to see if Cr back to baseline. Unable to review. He has been taking the flomax and finasteride. \par  \par Today pt here with his dtr Lashonda who reports urinary ss of less frequent voids despite urgency .He voids x 2-3 times per day and x 3 at night, no UI. Denies Constipation, denies constituonal ss , no dysuria, no hematuria . \par PVR > 398 ml.\par

## 2022-05-02 NOTE — ASSESSMENT
[FreeTextEntry1] : 89 y.o gentleman with BPH, hx of UR in the past requiring Chang catheter for few weeks.  \par \par - U/A and culture sent - will call if positive \par \par - PVR today 398cc\par \par - #16 coude inserted under aseptic technique - pt tolerated well. Drained 350 cc of yellow urine \par \par - supplies given to daughter and instruction given on how to switch to night bag\par \par - RTO on Monday for TOV

## 2022-05-02 NOTE — REVIEW OF SYSTEMS
[Dysuria] : no dysuria [Incontinence] : no incontinence [Nocturia] : nocturia [see HPI] : see HPI [Difficulty Walking] : no difficulty walking [Negative] : Heme/Lymph

## 2022-05-02 NOTE — PHYSICAL EXAM
[General Appearance - Well Developed] : well developed [General Appearance - Well Nourished] : well nourished [Normal Appearance] : normal appearance [Well Groomed] : well groomed [General Appearance - In No Acute Distress] : no acute distress [Abdomen Soft] : soft [Abdomen Tenderness] : non-tender [Costovertebral Angle Tenderness] : no ~M costovertebral angle tenderness [Urethral Meatus] : meatus normal [Urinary Bladder Findings] : the bladder was normal on palpation [Scrotum] : the scrotum was normal [Testes Mass (___cm)] : there were no testicular masses [No Prostate Nodules] : no prostate nodules [Edema] : no peripheral edema [] : no respiratory distress [Respiration, Rhythm And Depth] : normal respiratory rhythm and effort [Exaggerated Use Of Accessory Muscles For Inspiration] : no accessory muscle use [Oriented To Time, Place, And Person] : oriented to person, place, and time [Affect] : the affect was normal [Mood] : the mood was normal [Not Anxious] : not anxious [Normal Station and Gait] : the gait and station were normal for the patient's age

## 2022-05-17 NOTE — HISTORY OF PRESENT ILLNESS
[FreeTextEntry1] : 89yo gentleman with cc of urinary retention. Pt has been seen for this twice in the past 2 years. Both associated with CHF exacerbations. He has been taking the flomax and finasteride. He was seen earlier this month. Noted decreased voided volumes and increased LE edema. No constipation. PVR was almost 400 and rivas was placed. He had it removed last week. Initially going well but over the course of the week again with decreasing volumes and increased LE edema. Denies constipation. No dysuria. \par \par PVR today 250.

## 2022-05-17 NOTE — PHYSICAL EXAM
[General Appearance - Well Developed] : well developed [General Appearance - Well Nourished] : well nourished [Normal Appearance] : normal appearance [Well Groomed] : well groomed [General Appearance - In No Acute Distress] : no acute distress [Abdomen Soft] : soft [Abdomen Tenderness] : non-tender [Costovertebral Angle Tenderness] : no ~M costovertebral angle tenderness [Urethral Meatus] : meatus normal [Urinary Bladder Findings] : the bladder was normal on palpation [Scrotum] : the scrotum was normal [Testes Mass (___cm)] : there were no testicular masses [No Prostate Nodules] : no prostate nodules [Edema] : no peripheral edema [] : no respiratory distress [Exaggerated Use Of Accessory Muscles For Inspiration] : no accessory muscle use [Respiration, Rhythm And Depth] : normal respiratory rhythm and effort [Oriented To Time, Place, And Person] : oriented to person, place, and time [Affect] : the affect was normal [Mood] : the mood was normal [Not Anxious] : not anxious [Normal Station and Gait] : the gait and station were normal for the patient's age

## 2022-05-17 NOTE — REVIEW OF SYSTEMS
[Nocturia] : nocturia [see HPI] : see HPI [Negative] : Heme/Lymph [Dysuria] : no dysuria [Incontinence] : no incontinence [Difficulty Walking] : no difficulty walking

## 2022-05-17 NOTE — ASSESSMENT
[FreeTextEntry1] : Low volume retention with bother. Increased PVR again today. \par --Replace rivas\par --F/u cards\par --UA, UCx\par --Cont finasteride\par --Increase flomax to 0.8mg

## 2022-05-27 PROBLEM — N47.2 PARAPHIMOSIS: Status: ACTIVE | Noted: 2022-01-01

## 2022-05-31 PROBLEM — A49.8 CITROBACTER INFECTION: Status: ACTIVE | Noted: 2022-01-01

## 2022-07-15 NOTE — ASSESSMENT
[FreeTextEntry1] : We discussed the VUDS results and explained how there was decreased flow, but only low bladder pressure generated\par \par WE also discussed that PVR at beginning of test was 0 cc, but at end 255 (which was better than before he was started on Tamsulosin 0.8 )\par \par Patient daughter does not think surgery is a very good option at this point \par \par WE also dsicussed CIC and SPT \par \par Ultimately we decided to give him a fill and pull with PVR at nexxt cath change mid august\par we will schedule \par

## 2022-09-13 PROBLEM — N40.1 BPH WITH OBSTRUCTION/LOWER URINARY TRACT SYMPTOMS: Status: ACTIVE | Noted: 2018-03-19

## 2022-09-20 PROBLEM — R33.9 URINARY RETENTION: Status: ACTIVE | Noted: 2018-03-19

## 2022-09-20 PROBLEM — R33.9 INCOMPLETE EMPTYING OF BLADDER: Status: ACTIVE | Noted: 2022-01-01

## 2023-01-01 ENCOUNTER — NON-APPOINTMENT (OUTPATIENT)
Age: 88
End: 2023-01-01

## 2023-01-01 ENCOUNTER — INPATIENT (INPATIENT)
Facility: HOSPITAL | Age: 88
LOS: 26 days | Discharge: EXPIRED | End: 2023-04-15
Attending: STUDENT IN AN ORGANIZED HEALTH CARE EDUCATION/TRAINING PROGRAM | Admitting: STUDENT IN AN ORGANIZED HEALTH CARE EDUCATION/TRAINING PROGRAM
Payer: MEDICARE

## 2023-01-01 ENCOUNTER — APPOINTMENT (OUTPATIENT)
Dept: ELECTROPHYSIOLOGY | Facility: CLINIC | Age: 88
End: 2023-01-01

## 2023-01-01 VITALS
DIASTOLIC BLOOD PRESSURE: 96 MMHG | SYSTOLIC BLOOD PRESSURE: 124 MMHG | RESPIRATION RATE: 20 BRPM | OXYGEN SATURATION: 83 % | HEART RATE: 82 BPM

## 2023-01-01 VITALS — HEART RATE: 65 BPM | SYSTOLIC BLOOD PRESSURE: 100 MMHG | DIASTOLIC BLOOD PRESSURE: 50 MMHG

## 2023-01-01 DIAGNOSIS — Z71.89 OTHER SPECIFIED COUNSELING: ICD-10-CM

## 2023-01-01 DIAGNOSIS — R04.2 HEMOPTYSIS: ICD-10-CM

## 2023-01-01 DIAGNOSIS — E43 UNSPECIFIED SEVERE PROTEIN-CALORIE MALNUTRITION: ICD-10-CM

## 2023-01-01 DIAGNOSIS — R77.8 OTHER SPECIFIED ABNORMALITIES OF PLASMA PROTEINS: ICD-10-CM

## 2023-01-01 DIAGNOSIS — I50.9 HEART FAILURE, UNSPECIFIED: ICD-10-CM

## 2023-01-01 DIAGNOSIS — C34.90 MALIGNANT NEOPLASM OF UNSPECIFIED PART OF UNSPECIFIED BRONCHUS OR LUNG: ICD-10-CM

## 2023-01-01 DIAGNOSIS — Z51.5 ENCOUNTER FOR PALLIATIVE CARE: ICD-10-CM

## 2023-01-01 DIAGNOSIS — D62 ACUTE POSTHEMORRHAGIC ANEMIA: ICD-10-CM

## 2023-01-01 DIAGNOSIS — R53.81 OTHER MALAISE: ICD-10-CM

## 2023-01-01 DIAGNOSIS — N39.0 URINARY TRACT INFECTION, SITE NOT SPECIFIED: ICD-10-CM

## 2023-01-01 DIAGNOSIS — I48.91 UNSPECIFIED ATRIAL FIBRILLATION: ICD-10-CM

## 2023-01-01 DIAGNOSIS — I63.9 CEREBRAL INFARCTION, UNSPECIFIED: ICD-10-CM

## 2023-01-01 DIAGNOSIS — R78.81 BACTEREMIA: ICD-10-CM

## 2023-01-01 DIAGNOSIS — C32.9 MALIGNANT NEOPLASM OF LARYNX, UNSPECIFIED: ICD-10-CM

## 2023-01-01 DIAGNOSIS — R13.10 DYSPHAGIA, UNSPECIFIED: ICD-10-CM

## 2023-01-01 DIAGNOSIS — J18.9 PNEUMONIA, UNSPECIFIED ORGANISM: ICD-10-CM

## 2023-01-01 DIAGNOSIS — Z95.2 PRESENCE OF PROSTHETIC HEART VALVE: Chronic | ICD-10-CM

## 2023-01-01 DIAGNOSIS — N17.9 ACUTE KIDNEY FAILURE, UNSPECIFIED: ICD-10-CM

## 2023-01-01 DIAGNOSIS — E16.2 HYPOGLYCEMIA, UNSPECIFIED: ICD-10-CM

## 2023-01-01 DIAGNOSIS — J96.01 ACUTE RESPIRATORY FAILURE WITH HYPOXIA: ICD-10-CM

## 2023-01-01 DIAGNOSIS — H54.7 UNSPECIFIED VISUAL LOSS: ICD-10-CM

## 2023-01-01 DIAGNOSIS — R31.9 HEMATURIA, UNSPECIFIED: ICD-10-CM

## 2023-01-01 DIAGNOSIS — E87.0 HYPEROSMOLALITY AND HYPERNATREMIA: ICD-10-CM

## 2023-01-01 DIAGNOSIS — A41.02 SEPSIS DUE TO METHICILLIN RESISTANT STAPHYLOCOCCUS AUREUS: ICD-10-CM

## 2023-01-01 LAB
-  AMPICILLIN/SULBACTAM: SIGNIFICANT CHANGE UP
-  CEFAZOLIN: SIGNIFICANT CHANGE UP
-  CLINDAMYCIN: SIGNIFICANT CHANGE UP
-  DAPTOMYCIN: SIGNIFICANT CHANGE UP
-  ERYTHROMYCIN: SIGNIFICANT CHANGE UP
-  GENTAMICIN: SIGNIFICANT CHANGE UP
-  LINEZOLID: SIGNIFICANT CHANGE UP
-  OXACILLIN: SIGNIFICANT CHANGE UP
-  PENICILLIN: SIGNIFICANT CHANGE UP
-  RIFAMPIN: SIGNIFICANT CHANGE UP
-  TETRACYCLINE: SIGNIFICANT CHANGE UP
-  TRIMETHOPRIM/SULFAMETHOXAZOLE: SIGNIFICANT CHANGE UP
-  VANCOMYCIN: SIGNIFICANT CHANGE UP
ALBUMIN SERPL ELPH-MCNC: 3 G/DL — LOW (ref 3.3–5)
ALP SERPL-CCNC: 83 U/L — SIGNIFICANT CHANGE UP (ref 40–120)
ALT FLD-CCNC: 11 U/L — SIGNIFICANT CHANGE UP (ref 4–41)
ANION GAP SERPL CALC-SCNC: 10 MMOL/L — SIGNIFICANT CHANGE UP (ref 7–14)
ANION GAP SERPL CALC-SCNC: 11 MMOL/L — SIGNIFICANT CHANGE UP (ref 7–14)
ANION GAP SERPL CALC-SCNC: 12 MMOL/L — SIGNIFICANT CHANGE UP (ref 7–14)
ANION GAP SERPL CALC-SCNC: 14 MMOL/L — SIGNIFICANT CHANGE UP (ref 7–14)
ANION GAP SERPL CALC-SCNC: 16 MMOL/L — HIGH (ref 7–14)
ANION GAP SERPL CALC-SCNC: 18 MMOL/L — HIGH (ref 7–14)
ANION GAP SERPL CALC-SCNC: 18 MMOL/L — HIGH (ref 7–14)
ANION GAP SERPL CALC-SCNC: 6 MMOL/L — LOW (ref 7–14)
ANION GAP SERPL CALC-SCNC: 7 MMOL/L — SIGNIFICANT CHANGE UP (ref 7–14)
ANION GAP SERPL CALC-SCNC: 9 MMOL/L — SIGNIFICANT CHANGE UP (ref 7–14)
ANISOCYTOSIS BLD QL: SLIGHT — SIGNIFICANT CHANGE UP
APPEARANCE UR: ABNORMAL
APPEARANCE UR: CLEAR — SIGNIFICANT CHANGE UP
APTT BLD: 109.2 SEC — HIGH (ref 27–36.3)
APTT BLD: 116.2 SEC — SIGNIFICANT CHANGE UP (ref 27–36.3)
APTT BLD: 119.8 SEC — HIGH (ref 27–36.3)
APTT BLD: 130.8 SEC — CRITICAL HIGH (ref 27–36.3)
APTT BLD: 142 SEC — CRITICAL HIGH (ref 27–36.3)
APTT BLD: 147.7 SEC — CRITICAL HIGH (ref 27–36.3)
APTT BLD: 29.2 SEC — SIGNIFICANT CHANGE UP (ref 27–36.3)
APTT BLD: 33.6 SEC — SIGNIFICANT CHANGE UP (ref 27–36.3)
APTT BLD: 35.7 SEC — SIGNIFICANT CHANGE UP (ref 27–36.3)
APTT BLD: 37.9 SEC — HIGH (ref 27–36.3)
APTT BLD: 40.9 SEC — HIGH (ref 27–36.3)
APTT BLD: 41.8 SEC — HIGH (ref 27–36.3)
APTT BLD: 48 SEC — HIGH (ref 27–36.3)
APTT BLD: 50.9 SEC — HIGH (ref 27–36.3)
APTT BLD: 55.3 SEC — HIGH (ref 27–36.3)
APTT BLD: 55.8 SEC — HIGH (ref 27–36.3)
APTT BLD: 56 SEC — HIGH (ref 27–36.3)
APTT BLD: 57.7 SEC — HIGH (ref 27–36.3)
APTT BLD: 58.9 SEC — HIGH (ref 27–36.3)
APTT BLD: 61.9 SEC — HIGH (ref 27–36.3)
APTT BLD: 66 SEC — HIGH (ref 27–36.3)
APTT BLD: 71.9 SEC — HIGH (ref 27–36.3)
APTT BLD: 72.1 SEC — HIGH (ref 27–36.3)
APTT BLD: 73.7 SEC — HIGH (ref 27–36.3)
APTT BLD: 80.3 SEC — HIGH (ref 27–36.3)
APTT BLD: 80.6 SEC — HIGH (ref 27–36.3)
APTT BLD: 81.3 SEC — HIGH (ref 27–36.3)
APTT BLD: 81.9 SEC — HIGH (ref 27–36.3)
APTT BLD: 96.9 SEC — HIGH (ref 27–36.3)
AST SERPL-CCNC: 12 U/L — SIGNIFICANT CHANGE UP (ref 4–40)
B PERT DNA SPEC QL NAA+PROBE: SIGNIFICANT CHANGE UP
B PERT DNA SPEC QL NAA+PROBE: SIGNIFICANT CHANGE UP
B PERT+PARAPERT DNA PNL SPEC NAA+PROBE: SIGNIFICANT CHANGE UP
B PERT+PARAPERT DNA PNL SPEC NAA+PROBE: SIGNIFICANT CHANGE UP
BACTERIA # UR AUTO: ABNORMAL
BACTERIA # UR AUTO: NEGATIVE — SIGNIFICANT CHANGE UP
BASE EXCESS BLDV CALC-SCNC: -4.4 MMOL/L — LOW (ref -2–3)
BASE EXCESS BLDV CALC-SCNC: 10.4 MMOL/L — HIGH (ref -2–3)
BASOPHILS # BLD AUTO: 0 K/UL — SIGNIFICANT CHANGE UP (ref 0–0.2)
BASOPHILS # BLD AUTO: 0.01 K/UL — SIGNIFICANT CHANGE UP (ref 0–0.2)
BASOPHILS # BLD AUTO: 0.02 K/UL — SIGNIFICANT CHANGE UP (ref 0–0.2)
BASOPHILS # BLD AUTO: 0.03 K/UL — SIGNIFICANT CHANGE UP (ref 0–0.2)
BASOPHILS NFR BLD AUTO: 0 % — SIGNIFICANT CHANGE UP (ref 0–2)
BASOPHILS NFR BLD AUTO: 0.1 % — SIGNIFICANT CHANGE UP (ref 0–2)
BASOPHILS NFR BLD AUTO: 0.3 % — SIGNIFICANT CHANGE UP (ref 0–2)
BASOPHILS NFR BLD AUTO: 0.3 % — SIGNIFICANT CHANGE UP (ref 0–2)
BILIRUB SERPL-MCNC: 0.8 MG/DL — SIGNIFICANT CHANGE UP (ref 0.2–1.2)
BILIRUB UR-MCNC: NEGATIVE — SIGNIFICANT CHANGE UP
BILIRUB UR-MCNC: NEGATIVE — SIGNIFICANT CHANGE UP
BLD GP AB SCN SERPL QL: NEGATIVE — SIGNIFICANT CHANGE UP
BLOOD GAS VENOUS COMPREHENSIVE RESULT: SIGNIFICANT CHANGE UP
BLOOD GAS VENOUS COMPREHENSIVE RESULT: SIGNIFICANT CHANGE UP
BORDETELLA PARAPERTUSSIS (RAPRVP): SIGNIFICANT CHANGE UP
BORDETELLA PARAPERTUSSIS (RAPRVP): SIGNIFICANT CHANGE UP
BUN SERPL-MCNC: 22 MG/DL — SIGNIFICANT CHANGE UP (ref 7–23)
BUN SERPL-MCNC: 24 MG/DL — HIGH (ref 7–23)
BUN SERPL-MCNC: 26 MG/DL — HIGH (ref 7–23)
BUN SERPL-MCNC: 27 MG/DL — HIGH (ref 7–23)
BUN SERPL-MCNC: 28 MG/DL — HIGH (ref 7–23)
BUN SERPL-MCNC: 30 MG/DL — HIGH (ref 7–23)
BUN SERPL-MCNC: 31 MG/DL — HIGH (ref 7–23)
BUN SERPL-MCNC: 31 MG/DL — HIGH (ref 7–23)
BUN SERPL-MCNC: 33 MG/DL — HIGH (ref 7–23)
BUN SERPL-MCNC: 34 MG/DL — HIGH (ref 7–23)
BUN SERPL-MCNC: 36 MG/DL — HIGH (ref 7–23)
BUN SERPL-MCNC: 36 MG/DL — HIGH (ref 7–23)
BUN SERPL-MCNC: 40 MG/DL — HIGH (ref 7–23)
BUN SERPL-MCNC: 40 MG/DL — HIGH (ref 7–23)
BUN SERPL-MCNC: 41 MG/DL — HIGH (ref 7–23)
BUN SERPL-MCNC: 42 MG/DL — HIGH (ref 7–23)
BUN SERPL-MCNC: 44 MG/DL — HIGH (ref 7–23)
BUN SERPL-MCNC: 51 MG/DL — HIGH (ref 7–23)
BUN SERPL-MCNC: 63 MG/DL — HIGH (ref 7–23)
BUN SERPL-MCNC: 73 MG/DL — HIGH (ref 7–23)
C PNEUM DNA SPEC QL NAA+PROBE: SIGNIFICANT CHANGE UP
C PNEUM DNA SPEC QL NAA+PROBE: SIGNIFICANT CHANGE UP
CALCIUM SERPL-MCNC: 8.1 MG/DL — LOW (ref 8.4–10.5)
CALCIUM SERPL-MCNC: 8.2 MG/DL — LOW (ref 8.4–10.5)
CALCIUM SERPL-MCNC: 8.5 MG/DL — SIGNIFICANT CHANGE UP (ref 8.4–10.5)
CALCIUM SERPL-MCNC: 8.6 MG/DL — SIGNIFICANT CHANGE UP (ref 8.4–10.5)
CALCIUM SERPL-MCNC: 8.6 MG/DL — SIGNIFICANT CHANGE UP (ref 8.4–10.5)
CALCIUM SERPL-MCNC: 8.7 MG/DL — SIGNIFICANT CHANGE UP (ref 8.4–10.5)
CALCIUM SERPL-MCNC: 8.8 MG/DL — SIGNIFICANT CHANGE UP (ref 8.4–10.5)
CALCIUM SERPL-MCNC: 8.8 MG/DL — SIGNIFICANT CHANGE UP (ref 8.4–10.5)
CALCIUM SERPL-MCNC: 8.9 MG/DL — SIGNIFICANT CHANGE UP (ref 8.4–10.5)
CALCIUM SERPL-MCNC: 9 MG/DL — SIGNIFICANT CHANGE UP (ref 8.4–10.5)
CALCIUM SERPL-MCNC: 9.1 MG/DL — SIGNIFICANT CHANGE UP (ref 8.4–10.5)
CALCIUM SERPL-MCNC: 9.3 MG/DL — SIGNIFICANT CHANGE UP (ref 8.4–10.5)
CHLORIDE BLDV-SCNC: 101 MMOL/L — SIGNIFICANT CHANGE UP (ref 96–108)
CHLORIDE BLDV-SCNC: 102 MMOL/L — SIGNIFICANT CHANGE UP (ref 96–108)
CHLORIDE SERPL-SCNC: 100 MMOL/L — SIGNIFICANT CHANGE UP (ref 98–107)
CHLORIDE SERPL-SCNC: 100 MMOL/L — SIGNIFICANT CHANGE UP (ref 98–107)
CHLORIDE SERPL-SCNC: 101 MMOL/L — SIGNIFICANT CHANGE UP (ref 98–107)
CHLORIDE SERPL-SCNC: 101 MMOL/L — SIGNIFICANT CHANGE UP (ref 98–107)
CHLORIDE SERPL-SCNC: 102 MMOL/L — SIGNIFICANT CHANGE UP (ref 98–107)
CHLORIDE SERPL-SCNC: 102 MMOL/L — SIGNIFICANT CHANGE UP (ref 98–107)
CHLORIDE SERPL-SCNC: 103 MMOL/L — SIGNIFICANT CHANGE UP (ref 98–107)
CHLORIDE SERPL-SCNC: 105 MMOL/L — SIGNIFICANT CHANGE UP (ref 98–107)
CHLORIDE SERPL-SCNC: 106 MMOL/L — SIGNIFICANT CHANGE UP (ref 98–107)
CHLORIDE SERPL-SCNC: 107 MMOL/L — SIGNIFICANT CHANGE UP (ref 98–107)
CHLORIDE SERPL-SCNC: 108 MMOL/L — HIGH (ref 98–107)
CHLORIDE SERPL-SCNC: 109 MMOL/L — HIGH (ref 98–107)
CHLORIDE SERPL-SCNC: 109 MMOL/L — HIGH (ref 98–107)
CHLORIDE SERPL-SCNC: 111 MMOL/L — HIGH (ref 98–107)
CHLORIDE SERPL-SCNC: 88 MMOL/L — LOW (ref 98–107)
CHLORIDE SERPL-SCNC: 94 MMOL/L — LOW (ref 98–107)
CHLORIDE SERPL-SCNC: 96 MMOL/L — LOW (ref 98–107)
CHLORIDE SERPL-SCNC: 97 MMOL/L — LOW (ref 98–107)
CHLORIDE SERPL-SCNC: 99 MMOL/L — SIGNIFICANT CHANGE UP (ref 98–107)
CO2 BLDV-SCNC: 26.3 MMOL/L — HIGH (ref 22–26)
CO2 BLDV-SCNC: 40.2 MMOL/L — HIGH (ref 22–26)
CO2 SERPL-SCNC: 23 MMOL/L — SIGNIFICANT CHANGE UP (ref 22–31)
CO2 SERPL-SCNC: 24 MMOL/L — SIGNIFICANT CHANGE UP (ref 22–31)
CO2 SERPL-SCNC: 24 MMOL/L — SIGNIFICANT CHANGE UP (ref 22–31)
CO2 SERPL-SCNC: 25 MMOL/L — SIGNIFICANT CHANGE UP (ref 22–31)
CO2 SERPL-SCNC: 27 MMOL/L — SIGNIFICANT CHANGE UP (ref 22–31)
CO2 SERPL-SCNC: 28 MMOL/L — SIGNIFICANT CHANGE UP (ref 22–31)
CO2 SERPL-SCNC: 29 MMOL/L — SIGNIFICANT CHANGE UP (ref 22–31)
CO2 SERPL-SCNC: 30 MMOL/L — SIGNIFICANT CHANGE UP (ref 22–31)
CO2 SERPL-SCNC: 31 MMOL/L — SIGNIFICANT CHANGE UP (ref 22–31)
CO2 SERPL-SCNC: 31 MMOL/L — SIGNIFICANT CHANGE UP (ref 22–31)
CO2 SERPL-SCNC: 32 MMOL/L — HIGH (ref 22–31)
CO2 SERPL-SCNC: 32 MMOL/L — HIGH (ref 22–31)
CO2 SERPL-SCNC: 33 MMOL/L — HIGH (ref 22–31)
CO2 SERPL-SCNC: 34 MMOL/L — HIGH (ref 22–31)
COLOR SPEC: ABNORMAL
COLOR SPEC: YELLOW — SIGNIFICANT CHANGE UP
COMMENT - URINE: SIGNIFICANT CHANGE UP
CREAT SERPL-MCNC: 0.95 MG/DL — SIGNIFICANT CHANGE UP (ref 0.5–1.3)
CREAT SERPL-MCNC: 0.96 MG/DL — SIGNIFICANT CHANGE UP (ref 0.5–1.3)
CREAT SERPL-MCNC: 0.97 MG/DL — SIGNIFICANT CHANGE UP (ref 0.5–1.3)
CREAT SERPL-MCNC: 0.97 MG/DL — SIGNIFICANT CHANGE UP (ref 0.5–1.3)
CREAT SERPL-MCNC: 1 MG/DL — SIGNIFICANT CHANGE UP (ref 0.5–1.3)
CREAT SERPL-MCNC: 1.02 MG/DL — SIGNIFICANT CHANGE UP (ref 0.5–1.3)
CREAT SERPL-MCNC: 1.03 MG/DL — SIGNIFICANT CHANGE UP (ref 0.5–1.3)
CREAT SERPL-MCNC: 1.12 MG/DL — SIGNIFICANT CHANGE UP (ref 0.5–1.3)
CREAT SERPL-MCNC: 1.13 MG/DL — SIGNIFICANT CHANGE UP (ref 0.5–1.3)
CREAT SERPL-MCNC: 1.16 MG/DL — SIGNIFICANT CHANGE UP (ref 0.5–1.3)
CREAT SERPL-MCNC: 1.27 MG/DL — SIGNIFICANT CHANGE UP (ref 0.5–1.3)
CREAT SERPL-MCNC: 1.33 MG/DL — HIGH (ref 0.5–1.3)
CREAT SERPL-MCNC: 1.33 MG/DL — HIGH (ref 0.5–1.3)
CREAT SERPL-MCNC: 1.34 MG/DL — HIGH (ref 0.5–1.3)
CREAT SERPL-MCNC: 1.39 MG/DL — HIGH (ref 0.5–1.3)
CREAT SERPL-MCNC: 1.39 MG/DL — HIGH (ref 0.5–1.3)
CREAT SERPL-MCNC: 1.46 MG/DL — HIGH (ref 0.5–1.3)
CREAT SERPL-MCNC: 1.49 MG/DL — HIGH (ref 0.5–1.3)
CREAT SERPL-MCNC: 1.5 MG/DL — HIGH (ref 0.5–1.3)
CREAT SERPL-MCNC: 1.84 MG/DL — HIGH (ref 0.5–1.3)
CREAT SERPL-MCNC: 1.92 MG/DL — HIGH (ref 0.5–1.3)
CREAT SERPL-MCNC: 2.25 MG/DL — HIGH (ref 0.5–1.3)
CULTURE RESULTS: SIGNIFICANT CHANGE UP
DIFF PNL FLD: ABNORMAL
DIFF PNL FLD: ABNORMAL
DIGOXIN SERPL-MCNC: 1.1 NG/ML — SIGNIFICANT CHANGE UP (ref 0.8–2)
EGFR: 27 ML/MIN/1.73M2 — LOW
EGFR: 33 ML/MIN/1.73M2 — LOW
EGFR: 34 ML/MIN/1.73M2 — LOW
EGFR: 44 ML/MIN/1.73M2 — LOW
EGFR: 44 ML/MIN/1.73M2 — LOW
EGFR: 45 ML/MIN/1.73M2 — LOW
EGFR: 48 ML/MIN/1.73M2 — LOW
EGFR: 48 ML/MIN/1.73M2 — LOW
EGFR: 50 ML/MIN/1.73M2 — LOW
EGFR: 51 ML/MIN/1.73M2 — LOW
EGFR: 51 ML/MIN/1.73M2 — LOW
EGFR: 54 ML/MIN/1.73M2 — LOW
EGFR: 60 ML/MIN/1.73M2 — SIGNIFICANT CHANGE UP
EGFR: 62 ML/MIN/1.73M2 — SIGNIFICANT CHANGE UP
EGFR: 62 ML/MIN/1.73M2 — SIGNIFICANT CHANGE UP
EGFR: 69 ML/MIN/1.73M2 — SIGNIFICANT CHANGE UP
EGFR: 70 ML/MIN/1.73M2 — SIGNIFICANT CHANGE UP
EGFR: 72 ML/MIN/1.73M2 — SIGNIFICANT CHANGE UP
EGFR: 74 ML/MIN/1.73M2 — SIGNIFICANT CHANGE UP
EGFR: 74 ML/MIN/1.73M2 — SIGNIFICANT CHANGE UP
EGFR: 75 ML/MIN/1.73M2 — SIGNIFICANT CHANGE UP
EGFR: 76 ML/MIN/1.73M2 — SIGNIFICANT CHANGE UP
EOSINOPHIL # BLD AUTO: 0 K/UL — SIGNIFICANT CHANGE UP (ref 0–0.5)
EOSINOPHIL # BLD AUTO: 0 K/UL — SIGNIFICANT CHANGE UP (ref 0–0.5)
EOSINOPHIL # BLD AUTO: 0.02 K/UL — SIGNIFICANT CHANGE UP (ref 0–0.5)
EOSINOPHIL # BLD AUTO: 0.04 K/UL — SIGNIFICANT CHANGE UP (ref 0–0.5)
EOSINOPHIL # BLD AUTO: 0.06 K/UL — SIGNIFICANT CHANGE UP (ref 0–0.5)
EOSINOPHIL # BLD AUTO: 0.07 K/UL — SIGNIFICANT CHANGE UP (ref 0–0.5)
EOSINOPHIL # BLD AUTO: 0.08 K/UL — SIGNIFICANT CHANGE UP (ref 0–0.5)
EOSINOPHIL # BLD AUTO: 0.19 K/UL — SIGNIFICANT CHANGE UP (ref 0–0.5)
EOSINOPHIL NFR BLD AUTO: 0 % — SIGNIFICANT CHANGE UP (ref 0–6)
EOSINOPHIL NFR BLD AUTO: 0 % — SIGNIFICANT CHANGE UP (ref 0–6)
EOSINOPHIL NFR BLD AUTO: 0.1 % — SIGNIFICANT CHANGE UP (ref 0–6)
EOSINOPHIL NFR BLD AUTO: 0.5 % — SIGNIFICANT CHANGE UP (ref 0–6)
EOSINOPHIL NFR BLD AUTO: 0.7 % — SIGNIFICANT CHANGE UP (ref 0–6)
EOSINOPHIL NFR BLD AUTO: 0.8 % — SIGNIFICANT CHANGE UP (ref 0–6)
EOSINOPHIL NFR BLD AUTO: 0.9 % — SIGNIFICANT CHANGE UP (ref 0–6)
EOSINOPHIL NFR BLD AUTO: 1 % — SIGNIFICANT CHANGE UP (ref 0–6)
EPI CELLS # UR: 3 /HPF — SIGNIFICANT CHANGE UP (ref 0–5)
EPI CELLS # UR: 4 /HPF — SIGNIFICANT CHANGE UP (ref 0–5)
FLUAV SUBTYP SPEC NAA+PROBE: SIGNIFICANT CHANGE UP
FLUAV SUBTYP SPEC NAA+PROBE: SIGNIFICANT CHANGE UP
FLUBV RNA SPEC QL NAA+PROBE: SIGNIFICANT CHANGE UP
FLUBV RNA SPEC QL NAA+PROBE: SIGNIFICANT CHANGE UP
GAS PNL BLDV: 133 MMOL/L — LOW (ref 136–145)
GAS PNL BLDV: 139 MMOL/L — SIGNIFICANT CHANGE UP (ref 136–145)
GAS PNL BLDV: SIGNIFICANT CHANGE UP
GAS PNL BLDV: SIGNIFICANT CHANGE UP
GIANT PLATELETS BLD QL SMEAR: PRESENT — SIGNIFICANT CHANGE UP
GLUCOSE BLDC GLUCOMTR-MCNC: 100 MG/DL — HIGH (ref 70–99)
GLUCOSE BLDC GLUCOMTR-MCNC: 100 MG/DL — HIGH (ref 70–99)
GLUCOSE BLDC GLUCOMTR-MCNC: 101 MG/DL — HIGH (ref 70–99)
GLUCOSE BLDC GLUCOMTR-MCNC: 102 MG/DL — HIGH (ref 70–99)
GLUCOSE BLDC GLUCOMTR-MCNC: 103 MG/DL — HIGH (ref 70–99)
GLUCOSE BLDC GLUCOMTR-MCNC: 106 MG/DL — HIGH (ref 70–99)
GLUCOSE BLDC GLUCOMTR-MCNC: 106 MG/DL — HIGH (ref 70–99)
GLUCOSE BLDC GLUCOMTR-MCNC: 107 MG/DL — HIGH (ref 70–99)
GLUCOSE BLDC GLUCOMTR-MCNC: 108 MG/DL — HIGH (ref 70–99)
GLUCOSE BLDC GLUCOMTR-MCNC: 109 MG/DL — HIGH (ref 70–99)
GLUCOSE BLDC GLUCOMTR-MCNC: 111 MG/DL — HIGH (ref 70–99)
GLUCOSE BLDC GLUCOMTR-MCNC: 112 MG/DL — HIGH (ref 70–99)
GLUCOSE BLDC GLUCOMTR-MCNC: 114 MG/DL — HIGH (ref 70–99)
GLUCOSE BLDC GLUCOMTR-MCNC: 114 MG/DL — HIGH (ref 70–99)
GLUCOSE BLDC GLUCOMTR-MCNC: 115 MG/DL — HIGH (ref 70–99)
GLUCOSE BLDC GLUCOMTR-MCNC: 115 MG/DL — HIGH (ref 70–99)
GLUCOSE BLDC GLUCOMTR-MCNC: 117 MG/DL — HIGH (ref 70–99)
GLUCOSE BLDC GLUCOMTR-MCNC: 119 MG/DL — HIGH (ref 70–99)
GLUCOSE BLDC GLUCOMTR-MCNC: 120 MG/DL — HIGH (ref 70–99)
GLUCOSE BLDC GLUCOMTR-MCNC: 123 MG/DL — HIGH (ref 70–99)
GLUCOSE BLDC GLUCOMTR-MCNC: 124 MG/DL — HIGH (ref 70–99)
GLUCOSE BLDC GLUCOMTR-MCNC: 125 MG/DL — HIGH (ref 70–99)
GLUCOSE BLDC GLUCOMTR-MCNC: 127 MG/DL — HIGH (ref 70–99)
GLUCOSE BLDC GLUCOMTR-MCNC: 129 MG/DL — HIGH (ref 70–99)
GLUCOSE BLDC GLUCOMTR-MCNC: 129 MG/DL — HIGH (ref 70–99)
GLUCOSE BLDC GLUCOMTR-MCNC: 130 MG/DL — HIGH (ref 70–99)
GLUCOSE BLDC GLUCOMTR-MCNC: 130 MG/DL — HIGH (ref 70–99)
GLUCOSE BLDC GLUCOMTR-MCNC: 133 MG/DL — HIGH (ref 70–99)
GLUCOSE BLDC GLUCOMTR-MCNC: 133 MG/DL — HIGH (ref 70–99)
GLUCOSE BLDC GLUCOMTR-MCNC: 134 MG/DL — HIGH (ref 70–99)
GLUCOSE BLDC GLUCOMTR-MCNC: 137 MG/DL — HIGH (ref 70–99)
GLUCOSE BLDC GLUCOMTR-MCNC: 138 MG/DL — HIGH (ref 70–99)
GLUCOSE BLDC GLUCOMTR-MCNC: 140 MG/DL — HIGH (ref 70–99)
GLUCOSE BLDC GLUCOMTR-MCNC: 141 MG/DL — HIGH (ref 70–99)
GLUCOSE BLDC GLUCOMTR-MCNC: 142 MG/DL — HIGH (ref 70–99)
GLUCOSE BLDC GLUCOMTR-MCNC: 143 MG/DL — HIGH (ref 70–99)
GLUCOSE BLDC GLUCOMTR-MCNC: 146 MG/DL — HIGH (ref 70–99)
GLUCOSE BLDC GLUCOMTR-MCNC: 147 MG/DL — HIGH (ref 70–99)
GLUCOSE BLDC GLUCOMTR-MCNC: 149 MG/DL — HIGH (ref 70–99)
GLUCOSE BLDC GLUCOMTR-MCNC: 149 MG/DL — HIGH (ref 70–99)
GLUCOSE BLDC GLUCOMTR-MCNC: 150 MG/DL — HIGH (ref 70–99)
GLUCOSE BLDC GLUCOMTR-MCNC: 150 MG/DL — HIGH (ref 70–99)
GLUCOSE BLDC GLUCOMTR-MCNC: 182 MG/DL — HIGH (ref 70–99)
GLUCOSE BLDC GLUCOMTR-MCNC: 205 MG/DL — HIGH (ref 70–99)
GLUCOSE BLDC GLUCOMTR-MCNC: 213 MG/DL — HIGH (ref 70–99)
GLUCOSE BLDC GLUCOMTR-MCNC: 57 MG/DL — LOW (ref 70–99)
GLUCOSE BLDC GLUCOMTR-MCNC: 58 MG/DL — LOW (ref 70–99)
GLUCOSE BLDC GLUCOMTR-MCNC: 61 MG/DL — LOW (ref 70–99)
GLUCOSE BLDC GLUCOMTR-MCNC: 62 MG/DL — LOW (ref 70–99)
GLUCOSE BLDC GLUCOMTR-MCNC: 64 MG/DL — LOW (ref 70–99)
GLUCOSE BLDC GLUCOMTR-MCNC: 67 MG/DL — LOW (ref 70–99)
GLUCOSE BLDC GLUCOMTR-MCNC: 68 MG/DL — LOW (ref 70–99)
GLUCOSE BLDC GLUCOMTR-MCNC: 69 MG/DL — LOW (ref 70–99)
GLUCOSE BLDC GLUCOMTR-MCNC: 70 MG/DL — SIGNIFICANT CHANGE UP (ref 70–99)
GLUCOSE BLDC GLUCOMTR-MCNC: 70 MG/DL — SIGNIFICANT CHANGE UP (ref 70–99)
GLUCOSE BLDC GLUCOMTR-MCNC: 71 MG/DL — SIGNIFICANT CHANGE UP (ref 70–99)
GLUCOSE BLDC GLUCOMTR-MCNC: 72 MG/DL — SIGNIFICANT CHANGE UP (ref 70–99)
GLUCOSE BLDC GLUCOMTR-MCNC: 73 MG/DL — SIGNIFICANT CHANGE UP (ref 70–99)
GLUCOSE BLDC GLUCOMTR-MCNC: 74 MG/DL — SIGNIFICANT CHANGE UP (ref 70–99)
GLUCOSE BLDC GLUCOMTR-MCNC: 75 MG/DL — SIGNIFICANT CHANGE UP (ref 70–99)
GLUCOSE BLDC GLUCOMTR-MCNC: 75 MG/DL — SIGNIFICANT CHANGE UP (ref 70–99)
GLUCOSE BLDC GLUCOMTR-MCNC: 76 MG/DL — SIGNIFICANT CHANGE UP (ref 70–99)
GLUCOSE BLDC GLUCOMTR-MCNC: 78 MG/DL — SIGNIFICANT CHANGE UP (ref 70–99)
GLUCOSE BLDC GLUCOMTR-MCNC: 78 MG/DL — SIGNIFICANT CHANGE UP (ref 70–99)
GLUCOSE BLDC GLUCOMTR-MCNC: 80 MG/DL — SIGNIFICANT CHANGE UP (ref 70–99)
GLUCOSE BLDC GLUCOMTR-MCNC: 80 MG/DL — SIGNIFICANT CHANGE UP (ref 70–99)
GLUCOSE BLDC GLUCOMTR-MCNC: 82 MG/DL — SIGNIFICANT CHANGE UP (ref 70–99)
GLUCOSE BLDC GLUCOMTR-MCNC: 82 MG/DL — SIGNIFICANT CHANGE UP (ref 70–99)
GLUCOSE BLDC GLUCOMTR-MCNC: 83 MG/DL — SIGNIFICANT CHANGE UP (ref 70–99)
GLUCOSE BLDC GLUCOMTR-MCNC: 84 MG/DL — SIGNIFICANT CHANGE UP (ref 70–99)
GLUCOSE BLDC GLUCOMTR-MCNC: 85 MG/DL — SIGNIFICANT CHANGE UP (ref 70–99)
GLUCOSE BLDC GLUCOMTR-MCNC: 86 MG/DL — SIGNIFICANT CHANGE UP (ref 70–99)
GLUCOSE BLDC GLUCOMTR-MCNC: 87 MG/DL — SIGNIFICANT CHANGE UP (ref 70–99)
GLUCOSE BLDC GLUCOMTR-MCNC: 89 MG/DL — SIGNIFICANT CHANGE UP (ref 70–99)
GLUCOSE BLDC GLUCOMTR-MCNC: 90 MG/DL — SIGNIFICANT CHANGE UP (ref 70–99)
GLUCOSE BLDC GLUCOMTR-MCNC: 91 MG/DL — SIGNIFICANT CHANGE UP (ref 70–99)
GLUCOSE BLDC GLUCOMTR-MCNC: 92 MG/DL — SIGNIFICANT CHANGE UP (ref 70–99)
GLUCOSE BLDC GLUCOMTR-MCNC: 93 MG/DL — SIGNIFICANT CHANGE UP (ref 70–99)
GLUCOSE BLDC GLUCOMTR-MCNC: 94 MG/DL — SIGNIFICANT CHANGE UP (ref 70–99)
GLUCOSE BLDC GLUCOMTR-MCNC: 95 MG/DL — SIGNIFICANT CHANGE UP (ref 70–99)
GLUCOSE BLDC GLUCOMTR-MCNC: 96 MG/DL — SIGNIFICANT CHANGE UP (ref 70–99)
GLUCOSE BLDC GLUCOMTR-MCNC: 97 MG/DL — SIGNIFICANT CHANGE UP (ref 70–99)
GLUCOSE BLDC GLUCOMTR-MCNC: 98 MG/DL — SIGNIFICANT CHANGE UP (ref 70–99)
GLUCOSE BLDC GLUCOMTR-MCNC: 98 MG/DL — SIGNIFICANT CHANGE UP (ref 70–99)
GLUCOSE BLDC GLUCOMTR-MCNC: 99 MG/DL — SIGNIFICANT CHANGE UP (ref 70–99)
GLUCOSE BLDC GLUCOMTR-MCNC: 99 MG/DL — SIGNIFICANT CHANGE UP (ref 70–99)
GLUCOSE BLDV-MCNC: 220 MG/DL — HIGH (ref 70–99)
GLUCOSE BLDV-MCNC: 252 MG/DL — HIGH (ref 70–99)
GLUCOSE SERPL-MCNC: 111 MG/DL — HIGH (ref 70–99)
GLUCOSE SERPL-MCNC: 115 MG/DL — HIGH (ref 70–99)
GLUCOSE SERPL-MCNC: 121 MG/DL — HIGH (ref 70–99)
GLUCOSE SERPL-MCNC: 122 MG/DL — HIGH (ref 70–99)
GLUCOSE SERPL-MCNC: 129 MG/DL — HIGH (ref 70–99)
GLUCOSE SERPL-MCNC: 131 MG/DL — HIGH (ref 70–99)
GLUCOSE SERPL-MCNC: 137 MG/DL — HIGH (ref 70–99)
GLUCOSE SERPL-MCNC: 341 MG/DL — HIGH (ref 70–99)
GLUCOSE SERPL-MCNC: 373 MG/DL — HIGH (ref 70–99)
GLUCOSE SERPL-MCNC: 67 MG/DL — LOW (ref 70–99)
GLUCOSE SERPL-MCNC: 71 MG/DL — SIGNIFICANT CHANGE UP (ref 70–99)
GLUCOSE SERPL-MCNC: 72 MG/DL — SIGNIFICANT CHANGE UP (ref 70–99)
GLUCOSE SERPL-MCNC: 74 MG/DL — SIGNIFICANT CHANGE UP (ref 70–99)
GLUCOSE SERPL-MCNC: 80 MG/DL — SIGNIFICANT CHANGE UP (ref 70–99)
GLUCOSE SERPL-MCNC: 80 MG/DL — SIGNIFICANT CHANGE UP (ref 70–99)
GLUCOSE SERPL-MCNC: 82 MG/DL — SIGNIFICANT CHANGE UP (ref 70–99)
GLUCOSE SERPL-MCNC: 85 MG/DL — SIGNIFICANT CHANGE UP (ref 70–99)
GLUCOSE SERPL-MCNC: 85 MG/DL — SIGNIFICANT CHANGE UP (ref 70–99)
GLUCOSE SERPL-MCNC: 88 MG/DL — SIGNIFICANT CHANGE UP (ref 70–99)
GLUCOSE SERPL-MCNC: 88 MG/DL — SIGNIFICANT CHANGE UP (ref 70–99)
GLUCOSE SERPL-MCNC: 89 MG/DL — SIGNIFICANT CHANGE UP (ref 70–99)
GLUCOSE SERPL-MCNC: 89 MG/DL — SIGNIFICANT CHANGE UP (ref 70–99)
GLUCOSE UR QL: NEGATIVE — SIGNIFICANT CHANGE UP
GLUCOSE UR QL: NEGATIVE — SIGNIFICANT CHANGE UP
GRAM STN FLD: SIGNIFICANT CHANGE UP
HADV DNA SPEC QL NAA+PROBE: SIGNIFICANT CHANGE UP
HADV DNA SPEC QL NAA+PROBE: SIGNIFICANT CHANGE UP
HCO3 BLDV-SCNC: 24 MMOL/L — SIGNIFICANT CHANGE UP (ref 22–29)
HCO3 BLDV-SCNC: 38 MMOL/L — HIGH (ref 22–29)
HCOV 229E RNA SPEC QL NAA+PROBE: SIGNIFICANT CHANGE UP
HCOV 229E RNA SPEC QL NAA+PROBE: SIGNIFICANT CHANGE UP
HCOV HKU1 RNA SPEC QL NAA+PROBE: SIGNIFICANT CHANGE UP
HCOV HKU1 RNA SPEC QL NAA+PROBE: SIGNIFICANT CHANGE UP
HCOV NL63 RNA SPEC QL NAA+PROBE: SIGNIFICANT CHANGE UP
HCOV NL63 RNA SPEC QL NAA+PROBE: SIGNIFICANT CHANGE UP
HCOV OC43 RNA SPEC QL NAA+PROBE: SIGNIFICANT CHANGE UP
HCOV OC43 RNA SPEC QL NAA+PROBE: SIGNIFICANT CHANGE UP
HCT VFR BLD CALC: 24.1 % — LOW (ref 39–50)
HCT VFR BLD CALC: 24.4 % — LOW (ref 39–50)
HCT VFR BLD CALC: 26.1 % — LOW (ref 39–50)
HCT VFR BLD CALC: 26.2 % — LOW (ref 39–50)
HCT VFR BLD CALC: 26.7 % — LOW (ref 39–50)
HCT VFR BLD CALC: 27.2 % — LOW (ref 39–50)
HCT VFR BLD CALC: 27.9 % — LOW (ref 39–50)
HCT VFR BLD CALC: 27.9 % — LOW (ref 39–50)
HCT VFR BLD CALC: 28.3 % — LOW (ref 39–50)
HCT VFR BLD CALC: 29.7 % — LOW (ref 39–50)
HCT VFR BLD CALC: 29.8 % — LOW (ref 39–50)
HCT VFR BLD CALC: 30 % — LOW (ref 39–50)
HCT VFR BLD CALC: 30.1 % — LOW (ref 39–50)
HCT VFR BLD CALC: 31.4 % — LOW (ref 39–50)
HCT VFR BLD CALC: 31.8 % — LOW (ref 39–50)
HCT VFR BLD CALC: 31.9 % — LOW (ref 39–50)
HCT VFR BLD CALC: 32.1 % — LOW (ref 39–50)
HCT VFR BLD CALC: 33.5 % — LOW (ref 39–50)
HCT VFR BLD CALC: 33.6 % — LOW (ref 39–50)
HCT VFR BLD CALC: 34.2 % — LOW (ref 39–50)
HCT VFR BLD CALC: 35.1 % — LOW (ref 39–50)
HCT VFR BLD CALC: 35.6 % — LOW (ref 39–50)
HCT VFR BLD CALC: 36.3 % — LOW (ref 39–50)
HCT VFR BLD CALC: 37.1 % — LOW (ref 39–50)
HCT VFR BLD CALC: 38.1 % — LOW (ref 39–50)
HCT VFR BLD CALC: 38.1 % — LOW (ref 39–50)
HCT VFR BLD CALC: 38.2 % — LOW (ref 39–50)
HCT VFR BLD CALC: 38.4 % — LOW (ref 39–50)
HCT VFR BLD CALC: 38.6 % — LOW (ref 39–50)
HCT VFR BLD CALC: 38.9 % — LOW (ref 39–50)
HCT VFR BLD CALC: 42 % — SIGNIFICANT CHANGE UP (ref 39–50)
HCT VFR BLDA CALC: 25 % — LOW (ref 39–51)
HCT VFR BLDA CALC: 39 % — SIGNIFICANT CHANGE UP (ref 39–51)
HGB BLD CALC-MCNC: 13.1 G/DL — SIGNIFICANT CHANGE UP (ref 12.6–17.4)
HGB BLD CALC-MCNC: 8.4 G/DL — LOW (ref 12.6–17.4)
HGB BLD-MCNC: 10.1 G/DL — LOW (ref 13–17)
HGB BLD-MCNC: 10.3 G/DL — LOW (ref 13–17)
HGB BLD-MCNC: 10.5 G/DL — LOW (ref 13–17)
HGB BLD-MCNC: 10.6 G/DL — LOW (ref 13–17)
HGB BLD-MCNC: 11 G/DL — LOW (ref 13–17)
HGB BLD-MCNC: 11.2 G/DL — LOW (ref 13–17)
HGB BLD-MCNC: 11.2 G/DL — LOW (ref 13–17)
HGB BLD-MCNC: 11.3 G/DL — LOW (ref 13–17)
HGB BLD-MCNC: 11.5 G/DL — LOW (ref 13–17)
HGB BLD-MCNC: 11.5 G/DL — LOW (ref 13–17)
HGB BLD-MCNC: 12.3 G/DL — LOW (ref 13–17)
HGB BLD-MCNC: 7.2 G/DL — LOW (ref 13–17)
HGB BLD-MCNC: 7.3 G/DL — LOW (ref 13–17)
HGB BLD-MCNC: 7.8 G/DL — LOW (ref 13–17)
HGB BLD-MCNC: 8 G/DL — LOW (ref 13–17)
HGB BLD-MCNC: 8.1 G/DL — LOW (ref 13–17)
HGB BLD-MCNC: 8.1 G/DL — LOW (ref 13–17)
HGB BLD-MCNC: 8.2 G/DL — LOW (ref 13–17)
HGB BLD-MCNC: 8.3 G/DL — LOW (ref 13–17)
HGB BLD-MCNC: 8.4 G/DL — LOW (ref 13–17)
HGB BLD-MCNC: 9 G/DL — LOW (ref 13–17)
HGB BLD-MCNC: 9.1 G/DL — LOW (ref 13–17)
HGB BLD-MCNC: 9.1 G/DL — LOW (ref 13–17)
HGB BLD-MCNC: 9.3 G/DL — LOW (ref 13–17)
HGB BLD-MCNC: 9.6 G/DL — LOW (ref 13–17)
HGB BLD-MCNC: 9.8 G/DL — LOW (ref 13–17)
HGB BLD-MCNC: 9.9 G/DL — LOW (ref 13–17)
HMPV RNA SPEC QL NAA+PROBE: SIGNIFICANT CHANGE UP
HMPV RNA SPEC QL NAA+PROBE: SIGNIFICANT CHANGE UP
HPIV1 RNA SPEC QL NAA+PROBE: SIGNIFICANT CHANGE UP
HPIV1 RNA SPEC QL NAA+PROBE: SIGNIFICANT CHANGE UP
HPIV2 RNA SPEC QL NAA+PROBE: SIGNIFICANT CHANGE UP
HPIV2 RNA SPEC QL NAA+PROBE: SIGNIFICANT CHANGE UP
HPIV3 RNA SPEC QL NAA+PROBE: SIGNIFICANT CHANGE UP
HPIV3 RNA SPEC QL NAA+PROBE: SIGNIFICANT CHANGE UP
HPIV4 RNA SPEC QL NAA+PROBE: SIGNIFICANT CHANGE UP
HPIV4 RNA SPEC QL NAA+PROBE: SIGNIFICANT CHANGE UP
HYALINE CASTS # UR AUTO: 1 /LPF — SIGNIFICANT CHANGE UP (ref 0–7)
HYPOCHROMIA BLD QL: SLIGHT — SIGNIFICANT CHANGE UP
IANC: 12.57 K/UL — HIGH (ref 1.8–7.4)
IANC: 15.78 K/UL — HIGH (ref 1.8–7.4)
IANC: 15.79 K/UL — HIGH (ref 1.8–7.4)
IANC: 22.99 K/UL — HIGH (ref 1.8–7.4)
IANC: 6.1 K/UL — SIGNIFICANT CHANGE UP (ref 1.8–7.4)
IANC: 6.36 K/UL — SIGNIFICANT CHANGE UP (ref 1.8–7.4)
IANC: 6.99 K/UL — SIGNIFICANT CHANGE UP (ref 1.8–7.4)
IANC: 7.2 K/UL — SIGNIFICANT CHANGE UP (ref 1.8–7.4)
IMM GRANULOCYTES NFR BLD AUTO: 0.5 % — SIGNIFICANT CHANGE UP (ref 0–0.9)
IMM GRANULOCYTES NFR BLD AUTO: 0.5 % — SIGNIFICANT CHANGE UP (ref 0–0.9)
IMM GRANULOCYTES NFR BLD AUTO: 0.6 % — SIGNIFICANT CHANGE UP (ref 0–0.9)
IMM GRANULOCYTES NFR BLD AUTO: 1 % — HIGH (ref 0–0.9)
IMM GRANULOCYTES NFR BLD AUTO: 1 % — HIGH (ref 0–0.9)
IMM GRANULOCYTES NFR BLD AUTO: 1.1 % — HIGH (ref 0–0.9)
IMM GRANULOCYTES NFR BLD AUTO: 1.4 % — HIGH (ref 0–0.9)
INR BLD: 1.32 RATIO — HIGH (ref 0.88–1.16)
INR BLD: 1.39 RATIO — HIGH (ref 0.88–1.16)
INR BLD: 1.44 RATIO — HIGH (ref 0.88–1.16)
INR BLD: 1.45 RATIO — HIGH (ref 0.88–1.16)
INR BLD: 1.7 RATIO — HIGH (ref 0.88–1.16)
INR BLD: 1.79 RATIO — HIGH (ref 0.88–1.16)
INR BLD: 2.43 RATIO — HIGH (ref 0.88–1.16)
INR BLD: 8.29 RATIO — CRITICAL HIGH (ref 0.88–1.16)
KETONES UR-MCNC: NEGATIVE — SIGNIFICANT CHANGE UP
KETONES UR-MCNC: NEGATIVE — SIGNIFICANT CHANGE UP
LACTATE BLDV-MCNC: 3.5 MMOL/L — HIGH (ref 0.5–2)
LACTATE BLDV-MCNC: 3.8 MMOL/L — HIGH (ref 0.5–2)
LEGIONELLA AG UR QL: NEGATIVE — SIGNIFICANT CHANGE UP
LEUKOCYTE ESTERASE UR-ACNC: ABNORMAL
LEUKOCYTE ESTERASE UR-ACNC: ABNORMAL
LYMPHOCYTES # BLD AUTO: 0.22 K/UL — LOW (ref 1–3.3)
LYMPHOCYTES # BLD AUTO: 0.5 K/UL — LOW (ref 1–3.3)
LYMPHOCYTES # BLD AUTO: 0.5 K/UL — LOW (ref 1–3.3)
LYMPHOCYTES # BLD AUTO: 0.73 K/UL — LOW (ref 1–3.3)
LYMPHOCYTES # BLD AUTO: 0.76 K/UL — LOW (ref 1–3.3)
LYMPHOCYTES # BLD AUTO: 0.76 K/UL — LOW (ref 1–3.3)
LYMPHOCYTES # BLD AUTO: 0.79 K/UL — LOW (ref 1–3.3)
LYMPHOCYTES # BLD AUTO: 0.89 K/UL — LOW (ref 1–3.3)
LYMPHOCYTES # BLD AUTO: 0.9 % — LOW (ref 13–44)
LYMPHOCYTES # BLD AUTO: 10.1 % — LOW (ref 13–44)
LYMPHOCYTES # BLD AUTO: 10.1 % — LOW (ref 13–44)
LYMPHOCYTES # BLD AUTO: 10.2 % — LOW (ref 13–44)
LYMPHOCYTES # BLD AUTO: 2.8 % — LOW (ref 13–44)
LYMPHOCYTES # BLD AUTO: 2.9 % — LOW (ref 13–44)
LYMPHOCYTES # BLD AUTO: 5.1 % — LOW (ref 13–44)
LYMPHOCYTES # BLD AUTO: 8.8 % — LOW (ref 13–44)
M PNEUMO DNA SPEC QL NAA+PROBE: SIGNIFICANT CHANGE UP
M PNEUMO DNA SPEC QL NAA+PROBE: SIGNIFICANT CHANGE UP
MACROCYTES BLD QL: SIGNIFICANT CHANGE UP
MACROCYTES BLD QL: SIGNIFICANT CHANGE UP
MACROCYTES BLD QL: SLIGHT — SIGNIFICANT CHANGE UP
MAGNESIUM SERPL-MCNC: 1.5 MG/DL — LOW (ref 1.6–2.6)
MAGNESIUM SERPL-MCNC: 1.5 MG/DL — LOW (ref 1.6–2.6)
MAGNESIUM SERPL-MCNC: 1.6 MG/DL — SIGNIFICANT CHANGE UP (ref 1.6–2.6)
MAGNESIUM SERPL-MCNC: 1.7 MG/DL — SIGNIFICANT CHANGE UP (ref 1.6–2.6)
MAGNESIUM SERPL-MCNC: 1.9 MG/DL — SIGNIFICANT CHANGE UP (ref 1.6–2.6)
MAGNESIUM SERPL-MCNC: 2 MG/DL — SIGNIFICANT CHANGE UP (ref 1.6–2.6)
MAGNESIUM SERPL-MCNC: 2 MG/DL — SIGNIFICANT CHANGE UP (ref 1.6–2.6)
MAGNESIUM SERPL-MCNC: 2.1 MG/DL — SIGNIFICANT CHANGE UP (ref 1.6–2.6)
MAGNESIUM SERPL-MCNC: 2.1 MG/DL — SIGNIFICANT CHANGE UP (ref 1.6–2.6)
MAGNESIUM SERPL-MCNC: 2.2 MG/DL — SIGNIFICANT CHANGE UP (ref 1.6–2.6)
MAGNESIUM SERPL-MCNC: 2.4 MG/DL — SIGNIFICANT CHANGE UP (ref 1.6–2.6)
MAGNESIUM SERPL-MCNC: 2.4 MG/DL — SIGNIFICANT CHANGE UP (ref 1.6–2.6)
MAGNESIUM SERPL-MCNC: 2.6 MG/DL — SIGNIFICANT CHANGE UP (ref 1.6–2.6)
MANUAL SMEAR VERIFICATION: SIGNIFICANT CHANGE UP
MANUAL SMEAR VERIFICATION: SIGNIFICANT CHANGE UP
MCHC RBC-ENTMCNC: 28.9 GM/DL — LOW (ref 32–36)
MCHC RBC-ENTMCNC: 29 GM/DL — LOW (ref 32–36)
MCHC RBC-ENTMCNC: 29.3 GM/DL — LOW (ref 32–36)
MCHC RBC-ENTMCNC: 29.3 GM/DL — LOW (ref 32–36)
MCHC RBC-ENTMCNC: 29.4 GM/DL — LOW (ref 32–36)
MCHC RBC-ENTMCNC: 29.4 GM/DL — LOW (ref 32–36)
MCHC RBC-ENTMCNC: 29.6 GM/DL — LOW (ref 32–36)
MCHC RBC-ENTMCNC: 29.6 GM/DL — LOW (ref 32–36)
MCHC RBC-ENTMCNC: 29.6 PG — SIGNIFICANT CHANGE UP (ref 27–34)
MCHC RBC-ENTMCNC: 29.7 GM/DL — LOW (ref 32–36)
MCHC RBC-ENTMCNC: 29.7 GM/DL — LOW (ref 32–36)
MCHC RBC-ENTMCNC: 29.7 PG — SIGNIFICANT CHANGE UP (ref 27–34)
MCHC RBC-ENTMCNC: 29.7 PG — SIGNIFICANT CHANGE UP (ref 27–34)
MCHC RBC-ENTMCNC: 29.8 GM/DL — LOW (ref 32–36)
MCHC RBC-ENTMCNC: 29.9 GM/DL — LOW (ref 32–36)
MCHC RBC-ENTMCNC: 29.9 GM/DL — LOW (ref 32–36)
MCHC RBC-ENTMCNC: 29.9 PG — SIGNIFICANT CHANGE UP (ref 27–34)
MCHC RBC-ENTMCNC: 30.1 GM/DL — LOW (ref 32–36)
MCHC RBC-ENTMCNC: 30.1 PG — SIGNIFICANT CHANGE UP (ref 27–34)
MCHC RBC-ENTMCNC: 30.2 GM/DL — LOW (ref 32–36)
MCHC RBC-ENTMCNC: 30.2 PG — SIGNIFICANT CHANGE UP (ref 27–34)
MCHC RBC-ENTMCNC: 30.3 GM/DL — LOW (ref 32–36)
MCHC RBC-ENTMCNC: 30.3 PG — SIGNIFICANT CHANGE UP (ref 27–34)
MCHC RBC-ENTMCNC: 30.4 PG — SIGNIFICANT CHANGE UP (ref 27–34)
MCHC RBC-ENTMCNC: 30.4 PG — SIGNIFICANT CHANGE UP (ref 27–34)
MCHC RBC-ENTMCNC: 30.5 GM/DL — LOW (ref 32–36)
MCHC RBC-ENTMCNC: 30.5 PG — SIGNIFICANT CHANGE UP (ref 27–34)
MCHC RBC-ENTMCNC: 30.6 GM/DL — LOW (ref 32–36)
MCHC RBC-ENTMCNC: 30.6 PG — SIGNIFICANT CHANGE UP (ref 27–34)
MCHC RBC-ENTMCNC: 30.6 PG — SIGNIFICANT CHANGE UP (ref 27–34)
MCHC RBC-ENTMCNC: 30.7 GM/DL — LOW (ref 32–36)
MCHC RBC-ENTMCNC: 30.7 PG — SIGNIFICANT CHANGE UP (ref 27–34)
MCHC RBC-ENTMCNC: 30.8 PG — SIGNIFICANT CHANGE UP (ref 27–34)
MCHC RBC-ENTMCNC: 30.9 PG — SIGNIFICANT CHANGE UP (ref 27–34)
MCHC RBC-ENTMCNC: 31.1 PG — SIGNIFICANT CHANGE UP (ref 27–34)
MCHC RBC-ENTMCNC: 31.2 PG — SIGNIFICANT CHANGE UP (ref 27–34)
MCHC RBC-ENTMCNC: 31.3 GM/DL — LOW (ref 32–36)
MCHC RBC-ENTMCNC: 31.3 GM/DL — LOW (ref 32–36)
MCV RBC AUTO: 100 FL — SIGNIFICANT CHANGE UP (ref 80–100)
MCV RBC AUTO: 100 FL — SIGNIFICANT CHANGE UP (ref 80–100)
MCV RBC AUTO: 100.3 FL — HIGH (ref 80–100)
MCV RBC AUTO: 100.4 FL — HIGH (ref 80–100)
MCV RBC AUTO: 100.5 FL — HIGH (ref 80–100)
MCV RBC AUTO: 100.6 FL — HIGH (ref 80–100)
MCV RBC AUTO: 100.8 FL — HIGH (ref 80–100)
MCV RBC AUTO: 100.8 FL — HIGH (ref 80–100)
MCV RBC AUTO: 100.9 FL — HIGH (ref 80–100)
MCV RBC AUTO: 101.1 FL — HIGH (ref 80–100)
MCV RBC AUTO: 101.3 FL — HIGH (ref 80–100)
MCV RBC AUTO: 101.4 FL — HIGH (ref 80–100)
MCV RBC AUTO: 101.5 FL — HIGH (ref 80–100)
MCV RBC AUTO: 101.6 FL — HIGH (ref 80–100)
MCV RBC AUTO: 101.6 FL — HIGH (ref 80–100)
MCV RBC AUTO: 101.7 FL — HIGH (ref 80–100)
MCV RBC AUTO: 101.9 FL — HIGH (ref 80–100)
MCV RBC AUTO: 101.9 FL — HIGH (ref 80–100)
MCV RBC AUTO: 102 FL — HIGH (ref 80–100)
MCV RBC AUTO: 102.2 FL — HIGH (ref 80–100)
MCV RBC AUTO: 102.2 FL — HIGH (ref 80–100)
MCV RBC AUTO: 102.4 FL — HIGH (ref 80–100)
MCV RBC AUTO: 102.9 FL — HIGH (ref 80–100)
MCV RBC AUTO: 102.9 FL — HIGH (ref 80–100)
MCV RBC AUTO: 103 FL — HIGH (ref 80–100)
MCV RBC AUTO: 104 FL — HIGH (ref 80–100)
MCV RBC AUTO: 98 FL — SIGNIFICANT CHANGE UP (ref 80–100)
MCV RBC AUTO: 98.7 FL — SIGNIFICANT CHANGE UP (ref 80–100)
MCV RBC AUTO: 99.1 FL — SIGNIFICANT CHANGE UP (ref 80–100)
MCV RBC AUTO: 99.2 FL — SIGNIFICANT CHANGE UP (ref 80–100)
MCV RBC AUTO: 99.7 FL — SIGNIFICANT CHANGE UP (ref 80–100)
METHOD TYPE: SIGNIFICANT CHANGE UP
METHOD TYPE: SIGNIFICANT CHANGE UP
MICROCYTES BLD QL: SLIGHT — SIGNIFICANT CHANGE UP
MONOCYTES # BLD AUTO: 0.22 K/UL — SIGNIFICANT CHANGE UP (ref 0–0.9)
MONOCYTES # BLD AUTO: 0.51 K/UL — SIGNIFICANT CHANGE UP (ref 0–0.9)
MONOCYTES # BLD AUTO: 0.52 K/UL — SIGNIFICANT CHANGE UP (ref 0–0.9)
MONOCYTES # BLD AUTO: 0.55 K/UL — SIGNIFICANT CHANGE UP (ref 0–0.9)
MONOCYTES # BLD AUTO: 0.7 K/UL — SIGNIFICANT CHANGE UP (ref 0–0.9)
MONOCYTES # BLD AUTO: 0.83 K/UL — SIGNIFICANT CHANGE UP (ref 0–0.9)
MONOCYTES # BLD AUTO: 0.86 K/UL — SIGNIFICANT CHANGE UP (ref 0–0.9)
MONOCYTES # BLD AUTO: 1.02 K/UL — HIGH (ref 0–0.9)
MONOCYTES NFR BLD AUTO: 0.9 % — LOW (ref 2–14)
MONOCYTES NFR BLD AUTO: 5 % — SIGNIFICANT CHANGE UP (ref 2–14)
MONOCYTES NFR BLD AUTO: 5.8 % — SIGNIFICANT CHANGE UP (ref 2–14)
MONOCYTES NFR BLD AUTO: 5.8 % — SIGNIFICANT CHANGE UP (ref 2–14)
MONOCYTES NFR BLD AUTO: 6.4 % — SIGNIFICANT CHANGE UP (ref 2–14)
MONOCYTES NFR BLD AUTO: 6.6 % — SIGNIFICANT CHANGE UP (ref 2–14)
MONOCYTES NFR BLD AUTO: 6.8 % — SIGNIFICANT CHANGE UP (ref 2–14)
MONOCYTES NFR BLD AUTO: 8 % — SIGNIFICANT CHANGE UP (ref 2–14)
MRSA SPEC QL CULT: SIGNIFICANT CHANGE UP
NEUTROPHILS # BLD AUTO: 12.57 K/UL — HIGH (ref 1.8–7.4)
NEUTROPHILS # BLD AUTO: 15.78 K/UL — HIGH (ref 1.8–7.4)
NEUTROPHILS # BLD AUTO: 15.79 K/UL — HIGH (ref 1.8–7.4)
NEUTROPHILS # BLD AUTO: 23.28 K/UL — HIGH (ref 1.8–7.4)
NEUTROPHILS # BLD AUTO: 6.1 K/UL — SIGNIFICANT CHANGE UP (ref 1.8–7.4)
NEUTROPHILS # BLD AUTO: 6.36 K/UL — SIGNIFICANT CHANGE UP (ref 1.8–7.4)
NEUTROPHILS # BLD AUTO: 6.99 K/UL — SIGNIFICANT CHANGE UP (ref 1.8–7.4)
NEUTROPHILS # BLD AUTO: 7.2 K/UL — SIGNIFICANT CHANGE UP (ref 1.8–7.4)
NEUTROPHILS NFR BLD AUTO: 79.8 % — HIGH (ref 43–77)
NEUTROPHILS NFR BLD AUTO: 81 % — HIGH (ref 43–77)
NEUTROPHILS NFR BLD AUTO: 81 % — HIGH (ref 43–77)
NEUTROPHILS NFR BLD AUTO: 83.6 % — HIGH (ref 43–77)
NEUTROPHILS NFR BLD AUTO: 88.5 % — HIGH (ref 43–77)
NEUTROPHILS NFR BLD AUTO: 89.8 % — HIGH (ref 43–77)
NEUTROPHILS NFR BLD AUTO: 91.5 % — HIGH (ref 43–77)
NEUTROPHILS NFR BLD AUTO: 92.2 % — HIGH (ref 43–77)
NEUTS BAND # BLD: 5.2 % — SIGNIFICANT CHANGE UP (ref 0–6)
NEUTS BAND # BLD: 6.9 % — HIGH (ref 0–6)
NEUTS BAND # BLD: 8.8 % — HIGH (ref 0–6)
NITRITE UR-MCNC: NEGATIVE — SIGNIFICANT CHANGE UP
NITRITE UR-MCNC: NEGATIVE — SIGNIFICANT CHANGE UP
NRBC # BLD: 0 /100 WBCS — SIGNIFICANT CHANGE UP (ref 0–0)
NRBC # FLD: 0 K/UL — SIGNIFICANT CHANGE UP (ref 0–0)
OB PNL STL: NEGATIVE — SIGNIFICANT CHANGE UP
ORGANISM # SPEC MICROSCOPIC CNT: SIGNIFICANT CHANGE UP
OVALOCYTES BLD QL SMEAR: SLIGHT — SIGNIFICANT CHANGE UP
OVALOCYTES BLD QL SMEAR: SLIGHT — SIGNIFICANT CHANGE UP
PCO2 BLDV: 61 MMHG — HIGH (ref 42–55)
PCO2 BLDV: 72 MMHG — HIGH (ref 42–55)
PH BLDV: 7.21 — LOW (ref 7.32–7.43)
PH BLDV: 7.33 — SIGNIFICANT CHANGE UP (ref 7.32–7.43)
PH UR: 6 — SIGNIFICANT CHANGE UP (ref 5–8)
PH UR: 6 — SIGNIFICANT CHANGE UP (ref 5–8)
PHOSPHATE SERPL-MCNC: 2.4 MG/DL — LOW (ref 2.5–4.5)
PHOSPHATE SERPL-MCNC: 2.4 MG/DL — LOW (ref 2.5–4.5)
PHOSPHATE SERPL-MCNC: 2.6 MG/DL — SIGNIFICANT CHANGE UP (ref 2.5–4.5)
PHOSPHATE SERPL-MCNC: 2.7 MG/DL — SIGNIFICANT CHANGE UP (ref 2.5–4.5)
PHOSPHATE SERPL-MCNC: 2.8 MG/DL — SIGNIFICANT CHANGE UP (ref 2.5–4.5)
PHOSPHATE SERPL-MCNC: 3 MG/DL — SIGNIFICANT CHANGE UP (ref 2.5–4.5)
PHOSPHATE SERPL-MCNC: 3.1 MG/DL — SIGNIFICANT CHANGE UP (ref 2.5–4.5)
PHOSPHATE SERPL-MCNC: 3.1 MG/DL — SIGNIFICANT CHANGE UP (ref 2.5–4.5)
PHOSPHATE SERPL-MCNC: 3.2 MG/DL — SIGNIFICANT CHANGE UP (ref 2.5–4.5)
PHOSPHATE SERPL-MCNC: 3.5 MG/DL — SIGNIFICANT CHANGE UP (ref 2.5–4.5)
PHOSPHATE SERPL-MCNC: 3.6 MG/DL — SIGNIFICANT CHANGE UP (ref 2.5–4.5)
PHOSPHATE SERPL-MCNC: 3.7 MG/DL — SIGNIFICANT CHANGE UP (ref 2.5–4.5)
PHOSPHATE SERPL-MCNC: 3.7 MG/DL — SIGNIFICANT CHANGE UP (ref 2.5–4.5)
PHOSPHATE SERPL-MCNC: 3.8 MG/DL — SIGNIFICANT CHANGE UP (ref 2.5–4.5)
PLAT MORPH BLD: ABNORMAL
PLAT MORPH BLD: NORMAL — SIGNIFICANT CHANGE UP
PLAT MORPH BLD: NORMAL — SIGNIFICANT CHANGE UP
PLATELET # BLD AUTO: 113 K/UL — LOW (ref 150–400)
PLATELET # BLD AUTO: 124 K/UL — LOW (ref 150–400)
PLATELET # BLD AUTO: 125 K/UL — LOW (ref 150–400)
PLATELET # BLD AUTO: 125 K/UL — LOW (ref 150–400)
PLATELET # BLD AUTO: 132 K/UL — LOW (ref 150–400)
PLATELET # BLD AUTO: 146 K/UL — LOW (ref 150–400)
PLATELET # BLD AUTO: 149 K/UL — LOW (ref 150–400)
PLATELET # BLD AUTO: 149 K/UL — LOW (ref 150–400)
PLATELET # BLD AUTO: 151 K/UL — SIGNIFICANT CHANGE UP (ref 150–400)
PLATELET # BLD AUTO: 154 K/UL — SIGNIFICANT CHANGE UP (ref 150–400)
PLATELET # BLD AUTO: 157 K/UL — SIGNIFICANT CHANGE UP (ref 150–400)
PLATELET # BLD AUTO: 160 K/UL — SIGNIFICANT CHANGE UP (ref 150–400)
PLATELET # BLD AUTO: 161 K/UL — SIGNIFICANT CHANGE UP (ref 150–400)
PLATELET # BLD AUTO: 165 K/UL — SIGNIFICANT CHANGE UP (ref 150–400)
PLATELET # BLD AUTO: 170 K/UL — SIGNIFICANT CHANGE UP (ref 150–400)
PLATELET # BLD AUTO: 179 K/UL — SIGNIFICANT CHANGE UP (ref 150–400)
PLATELET # BLD AUTO: 182 K/UL — SIGNIFICANT CHANGE UP (ref 150–400)
PLATELET # BLD AUTO: 184 K/UL — SIGNIFICANT CHANGE UP (ref 150–400)
PLATELET # BLD AUTO: 185 K/UL — SIGNIFICANT CHANGE UP (ref 150–400)
PLATELET # BLD AUTO: 186 K/UL — SIGNIFICANT CHANGE UP (ref 150–400)
PLATELET # BLD AUTO: 187 K/UL — SIGNIFICANT CHANGE UP (ref 150–400)
PLATELET # BLD AUTO: 188 K/UL — SIGNIFICANT CHANGE UP (ref 150–400)
PLATELET # BLD AUTO: 192 K/UL — SIGNIFICANT CHANGE UP (ref 150–400)
PLATELET # BLD AUTO: 200 K/UL — SIGNIFICANT CHANGE UP (ref 150–400)
PLATELET # BLD AUTO: 203 K/UL — SIGNIFICANT CHANGE UP (ref 150–400)
PLATELET # BLD AUTO: 212 K/UL — SIGNIFICANT CHANGE UP (ref 150–400)
PLATELET # BLD AUTO: 246 K/UL — SIGNIFICANT CHANGE UP (ref 150–400)
PLATELET # BLD AUTO: 256 K/UL — SIGNIFICANT CHANGE UP (ref 150–400)
PLATELET # BLD AUTO: 259 K/UL — SIGNIFICANT CHANGE UP (ref 150–400)
PLATELET # BLD AUTO: 323 K/UL — SIGNIFICANT CHANGE UP (ref 150–400)
PLATELET # BLD AUTO: 325 K/UL — SIGNIFICANT CHANGE UP (ref 150–400)
PLATELET COUNT - ESTIMATE: ABNORMAL
PLATELET COUNT - ESTIMATE: NORMAL — SIGNIFICANT CHANGE UP
PLATELET COUNT - ESTIMATE: NORMAL — SIGNIFICANT CHANGE UP
PO2 BLDV: 26 MMHG — SIGNIFICANT CHANGE UP (ref 25–45)
PO2 BLDV: 33 MMHG — SIGNIFICANT CHANGE UP (ref 25–45)
POIKILOCYTOSIS BLD QL AUTO: SLIGHT — SIGNIFICANT CHANGE UP
POIKILOCYTOSIS BLD QL AUTO: SLIGHT — SIGNIFICANT CHANGE UP
POLYCHROMASIA BLD QL SMEAR: SLIGHT — SIGNIFICANT CHANGE UP
POLYCHROMASIA BLD QL SMEAR: SLIGHT — SIGNIFICANT CHANGE UP
POTASSIUM BLDV-SCNC: 4.4 MMOL/L — SIGNIFICANT CHANGE UP (ref 3.5–5.1)
POTASSIUM BLDV-SCNC: 5.1 MMOL/L — SIGNIFICANT CHANGE UP (ref 3.5–5.1)
POTASSIUM SERPL-MCNC: 3.3 MMOL/L — LOW (ref 3.5–5.3)
POTASSIUM SERPL-MCNC: 3.6 MMOL/L — SIGNIFICANT CHANGE UP (ref 3.5–5.3)
POTASSIUM SERPL-MCNC: 3.8 MMOL/L — SIGNIFICANT CHANGE UP (ref 3.5–5.3)
POTASSIUM SERPL-MCNC: 3.8 MMOL/L — SIGNIFICANT CHANGE UP (ref 3.5–5.3)
POTASSIUM SERPL-MCNC: 3.9 MMOL/L — SIGNIFICANT CHANGE UP (ref 3.5–5.3)
POTASSIUM SERPL-MCNC: 4 MMOL/L — SIGNIFICANT CHANGE UP (ref 3.5–5.3)
POTASSIUM SERPL-MCNC: 4.1 MMOL/L — SIGNIFICANT CHANGE UP (ref 3.5–5.3)
POTASSIUM SERPL-MCNC: 4.2 MMOL/L — SIGNIFICANT CHANGE UP (ref 3.5–5.3)
POTASSIUM SERPL-MCNC: 4.3 MMOL/L — SIGNIFICANT CHANGE UP (ref 3.5–5.3)
POTASSIUM SERPL-MCNC: 4.4 MMOL/L — SIGNIFICANT CHANGE UP (ref 3.5–5.3)
POTASSIUM SERPL-MCNC: 4.4 MMOL/L — SIGNIFICANT CHANGE UP (ref 3.5–5.3)
POTASSIUM SERPL-MCNC: 4.5 MMOL/L — SIGNIFICANT CHANGE UP (ref 3.5–5.3)
POTASSIUM SERPL-MCNC: 4.5 MMOL/L — SIGNIFICANT CHANGE UP (ref 3.5–5.3)
POTASSIUM SERPL-MCNC: 4.6 MMOL/L — SIGNIFICANT CHANGE UP (ref 3.5–5.3)
POTASSIUM SERPL-MCNC: 4.7 MMOL/L — SIGNIFICANT CHANGE UP (ref 3.5–5.3)
POTASSIUM SERPL-MCNC: 4.9 MMOL/L — SIGNIFICANT CHANGE UP (ref 3.5–5.3)
POTASSIUM SERPL-MCNC: 5.1 MMOL/L — SIGNIFICANT CHANGE UP (ref 3.5–5.3)
POTASSIUM SERPL-MCNC: 5.1 MMOL/L — SIGNIFICANT CHANGE UP (ref 3.5–5.3)
POTASSIUM SERPL-MCNC: 5.5 MMOL/L — HIGH (ref 3.5–5.3)
POTASSIUM SERPL-MCNC: 5.7 MMOL/L — HIGH (ref 3.5–5.3)
POTASSIUM SERPL-SCNC: 3.3 MMOL/L — LOW (ref 3.5–5.3)
POTASSIUM SERPL-SCNC: 3.6 MMOL/L — SIGNIFICANT CHANGE UP (ref 3.5–5.3)
POTASSIUM SERPL-SCNC: 3.8 MMOL/L — SIGNIFICANT CHANGE UP (ref 3.5–5.3)
POTASSIUM SERPL-SCNC: 3.8 MMOL/L — SIGNIFICANT CHANGE UP (ref 3.5–5.3)
POTASSIUM SERPL-SCNC: 3.9 MMOL/L — SIGNIFICANT CHANGE UP (ref 3.5–5.3)
POTASSIUM SERPL-SCNC: 4 MMOL/L — SIGNIFICANT CHANGE UP (ref 3.5–5.3)
POTASSIUM SERPL-SCNC: 4.1 MMOL/L — SIGNIFICANT CHANGE UP (ref 3.5–5.3)
POTASSIUM SERPL-SCNC: 4.2 MMOL/L — SIGNIFICANT CHANGE UP (ref 3.5–5.3)
POTASSIUM SERPL-SCNC: 4.3 MMOL/L — SIGNIFICANT CHANGE UP (ref 3.5–5.3)
POTASSIUM SERPL-SCNC: 4.4 MMOL/L — SIGNIFICANT CHANGE UP (ref 3.5–5.3)
POTASSIUM SERPL-SCNC: 4.4 MMOL/L — SIGNIFICANT CHANGE UP (ref 3.5–5.3)
POTASSIUM SERPL-SCNC: 4.5 MMOL/L — SIGNIFICANT CHANGE UP (ref 3.5–5.3)
POTASSIUM SERPL-SCNC: 4.5 MMOL/L — SIGNIFICANT CHANGE UP (ref 3.5–5.3)
POTASSIUM SERPL-SCNC: 4.6 MMOL/L — SIGNIFICANT CHANGE UP (ref 3.5–5.3)
POTASSIUM SERPL-SCNC: 4.7 MMOL/L — SIGNIFICANT CHANGE UP (ref 3.5–5.3)
POTASSIUM SERPL-SCNC: 4.9 MMOL/L — SIGNIFICANT CHANGE UP (ref 3.5–5.3)
POTASSIUM SERPL-SCNC: 5.1 MMOL/L — SIGNIFICANT CHANGE UP (ref 3.5–5.3)
POTASSIUM SERPL-SCNC: 5.1 MMOL/L — SIGNIFICANT CHANGE UP (ref 3.5–5.3)
POTASSIUM SERPL-SCNC: 5.5 MMOL/L — HIGH (ref 3.5–5.3)
POTASSIUM SERPL-SCNC: 5.7 MMOL/L — HIGH (ref 3.5–5.3)
PROCALCITONIN SERPL-MCNC: 1.14 NG/ML — HIGH (ref 0.02–0.1)
PROCALCITONIN SERPL-MCNC: 5.16 NG/ML — HIGH (ref 0.02–0.1)
PROT SERPL-MCNC: 6.3 G/DL — SIGNIFICANT CHANGE UP (ref 6–8.3)
PROT UR-MCNC: ABNORMAL
PROT UR-MCNC: ABNORMAL
PROTHROM AB SERPL-ACNC: 15.3 SEC — HIGH (ref 10.5–13.4)
PROTHROM AB SERPL-ACNC: 16.2 SEC — HIGH (ref 10.5–13.4)
PROTHROM AB SERPL-ACNC: 16.8 SEC — HIGH (ref 10.5–13.4)
PROTHROM AB SERPL-ACNC: 16.9 SEC — HIGH (ref 10.5–13.4)
PROTHROM AB SERPL-ACNC: 19.8 SEC — HIGH (ref 10.5–13.4)
PROTHROM AB SERPL-ACNC: 20.9 SEC — HIGH (ref 10.5–13.4)
PROTHROM AB SERPL-ACNC: 28.4 SEC — HIGH (ref 10.5–13.4)
PROTHROM AB SERPL-ACNC: 98.3 SEC — HIGH (ref 10.5–13.4)
RAPID RVP RESULT: SIGNIFICANT CHANGE UP
RAPID RVP RESULT: SIGNIFICANT CHANGE UP
RBC # BLD: 2.41 M/UL — LOW (ref 4.2–5.8)
RBC # BLD: 2.42 M/UL — LOW (ref 4.2–5.8)
RBC # BLD: 2.58 M/UL — LOW (ref 4.2–5.8)
RBC # BLD: 2.6 M/UL — LOW (ref 4.2–5.8)
RBC # BLD: 2.62 M/UL — LOW (ref 4.2–5.8)
RBC # BLD: 2.64 M/UL — LOW (ref 4.2–5.8)
RBC # BLD: 2.73 M/UL — LOW (ref 4.2–5.8)
RBC # BLD: 2.75 M/UL — LOW (ref 4.2–5.8)
RBC # BLD: 2.77 M/UL — LOW (ref 4.2–5.8)
RBC # BLD: 2.97 M/UL — LOW (ref 4.2–5.8)
RBC # BLD: 2.98 M/UL — LOW (ref 4.2–5.8)
RBC # BLD: 3.03 M/UL — LOW (ref 4.2–5.8)
RBC # BLD: 3.04 M/UL — LOW (ref 4.2–5.8)
RBC # BLD: 3.09 M/UL — LOW (ref 4.2–5.8)
RBC # BLD: 3.14 M/UL — LOW (ref 4.2–5.8)
RBC # BLD: 3.17 M/UL — LOW (ref 4.2–5.8)
RBC # BLD: 3.19 M/UL — LOW (ref 4.2–5.8)
RBC # BLD: 3.29 M/UL — LOW (ref 4.2–5.8)
RBC # BLD: 3.32 M/UL — LOW (ref 4.2–5.8)
RBC # BLD: 3.37 M/UL — LOW (ref 4.2–5.8)
RBC # BLD: 3.44 M/UL — LOW (ref 4.2–5.8)
RBC # BLD: 3.5 M/UL — LOW (ref 4.2–5.8)
RBC # BLD: 3.66 M/UL — LOW (ref 4.2–5.8)
RBC # BLD: 3.7 M/UL — LOW (ref 4.2–5.8)
RBC # BLD: 3.73 M/UL — LOW (ref 4.2–5.8)
RBC # BLD: 3.75 M/UL — LOW (ref 4.2–5.8)
RBC # BLD: 3.77 M/UL — LOW (ref 4.2–5.8)
RBC # BLD: 3.78 M/UL — LOW (ref 4.2–5.8)
RBC # BLD: 3.8 M/UL — LOW (ref 4.2–5.8)
RBC # BLD: 3.84 M/UL — LOW (ref 4.2–5.8)
RBC # BLD: 4.14 M/UL — LOW (ref 4.2–5.8)
RBC # FLD: 14.8 % — HIGH (ref 10.3–14.5)
RBC # FLD: 15 % — HIGH (ref 10.3–14.5)
RBC # FLD: 15.1 % — HIGH (ref 10.3–14.5)
RBC # FLD: 15.2 % — HIGH (ref 10.3–14.5)
RBC # FLD: 15.3 % — HIGH (ref 10.3–14.5)
RBC # FLD: 15.4 % — HIGH (ref 10.3–14.5)
RBC # FLD: 15.5 % — HIGH (ref 10.3–14.5)
RBC # FLD: 15.6 % — HIGH (ref 10.3–14.5)
RBC # FLD: 15.7 % — HIGH (ref 10.3–14.5)
RBC # FLD: 15.8 % — HIGH (ref 10.3–14.5)
RBC # FLD: 15.9 % — HIGH (ref 10.3–14.5)
RBC # FLD: 16.1 % — HIGH (ref 10.3–14.5)
RBC # FLD: 16.3 % — HIGH (ref 10.3–14.5)
RBC # FLD: 16.5 % — HIGH (ref 10.3–14.5)
RBC # FLD: 16.5 % — HIGH (ref 10.3–14.5)
RBC BLD AUTO: ABNORMAL
RBC BLD AUTO: ABNORMAL
RBC BLD AUTO: NORMAL — SIGNIFICANT CHANGE UP
RBC CASTS # UR COMP ASSIST: 2 /HPF — SIGNIFICANT CHANGE UP (ref 0–4)
RBC CASTS # UR COMP ASSIST: 4 /HPF — SIGNIFICANT CHANGE UP (ref 0–4)
RH IG SCN BLD-IMP: POSITIVE — SIGNIFICANT CHANGE UP
RSV RNA SPEC QL NAA+PROBE: SIGNIFICANT CHANGE UP
RSV RNA SPEC QL NAA+PROBE: SIGNIFICANT CHANGE UP
RV+EV RNA SPEC QL NAA+PROBE: SIGNIFICANT CHANGE UP
RV+EV RNA SPEC QL NAA+PROBE: SIGNIFICANT CHANGE UP
SAO2 % BLDV: 36.2 % — LOW (ref 67–88)
SAO2 % BLDV: 47.3 % — LOW (ref 67–88)
SARS-COV-2 RNA SPEC QL NAA+PROBE: SIGNIFICANT CHANGE UP
SODIUM SERPL-SCNC: 127 MMOL/L — LOW (ref 135–145)
SODIUM SERPL-SCNC: 136 MMOL/L — SIGNIFICANT CHANGE UP (ref 135–145)
SODIUM SERPL-SCNC: 139 MMOL/L — SIGNIFICANT CHANGE UP (ref 135–145)
SODIUM SERPL-SCNC: 140 MMOL/L — SIGNIFICANT CHANGE UP (ref 135–145)
SODIUM SERPL-SCNC: 141 MMOL/L — SIGNIFICANT CHANGE UP (ref 135–145)
SODIUM SERPL-SCNC: 142 MMOL/L — SIGNIFICANT CHANGE UP (ref 135–145)
SODIUM SERPL-SCNC: 143 MMOL/L — SIGNIFICANT CHANGE UP (ref 135–145)
SODIUM SERPL-SCNC: 144 MMOL/L — SIGNIFICANT CHANGE UP (ref 135–145)
SODIUM SERPL-SCNC: 146 MMOL/L — HIGH (ref 135–145)
SODIUM SERPL-SCNC: 146 MMOL/L — HIGH (ref 135–145)
SODIUM SERPL-SCNC: 148 MMOL/L — HIGH (ref 135–145)
SODIUM SERPL-SCNC: 149 MMOL/L — HIGH (ref 135–145)
SODIUM SERPL-SCNC: 149 MMOL/L — HIGH (ref 135–145)
SODIUM SERPL-SCNC: 151 MMOL/L — HIGH (ref 135–145)
SODIUM SERPL-SCNC: 151 MMOL/L — HIGH (ref 135–145)
SODIUM SERPL-SCNC: 152 MMOL/L — HIGH (ref 135–145)
SODIUM SERPL-SCNC: 152 MMOL/L — HIGH (ref 135–145)
SP GR SPEC: 1.02 — SIGNIFICANT CHANGE UP (ref 1.01–1.05)
SP GR SPEC: 1.04 — SIGNIFICANT CHANGE UP (ref 1.01–1.05)
SPECIMEN SOURCE: SIGNIFICANT CHANGE UP
TROPONIN T, HIGH SENSITIVITY RESULT: 105 NG/L — CRITICAL HIGH
TROPONIN T, HIGH SENSITIVITY RESULT: 161 NG/L — CRITICAL HIGH
UROBILINOGEN FLD QL: SIGNIFICANT CHANGE UP
UROBILINOGEN FLD QL: SIGNIFICANT CHANGE UP
VANCOMYCIN FLD-MCNC: 12.3 UG/ML — SIGNIFICANT CHANGE UP
VANCOMYCIN TROUGH SERPL-MCNC: 19.3 UG/ML — SIGNIFICANT CHANGE UP (ref 10–20)
VANCOMYCIN TROUGH SERPL-MCNC: 24 UG/ML — HIGH (ref 10–20)
VARIANT LYMPHS # BLD: 0.9 % — SIGNIFICANT CHANGE UP (ref 0–6)
WBC # BLD: 10.08 K/UL — SIGNIFICANT CHANGE UP (ref 3.8–10.5)
WBC # BLD: 10.1 K/UL — SIGNIFICANT CHANGE UP (ref 3.8–10.5)
WBC # BLD: 10.41 K/UL — SIGNIFICANT CHANGE UP (ref 3.8–10.5)
WBC # BLD: 11.88 K/UL — HIGH (ref 3.8–10.5)
WBC # BLD: 13.89 K/UL — HIGH (ref 3.8–10.5)
WBC # BLD: 14.22 K/UL — HIGH (ref 3.8–10.5)
WBC # BLD: 17.24 K/UL — HIGH (ref 3.8–10.5)
WBC # BLD: 17.72 K/UL — HIGH (ref 3.8–10.5)
WBC # BLD: 18.06 K/UL — HIGH (ref 3.8–10.5)
WBC # BLD: 18.7 K/UL — HIGH (ref 3.8–10.5)
WBC # BLD: 19.28 K/UL — HIGH (ref 3.8–10.5)
WBC # BLD: 23.9 K/UL — HIGH (ref 3.8–10.5)
WBC # BLD: 30.03 K/UL — HIGH (ref 3.8–10.5)
WBC # BLD: 6.94 K/UL — SIGNIFICANT CHANGE UP (ref 3.8–10.5)
WBC # BLD: 7.53 K/UL — SIGNIFICANT CHANGE UP (ref 3.8–10.5)
WBC # BLD: 7.85 K/UL — SIGNIFICANT CHANGE UP (ref 3.8–10.5)
WBC # BLD: 7.99 K/UL — SIGNIFICANT CHANGE UP (ref 3.8–10.5)
WBC # BLD: 8.05 K/UL — SIGNIFICANT CHANGE UP (ref 3.8–10.5)
WBC # BLD: 8.16 K/UL — SIGNIFICANT CHANGE UP (ref 3.8–10.5)
WBC # BLD: 8.33 K/UL — SIGNIFICANT CHANGE UP (ref 3.8–10.5)
WBC # BLD: 8.37 K/UL — SIGNIFICANT CHANGE UP (ref 3.8–10.5)
WBC # BLD: 8.55 K/UL — SIGNIFICANT CHANGE UP (ref 3.8–10.5)
WBC # BLD: 8.59 K/UL — SIGNIFICANT CHANGE UP (ref 3.8–10.5)
WBC # BLD: 8.61 K/UL — SIGNIFICANT CHANGE UP (ref 3.8–10.5)
WBC # BLD: 8.74 K/UL — SIGNIFICANT CHANGE UP (ref 3.8–10.5)
WBC # BLD: 8.75 K/UL — SIGNIFICANT CHANGE UP (ref 3.8–10.5)
WBC # BLD: 8.76 K/UL — SIGNIFICANT CHANGE UP (ref 3.8–10.5)
WBC # BLD: 9.53 K/UL — SIGNIFICANT CHANGE UP (ref 3.8–10.5)
WBC # BLD: 9.6 K/UL — SIGNIFICANT CHANGE UP (ref 3.8–10.5)
WBC # BLD: 9.81 K/UL — SIGNIFICANT CHANGE UP (ref 3.8–10.5)
WBC # BLD: 9.99 K/UL — SIGNIFICANT CHANGE UP (ref 3.8–10.5)
WBC # FLD AUTO: 10.08 K/UL — SIGNIFICANT CHANGE UP (ref 3.8–10.5)
WBC # FLD AUTO: 10.1 K/UL — SIGNIFICANT CHANGE UP (ref 3.8–10.5)
WBC # FLD AUTO: 10.41 K/UL — SIGNIFICANT CHANGE UP (ref 3.8–10.5)
WBC # FLD AUTO: 11.88 K/UL — HIGH (ref 3.8–10.5)
WBC # FLD AUTO: 13.89 K/UL — HIGH (ref 3.8–10.5)
WBC # FLD AUTO: 14.22 K/UL — HIGH (ref 3.8–10.5)
WBC # FLD AUTO: 17.24 K/UL — HIGH (ref 3.8–10.5)
WBC # FLD AUTO: 17.72 K/UL — HIGH (ref 3.8–10.5)
WBC # FLD AUTO: 18.06 K/UL — HIGH (ref 3.8–10.5)
WBC # FLD AUTO: 18.7 K/UL — HIGH (ref 3.8–10.5)
WBC # FLD AUTO: 19.28 K/UL — HIGH (ref 3.8–10.5)
WBC # FLD AUTO: 23.9 K/UL — HIGH (ref 3.8–10.5)
WBC # FLD AUTO: 30.03 K/UL — HIGH (ref 3.8–10.5)
WBC # FLD AUTO: 6.94 K/UL — SIGNIFICANT CHANGE UP (ref 3.8–10.5)
WBC # FLD AUTO: 7.53 K/UL — SIGNIFICANT CHANGE UP (ref 3.8–10.5)
WBC # FLD AUTO: 7.85 K/UL — SIGNIFICANT CHANGE UP (ref 3.8–10.5)
WBC # FLD AUTO: 7.99 K/UL — SIGNIFICANT CHANGE UP (ref 3.8–10.5)
WBC # FLD AUTO: 8.05 K/UL — SIGNIFICANT CHANGE UP (ref 3.8–10.5)
WBC # FLD AUTO: 8.16 K/UL — SIGNIFICANT CHANGE UP (ref 3.8–10.5)
WBC # FLD AUTO: 8.33 K/UL — SIGNIFICANT CHANGE UP (ref 3.8–10.5)
WBC # FLD AUTO: 8.37 K/UL — SIGNIFICANT CHANGE UP (ref 3.8–10.5)
WBC # FLD AUTO: 8.55 K/UL — SIGNIFICANT CHANGE UP (ref 3.8–10.5)
WBC # FLD AUTO: 8.59 K/UL — SIGNIFICANT CHANGE UP (ref 3.8–10.5)
WBC # FLD AUTO: 8.61 K/UL — SIGNIFICANT CHANGE UP (ref 3.8–10.5)
WBC # FLD AUTO: 8.74 K/UL — SIGNIFICANT CHANGE UP (ref 3.8–10.5)
WBC # FLD AUTO: 8.75 K/UL — SIGNIFICANT CHANGE UP (ref 3.8–10.5)
WBC # FLD AUTO: 8.76 K/UL — SIGNIFICANT CHANGE UP (ref 3.8–10.5)
WBC # FLD AUTO: 9.53 K/UL — SIGNIFICANT CHANGE UP (ref 3.8–10.5)
WBC # FLD AUTO: 9.6 K/UL — SIGNIFICANT CHANGE UP (ref 3.8–10.5)
WBC # FLD AUTO: 9.81 K/UL — SIGNIFICANT CHANGE UP (ref 3.8–10.5)
WBC # FLD AUTO: 9.99 K/UL — SIGNIFICANT CHANGE UP (ref 3.8–10.5)
WBC UR QL: 70 /HPF — HIGH (ref 0–5)
WBC UR QL: >50 /HPF — HIGH (ref 0–5)

## 2023-01-01 PROCEDURE — 99232 SBSQ HOSP IP/OBS MODERATE 35: CPT

## 2023-01-01 PROCEDURE — 43752 NASAL/OROGASTRIC W/TUBE PLMT: CPT

## 2023-01-01 PROCEDURE — 93010 ELECTROCARDIOGRAM REPORT: CPT

## 2023-01-01 PROCEDURE — 93306 TTE W/DOPPLER COMPLETE: CPT | Mod: 26

## 2023-01-01 PROCEDURE — 99233 SBSQ HOSP IP/OBS HIGH 50: CPT

## 2023-01-01 PROCEDURE — 71045 X-RAY EXAM CHEST 1 VIEW: CPT | Mod: 26

## 2023-01-01 PROCEDURE — 99222 1ST HOSP IP/OBS MODERATE 55: CPT

## 2023-01-01 PROCEDURE — 93279 PRGRMG DEV EVAL PM/LDLS PM: CPT | Mod: 26

## 2023-01-01 PROCEDURE — 74018 RADEX ABDOMEN 1 VIEW: CPT | Mod: 26

## 2023-01-01 PROCEDURE — 99233 SBSQ HOSP IP/OBS HIGH 50: CPT | Mod: GC

## 2023-01-01 PROCEDURE — 99222 1ST HOSP IP/OBS MODERATE 55: CPT | Mod: FS

## 2023-01-01 PROCEDURE — 99223 1ST HOSP IP/OBS HIGH 75: CPT | Mod: FS

## 2023-01-01 PROCEDURE — 99223 1ST HOSP IP/OBS HIGH 75: CPT

## 2023-01-01 PROCEDURE — 99232 SBSQ HOSP IP/OBS MODERATE 35: CPT | Mod: GC

## 2023-01-01 PROCEDURE — 99497 ADVNCD CARE PLAN 30 MIN: CPT

## 2023-01-01 PROCEDURE — 74230 X-RAY XM SWLNG FUNCJ C+: CPT | Mod: 26

## 2023-01-01 PROCEDURE — 99285 EMERGENCY DEPT VISIT HI MDM: CPT

## 2023-01-01 PROCEDURE — 71275 CT ANGIOGRAPHY CHEST: CPT | Mod: 26,QQ

## 2023-01-01 PROCEDURE — 99497 ADVNCD CARE PLAN 30 MIN: CPT | Mod: 25

## 2023-01-01 PROCEDURE — 99221 1ST HOSP IP/OBS SF/LOW 40: CPT | Mod: GC

## 2023-01-01 PROCEDURE — 99221 1ST HOSP IP/OBS SF/LOW 40: CPT | Mod: 57,GC

## 2023-01-01 PROCEDURE — 70450 CT HEAD/BRAIN W/O DYE: CPT | Mod: 26

## 2023-01-01 PROCEDURE — 76604 US EXAM CHEST: CPT | Mod: 26,GC

## 2023-01-01 PROCEDURE — 99223 1ST HOSP IP/OBS HIGH 75: CPT | Mod: GC

## 2023-01-01 RX ORDER — FUROSEMIDE 40 MG
20 TABLET ORAL ONCE
Refills: 0 | Status: COMPLETED | OUTPATIENT
Start: 2023-01-01 | End: 2023-01-01

## 2023-01-01 RX ORDER — VANCOMYCIN HCL 1 G
750 VIAL (EA) INTRAVENOUS EVERY 24 HOURS
Refills: 0 | Status: DISCONTINUED | OUTPATIENT
Start: 2023-01-01 | End: 2023-01-01

## 2023-01-01 RX ORDER — SODIUM CHLORIDE 9 MG/ML
1000 INJECTION, SOLUTION INTRAVENOUS
Refills: 0 | Status: DISCONTINUED | OUTPATIENT
Start: 2023-01-01 | End: 2023-01-01

## 2023-01-01 RX ORDER — DEXTROSE 50 % IN WATER 50 %
12.5 SYRINGE (ML) INTRAVENOUS ONCE
Refills: 0 | Status: DISCONTINUED | OUTPATIENT
Start: 2023-01-01 | End: 2023-01-01

## 2023-01-01 RX ORDER — HEPARIN SODIUM 5000 [USP'U]/ML
700 INJECTION INTRAVENOUS; SUBCUTANEOUS
Qty: 25000 | Refills: 0 | Status: DISCONTINUED | OUTPATIENT
Start: 2023-01-01 | End: 2023-01-01

## 2023-01-01 RX ORDER — PIPERACILLIN AND TAZOBACTAM 4; .5 G/20ML; G/20ML
3.38 INJECTION, POWDER, LYOPHILIZED, FOR SOLUTION INTRAVENOUS EVERY 8 HOURS
Refills: 0 | Status: DISCONTINUED | OUTPATIENT
Start: 2023-01-01 | End: 2023-01-01

## 2023-01-01 RX ORDER — VANCOMYCIN HCL 1 G
1000 VIAL (EA) INTRAVENOUS ONCE
Refills: 0 | Status: COMPLETED | OUTPATIENT
Start: 2023-01-01 | End: 2023-01-01

## 2023-01-01 RX ORDER — IPRATROPIUM/ALBUTEROL SULFATE 18-103MCG
3 AEROSOL WITH ADAPTER (GRAM) INHALATION EVERY 6 HOURS
Refills: 0 | Status: DISCONTINUED | OUTPATIENT
Start: 2023-01-01 | End: 2023-01-01

## 2023-01-01 RX ORDER — HEPARIN SODIUM 5000 [USP'U]/ML
2000 INJECTION INTRAVENOUS; SUBCUTANEOUS EVERY 6 HOURS
Refills: 0 | Status: DISCONTINUED | OUTPATIENT
Start: 2023-01-01 | End: 2023-01-01

## 2023-01-01 RX ORDER — DEXTROSE 50 % IN WATER 50 %
15 SYRINGE (ML) INTRAVENOUS ONCE
Refills: 0 | Status: DISCONTINUED | OUTPATIENT
Start: 2023-01-01 | End: 2023-01-01

## 2023-01-01 RX ORDER — GLUCAGON INJECTION, SOLUTION 0.5 MG/.1ML
1 INJECTION, SOLUTION SUBCUTANEOUS ONCE
Refills: 0 | Status: DISCONTINUED | OUTPATIENT
Start: 2023-01-01 | End: 2023-01-01

## 2023-01-01 RX ORDER — MAGNESIUM SULFATE 500 MG/ML
2 VIAL (ML) INJECTION ONCE
Refills: 0 | Status: COMPLETED | OUTPATIENT
Start: 2023-01-01 | End: 2023-01-01

## 2023-01-01 RX ORDER — DEXTROSE 50 % IN WATER 50 %
12.5 SYRINGE (ML) INTRAVENOUS ONCE
Refills: 0 | Status: COMPLETED | OUTPATIENT
Start: 2023-01-01 | End: 2023-01-01

## 2023-01-01 RX ORDER — PIPERACILLIN AND TAZOBACTAM 4; .5 G/20ML; G/20ML
3.38 INJECTION, POWDER, LYOPHILIZED, FOR SOLUTION INTRAVENOUS ONCE
Refills: 0 | Status: COMPLETED | OUTPATIENT
Start: 2023-01-01 | End: 2023-01-01

## 2023-01-01 RX ORDER — ACETAMINOPHEN 500 MG
1000 TABLET ORAL ONCE
Refills: 0 | Status: COMPLETED | OUTPATIENT
Start: 2023-01-01 | End: 2023-01-01

## 2023-01-01 RX ORDER — DEXTROSE 50 % IN WATER 50 %
25 SYRINGE (ML) INTRAVENOUS ONCE
Refills: 0 | Status: COMPLETED | OUTPATIENT
Start: 2023-01-01 | End: 2023-01-01

## 2023-01-01 RX ORDER — LOVASTATIN 20 MG
1 TABLET ORAL
Qty: 0 | Refills: 0 | DISCHARGE

## 2023-01-01 RX ORDER — HEPARIN SODIUM 5000 [USP'U]/ML
4000 INJECTION INTRAVENOUS; SUBCUTANEOUS EVERY 6 HOURS
Refills: 0 | Status: DISCONTINUED | OUTPATIENT
Start: 2023-01-01 | End: 2023-01-01

## 2023-01-01 RX ORDER — DILTIAZEM HCL 120 MG
1 CAPSULE, EXT RELEASE 24 HR ORAL
Qty: 0 | Refills: 0 | DISCHARGE

## 2023-01-01 RX ORDER — DOXAZOSIN MESYLATE 4 MG
1 TABLET ORAL AT BEDTIME
Refills: 0 | Status: DISCONTINUED | OUTPATIENT
Start: 2023-01-01 | End: 2023-01-01

## 2023-01-01 RX ORDER — HEPARIN SODIUM 5000 [USP'U]/ML
INJECTION INTRAVENOUS; SUBCUTANEOUS
Qty: 25000 | Refills: 0 | Status: DISCONTINUED | OUTPATIENT
Start: 2023-01-01 | End: 2023-01-01

## 2023-01-01 RX ORDER — ACETAMINOPHEN 500 MG
650 TABLET ORAL EVERY 6 HOURS
Refills: 0 | Status: DISCONTINUED | OUTPATIENT
Start: 2023-01-01 | End: 2023-01-01

## 2023-01-01 RX ORDER — DIGOXIN 250 MCG
1 TABLET ORAL
Qty: 0 | Refills: 0 | DISCHARGE

## 2023-01-01 RX ORDER — PIPERACILLIN AND TAZOBACTAM 4; .5 G/20ML; G/20ML
3.38 INJECTION, POWDER, LYOPHILIZED, FOR SOLUTION INTRAVENOUS EVERY 8 HOURS
Refills: 0 | Status: COMPLETED | OUTPATIENT
Start: 2023-01-01 | End: 2023-01-01

## 2023-01-01 RX ORDER — SODIUM CHLORIDE 9 MG/ML
250 INJECTION INTRAMUSCULAR; INTRAVENOUS; SUBCUTANEOUS ONCE
Refills: 0 | Status: COMPLETED | OUTPATIENT
Start: 2023-01-01 | End: 2023-01-01

## 2023-01-01 RX ORDER — SACUBITRIL AND VALSARTAN 24; 26 MG/1; MG/1
1 TABLET, FILM COATED ORAL
Qty: 0 | Refills: 0 | DISCHARGE

## 2023-01-01 RX ORDER — SODIUM CHLORIDE 9 MG/ML
4 INJECTION INTRAMUSCULAR; INTRAVENOUS; SUBCUTANEOUS EVERY 12 HOURS
Refills: 0 | Status: COMPLETED | OUTPATIENT
Start: 2023-01-01 | End: 2023-01-01

## 2023-01-01 RX ORDER — METOPROLOL TARTRATE 50 MG
100 TABLET ORAL
Refills: 0 | Status: DISCONTINUED | OUTPATIENT
Start: 2023-01-01 | End: 2023-01-01

## 2023-01-01 RX ORDER — FUROSEMIDE 40 MG
1 TABLET ORAL
Qty: 0 | Refills: 0 | DISCHARGE

## 2023-01-01 RX ORDER — SODIUM CHLORIDE 9 MG/ML
500 INJECTION INTRAMUSCULAR; INTRAVENOUS; SUBCUTANEOUS ONCE
Refills: 0 | Status: COMPLETED | OUTPATIENT
Start: 2023-01-01 | End: 2023-01-01

## 2023-01-01 RX ORDER — DEXTROSE 50 % IN WATER 50 %
25 SYRINGE (ML) INTRAVENOUS ONCE
Refills: 0 | Status: DISCONTINUED | OUTPATIENT
Start: 2023-01-01 | End: 2023-01-01

## 2023-01-01 RX ORDER — DIGOXIN 250 MCG
125 TABLET ORAL
Refills: 0 | Status: DISCONTINUED | OUTPATIENT
Start: 2023-01-01 | End: 2023-01-01

## 2023-01-01 RX ORDER — PHYTONADIONE (VIT K1) 5 MG
10 TABLET ORAL ONCE
Refills: 0 | Status: COMPLETED | OUTPATIENT
Start: 2023-01-01 | End: 2023-01-01

## 2023-01-01 RX ORDER — METOPROLOL TARTRATE 50 MG
25 TABLET ORAL
Refills: 0 | Status: DISCONTINUED | OUTPATIENT
Start: 2023-01-01 | End: 2023-01-01

## 2023-01-01 RX ORDER — VANCOMYCIN HCL 1 G
1000 VIAL (EA) INTRAVENOUS EVERY 24 HOURS
Refills: 0 | Status: DISCONTINUED | OUTPATIENT
Start: 2023-01-01 | End: 2023-01-01

## 2023-01-01 RX ORDER — DEXTROSE 50 % IN WATER 50 %
50 SYRINGE (ML) INTRAVENOUS ONCE
Refills: 0 | Status: COMPLETED | OUTPATIENT
Start: 2023-01-01 | End: 2023-01-01

## 2023-01-01 RX ORDER — SODIUM CHLORIDE 9 MG/ML
300 INJECTION INTRAMUSCULAR; INTRAVENOUS; SUBCUTANEOUS ONCE
Refills: 0 | Status: COMPLETED | OUTPATIENT
Start: 2023-01-01 | End: 2023-01-01

## 2023-01-01 RX ORDER — PHYTONADIONE (VIT K1) 5 MG
10 TABLET ORAL ONCE
Refills: 0 | Status: DISCONTINUED | OUTPATIENT
Start: 2023-01-01 | End: 2023-01-01

## 2023-01-01 RX ORDER — METOPROLOL TARTRATE 50 MG
5 TABLET ORAL EVERY 6 HOURS
Refills: 0 | Status: DISCONTINUED | OUTPATIENT
Start: 2023-01-01 | End: 2023-01-01

## 2023-01-01 RX ORDER — FUROSEMIDE 40 MG
20 TABLET ORAL DAILY
Refills: 0 | Status: DISCONTINUED | OUTPATIENT
Start: 2023-01-01 | End: 2023-01-01

## 2023-01-01 RX ORDER — WARFARIN SODIUM 2.5 MG/1
1 TABLET ORAL
Qty: 0 | Refills: 0 | DISCHARGE

## 2023-01-01 RX ORDER — POTASSIUM CHLORIDE 20 MEQ
40 PACKET (EA) ORAL ONCE
Refills: 0 | Status: COMPLETED | OUTPATIENT
Start: 2023-01-01 | End: 2023-01-01

## 2023-01-01 RX ORDER — HEPARIN SODIUM 5000 [USP'U]/ML
800 INJECTION INTRAVENOUS; SUBCUTANEOUS
Qty: 25000 | Refills: 0 | Status: DISCONTINUED | OUTPATIENT
Start: 2023-01-01 | End: 2023-01-01

## 2023-01-01 RX ORDER — METOPROLOL TARTRATE 50 MG
1 TABLET ORAL
Qty: 0 | Refills: 0 | DISCHARGE

## 2023-01-01 RX ORDER — VANCOMYCIN HCL 1 G
500 VIAL (EA) INTRAVENOUS EVERY 24 HOURS
Refills: 0 | Status: CANCELLED | OUTPATIENT
Start: 2023-04-16 | End: 2023-01-01

## 2023-01-01 RX ORDER — ATORVASTATIN CALCIUM 80 MG/1
1 TABLET, FILM COATED ORAL
Qty: 0 | Refills: 0 | DISCHARGE

## 2023-01-01 RX ORDER — HEPARIN SODIUM 5000 [USP'U]/ML
500 INJECTION INTRAVENOUS; SUBCUTANEOUS
Qty: 25000 | Refills: 0 | Status: DISCONTINUED | OUTPATIENT
Start: 2023-01-01 | End: 2023-01-01

## 2023-01-01 RX ORDER — TAMSULOSIN HYDROCHLORIDE 0.4 MG/1
0.4 CAPSULE ORAL AT BEDTIME
Refills: 0 | Status: DISCONTINUED | OUTPATIENT
Start: 2023-01-01 | End: 2023-01-01

## 2023-01-01 RX ORDER — INFLUENZA VIRUS VACCINE 15; 15; 15; 15 UG/.5ML; UG/.5ML; UG/.5ML; UG/.5ML
0.7 SUSPENSION INTRAMUSCULAR ONCE
Refills: 0 | Status: DISCONTINUED | OUTPATIENT
Start: 2023-01-01 | End: 2023-01-01

## 2023-01-01 RX ORDER — HEPARIN SODIUM 5000 [USP'U]/ML
900 INJECTION INTRAVENOUS; SUBCUTANEOUS
Qty: 25000 | Refills: 0 | Status: DISCONTINUED | OUTPATIENT
Start: 2023-01-01 | End: 2023-01-01

## 2023-01-01 RX ADMIN — Medication 5 MILLIGRAM(S): at 23:26

## 2023-01-01 RX ADMIN — PIPERACILLIN AND TAZOBACTAM 25 GRAM(S): 4; .5 INJECTION, POWDER, LYOPHILIZED, FOR SOLUTION INTRAVENOUS at 02:31

## 2023-01-01 RX ADMIN — Medication 3 MILLILITER(S): at 03:15

## 2023-01-01 RX ADMIN — Medication 5 MILLIGRAM(S): at 00:27

## 2023-01-01 RX ADMIN — HEPARIN SODIUM 8 UNIT(S)/HR: 5000 INJECTION INTRAVENOUS; SUBCUTANEOUS at 19:11

## 2023-01-01 RX ADMIN — Medication 3 MILLILITER(S): at 15:34

## 2023-01-01 RX ADMIN — Medication 3 MILLILITER(S): at 20:16

## 2023-01-01 RX ADMIN — Medication 3 MILLILITER(S): at 19:58

## 2023-01-01 RX ADMIN — HEPARIN SODIUM 2000 UNIT(S): 5000 INJECTION INTRAVENOUS; SUBCUTANEOUS at 07:20

## 2023-01-01 RX ADMIN — PIPERACILLIN AND TAZOBACTAM 25 GRAM(S): 4; .5 INJECTION, POWDER, LYOPHILIZED, FOR SOLUTION INTRAVENOUS at 05:55

## 2023-01-01 RX ADMIN — Medication 40 MILLIEQUIVALENT(S): at 13:55

## 2023-01-01 RX ADMIN — Medication 3 MILLILITER(S): at 03:48

## 2023-01-01 RX ADMIN — HEPARIN SODIUM 5 UNIT(S)/HR: 5000 INJECTION INTRAVENOUS; SUBCUTANEOUS at 19:39

## 2023-01-01 RX ADMIN — Medication 20 MILLIGRAM(S): at 06:00

## 2023-01-01 RX ADMIN — PIPERACILLIN AND TAZOBACTAM 25 GRAM(S): 4; .5 INJECTION, POWDER, LYOPHILIZED, FOR SOLUTION INTRAVENOUS at 22:27

## 2023-01-01 RX ADMIN — Medication 3 MILLILITER(S): at 03:38

## 2023-01-01 RX ADMIN — Medication 50 MILLILITER(S): at 15:57

## 2023-01-01 RX ADMIN — Medication 1 MILLIGRAM(S): at 23:12

## 2023-01-01 RX ADMIN — HEPARIN SODIUM 5 UNIT(S)/HR: 5000 INJECTION INTRAVENOUS; SUBCUTANEOUS at 07:11

## 2023-01-01 RX ADMIN — Medication 3 MILLILITER(S): at 19:49

## 2023-01-01 RX ADMIN — Medication 3 MILLILITER(S): at 03:32

## 2023-01-01 RX ADMIN — Medication 650 MILLIGRAM(S): at 11:27

## 2023-01-01 RX ADMIN — Medication 3 MILLILITER(S): at 22:51

## 2023-01-01 RX ADMIN — Medication 5 MILLIGRAM(S): at 13:55

## 2023-01-01 RX ADMIN — PIPERACILLIN AND TAZOBACTAM 25 GRAM(S): 4; .5 INJECTION, POWDER, LYOPHILIZED, FOR SOLUTION INTRAVENOUS at 00:00

## 2023-01-01 RX ADMIN — Medication 3 MILLILITER(S): at 21:08

## 2023-01-01 RX ADMIN — HEPARIN SODIUM 0 UNIT(S)/HR: 5000 INJECTION INTRAVENOUS; SUBCUTANEOUS at 01:04

## 2023-01-01 RX ADMIN — Medication 650 MILLIGRAM(S): at 16:00

## 2023-01-01 RX ADMIN — Medication 650 MILLIGRAM(S): at 18:02

## 2023-01-01 RX ADMIN — Medication 3 MILLILITER(S): at 03:46

## 2023-01-01 RX ADMIN — PIPERACILLIN AND TAZOBACTAM 25 GRAM(S): 4; .5 INJECTION, POWDER, LYOPHILIZED, FOR SOLUTION INTRAVENOUS at 22:09

## 2023-01-01 RX ADMIN — SODIUM CHLORIDE 50 MILLILITER(S): 9 INJECTION, SOLUTION INTRAVENOUS at 15:14

## 2023-01-01 RX ADMIN — HEPARIN SODIUM 8 UNIT(S)/HR: 5000 INJECTION INTRAVENOUS; SUBCUTANEOUS at 19:23

## 2023-01-01 RX ADMIN — SODIUM CHLORIDE 300 MILLILITER(S): 9 INJECTION INTRAMUSCULAR; INTRAVENOUS; SUBCUTANEOUS at 06:16

## 2023-01-01 RX ADMIN — Medication 3 MILLILITER(S): at 15:06

## 2023-01-01 RX ADMIN — Medication 5 MILLIGRAM(S): at 00:05

## 2023-01-01 RX ADMIN — Medication 3 MILLILITER(S): at 09:15

## 2023-01-01 RX ADMIN — Medication 650 MILLIGRAM(S): at 10:07

## 2023-01-01 RX ADMIN — Medication 3 MILLILITER(S): at 19:39

## 2023-01-01 RX ADMIN — Medication 3 MILLILITER(S): at 10:19

## 2023-01-01 RX ADMIN — Medication 400 MILLIGRAM(S): at 06:15

## 2023-01-01 RX ADMIN — Medication 650 MILLIGRAM(S): at 17:32

## 2023-01-01 RX ADMIN — Medication 650 MILLIGRAM(S): at 13:38

## 2023-01-01 RX ADMIN — Medication 100 MILLIGRAM(S): at 05:27

## 2023-01-01 RX ADMIN — Medication 650 MILLIGRAM(S): at 15:30

## 2023-01-01 RX ADMIN — Medication 25 GRAM(S): at 21:46

## 2023-01-01 RX ADMIN — Medication 5 MILLIGRAM(S): at 06:01

## 2023-01-01 RX ADMIN — Medication 3 MILLILITER(S): at 04:01

## 2023-01-01 RX ADMIN — Medication 3 MILLILITER(S): at 08:31

## 2023-01-01 RX ADMIN — Medication 1 MILLIGRAM(S): at 21:48

## 2023-01-01 RX ADMIN — Medication 100 MILLIGRAM(S): at 22:33

## 2023-01-01 RX ADMIN — Medication 125 MICROGRAM(S): at 11:32

## 2023-01-01 RX ADMIN — Medication 5 MILLIGRAM(S): at 19:31

## 2023-01-01 RX ADMIN — PIPERACILLIN AND TAZOBACTAM 25 GRAM(S): 4; .5 INJECTION, POWDER, LYOPHILIZED, FOR SOLUTION INTRAVENOUS at 02:46

## 2023-01-01 RX ADMIN — Medication 3 MILLILITER(S): at 21:39

## 2023-01-01 RX ADMIN — Medication 100 MILLIGRAM(S): at 17:43

## 2023-01-01 RX ADMIN — Medication 3 MILLILITER(S): at 16:56

## 2023-01-01 RX ADMIN — SODIUM CHLORIDE 4 MILLILITER(S): 9 INJECTION INTRAMUSCULAR; INTRAVENOUS; SUBCUTANEOUS at 10:34

## 2023-01-01 RX ADMIN — Medication 100 MILLIGRAM(S): at 07:10

## 2023-01-01 RX ADMIN — Medication 20 MILLIGRAM(S): at 17:07

## 2023-01-01 RX ADMIN — Medication 3 MILLILITER(S): at 14:49

## 2023-01-01 RX ADMIN — Medication 5 MILLIGRAM(S): at 05:56

## 2023-01-01 RX ADMIN — HEPARIN SODIUM 500 UNIT(S)/HR: 5000 INJECTION INTRAVENOUS; SUBCUTANEOUS at 20:01

## 2023-01-01 RX ADMIN — Medication 3 MILLILITER(S): at 15:08

## 2023-01-01 RX ADMIN — Medication 125 MICROGRAM(S): at 22:22

## 2023-01-01 RX ADMIN — Medication 100 MILLIGRAM(S): at 05:24

## 2023-01-01 RX ADMIN — Medication 3 MILLILITER(S): at 16:04

## 2023-01-01 RX ADMIN — Medication 100 MILLIGRAM(S): at 21:49

## 2023-01-01 RX ADMIN — HEPARIN SODIUM 5 UNIT(S)/HR: 5000 INJECTION INTRAVENOUS; SUBCUTANEOUS at 19:15

## 2023-01-01 RX ADMIN — HEPARIN SODIUM 8 UNIT(S)/HR: 5000 INJECTION INTRAVENOUS; SUBCUTANEOUS at 17:21

## 2023-01-01 RX ADMIN — HEPARIN SODIUM 8 UNIT(S)/HR: 5000 INJECTION INTRAVENOUS; SUBCUTANEOUS at 07:23

## 2023-01-01 RX ADMIN — Medication 100 MILLIGRAM(S): at 05:55

## 2023-01-01 RX ADMIN — Medication 3 MILLILITER(S): at 09:39

## 2023-01-01 RX ADMIN — HEPARIN SODIUM 5 UNIT(S)/HR: 5000 INJECTION INTRAVENOUS; SUBCUTANEOUS at 08:46

## 2023-01-01 RX ADMIN — Medication 100 MILLIGRAM(S): at 11:32

## 2023-01-01 RX ADMIN — Medication 3 MILLILITER(S): at 03:55

## 2023-01-01 RX ADMIN — Medication 100 MILLIGRAM(S): at 16:29

## 2023-01-01 RX ADMIN — Medication 1 MILLIGRAM(S): at 21:42

## 2023-01-01 RX ADMIN — PIPERACILLIN AND TAZOBACTAM 25 GRAM(S): 4; .5 INJECTION, POWDER, LYOPHILIZED, FOR SOLUTION INTRAVENOUS at 05:45

## 2023-01-01 RX ADMIN — Medication 250 MILLIGRAM(S): at 06:56

## 2023-01-01 RX ADMIN — Medication 3 MILLILITER(S): at 03:43

## 2023-01-01 RX ADMIN — PIPERACILLIN AND TAZOBACTAM 25 GRAM(S): 4; .5 INJECTION, POWDER, LYOPHILIZED, FOR SOLUTION INTRAVENOUS at 05:30

## 2023-01-01 RX ADMIN — Medication 3 MILLILITER(S): at 07:12

## 2023-01-01 RX ADMIN — SODIUM CHLORIDE 50 MILLILITER(S): 9 INJECTION, SOLUTION INTRAVENOUS at 11:11

## 2023-01-01 RX ADMIN — PIPERACILLIN AND TAZOBACTAM 25 GRAM(S): 4; .5 INJECTION, POWDER, LYOPHILIZED, FOR SOLUTION INTRAVENOUS at 21:15

## 2023-01-01 RX ADMIN — Medication 1000 MILLIGRAM(S): at 06:45

## 2023-01-01 RX ADMIN — Medication 3 MILLILITER(S): at 20:53

## 2023-01-01 RX ADMIN — HEPARIN SODIUM 5 UNIT(S)/HR: 5000 INJECTION INTRAVENOUS; SUBCUTANEOUS at 03:23

## 2023-01-01 RX ADMIN — Medication 3 MILLILITER(S): at 04:44

## 2023-01-01 RX ADMIN — Medication 3 MILLILITER(S): at 15:02

## 2023-01-01 RX ADMIN — Medication 650 MILLIGRAM(S): at 02:00

## 2023-01-01 RX ADMIN — Medication 3 MILLILITER(S): at 16:42

## 2023-01-01 RX ADMIN — Medication 3 MILLILITER(S): at 03:49

## 2023-01-01 RX ADMIN — Medication 3 MILLILITER(S): at 21:42

## 2023-01-01 RX ADMIN — Medication 650 MILLIGRAM(S): at 15:26

## 2023-01-01 RX ADMIN — Medication 400 MILLIGRAM(S): at 08:46

## 2023-01-01 RX ADMIN — PIPERACILLIN AND TAZOBACTAM 25 GRAM(S): 4; .5 INJECTION, POWDER, LYOPHILIZED, FOR SOLUTION INTRAVENOUS at 18:16

## 2023-01-01 RX ADMIN — Medication 3 MILLILITER(S): at 21:25

## 2023-01-01 RX ADMIN — HEPARIN SODIUM 800 UNIT(S)/HR: 5000 INJECTION INTRAVENOUS; SUBCUTANEOUS at 07:08

## 2023-01-01 RX ADMIN — PIPERACILLIN AND TAZOBACTAM 25 GRAM(S): 4; .5 INJECTION, POWDER, LYOPHILIZED, FOR SOLUTION INTRAVENOUS at 13:55

## 2023-01-01 RX ADMIN — PIPERACILLIN AND TAZOBACTAM 25 GRAM(S): 4; .5 INJECTION, POWDER, LYOPHILIZED, FOR SOLUTION INTRAVENOUS at 05:54

## 2023-01-01 RX ADMIN — Medication 100 MILLIGRAM(S): at 17:26

## 2023-01-01 RX ADMIN — Medication 3 MILLILITER(S): at 07:30

## 2023-01-01 RX ADMIN — Medication 650 MILLIGRAM(S): at 13:43

## 2023-01-01 RX ADMIN — Medication 650 MILLIGRAM(S): at 19:32

## 2023-01-01 RX ADMIN — Medication 5 MILLIGRAM(S): at 17:08

## 2023-01-01 RX ADMIN — Medication 3 MILLILITER(S): at 04:04

## 2023-01-01 RX ADMIN — PIPERACILLIN AND TAZOBACTAM 25 GRAM(S): 4; .5 INJECTION, POWDER, LYOPHILIZED, FOR SOLUTION INTRAVENOUS at 22:07

## 2023-01-01 RX ADMIN — Medication 400 MILLIGRAM(S): at 20:04

## 2023-01-01 RX ADMIN — Medication 400 MILLIGRAM(S): at 10:52

## 2023-01-01 RX ADMIN — PIPERACILLIN AND TAZOBACTAM 25 GRAM(S): 4; .5 INJECTION, POWDER, LYOPHILIZED, FOR SOLUTION INTRAVENOUS at 14:39

## 2023-01-01 RX ADMIN — Medication 3 MILLILITER(S): at 03:50

## 2023-01-01 RX ADMIN — PIPERACILLIN AND TAZOBACTAM 25 GRAM(S): 4; .5 INJECTION, POWDER, LYOPHILIZED, FOR SOLUTION INTRAVENOUS at 18:49

## 2023-01-01 RX ADMIN — Medication 3 MILLILITER(S): at 20:54

## 2023-01-01 RX ADMIN — HEPARIN SODIUM 800 UNIT(S)/HR: 5000 INJECTION INTRAVENOUS; SUBCUTANEOUS at 02:04

## 2023-01-01 RX ADMIN — HEPARIN SODIUM 8 UNIT(S)/HR: 5000 INJECTION INTRAVENOUS; SUBCUTANEOUS at 23:52

## 2023-01-01 RX ADMIN — HEPARIN SODIUM 7 UNIT(S)/HR: 5000 INJECTION INTRAVENOUS; SUBCUTANEOUS at 07:26

## 2023-01-01 RX ADMIN — Medication 20 MILLIGRAM(S): at 17:41

## 2023-01-01 RX ADMIN — Medication 5 MILLIGRAM(S): at 06:00

## 2023-01-01 RX ADMIN — Medication 1 MILLIGRAM(S): at 21:47

## 2023-01-01 RX ADMIN — HEPARIN SODIUM 8 UNIT(S)/HR: 5000 INJECTION INTRAVENOUS; SUBCUTANEOUS at 19:14

## 2023-01-01 RX ADMIN — PIPERACILLIN AND TAZOBACTAM 25 GRAM(S): 4; .5 INJECTION, POWDER, LYOPHILIZED, FOR SOLUTION INTRAVENOUS at 22:49

## 2023-01-01 RX ADMIN — Medication 650 MILLIGRAM(S): at 08:55

## 2023-01-01 RX ADMIN — PIPERACILLIN AND TAZOBACTAM 25 GRAM(S): 4; .5 INJECTION, POWDER, LYOPHILIZED, FOR SOLUTION INTRAVENOUS at 13:47

## 2023-01-01 RX ADMIN — PIPERACILLIN AND TAZOBACTAM 25 GRAM(S): 4; .5 INJECTION, POWDER, LYOPHILIZED, FOR SOLUTION INTRAVENOUS at 08:45

## 2023-01-01 RX ADMIN — HEPARIN SODIUM 5 UNIT(S)/HR: 5000 INJECTION INTRAVENOUS; SUBCUTANEOUS at 07:04

## 2023-01-01 RX ADMIN — Medication 3 MILLILITER(S): at 15:27

## 2023-01-01 RX ADMIN — Medication 1000 MILLIGRAM(S): at 17:30

## 2023-01-01 RX ADMIN — HEPARIN SODIUM 8 UNIT(S)/HR: 5000 INJECTION INTRAVENOUS; SUBCUTANEOUS at 17:06

## 2023-01-01 RX ADMIN — Medication 20 MILLIGRAM(S): at 17:26

## 2023-01-01 RX ADMIN — Medication 3 MILLILITER(S): at 15:53

## 2023-01-01 RX ADMIN — Medication 3 MILLILITER(S): at 22:20

## 2023-01-01 RX ADMIN — Medication 3 MILLILITER(S): at 10:18

## 2023-01-01 RX ADMIN — Medication 3 MILLILITER(S): at 04:31

## 2023-01-01 RX ADMIN — Medication 5 MILLIGRAM(S): at 13:30

## 2023-01-01 RX ADMIN — Medication 1 MILLIGRAM(S): at 22:32

## 2023-01-01 RX ADMIN — Medication 3 MILLILITER(S): at 21:10

## 2023-01-01 RX ADMIN — Medication 5 MILLIGRAM(S): at 17:39

## 2023-01-01 RX ADMIN — Medication 3 MILLILITER(S): at 10:01

## 2023-01-01 RX ADMIN — Medication 1 MILLIGRAM(S): at 22:33

## 2023-01-01 RX ADMIN — Medication 3 MILLILITER(S): at 20:20

## 2023-01-01 RX ADMIN — Medication 250 MILLIGRAM(S): at 07:02

## 2023-01-01 RX ADMIN — HEPARIN SODIUM 8 UNIT(S)/HR: 5000 INJECTION INTRAVENOUS; SUBCUTANEOUS at 19:18

## 2023-01-01 RX ADMIN — Medication 3 MILLILITER(S): at 07:44

## 2023-01-01 RX ADMIN — Medication 650 MILLIGRAM(S): at 09:04

## 2023-01-01 RX ADMIN — PIPERACILLIN AND TAZOBACTAM 25 GRAM(S): 4; .5 INJECTION, POWDER, LYOPHILIZED, FOR SOLUTION INTRAVENOUS at 11:47

## 2023-01-01 RX ADMIN — PIPERACILLIN AND TAZOBACTAM 25 GRAM(S): 4; .5 INJECTION, POWDER, LYOPHILIZED, FOR SOLUTION INTRAVENOUS at 02:55

## 2023-01-01 RX ADMIN — HEPARIN SODIUM 1000 UNIT(S)/HR: 5000 INJECTION INTRAVENOUS; SUBCUTANEOUS at 16:45

## 2023-01-01 RX ADMIN — HEPARIN SODIUM 1000 UNIT(S)/HR: 5000 INJECTION INTRAVENOUS; SUBCUTANEOUS at 16:43

## 2023-01-01 RX ADMIN — Medication 20 MILLIGRAM(S): at 14:09

## 2023-01-01 RX ADMIN — HEPARIN SODIUM 5 UNIT(S)/HR: 5000 INJECTION INTRAVENOUS; SUBCUTANEOUS at 04:29

## 2023-01-01 RX ADMIN — PIPERACILLIN AND TAZOBACTAM 25 GRAM(S): 4; .5 INJECTION, POWDER, LYOPHILIZED, FOR SOLUTION INTRAVENOUS at 21:42

## 2023-01-01 RX ADMIN — Medication 650 MILLIGRAM(S): at 17:00

## 2023-01-01 RX ADMIN — HEPARIN SODIUM 5 UNIT(S)/HR: 5000 INJECTION INTRAVENOUS; SUBCUTANEOUS at 07:18

## 2023-01-01 RX ADMIN — Medication 1 MILLIGRAM(S): at 22:53

## 2023-01-01 RX ADMIN — Medication 5 MILLIGRAM(S): at 00:00

## 2023-01-01 RX ADMIN — Medication 3 MILLILITER(S): at 16:10

## 2023-01-01 RX ADMIN — Medication 3 MILLILITER(S): at 22:13

## 2023-01-01 RX ADMIN — Medication 3 MILLILITER(S): at 14:03

## 2023-01-01 RX ADMIN — Medication 20 MILLIGRAM(S): at 17:43

## 2023-01-01 RX ADMIN — PIPERACILLIN AND TAZOBACTAM 25 GRAM(S): 4; .5 INJECTION, POWDER, LYOPHILIZED, FOR SOLUTION INTRAVENOUS at 06:14

## 2023-01-01 RX ADMIN — SODIUM CHLORIDE 50 MILLILITER(S): 9 INJECTION, SOLUTION INTRAVENOUS at 02:11

## 2023-01-01 RX ADMIN — Medication 5 MILLIGRAM(S): at 05:25

## 2023-01-01 RX ADMIN — HEPARIN SODIUM 8 UNIT(S)/HR: 5000 INJECTION INTRAVENOUS; SUBCUTANEOUS at 07:21

## 2023-01-01 RX ADMIN — PIPERACILLIN AND TAZOBACTAM 25 GRAM(S): 4; .5 INJECTION, POWDER, LYOPHILIZED, FOR SOLUTION INTRAVENOUS at 06:29

## 2023-01-01 RX ADMIN — Medication 650 MILLIGRAM(S): at 09:33

## 2023-01-01 RX ADMIN — Medication 5 MILLIGRAM(S): at 00:40

## 2023-01-01 RX ADMIN — Medication 1000 MILLIGRAM(S): at 21:00

## 2023-01-01 RX ADMIN — SODIUM CHLORIDE 4 MILLILITER(S): 9 INJECTION INTRAMUSCULAR; INTRAVENOUS; SUBCUTANEOUS at 11:32

## 2023-01-01 RX ADMIN — Medication 25 GRAM(S): at 17:51

## 2023-01-01 RX ADMIN — Medication 125 MICROGRAM(S): at 09:32

## 2023-01-01 RX ADMIN — Medication 250 MILLIGRAM(S): at 11:19

## 2023-01-01 RX ADMIN — Medication 650 MILLIGRAM(S): at 18:27

## 2023-01-01 RX ADMIN — Medication 5 MILLIGRAM(S): at 05:55

## 2023-01-01 RX ADMIN — Medication 125 MICROGRAM(S): at 10:07

## 2023-01-01 RX ADMIN — PIPERACILLIN AND TAZOBACTAM 25 GRAM(S): 4; .5 INJECTION, POWDER, LYOPHILIZED, FOR SOLUTION INTRAVENOUS at 17:07

## 2023-01-01 RX ADMIN — Medication 650 MILLIGRAM(S): at 09:18

## 2023-01-01 RX ADMIN — Medication 3 MILLILITER(S): at 08:58

## 2023-01-01 RX ADMIN — Medication 3 MILLILITER(S): at 09:20

## 2023-01-01 RX ADMIN — Medication 3 MILLILITER(S): at 20:19

## 2023-01-01 RX ADMIN — Medication 650 MILLIGRAM(S): at 14:09

## 2023-01-01 RX ADMIN — PIPERACILLIN AND TAZOBACTAM 25 GRAM(S): 4; .5 INJECTION, POWDER, LYOPHILIZED, FOR SOLUTION INTRAVENOUS at 23:23

## 2023-01-01 RX ADMIN — Medication 3 MILLILITER(S): at 07:42

## 2023-01-01 RX ADMIN — Medication 100 MILLIGRAM(S): at 06:29

## 2023-01-01 RX ADMIN — PIPERACILLIN AND TAZOBACTAM 25 GRAM(S): 4; .5 INJECTION, POWDER, LYOPHILIZED, FOR SOLUTION INTRAVENOUS at 22:20

## 2023-01-01 RX ADMIN — Medication 5 MILLIGRAM(S): at 17:59

## 2023-01-01 RX ADMIN — Medication 3 MILLILITER(S): at 11:29

## 2023-01-01 RX ADMIN — PIPERACILLIN AND TAZOBACTAM 25 GRAM(S): 4; .5 INJECTION, POWDER, LYOPHILIZED, FOR SOLUTION INTRAVENOUS at 05:52

## 2023-01-01 RX ADMIN — Medication 5 MILLIGRAM(S): at 17:51

## 2023-01-01 RX ADMIN — Medication 100 MILLIGRAM(S): at 17:49

## 2023-01-01 RX ADMIN — Medication 100 MILLIGRAM(S): at 16:40

## 2023-01-01 RX ADMIN — Medication 3 MILLILITER(S): at 09:00

## 2023-01-01 RX ADMIN — PIPERACILLIN AND TAZOBACTAM 25 GRAM(S): 4; .5 INJECTION, POWDER, LYOPHILIZED, FOR SOLUTION INTRAVENOUS at 13:09

## 2023-01-01 RX ADMIN — Medication 5 MILLIGRAM(S): at 12:46

## 2023-01-01 RX ADMIN — Medication 100 MILLIGRAM(S): at 18:30

## 2023-01-01 RX ADMIN — PIPERACILLIN AND TAZOBACTAM 25 GRAM(S): 4; .5 INJECTION, POWDER, LYOPHILIZED, FOR SOLUTION INTRAVENOUS at 13:23

## 2023-01-01 RX ADMIN — Medication 650 MILLIGRAM(S): at 01:05

## 2023-01-01 RX ADMIN — Medication 3 MILLILITER(S): at 20:47

## 2023-01-01 RX ADMIN — Medication 3 MILLILITER(S): at 14:17

## 2023-01-01 RX ADMIN — Medication 100 MILLIGRAM(S): at 05:30

## 2023-01-01 RX ADMIN — HEPARIN SODIUM 5 UNIT(S)/HR: 5000 INJECTION INTRAVENOUS; SUBCUTANEOUS at 17:52

## 2023-01-01 RX ADMIN — Medication 25 GRAM(S): at 07:43

## 2023-01-01 RX ADMIN — Medication 650 MILLIGRAM(S): at 18:57

## 2023-01-01 RX ADMIN — Medication 100 MILLIGRAM(S): at 06:05

## 2023-01-01 RX ADMIN — Medication 3 MILLILITER(S): at 20:39

## 2023-01-01 RX ADMIN — HEPARIN SODIUM 7 UNIT(S)/HR: 5000 INJECTION INTRAVENOUS; SUBCUTANEOUS at 14:38

## 2023-01-01 RX ADMIN — HEPARIN SODIUM 8 UNIT(S)/HR: 5000 INJECTION INTRAVENOUS; SUBCUTANEOUS at 07:14

## 2023-01-01 RX ADMIN — HEPARIN SODIUM 5 UNIT(S)/HR: 5000 INJECTION INTRAVENOUS; SUBCUTANEOUS at 09:40

## 2023-01-01 RX ADMIN — HEPARIN SODIUM 700 UNIT(S)/HR: 5000 INJECTION INTRAVENOUS; SUBCUTANEOUS at 08:29

## 2023-01-01 RX ADMIN — Medication 100 MILLIGRAM(S): at 17:06

## 2023-01-01 RX ADMIN — HEPARIN SODIUM 7 UNIT(S)/HR: 5000 INJECTION INTRAVENOUS; SUBCUTANEOUS at 00:41

## 2023-01-01 RX ADMIN — Medication 3 MILLILITER(S): at 16:28

## 2023-01-01 RX ADMIN — PIPERACILLIN AND TAZOBACTAM 25 GRAM(S): 4; .5 INJECTION, POWDER, LYOPHILIZED, FOR SOLUTION INTRAVENOUS at 08:14

## 2023-01-01 RX ADMIN — Medication 1 MILLIGRAM(S): at 21:43

## 2023-01-01 RX ADMIN — PIPERACILLIN AND TAZOBACTAM 25 GRAM(S): 4; .5 INJECTION, POWDER, LYOPHILIZED, FOR SOLUTION INTRAVENOUS at 10:36

## 2023-01-01 RX ADMIN — HEPARIN SODIUM 5 UNIT(S)/HR: 5000 INJECTION INTRAVENOUS; SUBCUTANEOUS at 07:58

## 2023-01-01 RX ADMIN — TAMSULOSIN HYDROCHLORIDE 0.4 MILLIGRAM(S): 0.4 CAPSULE ORAL at 22:22

## 2023-01-01 RX ADMIN — HEPARIN SODIUM 7 UNIT(S)/HR: 5000 INJECTION INTRAVENOUS; SUBCUTANEOUS at 16:39

## 2023-01-01 RX ADMIN — Medication 400 MILLIGRAM(S): at 16:58

## 2023-01-01 RX ADMIN — PIPERACILLIN AND TAZOBACTAM 25 GRAM(S): 4; .5 INJECTION, POWDER, LYOPHILIZED, FOR SOLUTION INTRAVENOUS at 15:39

## 2023-01-01 RX ADMIN — PIPERACILLIN AND TAZOBACTAM 25 GRAM(S): 4; .5 INJECTION, POWDER, LYOPHILIZED, FOR SOLUTION INTRAVENOUS at 06:01

## 2023-01-01 RX ADMIN — Medication 3 MILLILITER(S): at 20:01

## 2023-01-01 RX ADMIN — HEPARIN SODIUM 5 UNIT(S)/HR: 5000 INJECTION INTRAVENOUS; SUBCUTANEOUS at 15:23

## 2023-01-01 RX ADMIN — Medication 5 MILLIGRAM(S): at 13:25

## 2023-01-01 RX ADMIN — Medication 3 MILLILITER(S): at 10:34

## 2023-01-01 RX ADMIN — Medication 5 MILLIGRAM(S): at 11:35

## 2023-01-01 RX ADMIN — Medication 20 MILLIGRAM(S): at 01:09

## 2023-01-01 RX ADMIN — Medication 3 MILLILITER(S): at 03:51

## 2023-01-01 RX ADMIN — Medication 12.5 GRAM(S): at 04:18

## 2023-01-01 RX ADMIN — Medication 3 MILLILITER(S): at 10:44

## 2023-01-01 RX ADMIN — Medication 3 MILLILITER(S): at 16:14

## 2023-01-01 RX ADMIN — Medication 125 MICROGRAM(S): at 11:13

## 2023-01-01 RX ADMIN — SODIUM CHLORIDE 4 MILLILITER(S): 9 INJECTION INTRAMUSCULAR; INTRAVENOUS; SUBCUTANEOUS at 10:19

## 2023-01-01 RX ADMIN — Medication 3 MILLILITER(S): at 04:30

## 2023-01-01 RX ADMIN — Medication 5 MILLIGRAM(S): at 17:06

## 2023-01-01 RX ADMIN — Medication 3 MILLILITER(S): at 16:43

## 2023-01-01 RX ADMIN — PIPERACILLIN AND TAZOBACTAM 25 GRAM(S): 4; .5 INJECTION, POWDER, LYOPHILIZED, FOR SOLUTION INTRAVENOUS at 16:35

## 2023-01-01 RX ADMIN — Medication 3 MILLILITER(S): at 15:12

## 2023-01-01 RX ADMIN — Medication 3 MILLILITER(S): at 03:31

## 2023-01-01 RX ADMIN — HEPARIN SODIUM 2000 UNIT(S): 5000 INJECTION INTRAVENOUS; SUBCUTANEOUS at 03:29

## 2023-01-01 RX ADMIN — Medication 5 MILLIGRAM(S): at 23:29

## 2023-01-01 RX ADMIN — SODIUM CHLORIDE 250 MILLILITER(S): 9 INJECTION INTRAMUSCULAR; INTRAVENOUS; SUBCUTANEOUS at 04:00

## 2023-01-01 RX ADMIN — Medication 3 MILLILITER(S): at 01:00

## 2023-01-01 RX ADMIN — Medication 5 MILLIGRAM(S): at 11:47

## 2023-01-01 RX ADMIN — Medication 3 MILLILITER(S): at 16:07

## 2023-01-01 RX ADMIN — Medication 250 MILLIGRAM(S): at 05:04

## 2023-01-01 RX ADMIN — HEPARIN SODIUM 5 UNIT(S)/HR: 5000 INJECTION INTRAVENOUS; SUBCUTANEOUS at 19:18

## 2023-01-01 RX ADMIN — PIPERACILLIN AND TAZOBACTAM 25 GRAM(S): 4; .5 INJECTION, POWDER, LYOPHILIZED, FOR SOLUTION INTRAVENOUS at 16:33

## 2023-01-01 RX ADMIN — Medication 1000 MILLIGRAM(S): at 11:15

## 2023-01-01 RX ADMIN — SODIUM CHLORIDE 125 MILLILITER(S): 9 INJECTION INTRAMUSCULAR; INTRAVENOUS; SUBCUTANEOUS at 18:57

## 2023-01-01 RX ADMIN — HEPARIN SODIUM 8 UNIT(S)/HR: 5000 INJECTION INTRAVENOUS; SUBCUTANEOUS at 07:30

## 2023-01-01 RX ADMIN — PIPERACILLIN AND TAZOBACTAM 200 GRAM(S): 4; .5 INJECTION, POWDER, LYOPHILIZED, FOR SOLUTION INTRAVENOUS at 17:16

## 2023-01-01 RX ADMIN — Medication 3 MILLILITER(S): at 00:50

## 2023-01-01 RX ADMIN — Medication 1 MILLIGRAM(S): at 22:27

## 2023-01-01 RX ADMIN — Medication 1 MILLIGRAM(S): at 22:49

## 2023-01-01 RX ADMIN — SODIUM CHLORIDE 30 MILLILITER(S): 9 INJECTION, SOLUTION INTRAVENOUS at 08:49

## 2023-01-01 RX ADMIN — Medication 3 MILLILITER(S): at 20:26

## 2023-01-01 RX ADMIN — Medication 650 MILLIGRAM(S): at 09:56

## 2023-01-01 RX ADMIN — PIPERACILLIN AND TAZOBACTAM 25 GRAM(S): 4; .5 INJECTION, POWDER, LYOPHILIZED, FOR SOLUTION INTRAVENOUS at 06:13

## 2023-01-01 RX ADMIN — SODIUM CHLORIDE 40 MILLILITER(S): 9 INJECTION, SOLUTION INTRAVENOUS at 10:15

## 2023-01-01 RX ADMIN — SODIUM CHLORIDE 4 MILLILITER(S): 9 INJECTION INTRAMUSCULAR; INTRAVENOUS; SUBCUTANEOUS at 21:39

## 2023-01-01 RX ADMIN — Medication 3 MILLILITER(S): at 14:24

## 2023-01-01 RX ADMIN — Medication 650 MILLIGRAM(S): at 08:33

## 2023-01-01 RX ADMIN — HEPARIN SODIUM 5 UNIT(S)/HR: 5000 INJECTION INTRAVENOUS; SUBCUTANEOUS at 02:12

## 2023-01-01 RX ADMIN — Medication 3 MILLILITER(S): at 16:13

## 2023-01-01 RX ADMIN — Medication 3 MILLILITER(S): at 03:16

## 2023-01-01 RX ADMIN — Medication 100 MILLIGRAM(S): at 05:23

## 2023-01-01 RX ADMIN — Medication 5 MILLIGRAM(S): at 11:44

## 2023-01-01 RX ADMIN — Medication 100 MILLIGRAM(S): at 17:22

## 2023-01-01 RX ADMIN — Medication 3 MILLILITER(S): at 10:07

## 2023-01-01 RX ADMIN — SODIUM CHLORIDE 40 MILLILITER(S): 9 INJECTION, SOLUTION INTRAVENOUS at 23:23

## 2023-01-01 RX ADMIN — Medication 5 MILLIGRAM(S): at 06:13

## 2023-01-01 RX ADMIN — Medication 1 MILLIGRAM(S): at 22:35

## 2023-01-01 RX ADMIN — Medication 5 MILLIGRAM(S): at 06:57

## 2023-01-01 RX ADMIN — SODIUM CHLORIDE 4 MILLILITER(S): 9 INJECTION INTRAMUSCULAR; INTRAVENOUS; SUBCUTANEOUS at 21:07

## 2023-01-01 RX ADMIN — Medication 3 MILLILITER(S): at 16:30

## 2023-01-01 RX ADMIN — Medication 1000 MILLIGRAM(S): at 09:10

## 2023-01-01 RX ADMIN — Medication 5 MILLIGRAM(S): at 00:26

## 2023-01-01 RX ADMIN — PIPERACILLIN AND TAZOBACTAM 25 GRAM(S): 4; .5 INJECTION, POWDER, LYOPHILIZED, FOR SOLUTION INTRAVENOUS at 22:51

## 2023-01-01 RX ADMIN — Medication 5 MILLIGRAM(S): at 17:16

## 2023-01-01 RX ADMIN — Medication 1 MILLIGRAM(S): at 21:34

## 2023-01-01 RX ADMIN — Medication 3 MILLILITER(S): at 09:17

## 2023-01-01 RX ADMIN — Medication 3 MILLILITER(S): at 10:12

## 2023-01-01 RX ADMIN — Medication 5 MILLIGRAM(S): at 01:10

## 2023-01-01 RX ADMIN — Medication 100 MILLIGRAM(S): at 18:00

## 2023-01-01 RX ADMIN — PIPERACILLIN AND TAZOBACTAM 25 GRAM(S): 4; .5 INJECTION, POWDER, LYOPHILIZED, FOR SOLUTION INTRAVENOUS at 12:48

## 2023-01-01 RX ADMIN — Medication 3 MILLILITER(S): at 04:23

## 2023-01-01 RX ADMIN — Medication 3 MILLILITER(S): at 21:36

## 2023-01-01 RX ADMIN — Medication 102 MILLIGRAM(S): at 19:03

## 2023-01-01 RX ADMIN — Medication 100 MILLIGRAM(S): at 21:46

## 2023-01-01 RX ADMIN — PIPERACILLIN AND TAZOBACTAM 25 GRAM(S): 4; .5 INJECTION, POWDER, LYOPHILIZED, FOR SOLUTION INTRAVENOUS at 23:03

## 2023-01-01 RX ADMIN — HEPARIN SODIUM 8 UNIT(S)/HR: 5000 INJECTION INTRAVENOUS; SUBCUTANEOUS at 15:55

## 2023-01-01 RX ADMIN — Medication 3 MILLILITER(S): at 21:00

## 2023-01-01 RX ADMIN — PIPERACILLIN AND TAZOBACTAM 25 GRAM(S): 4; .5 INJECTION, POWDER, LYOPHILIZED, FOR SOLUTION INTRAVENOUS at 14:02

## 2023-01-01 RX ADMIN — Medication 250 MILLIGRAM(S): at 06:13

## 2023-01-01 RX ADMIN — Medication 650 MILLIGRAM(S): at 07:59

## 2023-02-07 NOTE — REASON FOR VISIT
[Follow-Up: _____] : a [unfilled] follow-up visit [Spouse] : spouse Prednisone Counseling:  I discussed with the patient the risks of prolonged use of prednisone including but not limited to weight gain, insomnia, osteoporosis, mood changes, diabetes, susceptibility to infection, glaucoma and high blood pressure.  In cases where prednisone use is prolonged, patients should be monitored with blood pressure checks, serum glucose levels and an eye exam.  Additionally, the patient may need to be placed on GI prophylaxis, PCP prophylaxis, and calcium and vitamin D supplementation and/or a bisphosphonate.  The patient verbalized understanding of the proper use and the possible adverse effects of prednisone.  All of the patient's questions and concerns were addressed.

## 2023-03-19 NOTE — ED PROVIDER NOTE - OBJECTIVE STATEMENT
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 Patient also had some blood on his bed sheets this morning.  Daughter states that the patient is no longer able to walk as much due to his weakness–patient walks on his own. Denies fevers, chills, cp, sob, abd pain, n/v/c/d, dysuria, melena or hematochezia. 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 Patient also had some blood on his bed sheets this morning. Pt last night had some regurgitation while eating and drooling of ice cream and daughter is concerned about possible aspiration. Daughter states that the patient is no longer able to walk as much due to his weakness–patient walks on his own. Denies fevers, chills, cp, sob, abd pain, n/v/c/d, dysuria, melena or hematochezia.

## 2023-03-19 NOTE — ED ADULT NURSE NOTE - OBJECTIVE STATEMENT
RAJEEV RN: pt. received to CT as code stroke, pt. A&Ox3 ambulatory at baseline pmh of Esophogeal/Lung CA, TAVR, Pacemaker, Jamesonstein Barr, Bell's Palsy. Code stroke cancelled upon collateral from pt. daughter that bell's palsy has been present x15 years. pt. presenting to ED for Hemoptysis starting this AM a/w weakness. pt. daughter endorses the pt. has been weak for the past couple of days. pt. endorses coughing up bright red blood this AM. NAD noted. respirations even and unlabored. NSR on bedside cardiac monitor. pt. arrives with 18g in the L FA, 20g IV placed in the R FA. labs drawn. comfort measures provided. safety precautions maintained.

## 2023-03-19 NOTE — ED PROVIDER NOTE - CLINICAL SUMMARY MEDICAL DECISION MAKING FREE TEXT BOX
89-year-old male with PMH esophageal cancer remote, lung cancer current, on warfarin, presents with hemoptysis.  On exam patient was hypoxic to the 80s on room air, nontender abdomen, awake alert, no lower extremity swelling, no dawna blood on rectal exam.  Hemoptysis could be secondary to known lung cancer, DDx includes PE.  Daughter at bedside, goals of care discussion took place–daughter endorses patient would want chest compressions but DNI.  Will go over MOLST form with patient and daughter.  Work-up includes basic labs, CTA chest with IV contrast, fecal occult blood, coags, x-ray, EKG, type and screen, troponin, TSH, RVP, Pro-Guillermo, BNP, lipase, antibiotics with Zosyn.  Dispo admission. 89-year-old male with PMH esophageal cancer remote, lung cancer current, on warfarin, presents with hemoptysis.  On exam patient was hypoxic to the 80s on room air, nontender abdomen, awake alert, no lower extremity swelling, no dawna blood on rectal exam.  Hemoptysis could be secondary to known lung cancer, DDx includes PE.  Daughter at bedside, goals of care discussion took place–daughter endorses patient would want chest compressions but DNI.  Will go over MOLST form with patient and daughter.  Work-up includes basic labs, CTA chest with IV contrast, fecal occult blood, coags, x-ray, EKG, type and screen, troponin, TSH, RVP, Pro-Guillermo, BNP, lipase, antibiotics with Zosyn.  Dispo admission. Will likely need endoscopy vs bronchoscopy.

## 2023-03-19 NOTE — ED ADULT TRIAGE NOTE - CHIEF COMPLAINT QUOTE
pts family called EMS for hematuria. pt found to be hypotensive by EMS (originally 60/30) give NS via #18 L F/A. placed on 3L NC for room air sat of 83%. pt noted to have right sided facial droop. MD Lane to triage for eval. code stroke called, pt brought directly to CT.

## 2023-03-19 NOTE — ED PROVIDER NOTE - NS ED ROS FT
REVIEW OF SYSTEMS:    CONSTITUTIONAL: No fever, + fatigue  EYES: No eye pain, visual disturbances, or discharge  ENMT:  No difficulty hearing, tinnitus, vertigo; No sinus or throat pain  NECK: No pain or stiffness  RESPIRATORY: + cough, no wheezing, chills, + hemoptysis; No shortness of breath  CARDIOVASCULAR: No chest pain, palpitations, dizziness, or leg swelling  GASTROINTESTINAL: No abdominal or epigastric pain. No nausea, vomiting, or hematemesis; No diarrhea or constipation. No melena or hematochezia.  GENITOURINARY: No dysuria, frequency, hematuria, or incontinence  NEUROLOGICAL: No headaches, numbness, or tremors  SKIN: No itching, burning, rashes, or lesions

## 2023-03-19 NOTE — ED PROVIDER NOTE - ATTENDING CONTRIBUTION TO CARE
89yM Portuguese speaking, HTN, HLD, TIA, severe AS s/p TAVR on Coumadin, AFib, esophageal and lung CA (not currently being tx'd) presents with several episodes of hemoptysis today. pt daughter at bedside states he coughed up large amou nt fof blood and found blood stains on his sheets. pt currently on coumadin. denies vomiting blood or melena. no sob but noted ot by hypoxic, pt does use supplemental o2 prn at home. no fevers, c hills, chest pain abd pain.  pt in NAD, rhonchorus lung sounds, hypoxic on RA but improved with 5L NC  abd nontender  concern for aspiration pna, mass invading bronchus, PE,   will check labs, h/h, supplemental o2, abx cover for aspiration  CTA, PE study

## 2023-03-19 NOTE — ED PROVIDER NOTE - PHYSICAL EXAMINATION
GENERAL: unwell appearing elderly male in no acute distress  HEAD: normocephalic, atraumatic  HEENT: normal conjunctiva, oral mucosa moist, no JVD  CARDIAC: regular rate and rhythm, normal S1S2, no appreciable murmurs, 2+ pulses in UE b/l  PULM: coarse rhonchi in lung fields diffusely, no wheezing  GI: abdomen nondistended, soft, nontender, no guarding, rebound tenderness  : no suprapubic tenderness  NEURO: following commands, normal speech, PERRL, EOMI, AAOx3  MSK: no peripheral edema, no calf tenderness b/l  SKIN: chronic venous stasis rash in LLE, well-perfused, extremities warm  PSYCH: appropriate mood and affect

## 2023-03-20 NOTE — PROGRESS NOTE ADULT - ASSESSMENT
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

## 2023-03-20 NOTE — PROGRESS NOTE ADULT - PROBLEM SELECTOR PLAN 5
-Patient with elevated CE, Trop 95, 105-->161  - EKG- Vpaced with no evidence of ischemia  -Appreciate Cards recs- Likely demand ischemia in the setting of infection and HAYLIE. EKG and CE not consistent with ACS, would not treat for acs. FU  TTE   -Continuous cardiac monitoring to monitor for arrhythmias - On entresto, lasix, bb, dig 3 days/week  -Given relatively soft pressure, will hold overnight; will need bedside  dysphagia eval prior to ordering Severe AS s/p TAVR 4 years ago, Afib  on Coumadin,   INR>8 likely 2/2 coumadin in setting of recent anorexia  Vit k given   Repeat INR 2.3  trend coags, cbc  Will monitor and once INR < 2 and no further hemoptysis, will restart Coumadin

## 2023-03-20 NOTE — H&P ADULT - NSHPADDITIONALINFOADULT_GEN_ALL_CORE
> 75 minutes devoted to chart review, h&p, ekg and imaging review where applicable, and documentation of aforementioned entities

## 2023-03-20 NOTE — H&P ADULT - NSHPREVIEWOFSYSTEMS_GEN_ALL_CORE
Review of Systems:   CONSTITUTIONAL: No fever, + fatigue  EYES: No eye pain, visual disturbances, or discharge  ENMT:  No difficulty hearing, tinnitus, vertigo; No sinus or throat pain  NECK: No pain or stiffness  RESPIRATORY: No cough, wheezing, chills or hemoptysis; No shortness of breath  CARDIOVASCULAR: No chest pain, palpitations, dizziness, or leg swelling  GASTROINTESTINAL: No abdominal or epigastric pain. No nausea, vomiting, or hematemesis; No diarrhea or constipation. No melena or hematochezia.  GENITOURINARY: No dysuria, frequency, hematuria, or incontinence  NEUROLOGICAL: No headaches,  numbness, or tremors  SKIN: No itching, burning, rashes, or lesions

## 2023-03-20 NOTE — H&P ADULT - PROBLEM SELECTOR PLAN 4
on entresto, lasix, bb, dig 3 days/week, Given relatively soft pressure, will hold overnight; will need bedside  dysphagia eval prior to ordering

## 2023-03-20 NOTE — H&P ADULT - PROBLEM SELECTOR PLAN 6
-aspiration precautions  -bedside and formal swallow eval  -will hold po meds overnight given most meds will have BP lowering effect  -bedside swallow eval in am, and crush meds as per home routine if pt passes. will requuire soft diet; if fails , will need to convert po meds to IV where possible -aspiration precautions  -bedside and formal swallow eval  -will hold po meds overnight given most meds will have BP lowering effect  -bedside swallow eval in am, and crush meds as per home routine if pt passes. will requuire soft diet; if fails , will need to convert po meds to IV where possible  -ct head ordered

## 2023-03-20 NOTE — PROGRESS NOTE ADULT - SUBJECTIVE AND OBJECTIVE BOX
PROGRESS NOTE:     Patient is a 89y old  Male who presents with a chief complaint of hemoptysis (20 Mar 2023 00:35)      SUBJECTIVE / OVERNIGHT EVENTS:    ADDITIONAL REVIEW OF SYSTEMS:    MEDICATIONS  (STANDING):  influenza  Vaccine (HIGH DOSE) 0.7 milliLiter(s) IntraMuscular once  piperacillin/tazobactam IVPB.. 3.375 Gram(s) IV Intermittent every 8 hours    MEDICATIONS  (PRN):      CAPILLARY BLOOD GLUCOSE  67 (19 Mar 2023 16:53)      POCT Blood Glucose.: 67 mg/dL (19 Mar 2023 15:55)    I&O's Summary      PHYSICAL EXAM:  Vital Signs Last 24 Hrs  T(C): 36.3 (20 Mar 2023 09:06), Max: 37.1 (20 Mar 2023 07:55)  T(F): 97.4 (20 Mar 2023 09:06), Max: 98.7 (20 Mar 2023 07:55)  HR: 68 (20 Mar 2023 09:06) (62 - 86)  BP: 110/45 (20 Mar 2023 09:06) (98/47 - 124/96)  BP(mean): --  RR: 18 (20 Mar 2023 09:06) (16 - 21)  SpO2: 100% (20 Mar 2023 09:06) (83% - 100%)    Parameters below as of 20 Mar 2023 09:06  Patient On (Oxygen Delivery Method): room air        CONSTITUTIONAL: NAD, well-developed  RESPIRATORY: Normal respiratory effort; lungs are clear to auscultation bilaterally  CARDIOVASCULAR: Regular rate and rhythm, normal S1 and S2, no murmur/rub/gallop; No lower extremity edema; Peripheral pulses are 2+ bilaterally  ABDOMEN: Nontender to palpation, normoactive bowel sounds, no rebound/guarding; No hepatosplenomegaly  MUSCLOSKELETAL: no clubbing or cyanosis of digits; no joint swelling or tenderness to palpation  PSYCH: A+O to person, place, and time; affect appropriate  NEURO:  SKIN:    LABS:                        9.6    14.22 )-----------( 125      ( 20 Mar 2023 02:07 )             31.4     03-20    139  |  106  |  42<H>  ----------------------------<  72  4.9   |  24  |  1.84<H>    Ca    8.5      20 Mar 2023 02:07  Mg     1.60     03-19    TPro  6.3  /  Alb  3.0<L>  /  TBili  0.8  /  DBili  x   /  AST  12  /  ALT  11  /  AlkPhos  83  03-20    PT/INR - ( 20 Mar 2023 02:07 )   PT: 28.4 sec;   INR: 2.43 ratio         PTT - ( 20 Mar 2023 02:07 )  PTT:40.9 sec            RADIOLOGY & ADDITIONAL TESTS:  Results Reviewed:   Imaging Personally Reviewed:  Electrocardiogram Personally Reviewed:    COORDINATION OF CARE:  Care Discussed with Consultants/Other Providers [Y/N]:  Prior or Outpatient Records Reviewed [Y/N]:   Ridge Urbano  Hospitalist  Pager- [phone]  PROGRESS NOTE:     Patient is a 89y old  Male who presents with a chief complaint of hemoptysis (20 Mar 2023 00:35)      SUBJECTIVE / OVERNIGHT EVENTS: pt seen and examined by me   Yi  #  [id number]  Denies further hemoptysis.   Hungry, asking to eat     ADDITIONAL REVIEW OF SYSTEMS:    MEDICATIONS  (STANDING):  influenza  Vaccine (HIGH DOSE) 0.7 milliLiter(s) IntraMuscular once  piperacillin/tazobactam IVPB.. 3.375 Gram(s) IV Intermittent every 8 hours    MEDICATIONS  (PRN):      CAPILLARY BLOOD GLUCOSE  67 (19 Mar 2023 16:53)      POCT Blood Glucose.: 67 mg/dL (19 Mar 2023 15:55)    I&O's Summary      PHYSICAL EXAM:  Vital Signs Last 24 Hrs  T(C): 36.3 (20 Mar 2023 09:06), Max: 37.1 (20 Mar 2023 07:55)  T(F): 97.4 (20 Mar 2023 09:06), Max: 98.7 (20 Mar 2023 07:55)  HR: 68 (20 Mar 2023 09:06) (62 - 86)  BP: 110/45 (20 Mar 2023 09:06) (98/47 - 124/96)  BP(mean): --  RR: 18 (20 Mar 2023 09:06) (16 - 21)  SpO2: 100% (20 Mar 2023 09:06) (83% - 100%)    Parameters below as of 20 Mar 2023 09:06  Patient On (Oxygen Delivery Method): room air        CONSTITUTIONAL: NAD, frail   RESPIRATORY: Normal respiratory effort; lungs are clear to auscultation bilaterally  CARDIOVASCULAR: Regular rate and rhythm, normal S1 and S2, no murmur/rub/gallop; No lower extremity edema; Peripheral pulses are 2+ bilaterally  ABDOMEN: Nontender to palpation, normoactive bowel sounds, no rebound/guarding; No hepatosplenomegaly  MUSCLOSKELETAL: no clubbing or cyanosis of digits; no joint swelling or tenderness to palpation  PSYCH: A+O to person, place, and time; affect appropriate  NEURO: Right side facial droop  SKIN: Scaly plaques on shins, ecchymoses on extremities    LABS:                        9.6    14.22 )-----------( 125      ( 20 Mar 2023 02:07 )             31.4     03-20    139  |  106  |  42<H>  ----------------------------<  72  4.9   |  24  |  1.84<H>    Ca    8.5      20 Mar 2023 02:07  Mg     1.60     03-19    TPro  6.3  /  Alb  3.0<L>  /  TBili  0.8  /  DBili  x   /  AST  12  /  ALT  11  /  AlkPhos  83  03-20    PT/INR - ( 20 Mar 2023 02:07 )   PT: 28.4 sec;   INR: 2.43 ratio         PTT - ( 20 Mar 2023 02:07 )  PTT:40.9 sec            RADIOLOGY & ADDITIONAL TESTS:  Results Reviewed:   Imaging Personally Reviewed:  Electrocardiogram Personally Reviewed:    COORDINATION OF CARE:  Care Discussed with Consultants/Other Providers [Y/N]:  Prior or Outpatient Records Reviewed [Y/N]:

## 2023-03-20 NOTE — H&P ADULT - PROBLEM SELECTOR PLAN 2
-will place on zosyn for suspected asp pna, follow cultures  -trend Hb  -aspiration precautions  -bedside and formal swallow eval

## 2023-03-20 NOTE — CONSULT NOTE ADULT - SUBJECTIVE AND OBJECTIVE BOX
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 CTA chest negative for PE, suspected RLL and POLLO  PNA, right upper lobe mass s/p biopsy. Per daughter, pt has baseline has some diffuclty with po intake, but reports recent coughing even with sips of water is unusual. Pt denies cp, sob, no listed h/o cad. Trop 95, 105, ekg paced. Most recent sbp 98, baseline 110 per daughter  (20 Mar 2023 00:20)  	     Allergies  No Known Allergies    Intolerances      PAST MEDICAL & SURGICAL HISTORY:  Cardiac Dysrhythmia  Dyslipidemia  Hypertension  BPH (Benign Prostatic Hyperplasia)  Hx TIA  x 8yrs  A-fib  Larynx neoplasm  h/o cardioversion  of Atrial Fibrillation  excision of Basal Cell Carcinoma of Nose  S/P TAVR (transcatheter aortic valve replacement)      FAMILY HISTORY:  Family history of hypertension      SUBSTANCE USE  Tobacco Usage:  denies  Alcohol Usage: denies  Recreational drugs: denies      REVIEW OF SYSTEMS:  CV: chest pain (-), palpitation (-), orthopnea (-), PND (-), edema (-)  PULM: SOB (-), cough (-), wheezing (-), hemoptysis (+).   CONST: fever (-), chills (-) or fatigue (+)  GI: abdominal distension (-), abdominal pain (-) , nausea/vomiting (-), hematemesis, (-), melena (-), hematochezia (-)  : dysuria (-), frequency (-), hematuria (-).   NEURO: numbness (-), weakness (-), dizziness (-)  SKIN: itching (-), rash (-)  HEENT:  visual changes (-); vertigo or throat pain (-);  neck stiffness (-)   All other review of systems is negative unless indicated above.      T(C): 36.6 (03-19-23 @ 23:00), Max: 36.6 (03-19-23 @ 23:00)  HR: 62 (03-19-23 @ 23:00) (62 - 86)  BP: 98/47 (03-19-23 @ 23:00) (98/47 - 124/96)  RR: 16 (03-19-23 @ 23:00) (16 - 20)  SpO2: 98% (03-19-23 @ 23:00) (83% - 98%)  Wt(kg): --  I&O's Summary      Physical Exam:  General: NAD  Cardiovascular: Normal S1 S2, No JVD, No murmurs, No edema  Respiratory: coarse rhonchi in lung fields diffusely, no wheezing  Gastrointestinal:  Soft, Non-tender, + BS	  Skin: warm and dry, No rashes, No ecchymoses, No cyanosis	  Extremities:  No clubbing, cyanosis or edema  Vascular: Peripheral pulses palpable 2+ bilaterally    CBC Full  -  ( 19 Mar 2023 16:36 )  WBC Count : 17.24 K/uL  Hemoglobin : 9.6 g/dL  Hematocrit : 31.9 %  Platelet Count - Automated : 113 K/uL  Mean Cell Volume : 101.6 fL  Mean Cell Hemoglobin : 30.6 pg  Mean Cell Hemoglobin Concentration : 30.1 gm/dL  Auto Neutrophil # : 15.78 K/uL  Auto Lymphocyte # : 0.50 K/uL  Auto Monocyte # : 0.86 K/uL  Auto Eosinophil # : 0.00 K/uL  Auto Basophil # : 0.02 K/uL  Auto Neutrophil % : 91.5 %  Auto Lymphocyte % : 2.9 %  Auto Monocyte % : 5.0 %  Auto Eosinophil % : 0.0 %  Auto Basophil % : 0.1 %    03-19    139  |  105  |  36<H>  ----------------------------<  232<H>  5.5<H>   |  23  |  1.71<H>    Ca    8.0<L>      19 Mar 2023 16:36  Mg     1.60     03-19    TPro  5.9<L>  /  Alb  2.9<L>  /  TBili  0.6  /  DBili  x   /  AST  16  /  ALT  10  /  AlkPhos  81  03-19    proBNP:   Lipid Profile:   HgA1c:   TSH: Thyroid Stimulating Hormone, Serum: 2.24 uIU/mL (03-19 @ 16:36)  CARDIAC MARKERS ( 19 Mar 2023 19:00 )  105 ng/L / x     / x     / x     / x     / x      CARDIAC MARKERS ( 19 Mar 2023 16:36 )  95 ng/L / x     / x     / x     / x     / x            Diagnostic testing:  cath:  ----    echo:    < from: Transthoracic Echocardiogram (03.06.18 @ 13:14) >  Conclusions:  1. Mitral annular calcification and calcified mitral  leaflets with decreased diastolic opening. Mild-moderate  mitral regurgitation. Peak mitral valve gradient equals 14  mm Hg, mean transmitral valve gradient equals 5 mm Hg,  consistent with mild  mitral stenosis.  2. Transcatheter aortic valve replacement. The valve  appears well-seated. Peak transaortic valve gradient equals  27 mm Hg, mean transaortic valve gradient equals 11 mm Hg,  which is probably normal in the presence of a transcatheter  aortic valve replacement.  Mild paravalvular aortic  regurgitation. There are two jets of paravalvular aortic  regurgitation at the 3 o'clock and 6 o'clock position.  3. Severely dilated left atrium.  LA volume index = 79  cc/m2.  4. Mild concentric left ventricular hypertrophy.  5. Overall preserved left ventricular systolic dysfunction.   Septal motion consistent with paced rhythm.  6. Unable to comment on diastolic function in the setting  of atrial fibrillation and paced rhythm  7. Enlarged right atrium. A device wire is noted in the  right heart.  8. Normal right ventricular size with decreased right  ventricular systolic function.  9. Estimated right ventricular systolic pressure equals 49  mm Hg, assuming right atrial pressure equals 8 mm Hg,  consistent with mild pulmonary hypertension.    < end of copied text >

## 2023-03-20 NOTE — CONSULT NOTE ADULT - ASSESSMENT
89 y/o M Chinese speaking, HTN, HLD, TIA, severe AS s/p TAVR 4 years ago, Afib on Coumadin, PPM, CHF on entresto and lasix, bph on flomax and finasteride,  remote esophageal CA s/p chemo and radiation 12 years ago and lung CA (not currently being tx'd), 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 CTA chest negative for PE, suspected RLL and POLLO  PNA, right upper lobe mass s/p biopsy. Cardiology consulted for elevated troponin.  Pt denies cp, sob       #Elevated Troponin  - Patient with elevated CE, Trop 95, 105  - EKG- Vpaced.  - HD stable, not in decompensated HF state  - Impression- demand ischemia in the setting of infection and HAYLIE (scr1.7)  - EKG show showed V-paced with no evidence of ischemia  - Serial EKG PRN to assess for ST changes  - Continuous cardiac monitoring to monitor for arrhythmias  - CBC, CMP, coags, HbA1C, TSH, lipids for comorbidities, Trend cardiac enzymes (troponin, CK & ckmb)  - ECHO: TTE in am  - EKG and CE not consistent with ACS, would not treat for acs      Thank you, if any questions or clinical situation changes please call spectra #[phone].  Attending Attestation to follow

## 2023-03-20 NOTE — H&P ADULT - NSHPPHYSICALEXAM_GEN_ALL_CORE
PHYSICAL EXAM:      Constitutional: NAD, well-groomed  HEENT:  EOMI, Normal Hearing, upper dentures in place (lower dentures mistakenly left at home)  Neck: No LAD, No JVD  Back: Normal spine flexure, No CVA tenderness  Respiratory: CTAB, decreased right sided breath sounds  Cardiovascular: S1 and S2, RRR, no M/G/R  Gastrointestinal: BS+, soft, NT/ND  Extremities: No peripheral edema  Vascular: 2+ peripheral pulses  Neurological: A/O x 2, no acute focal deficits; chronic facial palsy  Psychiatric: Normal mood, normal affect  Musculoskeletal: 4-5/5 strength b/l upper and lower extremities  Skin: No rashes

## 2023-03-20 NOTE — PROGRESS NOTE ADULT - PROBLEM SELECTOR PLAN 2
-will place on zosyn for suspected asp pna, follow cultures  -trend Hb  -aspiration precautions  -bedside and formal swallow eval -On zosyn for suspected asp pna  - RVP/COVID- negative  -trend Hb  -aspiration precautions  -FU bedside and formal swallow eval On zosyn for suspected asp pna  RVP/COVID- negative  Trend Hb  Aspiration precautions  FU bedside and formal swallow eval

## 2023-03-20 NOTE — PROGRESS NOTE ADULT - PROBLEM SELECTOR PLAN 3
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 On 6/11/2018, he completed a course of definitive stereotactic body radiotherapy to the left upper lobe using Cyberknife technology  Was started on Keytruda was started every 3 weeks beginning March 2021. The fourth dose was Nasima 15, 2021.  Subsequently, he developed severe GI toxicity and this has been held   As per daughter pt is not on any therapy since  He saw his Oncologist in December 2022 and had a repeat CT   Called pts oncologist Dr Brenden Hanna- [phone]. Left message with  for call back Patient with elevated CE, Trop 95, 105-->161  EKG- Vpaced with no evidence of ischemia  Appreciate Cards recs- Likely demand ischemia in the setting of infection and HAYLIE. EKG and CE not consistent with ACS, would not treat for acs. FU  TTE   Continuous cardiac monitoring to monitor for arrhythmias

## 2023-03-20 NOTE — PROGRESS NOTE ADULT - CONVERSATION DETAILS
Discussed with pt at bedside, Pt [place] and deferred conversation to daughter Melissa  As per daughter, they had this conversation and  MOLST form was discussed in the ED yesterday and they opted for FULL CODE

## 2023-03-20 NOTE — PROGRESS NOTE ADULT - PROBLEM SELECTOR PLAN 7
CT head-No acute intracranial hemorrhage, mass effect, or shift of the midline structures.  Aspiration precautions  Failed bedside and formal swallow eval   hold po meds overnight given most meds will have BP lowering effect  As per daughter, since his history of Esophageal Ca 8 years, his swallow test was always abnormal  but pt has learned to adjust his diet and take his time eating  FU bedside swallow eval CT head-No acute intracranial hemorrhage, mass effect, or shift of the midline structures.  Aspiration precautions  Failed bedside and formal swallow eval   hold po meds overnight given most meds will have BP lowering effect  As per daughter, since his history of Laryngeal  Ca 8 years, his swallow test was always abnormal  but pt has learned to adjust his diet and take his time eating  FU bedside swallow eval

## 2023-03-20 NOTE — PROGRESS NOTE ADULT - PROBLEM SELECTOR PLAN 4
- On entresto, lasix, bb, dig 3 days/week  -Given relatively soft pressure, will hold overnight; will need bedside  dysphagia eval prior to ordering -INR>8 likely 2/2 coumadin in setting of recent anorexia  -Vit k given  - Repeat INR 2.3  -trend coags, cbc  - WIll monitor and once INR < 2 and no further hemoptysis, will restart Coumadin On entresto, lasix, bb, dig 3 days/week- On HOLD   Given relatively soft pressure, will hold overnight; will need bedside  dysphagia eval prior to ordering

## 2023-03-20 NOTE — PATIENT PROFILE ADULT - FUNCTIONAL ASSESSMENT - BASIC MOBILITY 6.
1-calculated by average/Not able to assess (calculate score using [facility] averaging method)

## 2023-03-20 NOTE — H&P ADULT - PROBLEM SELECTOR PLAN 1
-2/2 pna? known right upper lobe ca?  -will place on zosyn for suspected asp pna, follow cultures  -trend Hb  -aspiration precautions  -bedside and formal swallow eval

## 2023-03-20 NOTE — PROGRESS NOTE ADULT - PROBLEM SELECTOR PLAN 6
Aspiration precautions  Bedside and formal swallow eval  hold po meds overnight given most meds will have BP lowering effect  -bedside swallow eval in am, and crush meds as per home routine if pt passes. will requuire soft diet; if fails , will need to convert po meds to IV where possible  -ct head ordered -Patient with elevated CE, Trop 95, 105-->161  - EKG- Vpaced with no evidence of ischemia  -Appreciate Cards recs- Likely demand ischemia in the setting of infection and HAYLIE. EKG and CE not consistent with ACS, would not treat for acs. 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 On 6/11/2018, he completed a course of definitive stereotactic body radiotherapy to the left upper lobe using Cyberknife technology  Was started on Keytruda was started every 3 weeks beginning March 2021. The fourth dose was Nasima 15, 2021.  Subsequently, he developed severe GI toxicity and this has been held   As per daughter pt is not on any therapy since  He saw his Oncologist in December 2022 and had a repeat CT   Called pts oncologist Dr Brenden Hanna- [phone]. Left message with  for call back

## 2023-03-20 NOTE — PROGRESS NOTE ADULT - PROBLEM SELECTOR PLAN 1
-Suspect secondary to RUL Mass. Other possibility is secondary to PNA   -CTA chest negative for PE, suspected RLL and POLLO  PNA, right upper lobe mass s/p biopsy.  -On zosyn for suspected asp pna, follow cultures  -trend Hb  -aspiration precautions  -bedside and formal swallow eval -Suspect secondary to RUL Mass. Other possibility is secondary to PNA   - Setting of supratherapeutic INR  - had 1-2 episodes at home No further episodes of hemoptysis  since, HH stable  -CTA chest negative for PE, suspected RLL and POLLO  PNA, right upper lobe mass s/p biopsy.  -On zosyn for suspected asp pna, follow cultures  -trend Hb  -aspiration precautions  -Called pts Oncologist Dr Brenden Hanna to obtain collateral- Await call back Suspect secondary to RUL Mass. Other possibility is secondary to PNA   Setting of supratherapeutic INR  Had 1-2 episodes at home No further episodes of hemoptysis  since, HH stable  CTA chest negative for PE, suspected RLL and POLLO  PNA, right upper lobe mass s/p biopsy.  On zosyn for suspected asp pna, follow cultures  trend Hb  Aspiration precautions  Called pts Oncologist Dr Brenden Hanna to obtain collateral- Await call back

## 2023-03-20 NOTE — H&P ADULT - NSHPLABSRESULTS_GEN_ALL_CORE
9.6    17.24 )-----------( 113      ( 19 Mar 2023 16:36 )             31.9     03-19    139  |  105  |  36<H>  ----------------------------<  232<H>  5.5<H>   |  23  |  1.71<H>    Ca    8.0<L>      19 Mar 2023 16:36  Mg     1.60     03-19    TPro  5.9<L>  /  Alb  2.9<L>  /  TBili  0.6  /  DBili  x   /  AST  16  /  ALT  10  /  AlkPhos  81  03-19    CAPILLARY BLOOD GLUCOSE  67 (19 Mar 2023 16:53)      POCT Blood Glucose.: 67 mg/dL (19 Mar 2023 15:55)    PT/INR - ( 19 Mar 2023 16:36 )   PT: 98.3 sec;   INR: 8.29 ratio         PTT - ( 19 Mar 2023 16:36 )  PTT:58.9 sec    Vital Signs Last 24 Hrs  T(C): 36.6 (19 Mar 2023 23:00), Max: 36.6 (19 Mar 2023 23:00)  T(F): 97.8 (19 Mar 2023 23:00), Max: 97.8 (19 Mar 2023 23:00)  HR: 62 (19 Mar 2023 23:00) (62 - 86)  BP: 98/47 (19 Mar 2023 23:00) (98/47 - 124/96)  BP(mean): --  RR: 16 (19 Mar 2023 23:00) (16 - 20)  SpO2: 98% (19 Mar 2023 23:00) (83% - 98%)    Parameters below as of 19 Mar 2023 23:00  Patient On (Oxygen Delivery Method): nasal cannula  O2 Flow (L/min): 2

## 2023-03-20 NOTE — H&P ADULT - HISTORY OF PRESENT ILLNESS
89yM Luxembourgish speaking, HTN, HLD, TIA, severe AS s/p TAVR 4 years ago, Afib  on Coumadin, PPM, CHF on entresto and lasix, bph on flomax and finasteride,  remote esophageal CA s/p chemo and radiation 12 years ago and lung CA (not currently being tx'd), 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 CTA chest negative for PE, suspected RLL and POLLO  PNA, right upper lobe mass s/p biopsy. Per daughter, pt has baseline has some diffuclty with po intake, but reports recent coughing even with sips of water is unusual. Pt denies cp, sob, no listed h/o cad. Trop 95, 105, ekg paced.  89yM Persian speaking, HTN, HLD, TIA, severe AS s/p TAVR 4 years ago, Afib  on Coumadin, PPM, CHF on entresto and lasix, bph on flomax and finasteride,  remote esophageal CA s/p chemo and radiation 12 years ago and lung CA (not currently being tx'd), 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 CTA chest negative for PE, suspected RLL and POLLO  PNA, right upper lobe mass s/p biopsy. Per daughter, pt has baseline has some diffuclty with po intake, but reports recent coughing even with sips of water is unusual. Pt denies cp, sob, no listed h/o cad. Trop 95, 105, ekg paced. Most recent sbp 98, baseline 110 per daughter  89yM Romansh speaking, HTN, HLD, TIA, severe AS s/p TAVR 4 years ago, Afib  on Coumadin, PPM, CHF on entresto and lasix, bph on flomax and finasteride,  remote esophageal CA s/p chemo and radiation 12 years ago and lung CA (not currently being tx'd), 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 INR >8. CTA chest negative for PE, suspected RLL and POLLO  PNA, right upper lobe mass s/p biopsy. Procalcitonin >5.  Per daughter, pt has baseline  difficulty with po intake, but reports recent coughing even with sips of water which is unusual. Pt denies cp, sob, no listed h/o cad. Trop 95, 105, ekg paced. Most recent sbp 98, baseline 110 per daughter

## 2023-03-21 NOTE — PROGRESS NOTE ADULT - PROBLEM SELECTOR PLAN 4
On entresto, lasix, bb, dig 3 days/week- On HOLD   Given relatively soft pressure, will hold overnight; will need bedside  dysphagia eval prior to ordering On entresto, lasix, bb, dig 3 days/week- On HOLD as pt was NPO and soft BPs  Will restart low dose Metoprolol 25mg BID  Restart rest of meds as per BP needs and volume status

## 2023-03-21 NOTE — PROGRESS NOTE ADULT - ASSESSMENT
89yM Hungarian speaking, HTN, HLD, TIA, severe AS s/p TAVR 4 years ago, Afib  on Coumadin, PPM, CHF on entresto and lasix, bph on flomax and finasteride,  remote laryngeal l CA s/p chemo and radiation 12 years ago and lung CA (not currently being tx'd), 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 89yM Pashto speaking, HTN, HLD, TIA, severe AS s/p TAVR 4 years ago, Afib  on Coumadin, PPM, CHF on entresto and lasix, bph on flomax and finasteride,  remote laryngeal l CA s/p chemo and radiation 12 years ago and lung CA (not currently being tx'd), 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

## 2023-03-21 NOTE — PROGRESS NOTE ADULT - CONVERSATION DETAILS
Met with patient and daughter Margarita at bedside. Pt defers decision to his daughter   Readdressed GOC. Discussed risks/benefit of intubation/CPR/resuscitation.  As per daughter, pt is a fighter, has 9 lives and would want all interventions though may consider options. For now-   FULL CODE  Daughter worried pt has not eaten in 2 days, She shares pt always had issues with swallowing since his cancer diagnosis and has modified his dietary habits and has been doing well since. Requesting diet   Daughter understands aspiration risks and agreeable to a puree diet pending official SS Met with patient and daughter Margarita at bedside. Pt defers decision to his daughter   Readdressed GOC. Discussed risks/benefit of intubation/CPR/resuscitation.  As per daughter, pt is a fighter, and would want all interventions though may consider options. For now-   FULL CODE  Daughter worried pt has not eaten in 2 days, She shares pt always had issues with swallowing since his cancer diagnosis and has modified his dietary habits and has been doing well since. Requesting diet   Daughter understands aspiration risks and agreeable to a puree diet pending official SS

## 2023-03-21 NOTE — CONSULT NOTE ADULT - SUBJECTIVE AND OBJECTIVE BOX
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 INR >8. CTA chest negative for PE, suspected RLL and POLLO  PNA, right upper lobe mass s/p biopsy. Procalcitonin >5.  Per daughter, pt has baseline  difficulty with po intake, but reports recent coughing even with sips of water which is unusual. Pt denies cp, sob, no listed h/o cad. Trop 95, 105, ekg paced. Most recent sbp 98, baseline 110 per daughter  (20 Mar 2023 00:20)    INR was reversed, now <2. Minimal hemoptysis since then. Otherwise feels well per his report this afternoon.      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      S/P TAVR (transcatheter aortic valve replacement)          FAMILY HISTORY:  Family history of hypertension        SOCIAL HISTORY:  Smoking: [ ] Never Smoked [ ] Former Smoker (__ packs x ___ years) [ ] Current Smoker  (__ packs x ___ years)  Substance Use: [ ] Never Used [ ] Used ____  EtOH Use:  Marital Status: [ ] Single [ ]  [ ]  [ ]   Sexual History:   Occupation:  Recent Travel:  Country of Birth:  Advance Directives:    Allergies    No Known Allergies    Intolerances        HOME MEDICATIONS:  Home Medications:  allopurinol 100 mg oral tablet: 1 tab(s) orally once a day (17 Sep 2020 14:24)  Coumadin 1 mg oral tablet: 1 tab(s) orally once a day (20 Mar 2023 01:33)  digoxin 125 mcg (0.125 mg) oral tablet: 1 tab(s) orally 3 times a week //Mo (20 Mar 2023 01:32)  Entresto 24 mg-26 mg oral tablet: 1 tab(s) orally 2 times a day (20 Mar 2023 01:33)  Flomax 0.4 mg oral capsule: 2 cap(s) orally once a day (20 Mar 2023 01:29)  Lasix 20 mg oral tablet: 1 tab(s) orally once a day (20 Mar 2023 01:30)  Lipitor 10 mg oral tablet: 1 tab(s) orally once a day (20 Mar 2023 01:33)  Lopressor 100 mg oral tablet: 1 tab(s) orally 2 times a day (20 Mar 2023 01:32)  Proscar 5 mg oral tablet: 1 tab(s) orally once a day (17 Sep 2020 07:06)      REVIEW OF SYSTEMS:  Constitutional: [x ] negative [ ] fevers [ ] chills [ ] weight loss [ ] weight gain  HEENT: [x ] negative [ ] dry eyes [ ] eye irritation [ ] postnasal drip [ ] nasal congestion  CV: [x ] negative  [ ] chest pain [ ] orthopnea [ ] palpitations [ ] murmur  Resp: [ ] negative [ ] cough [ ] shortness of breath [ ] dyspnea [ ] wheezing [ ] sputum [ ] hemoptysis  GI: [ ] negative [ ] nausea [ ] vomiting [ ] diarrhea [ ] constipation [ ] abd pain [ ] dysphagia   : [ ] negative [ ] dysuria [ ] nocturia [ ] hematuria [ ] increased urinary frequency  Musculoskeletal: [ ] negative [ ] back pain [ ] myalgias [ ] arthralgias [ ] fracture  Skin: [ ] negative [ ] rash [ ] itch  Neurological: [ ] negative [ ] headache [ ] dizziness [ ] syncope [ ] weakness [ ] numbness  Psychiatric: [ ] negative [ ] anxiety [ ] depression  Endocrine: [ ] negative [ ] diabetes [ ] thyroid problem  Hematologic/Lymphatic: [ ] negative [ ] anemia [ ] bleeding problem  Allergic/Immunologic: [ ] negative [ ] itchy eyes [ ] nasal discharge [ ] hives [ ] angioedema  [ ] All other systems negative  [x ] Unable to assess ROS because poor historian    OBJECTIVE:  ICU Vital Signs Last 24 Hrs  T(C): 36.1 (21 Mar 2023 15:31), Max: 36.4 (20 Mar 2023 22:37)  T(F): 97 (21 Mar 2023 15:31), Max: 97.6 (20 Mar 2023 22:37)  HR: 88 (21 Mar 2023 15:31) (67 - 88)  BP: 142/60 (21 Mar 2023 15:31) (110/47 - 150/42)  BP(mean): --  ABP: --  ABP(mean): --  RR: 18 (21 Mar 2023 15:31) (16 - 20)  SpO2: 100% (21 Mar 2023 15:31) (91% - 100%)    O2 Parameters below as of 21 Mar 2023 15:31  Patient On (Oxygen Delivery Method): nasal cannula  O2 Flow (L/min): 4             @ 07:01  -   @ 19:29  --------------------------------------------------------  IN: 0 mL / OUT: 100 mL / NET: -100 mL      CAPILLARY BLOOD GLUCOSE      POCT Blood Glucose.: 86 mg/dL (21 Mar 2023 12:53)      PHYSICAL EXAM:  Gen: NAD  HEENT: MMM  Neck: No JVP elevation  CV: irregular  Lung: rhonchorous throughout  Abd: Soft, NT, ND  Ext: WWP, no CCE  Skin: No rash  Neuro: Rt facial droop    HOSPITAL MEDICATIONS:  Standing Meds:  dextrose 5% + lactated ringers. 1000 milliLiter(s) IV Continuous <Continuous>  influenza  Vaccine (HIGH DOSE) 0.7 milliLiter(s) IntraMuscular once  metoprolol tartrate 25 milliGRAM(s) Oral two times a day  piperacillin/tazobactam IVPB.. 3.375 Gram(s) IV Intermittent every 8 hours      PRN Meds:      LABS:    The Labs were reviewed by me   The Radiology was reviewed by me    EKG tracing reviewed by me        144  |  108<H>  |  41<H>  ----------------------------<  82  5.1   |  25  |  1.49<H>      139  |  106  |  42<H>  ----------------------------<  72  4.9   |  24  |  1.84<H>      139  |  105  |  36<H>  ----------------------------<  232<H>  5.5<H>   |  23  |  1.71<H>    Ca    8.9      21 Mar 2023 10:22  Ca    8.5      20 Mar 2023 02:07  Ca    8.0<L>      19 Mar 2023 16:36    TPro  6.3  /  Alb  3.0<L>  /  TBili  0.8  /  DBili  x   /  AST  12  /  ALT  11  /  AlkPhos  83  20  TPro  5.9<L>  /  Alb  2.9<L>  /  TBili  0.6  /  DBili  x   /  AST  16  /  ALT  10  /  AlkPhos  81      Magnesium, Serum: 1.60 mg/dL (23 @ 16:36)        PT/INR - ( 21 Mar 2023 10:22 )   PT: 16.2 sec;   INR: 1.39 ratio         PTT - ( 21 Mar 2023 10:22 )  PTT:33.6 sec              Urinalysis Basic - ( 20 Mar 2023 13:40 )    Color: Yellow / Appearance: Clear / S.044 / pH: x  Gluc: x / Ketone: Negative  / Bili: Negative / Urobili: <2 mg/dL   Blood: x / Protein: 30 mg/dL / Nitrite: Negative   Leuk Esterase: Large / RBC: 2 /HPF / WBC 70 /HPF   Sq Epi: x / Non Sq Epi: 4 /HPF / Bacteria: Negative                              9.8    10.08 )-----------( 132      ( 21 Mar 2023 10:22 )             32.1                         9.6    14.22 )-----------( 125      ( 20 Mar 2023 02:07 )             31.4                         9.6    17.24 )-----------( 113      ( 19 Mar 2023 16:36 )             31.9     CAPILLARY BLOOD GLUCOSE      POCT Blood Glucose.: 86 mg/dL (21 Mar 2023 12:53)    Blood Gas Source Venous: Venous (23 @ 16:36)      MICROBIOLOGY:     Culture - Blood (collected 19 Mar 2023 16:50)  Source: .Blood Blood-Peripheral  Preliminary Report (21 Mar 2023 01:02):    No growth to date.    Culture - Blood (collected 19 Mar 2023 16:31)  Source: .Blood Blood-Peripheral  Preliminary Report (21 Mar 2023 01:02):    No growth to date.        RADIOLOGY:  [ ] Reviewed and interpreted by me    PULMONARY FUNCTION TESTS:    EKG:

## 2023-03-21 NOTE — PROGRESS NOTE ADULT - PROBLEM SELECTOR PLAN 5
Severe AS s/p TAVR 4 years ago, Afib  on Coumadin,   INR>8 likely 2/2 coumadin in setting of recent anorexia  Vit k given   Repeat INR 2.3  trend coags, cbc  Will monitor and once INR < 2 and no further hemoptysis, will restart Coumadin  FU AM INR Severe AS s/p TAVR 4 years ago, Afib  on Coumadin,As per daughter Goal INR- 2.5-3   Coagulopathy with INR>8 likely 2/2 coumadin in setting of recent anorexia. s/p Vit k given  Repeat INR 2.3-->1.39  Will challenge with  Heparin gtt ( pt with Hemoptysis, HH now stable)   Will await Pul recs as well before transitioning to Coumadin

## 2023-03-21 NOTE — PROGRESS NOTE ADULT - PROBLEM SELECTOR PLAN 3
Patient with elevated CE, Trop 95, 105-->161  EKG- Vpaced with no evidence of ischemia  Appreciate Cards recs- Likely demand ischemia in the setting of infection and HAYLIE. EKG and CE not consistent with ACS, would not treat for acs. FU  TTE   Continuous cardiac monitoring to monitor for arrhythmias Patient with elevated CE, Trop 95, 105-->161  EKG- Vpaced with no evidence of ischemia  TTE- Moderate mitral stenosis. Bioprosthetic AVR, severely dilated LA. Grossly normal LV systolic function. Severe RA  enlargement. RV enlargement with decreased RV systolic function.   Continuous cardiac monitoring to monitor for arrhythmias  Appreciate Cards recs- Likely demand ischemia in the setting of infection and HAYLIE. EKG and CE not consistent with ACS, would not treat for acs.

## 2023-03-21 NOTE — ED ADULT NURSE REASSESSMENT NOTE - NS ED NURSE REASSESS COMMENT FT1
Break Coverage RN: Pt A&Ox4, respirations equal and unlabored. Pt resting in stretcher at this time with no complaints. IV patent. A fib on cardiac monitor. Medicated as per EMR orders. Report given to DUGLAS Schwab in [facility] for continuity of care. Pt to be transported to [facility] at this time. No acute distress noted.
Patient resting comfortably, offers no complaints. Respirations even and unlabored. Safety maintained, call bell within reach.
Patient resting in stretcher, no acute distress noted at this time. Patient updated on plan of care, will continue to monitor.
Report received from covering RN. Patient sleeping at this time, respirations even and unlabored. Safety maintained.
Pt is resting in stretcher and does not appear to be in any acute distress. Respirations even and unlabored, chest rise equal b/l. V-paced afib noted in cardiac monitor. VS as noted in flow sheets. Safety maintained throughout. Will continue to monitor.
Pt received A&Ox4, offering no complaints at this time. pt remains on continuos monitor, NSR with PVCs noted. pt on 4 L NC. respirations even and unlabored. awaiting bed assignment. safety maintained, side rails up
Received patient from previous RN, A&O X3 , documentation as noted. Patient denies any pain or medical complaints at this time . Patient able to speak in clear sentences, respirations equal and unlabored. Patient pending bed placement. Patient in no acute distress at this time,  will continue to monitor. VSS as noted in flowsheet.

## 2023-03-21 NOTE — PROGRESS NOTE ADULT - PROBLEM SELECTOR PLAN 7
CT head-No acute intracranial hemorrhage, mass effect, or shift of the midline structures.  Aspiration precautions  Failed bedside and formal swallow eval   hold po meds overnight given most meds will have BP lowering effect  As per daughter, since his history of Laryngeal  Ca 8 years, his swallow test was always abnormal  but pt has learned to adjust his diet and take his time eating  FU bedside swallow eval CT head-No acute intracranial hemorrhage, mass effect, or shift of the midline structures.  Aspiration precautions  Failed bedside and formal swallow eval   hold po meds overnight given most meds will have BP lowering effect  As per daughter, since his history of Laryngeal  Ca 8 years, his swallow test was always abnormal  but pt has learned to adjust his diet and take his time eating, Will start on Dysphagia diet( daughter aware of risks and agreeable)   Still pending swallow eval

## 2023-03-21 NOTE — PROGRESS NOTE ADULT - PROBLEM SELECTOR PLAN 2
On zosyn for suspected asp pna  RVP/COVID- negative  BC- NGTD  Trend Hb  Aspiration precautions, Failed bedside screen x 2  FU bedside and formal swallow eval Concern for gram negative PNA  CT- Area of consolidation in the right lower lobe which may represent pneumonia. Multiple nodular opacities in the left upper lung which may be infectious, inflammatory or neoplastic in etiology.  RVP/COVID- negative. BC- NGTD  Aspiration precautions, Failed bedside screen x 2  On zosyn for suspected asp pna  FU bedside and formal swallow eval

## 2023-03-21 NOTE — PROGRESS NOTE ADULT - PROBLEM SELECTOR PLAN 1
Suspect secondary to RUL Mass. Other possibility is secondary to PNA   Setting of supratherapeutic INR  Had 1-2 episodes at home No further episodes of hemoptysis  since, HH stable  CTA chest negative for PE, suspected RLL and POLLO  PNA, right upper lobe mass s/p biopsy.  On zosyn for suspected asp pna, follow cultures  trend Hb  Aspiration precautions  Called pts Oncologist Dr Brenden Hanna to obtain collateral- Await call back Suspect secondary to RUL Mass. Other possibility is secondary to PNA   In setting of supratherapeutic INR, INR 8 on admission  Had 1-2 episodes at home  Scant amount of hemoptysis, HH stable  CTA chest negative for PE, suspected RLL and POLLO  PNA, right upper lobe mass s/p biopsy.  On zosyn for suspected asp pna, follow cultures  Aspiration precautions  Called pts Oncologist Dr Brenden Hanna to obtain collateral- Await call back  Consulted Pul re- Hemoptysis and risk assessment to start Coumadin  FU recs Suspect secondary to RUL Mass. Other possibility is secondary to PNA   In setting of supratherapeutic INR, INR 8 on admission  Had 1-2 episodes at home  Scant amount of hemoptysis, HH stable  CTA chest negative for PE, suspected RLL and POLLO  PNA, right upper lobe mass s/p biopsy.  On zosyn for suspected asp pna, follow cultures  Aspiration precautions  Called pts Oncologist Dr Brenden Hanna to obtain collateral- Await call back  Consulted Pul re- Hemoptysis and risk assessment to start Coumadin  Plan to start Heparin gtt as INR subtherapeutic with careful monitoring of labs and symptoms

## 2023-03-21 NOTE — PROGRESS NOTE ADULT - PROBLEM SELECTOR PLAN 6
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 On 6/11/2018, he completed a course of definitive stereotactic body radiotherapy to the left upper lobe using Cyberknife technology  Was started on Keytruda was started every 3 weeks beginning March 2021. The fourth dose was Nasima 15, 2021.  Subsequently, he developed severe GI toxicity and this has been held   As per daughter pt is not on any therapy since  He saw his Oncologist in December 2022 and had a repeat CT   Called pts oncologist Dr Brenden Hanna- [phone]. Left message with  for call back

## 2023-03-21 NOTE — CONSULT NOTE ADULT - ASSESSMENT
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

## 2023-03-21 NOTE — PROGRESS NOTE ADULT - SUBJECTIVE AND OBJECTIVE BOX
Ridge Urbano  Hospitalist  Pager- [phone]  PROGRESS NOTE:     Patient is a 89y old  Male who presents with a chief complaint of hemoptysis (20 Mar 2023 00:35)      SUBJECTIVE / OVERNIGHT EVENTS: pt seen and examined by me   [place]  #  [phone]  Denies further hemoptysis.   Hungry, asking to eat     ADDITIONAL REVIEW OF SYSTEMS:    MEDICATIONS  (STANDING):  influenza  Vaccine (HIGH DOSE) 0.7 milliLiter(s) IntraMuscular once  piperacillin/tazobactam IVPB.. 3.375 Gram(s) IV Intermittent every 8 hours    MEDICATIONS  (PRN):      CAPILLARY BLOOD GLUCOSE  67 (19 Mar 2023 16:53)      POCT Blood Glucose.: 67 mg/dL (19 Mar 2023 15:55)    I&O's Summary      PHYSICAL EXAM:    Vital Signs Last 24 Hrs  T(C): 36.4 (21 Mar 2023 02:56), Max: 36.7 (20 Mar 2023 12:22)  T(F): 97.5 (21 Mar 2023 02:56), Max: 98.1 (20 Mar 2023 12:22)  HR: 77 (21 Mar 2023 08:13) (66 - 77)  BP: 139/56 (21 Mar 2023 08:13) (110/47 - 150/42)  BP(mean): --  RR: 16 (21 Mar 2023 02:56) (16 - 16)  SpO2: 95% (21 Mar 2023 02:56) (95% - 100%)    Parameters below as of 21 Mar 2023 02:56  Patient On (Oxygen Delivery Method): nasal cannula  O2 Flow (L/min): 4      CONSTITUTIONAL: NAD, frail   RESPIRATORY: Normal respiratory effort; lungs are clear to auscultation bilaterally  CARDIOVASCULAR: Regular rate and rhythm, normal S1 and S2, no murmur/rub/gallop; No lower extremity edema; Peripheral pulses are 2+ bilaterally  ABDOMEN: Nontender to palpation, normoactive bowel sounds, no rebound/guarding; No hepatosplenomegaly  MUSCLOSKELETAL: no clubbing or cyanosis of digits; no joint swelling or tenderness to palpation  PSYCH: A+O to person, place, and time; affect appropriate  NEURO: Right side facial droop  SKIN: Scaly plaques on shins, ecchymoses on extremities    LABS:                                             9.6    14.22 )-----------( 125      ( 20 Mar 2023 02:07 )             31.4       03-20    139  |  106  |  42<H>  ----------------------------<  72  4.9   |  24  |  1.84<H>    Ca    8.5      20 Mar 2023 02:07  Mg     1.60     03-19    TPro  6.3  /  Alb  3.0<L>  /  TBili  0.8  /  DBili  x   /  AST  12  /  ALT  11  /  AlkPhos  83  03-20        RADIOLOGY & ADDITIONAL TESTS:  Results Reviewed:   Imaging Personally Reviewed:  Electrocardiogram Personally Reviewed:    COORDINATION OF CARE:  Care Discussed with Consultants/Other Providers [Y/N]:  Prior or Outpatient Records Reviewed [Y/N]:   Ridge Urbano  Hospitalist  Pager- [phone]  PROGRESS NOTE:     Patient is a 89y old  Male who presents with a chief complaint of hemoptysis (20 Mar 2023 00:35)      SUBJECTIVE / OVERNIGHT EVENTS: pt seen and examined by me   Used Bengali  #  [phone] but pt [place] and was not able to hear well  Pts daughter Melissa at bedside  Pt with occasional cough with specks of blood, not as much as before   Hungry, asking to eat     ADDITIONAL REVIEW OF SYSTEMS:    MEDICATIONS  (STANDING):  influenza  Vaccine (HIGH DOSE) 0.7 milliLiter(s) IntraMuscular once  piperacillin/tazobactam IVPB.. 3.375 Gram(s) IV Intermittent every 8 hours    MEDICATIONS  (PRN):      CAPILLARY BLOOD GLUCOSE  67 (19 Mar 2023 16:53)      POCT Blood Glucose.: 67 mg/dL (19 Mar 2023 15:55)    I&O's Summary      PHYSICAL EXAM:    Vital Signs Last 24 Hrs  T(C): 36.4 (21 Mar 2023 02:56), Max: 36.7 (20 Mar 2023 12:22)  T(F): 97.5 (21 Mar 2023 02:56), Max: 98.1 (20 Mar 2023 12:22)  HR: 77 (21 Mar 2023 08:13) (66 - 77)  BP: 139/56 (21 Mar 2023 08:13) (110/47 - 150/42)  BP(mean): --  RR: 16 (21 Mar 2023 02:56) (16 - 16)  SpO2: 95% (21 Mar 2023 02:56) (95% - 100%)    Parameters below as of 21 Mar 2023 02:56  Patient On (Oxygen Delivery Method): nasal cannula  O2 Flow (L/min): 4      CONSTITUTIONAL: NAD, frail, [place]   RESPIRATORY: Normal respiratory effort; lungs are clear to auscultation bilaterally  CARDIOVASCULAR: Regular rate and rhythm, normal S1 and S2, no murmur/rub/gallop; No lower extremity edema; Peripheral pulses are 2+ bilaterally  ABDOMEN: Nontender to palpation, normoactive bowel sounds, no rebound/guarding; No hepatosplenomegaly  MUSCLOSKELETAL: no clubbing or cyanosis of digits; no joint swelling or tenderness to palpation  PSYCH: A+O to person, place, and time; affect appropriate  NEURO: Right side facial droop  SKIN: Scaly plaques on shins, ecchymoses on extremities    LABS:                                             9.6    14.22 )-----------( 125      ( 20 Mar 2023 02:07 )             31.4       03-20    139  |  106  |  42<H>  ----------------------------<  72  4.9   |  24  |  1.84<H>    Ca    8.5      20 Mar 2023 02:07  Mg     1.60     03-19    TPro  6.3  /  Alb  3.0<L>  /  TBili  0.8  /  DBili  x   /  AST  12  /  ALT  11  /  AlkPhos  83  03-20        RADIOLOGY & ADDITIONAL TESTS:  Results Reviewed:   Imaging Personally Reviewed:  Electrocardiogram Personally Reviewed:    COORDINATION OF CARE:  Care Discussed with Consultants/Other Providers [Y/N]: PUL, ACP  Prior or Outpatient Records Reviewed [Y/N]: Onc notes

## 2023-03-22 NOTE — PROGRESS NOTE ADULT - PROBLEM SELECTOR PLAN 1
Suspect secondary to RUL Mass. Other possibility is secondary to PNA   In setting of supratherapeutic INR (INR 8 on admission)  CTA chest negative for PE, suspected RLL and POLLO  PNA, right upper lobe mass s/p biopsy.  On Zosyn for suspected asp pna  Holding anticoagulation   Hgb stable   Pulm input appreciated

## 2023-03-22 NOTE — PROGRESS NOTE ADULT - SUBJECTIVE AND OBJECTIVE BOX
PROGRESS NOTE:     Patient is a 89y old  Male who presents with a chief complaint of hemoptysis (22 Mar 2023 12:36)      SUBJECTIVE / OVERNIGHT EVENTS: No acute events, reports improvement in hemoptysis.     ADDITIONAL REVIEW OF SYSTEMS:    MEDICATIONS  (STANDING):  albuterol/ipratropium for Nebulization 3 milliLiter(s) Nebulizer every 6 hours  dextrose 5% + lactated ringers. 1000 milliLiter(s) (50 mL/Hr) IV Continuous <Continuous>  influenza  Vaccine (HIGH DOSE) 0.7 milliLiter(s) IntraMuscular once  metoprolol tartrate 25 milliGRAM(s) Oral two times a day  piperacillin/tazobactam IVPB.. 3.375 Gram(s) IV Intermittent every 8 hours    MEDICATIONS  (PRN):      CAPILLARY BLOOD GLUCOSE        I&O's Summary    21 Mar 2023 07:01  -  22 Mar 2023 07:00  --------------------------------------------------------  IN: 0 mL / OUT: 100 mL / NET: -100 mL        PHYSICAL EXAM:  Vital Signs Last 24 Hrs  T(C): 36.5 (22 Mar 2023 11:02), Max: 36.7 (21 Mar 2023 21:12)  T(F): 97.7 (22 Mar 2023 11:02), Max: 98.1 (21 Mar 2023 21:12)  HR: 71 (22 Mar 2023 11:02) (64 - 88)  BP: 146/73 (22 Mar 2023 11:02) (110/50 - 151/85)  BP(mean): --  RR: 18 (22 Mar 2023 11:02) (18 - 18)  SpO2: 99% (22 Mar 2023 11:02) (98% - 100%)    Parameters below as of 22 Mar 2023 06:15  Patient On (Oxygen Delivery Method): nasal cannula  O2 Flow (L/min): 4      CONSTITUTIONAL: NAD, frail, [place]   RESPIRATORY: Normal respiratory effort; lungs are clear to auscultation bilaterally  CARDIOVASCULAR: Regular rate and rhythm, normal S1 and S2, no murmur/rub/gallop; No lower extremity edema; Peripheral pulses are 2+ bilaterally  ABDOMEN: Nontender to palpation, normoactive bowel sounds, no rebound/guarding; No hepatosplenomegaly  MUSCLOSKELETAL: no clubbing or cyanosis of digits; no joint swelling or tenderness to palpation  PSYCH: A+O to person, place, and time; affect appropriate  NEURO: Right side facial droop  SKIN: Scaly plaques on shins, ecchymoses on extremities      LABS:                        10.6   10.10 )-----------( 149      ( 22 Mar 2023 06:16 )             35.6     03-22    148<H>  |  111<H>  |  31<H>  ----------------------------<  80  4.5   |  23  |  1.16    Ca    9.3      22 Mar 2023 06:16  Phos  3.1     03-22  Mg     1.90     03-22      PT/INR - ( 21 Mar 2023 10:22 )   PT: 16.2 sec;   INR: 1.39 ratio         PTT - ( 21 Mar 2023 22:44 )  PTT:35.7 sec          Culture - Blood (collected 19 Mar 2023 16:50)  Source: .Blood Blood-Peripheral  Preliminary Report (21 Mar 2023 01:02):    No growth to date.    Culture - Blood (collected 19 Mar 2023 16:31)  Source: .Blood Blood-Peripheral  Preliminary Report (21 Mar 2023 01:02):    No growth to date.        RADIOLOGY & ADDITIONAL TESTS:  Results Reviewed:   Imaging Personally Reviewed:  Electrocardiogram Personally Reviewed:    COORDINATION OF CARE:  Care Discussed with Consultants/Other Providers [Y/N]:  Prior or Outpatient Records Reviewed [Y/N]:

## 2023-03-22 NOTE — SWALLOW BEDSIDE ASSESSMENT ADULT - ASR SWALLOW RECOMMEND DIAG
Cinesophagram to objectively assess swallow function given reported difficulty swallowing, to determine if cough is dysphagia related and given  concern for aspiration PNA/VFSS/MBS

## 2023-03-22 NOTE — PROGRESS NOTE ADULT - PROBLEM SELECTOR PLAN 2
Concern for gram negative PNA vs aspiration PNA  CT- Area of consolidation in the right lower lobe which may represent pneumonia. Multiple nodular opacities in the left upper lung which may be infectious, inflammatory or neoplastic in etiology.  RVP/COVID- negative. BC- NGTD  Continue Zosyn

## 2023-03-22 NOTE — PROGRESS NOTE ADULT - PROBLEM SELECTOR PLAN 8
CT head-No acute intracranial hemorrhage, mass effect, or shift of the midline structures.  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

## 2023-03-22 NOTE — CHART NOTE - NSCHARTNOTEFT_GEN_A_CORE
Patient noted on tele afib RVR. Patient npo at this time. Patient asymptomatic. Heart rate ranging from 120-150. BP stable.  Patient NPO per s/s recommendations. BB changed to lopressor 5mg IV q6 at this time. Dr. Tom jauregui.

## 2023-03-22 NOTE — PROGRESS NOTE ADULT - SUBJECTIVE AND OBJECTIVE BOX
CHIEF COMPLAINT:Patient is a 89y old  Male who presents with a chief complaint of hemoptysis (21 Mar 2023 16:13)      Interval Events:    REVIEW OF SYSTEMS:  [x] All other systems negative except per HPI   [ ] Unable to assess ROS because ________    OBJECTIVE:  ICU Vital Signs Last 24 Hrs  T(C): 36.5 (22 Mar 2023 11:02), Max: 36.7 (21 Mar 2023 21:12)  T(F): 97.7 (22 Mar 2023 11:02), Max: 98.1 (21 Mar 2023 21:12)  HR: 71 (22 Mar 2023 11:02) (64 - 88)  BP: 146/73 (22 Mar 2023 11:02) (110/50 - 151/85)  BP(mean): --  ABP: --  ABP(mean): --  RR: 18 (22 Mar 2023 11:02) (16 - 20)  SpO2: 99% (22 Mar 2023 11:02) (91% - 100%)    O2 Parameters below as of 22 Mar 2023 06:15  Patient On (Oxygen Delivery Method): nasal cannula  O2 Flow (L/min): 4            03-21 @ 07:01  -  03-22 @ 07:00  --------------------------------------------------------  IN: 0 mL / OUT: 100 mL / NET: -100 mL        PHYSICAL EXAM:  GENERAL: NAD, well-groomed, well-developed  HEAD:  Atraumatic, Normocephalic  EYES: EOMI, PERRLA, conjunctiva and sclera clear  ENMT: No tonsillar erythema, exudates, or enlargement; Moist mucous membranes, Good dentition, No lesions  NECK: Supple, No JVD, Normal thyroid  CHEST/LUNG: Clear to auscultation bilaterally; No rales, rhonchi, wheezing, or rubs  HEART: Regular rate and rhythm; No murmurs, rubs, or gallops  ABDOMEN: Soft, Nontender, Nondistended; Bowel sounds present  VASCULAR:  2+ Peripheral Pulses, No clubbing, cyanosis, or edema  LYMPH: No lymphadenopathy noted  SKIN: No rashes or lesions  NERVOUS SYSTEM:  Alert & Oriented X3, Good concentration; Motor Strength 5/5 B/L upper and lower extremities; DTRs 2+ intact and symmetric    HOSPITAL MEDICATIONS:  MEDICATIONS  (STANDING):  albuterol/ipratropium for Nebulization 3 milliLiter(s) Nebulizer every 6 hours  dextrose 5% + lactated ringers. 1000 milliLiter(s) (50 mL/Hr) IV Continuous <Continuous>  influenza  Vaccine (HIGH DOSE) 0.7 milliLiter(s) IntraMuscular once  metoprolol tartrate 25 milliGRAM(s) Oral two times a day  piperacillin/tazobactam IVPB.. 3.375 Gram(s) IV Intermittent every 8 hours    MEDICATIONS  (PRN):      LABS:    The Labs were reviewed by me   The Radiology was reviewed by me    EKG tracing reviewed by me        148<H>  |  111<H>  |  31<H>  ----------------------------<  80  4.5   |  23  |  1.16      144  |  108<H>  |  41<H>  ----------------------------<  82  5.1   |  25  |  1.49<H>      139  |  106  |  42<H>  ----------------------------<  72  4.9   |  24  |  1.84<H>    Ca    9.3      22 Mar 2023 06:16  Ca    8.9      21 Mar 2023 10:22  Ca    8.5      20 Mar 2023 02:07  Phos  3.1       Mg     1.90         TPro  6.3  /  Alb  3.0<L>  /  TBili  0.8  /  DBili  x   /  AST  12  /  ALT  11  /  AlkPhos  83    TPro  5.9<L>  /  Alb  2.9<L>  /  TBili  0.6  /  DBili  x   /  AST  16  /  ALT  10  /  AlkPhos  81      Magnesium, Serum: 1.90 mg/dL (23 @ 06:16)  Magnesium, Serum: 1.60 mg/dL (23 @ 16:36)    Phosphorus Level, Serum: 3.1 mg/dL (23 @ 06:16)      PT/INR - ( 21 Mar 2023 10:22 )   PT: 16.2 sec;   INR: 1.39 ratio         PTT - ( 21 Mar 2023 22:44 )  PTT:35.7 sec              Urinalysis Basic - ( 20 Mar 2023 13:40 )    Color: Yellow / Appearance: Clear / S.044 / pH: x  Gluc: x / Ketone: Negative  / Bili: Negative / Urobili: <2 mg/dL   Blood: x / Protein: 30 mg/dL / Nitrite: Negative   Leuk Esterase: Large / RBC: 2 /HPF / WBC 70 /HPF   Sq Epi: x / Non Sq Epi: 4 /HPF / Bacteria: Negative                              10.6   10.10 )-----------( 149      ( 22 Mar 2023 06:16 )             35.6                         10.5   8.37  )-----------( 125      ( 21 Mar 2023 22:44 )             33.6                         9.8    10.08 )-----------( 132      ( 21 Mar 2023 10:22 )             32.1     CAPILLARY BLOOD GLUCOSE      POCT Blood Glucose.: 86 mg/dL (21 Mar 2023 12:53)    Blood Gas Source Venous: Venous (23 @ 16:36)      MICROBIOLOGY:     RADIOLOGY:  [ ] Reviewed and interpreted by me    Point of Care Ultrasound Findings:    PFT:    EKG: CHIEF COMPLAINT:Patient is a 89y old  Male who presents with a chief complaint of hemoptysis (21 Mar 2023 16:13)      Interval Events:  Small amount of blood in sputum o/n in tissues, otherwise no complaint.    REVIEW OF SYSTEMS:  [x] All other systems negative except per HPI   [ ] Unable to assess ROS because ________    OBJECTIVE:  ICU Vital Signs Last 24 Hrs  T(C): 36.5 (22 Mar 2023 11:02), Max: 36.7 (21 Mar 2023 21:12)  T(F): 97.7 (22 Mar 2023 11:02), Max: 98.1 (21 Mar 2023 21:12)  HR: 71 (22 Mar 2023 11:02) (64 - 88)  BP: 146/73 (22 Mar 2023 11:02) (110/50 - 151/85)  BP(mean): --  ABP: --  ABP(mean): --  RR: 18 (22 Mar 2023 11:02) (16 - 20)  SpO2: 99% (22 Mar 2023 11:02) (91% - 100%)    O2 Parameters below as of 22 Mar 2023 06:15  Patient On (Oxygen Delivery Method): nasal cannula  O2 Flow (L/min): 4            -21 @ 07:01  -  03-22 @ 07:00  --------------------------------------------------------  IN: 0 mL / OUT: 100 mL / NET: -100 mL        PHYSICAL EXAM:  Gen: NAD  HEENT: MMM  Neck: No JVP elevation  CV: irregular  Lung: rhonchorous throughout  Abd: Soft, NT, ND  Ext: WWP, no CCE  Skin: No rash  Neuro: Rt facial droop    HOSPITAL MEDICATIONS:  MEDICATIONS  (STANDING):  albuterol/ipratropium for Nebulization 3 milliLiter(s) Nebulizer every 6 hours  dextrose 5% + lactated ringers. 1000 milliLiter(s) (50 mL/Hr) IV Continuous <Continuous>  influenza  Vaccine (HIGH DOSE) 0.7 milliLiter(s) IntraMuscular once  metoprolol tartrate 25 milliGRAM(s) Oral two times a day  piperacillin/tazobactam IVPB.. 3.375 Gram(s) IV Intermittent every 8 hours    MEDICATIONS  (PRN):      LABS:    The Labs were reviewed by me   The Radiology was reviewed by me    EKG tracing reviewed by me        148<H>  |  111<H>  |  31<H>  ----------------------------<  80  4.5   |  23  |  1.16      144  |  108<H>  |  41<H>  ----------------------------<  82  5.1   |  25  |  1.49<H>      139  |  106  |  42<H>  ----------------------------<  72  4.9   |  24  |  1.84<H>    Ca    9.3      22 Mar 2023 06:16  Ca    8.9      21 Mar 2023 10:22  Ca    8.5      20 Mar 2023 02:07  Phos  3.1       Mg     1.90         TPro  6.3  /  Alb  3.0<L>  /  TBili  0.8  /  DBili  x   /  AST  12  /  ALT  11  /  AlkPhos  83    TPro  5.9<L>  /  Alb  2.9<L>  /  TBili  0.6  /  DBili  x   /  AST  16  /  ALT  10  /  AlkPhos  81      Magnesium, Serum: 1.90 mg/dL (23 @ 06:16)  Magnesium, Serum: 1.60 mg/dL (23 @ 16:36)    Phosphorus Level, Serum: 3.1 mg/dL (23 @ 06:16)      PT/INR - ( 21 Mar 2023 10:22 )   PT: 16.2 sec;   INR: 1.39 ratio         PTT - ( 21 Mar 2023 22:44 )  PTT:35.7 sec              Urinalysis Basic - ( 20 Mar 2023 13:40 )    Color: Yellow / Appearance: Clear / S.044 / pH: x  Gluc: x / Ketone: Negative  / Bili: Negative / Urobili: <2 mg/dL   Blood: x / Protein: 30 mg/dL / Nitrite: Negative   Leuk Esterase: Large / RBC: 2 /HPF / WBC 70 /HPF   Sq Epi: x / Non Sq Epi: 4 /HPF / Bacteria: Negative                              10.6   10.10 )-----------( 149      ( 22 Mar 2023 06:16 )             35.6                         10.5   8.37  )-----------( 125      ( 21 Mar 2023 22:44 )             33.6                         9.8    10.08 )-----------( 132      ( 21 Mar 2023 10:22 )             32.1     CAPILLARY BLOOD GLUCOSE      POCT Blood Glucose.: 86 mg/dL (21 Mar 2023 12:53)    Blood Gas Source Venous: Venous (23 @ 16:36)      MICROBIOLOGY:     RADIOLOGY:  [ ] Reviewed and interpreted by me    Point of Care Ultrasound Findings:    PFT:    EKG:

## 2023-03-22 NOTE — PROGRESS NOTE ADULT - PROBLEM SELECTOR PLAN 3
Patient with elevated CE, Trop 95, 105-->161  EKG- Vpaced with no evidence of ischemia  TTE- Moderate mitral stenosis. Bioprosthetic AVR, severely dilated LA. Grossly normal LV systolic function. Severe RA  enlargement. RV enlargement with decreased RV systolic function.   Continuous cardiac monitoring to monitor for arrhythmias  Appreciate Cards recs- Likely demand ischemia in the setting of infection and HAYLIE. EKG and CE not consistent with ACS, would not treat for acs.

## 2023-03-22 NOTE — PROVIDER CONTACT NOTE (OTHER) - ACTION/TREATMENT ORDERED:
ACP made aware. Oxygen increased to to 6L NC patients o2 88%, then increased to Nonrebreather o2 96%. ACP putting in order for nonrebreather.

## 2023-03-22 NOTE — SWALLOW BEDSIDE ASSESSMENT ADULT - ADDITIONAL RECOMMENDATIONS
1. This department to follow up as schedule permits for PO candidacy   2. Medical team advised to reconsult this service if there are any changes in medical status.

## 2023-03-22 NOTE — CHART NOTE - NSCHARTNOTEFT_GEN_A_CORE
Informed by RN that patient was hypoxic to 88% on 4L NC. Increased to 6L NC with improvement just to 89%. Patient then placed on non-rebreather with improvement of O2 sat to 96%.     Patient seen at bedside. States he feels well with no complaints. Denies chest pain, shortness of breath, dizziness, or other complaints. Patient appears in NAD. Is resting comfortably with no pulmonary distress. Lungs are clear to auscultation bilaterally. Regular rate and rhythm, normal S1 and S2. No abdominal tenderness noted to palpation. +Right facial droop (not new). A Informed by RN that patient was hypoxic to 88% on 4L NC. Increased to 6L NC with improvement just to 89%. Patient then placed on non-rebreather with improvement of O2 sat to 96%.     Patient seen at bedside. States he feels well with no complaints. Denies chest pain, shortness of breath, dizziness, or other complaints. Patient appears in NAD. Is resting comfortably with no pulmonary distress. Lungs are clear to auscultation bilaterally. Irregular rhythm but rate normal, normal S1 and S2. No abdominal tenderness noted to palpation. +Right facial droop (not new). Patient alert and awake. No joint swelling noted.     T(C): 37 (03-22-23 @ 22:30), Max: 37 (03-22-23 @ 22:30)  HR: 95 (03-22-23 @ 22:30) (70 - 95)  BP: 114/60 (03-22-23 @ 22:30) (110/50 - 146/73)  RR: 18 (03-22-23 @ 22:30) (18 - 19)  SpO2: 98% (03-22-23 @ 22:30) (86% - 100%)    Plan:  - Patient taken off of non-rebreather and placed back on NC 4L; O2 saturation is at 96%.  - CXR ordered given hypoxia. Prelim: compared to prior study, the right pleural effusion has increased in size. Will f/u official read in AM.  - IV Lasix 20mg x1 dose given.   - Will keep on NC 4L and wean off as tolerated.  - C/w telemetry monitoring.  - C/w   - Will continue to monitor patient and relay events to day team for further continuity of care.

## 2023-03-22 NOTE — PROGRESS NOTE ADULT - PROBLEM SELECTOR PLAN 7
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 On 6/11/2018, he completed a course of definitive stereotactic body radiotherapy to the left upper lobe using Cyberknife technology  Was started on Keytruda was started every 3 weeks beginning March 2021. The fourth dose was Nasima 15, 2021.  Subsequently, he developed severe GI toxicity and this has been held   As per daughter pt is not on any therapy since  He saw his Oncologist in December 2022 and had a repeat CT   Outpatient d/up w/ oncologist Dr Brenden Hanna- [phone]

## 2023-03-22 NOTE — PROGRESS NOTE ADULT - PROBLEM SELECTOR PLAN 6
Severe AS s/p TAVR 4 years ago, Afib  on Coumadin,As per daughter Goal INR- 2.5-3   Coagulopathy with INR>8 likely 2/2 coumadin in setting of recent anorexia. s/p Vit k given  INR trended down   Holding anticoagulation d/t hemoptysis

## 2023-03-22 NOTE — PROGRESS NOTE ADULT - PROBLEM SELECTOR PLAN 5
Chronic HFpEF   On entresto, lasix, bb, dig 3 days/week- On HOLD as pt was NPO and soft BPs  Restarted low dose Metoprolol 25mg BID  Currently euvolemic

## 2023-03-22 NOTE — PROGRESS NOTE ADULT - ASSESSMENT
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

## 2023-03-22 NOTE — PROGRESS NOTE ADULT - ATTENDING COMMENTS
89yM Telugu speaking, HTN, HLD, TIA, severe AS s/p TAVR 4 years ago, Afib  on Coumadin, PPM, CHF, remote esophageal CA s/p chemo and radiation 12 years ago and lung CA (not currently being tx'd), Bell's palsy  p/w hemoptysis in setting of very high INR.  Pt reports blood mixed with phlegm. Denies prior hemoptysis. Currently comfortable at rest. Reports small amount of blood in a tissue now after INR reversed.    Hemoptysis may be from his R sided cancer (which is no longer being tx) vs pneumonia while supratherapeutic INR. Would cont to hold his a/c for today given cont small amount of hemoptysis.   Pt with small streaks of blood w/ sputum in tissues today. Other wise denies complaints    - complete course of abx for PNA  - f/u cx, rvp and bcx negative  - quantify amount of hemoptysis being produced in cup rather tissues  - hold a/c if cont to have hemoptysis  - wean off o2 NC, maintain sat >90%  - concern for aspiration events when eating/drinking?, consider swallow eval  - no planned tx for NSCLC (reportedly had prior toxicity with treatment).

## 2023-03-22 NOTE — SWALLOW BEDSIDE ASSESSMENT ADULT - COMMENTS
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 Language Line Ugandan  utilized ID # [id number]. Wet/productive cough noted at baseline

## 2023-03-22 NOTE — SWALLOW BEDSIDE ASSESSMENT ADULT - SWALLOW EVAL: DIAGNOSIS
Patient presents with oropharyngeal dysphagia given thin liquids, mildly thick liquids and puree. Oral stage marked by adequate bolus containment, slowed bolus manipulation and slowed anterior-posterior transport with adequate oral clearance. Pharyngeal stage marked by observed initiation of the pharyngeal swallow trigger judged via laryngeal palpation. Inconsistent cough response noted following all PO trials, likely indicative of impaired airway protection

## 2023-03-22 NOTE — PROGRESS NOTE ADULT - ASSESSMENT
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

## 2023-03-23 NOTE — SWALLOW VFSS/MBS ASSESSMENT ADULT - RECOMMENDED CONSISTENCY
1) NPO with consideration for nonoral means of nutrition.   2) Suggest discussion with patient/family regarding plan of care for nutritional goals of care (pleasure feeds vs. nonoral)   3) Aspiration and Reflux Precautions 1) NPO with consideration for nonoral means of nutrition due to severity of oral stage deficits   2) Suggest discussion with patient/family regarding plan of care for nutritional goals of care (pleasure feeds vs. nonoral)   3) Aspiration and Reflux Precautions

## 2023-03-23 NOTE — SWALLOW VFSS/MBS ASSESSMENT ADULT - DIAGNOSTIC IMPRESSIONS
1) Severe Oral Stage for Puree and Mildly Thick Liquids characterized by adequate oral containment, prolonged bolus manipulation, minimal anterior to posterior transfer for Puree resulting in severe oral residue primarily at the posterior base of tongue for Puree, premature spillage to the hypopharynx (pyriforms) for Mildly Thick Liquids, with oral clearance deficits.   2) Severe Pharyngeal Stage for Puree and Mildly Thick Liquids characterized by delayed initiation of the pharyngeal swallow (Bolus head at the pyriforms for Mildly Thick Liquids), reduced hyolaryngeal excursion, reduced base of tongue retraction, reduced laryngeal vestibule closure, reduced epiglottic deflection, and reduced pharyngeal contractility. There is Severe Pharyngeal Clearance deficits at the posterior base of tongue, valleculae, and pyriforms for Puree. Liquid wash did not benefit to assist in clearing pharyngeal residue. There is Aspiration before the swallow exacerbated by the severity of the oral stage component for Mildly Thick Liquids. There is Deep Laryngeal Penetration during the swallow to the level of the vocal folds. Patient given verbal cue to cough which benefitted for partial clearance of pharyngeal residue and patient's RN administered suctioning via [place] to remove remaining pharyngeal residue. 1) Severe Oral Stage for Puree and Mildly Thick Liquids characterized by oral containment of bolus, prolonged bolus manipulation, minimal anterior to posterior transfer for Puree resulting in severe oral residue primarily at the posterior base of tongue for Puree, premature spillage to the hypopharynx (pyriforms) for Mildly Thick Liquids, with oral clearance deficits.   2) Severe Pharyngeal Stage for Puree and Mildly Thick Liquids characterized by delayed initiation of the pharyngeal swallow (Bolus head at the pyriforms for Mildly Thick Liquids), reduced hyolaryngeal excursion, reduced base of tongue retraction, reduced laryngeal vestibule closure, reduced epiglottic deflection, and reduced pharyngeal contractility. There is Severe Pharyngeal Clearance deficits at the posterior base of tongue, valleculae, and pyriforms for Puree. Liquid wash did not benefit to assist in clearing pharyngeal residue. There is Aspiration before the swallow exacerbated by the severity of the oral stage component for Mildly Thick Liquids. There is Deep Laryngeal Penetration during the swallow to the level of the vocal folds. Patient given verbal cue to cough which benefitted for partial clearance of pharyngeal residue and patient's RN administered suctioning via Y[place]auer to remove remaining pharyngeal residue. 1) Severe Oral Stage for Puree and Mildly Thick Liquids characterized by adequate oral containment, slow/prolonged bolus manipulation, slow/delayed tongue motion with eventual prolonged anterior to posterior transfer of bolus for Puree however resulting in severe residue along the tongue surface and base of tongue, premature spillage to the hypopharynx (pyriforms) due to reduced tongue to palate seal and reduced tongue coordination for Mildly Thick Liquids, with oral clearance deficits located primarily at the mid to posterior tongue surface.   2) Severe Pharyngeal Stage for Puree and Mildly Thick Liquids characterized by delayed initiation of the pharyngeal swallow (Bolus head at the pyriforms for Mildly Thick Liquids), reduced hyolaryngeal excursion, reduced base of tongue retraction, reduced laryngeal vestibule closure, reduced epiglottic deflection, and reduced pharyngeal contractility. There is Severe Pharyngeal Clearance deficits at the posterior base of tongue, valleculae, and pyriforms for Puree. Mildly Thick Liquid wash did not benefit in clearing the severity of the pharyngeal residue. There is Aspiration BEFORE the swallow exacerbated by the severity of the oral stage component for Mildly Thick Liquids leading to Deep Laryngeal Penetration during the swallow to the level of the vocal folds leading to Aspiration. Patient given verbal/visual cue to cough and expectorate in attempt to clear pharyngeal residue which was unsuccessful therefor patient's RN administered deep suctioning via [place]auer to remove all remaining pharyngeal residue. 1) Severe Oral Dysphagia for Puree and Mildly Thick Liquids characterized by adequate oral containment, slow/prolonged bolus manipulation, slow/delayed tongue motion with eventual prolonged anterior to posterior transfer of bolus for Puree however resulting in severe residue along the tongue surface and base of tongue, premature spillage to the hypopharynx (pyriforms) due to reduced tongue to palate seal and reduced tongue coordination for Mildly Thick Liquids, with oral clearance deficits located primarily at the mid to posterior tongue surface.   2) Severe Pharyngeal Dysphagia for Puree and Mildly Thick Liquids characterized by delayed initiation of the pharyngeal swallow (Bolus head at the pyriforms for Mildly Thick Liquids), reduced hyolaryngeal excursion, reduced base of tongue retraction, reduced laryngeal vestibule closure, reduced epiglottic deflection, and reduced pharyngeal contractility. There is Severe Pharyngeal Clearance deficits at the posterior base of tongue, valleculae, and pyriforms for Puree. Mildly Thick Liquid wash did not benefit in clearing the severity of the pharyngeal residue. There is Aspiration BEFORE the swallow exacerbated by the severity of the oral stage component for Mildly Thick Liquids leading to Deep Laryngeal Penetration during the swallow to the level of the vocal folds leading to Aspiration. The patient is not sensate to aspiration event given no cough response. Patient given verbal/visual cue to cough and expectorate in attempt to clear pharyngeal residue which was unsuccessful therefor patient's RN administered deep suctioning via Y[place]auer to remove all remaining pharyngeal residue. 1) Severe Oral Dysphagia for Puree and Mildly Thick Liquids characterized by adequate oral containment, slow/prolonged bolus manipulation, slow/delayed tongue motion with eventual prolonged anterior to posterior transfer of bolus for Puree however resulting in severe residue along the tongue surface and base of tongue, premature spillage to the hypopharynx (pyriforms) due to reduced tongue to palate seal and reduced tongue coordination for Mildly Thick Liquids, with oral clearance deficits located primarily at the mid to posterior tongue surface.   2) Severe Pharyngeal Dysphagia for Puree and Mildly Thick Liquids characterized by delayed initiation of the pharyngeal swallow (Bolus head at the pyriforms for Mildly Thick Liquids), reduced hyolaryngeal excursion, reduced base of tongue retraction, reduced laryngeal vestibule closure, reduced epiglottic deflection, and reduced pharyngeal contractility. There is Severe Pharyngeal Clearance deficits at the posterior base of tongue, valleculae, and pyriforms for Puree. Mildly Thick Liquid wash did not benefit in clearing the severity of the pharyngeal residue. There is Aspiration BEFORE the swallow exacerbated by the severity of the oral stage component for Mildly Thick Liquids leading to Deep Laryngeal Penetration during the swallow to the level of the vocal folds leading to Aspiration. The patient is not sensate to aspiration event given no cough response. Patient given verbal/visual cue to cough and expectorate in attempt to clear pharyngeal residue which was unsuccessful therefore patient's RN administered deep suctioning via Y[place]auer to remove all remaining pharyngeal residue. 1) Severe Oral Dysphagia for Puree and Mildly Thick Liquids characterized by adequate oral containment, slow/prolonged bolus manipulation, slow/delayed tongue motion with eventual prolonged anterior to posterior transfer of bolus for Puree however resulting in severe residue along the mid tongue and posterior base of tongue surface, premature spillage to the hypopharynx (pyriforms) due to reduced tongue to palate seal and reduced tongue coordination for Mildly Thick Liquids, with oral clearance deficits located primarily at the mid to posterior tongue surface post swallow.   2) Severe Pharyngeal Dysphagia for Puree and Mildly Thick Liquids characterized by delayed initiation of the pharyngeal swallow (Bolus head at the pyriforms for Mildly Thick Liquids), reduced hyolaryngeal excursion, reduced base of tongue retraction, reduced laryngeal vestibule closure, reduced epiglottic deflection, and reduced pharyngeal contractility. There is Severe Pharyngeal Clearance deficits at the posterior base of tongue, valleculae, and pyriforms for Puree. Mildly Thick Liquid wash did not benefit in clearing the severity of the pharyngeal residue. There is Aspiration BEFORE the swallow exacerbated by the severity of the oral stage component for Mildly Thick Liquids leading to Deep Laryngeal Penetration during the swallow to the level of the vocal folds leading to Aspiration. The patient is not sensate to aspiration event given no cough response. Patient given verbal/visual cue to cough and expectorate in attempt to clear pharyngeal residue which was unsuccessful therefore patient's RN administered deep suctioning via Y[place]auer to remove all remaining pharyngeal residue.

## 2023-03-23 NOTE — PROGRESS NOTE ADULT - PROBLEM SELECTOR PLAN 6
Acute on chronic HFpEF   On entresto, lasix, bb, dig 3 days/week- On HOLD as pt was NPO and soft BPs  CXR w/ right pleural effusion increased in size  Diuresing w/ IV Lasix 20mg prn   Strict I/O's, daily weights

## 2023-03-23 NOTE — PROGRESS NOTE ADULT - PROBLEM SELECTOR PLAN 8
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 On 6/11/2018, he completed a course of definitive stereotactic body radiotherapy to the left upper lobe using Cyberknife technology  Was started on Keytruda was started every 3 weeks beginning March 2021. The fourth dose was Nasima 15, 2021.  Subsequently, he developed severe GI toxicity and this has been held   As per daughter pt is not on any therapy since  He saw his Oncologist in December 2022 and had a repeat CT   Outpatient d/up w/ oncologist Dr Brenden Hanna- [phone]

## 2023-03-23 NOTE — PROGRESS NOTE ADULT - ASSESSMENT
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

## 2023-03-23 NOTE — SWALLOW VFSS/MBS ASSESSMENT ADULT - ORAL PHASE
Severe Oral Stage: prolonged bolus manipulation, delayed anterior to posterior transfer/Laryngeal penetration before swallow - silent Premature spillage to hypopharynx (pyriforms)/Aspiration before swallow - silent

## 2023-03-23 NOTE — PROGRESS NOTE ADULT - ASSESSMENT
89yM Swedish speaking, HTN, HLD, TIA, severe AS s/p TAVR 4 years ago, Afib  on Coumadin, PPM, CHF on entresto and lasix, bph on flomax and finasteride,  remote esophageal CA s/p chemo and radiation 12 years ago and lung CA (not currently being tx'd), 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, bright red blood and increased weakness x2days    #Hemoptysis  Suspect 2/2 pna i/s/o pna with leukocytosis, elevated procal and RLL consolidation on CT chest, improved but still small amount of hemoptysis overnight  - continue to hold AC  - quantify hemoptysis in single specimen cup  - complete course of abx for pna  - airway clearance with duonebs q6h, acapella if pt can coordinate vs chest PT  - f/u SLP recommendations 89yM Wolof speaking, HTN, HLD, TIA, severe AS s/p TAVR 4 years ago, Afib  on Coumadin, PPM, CHF on entresto and lasix, bph on flomax and finasteride,  remote esophageal CA s/p chemo and radiation 12 years ago and lung CA (not currently being tx'd), 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, bright red blood and increased weakness x2days    #Hemoptysis  Suspect 2/2 pna i/s/o pna with leukocytosis, elevated procal and RLL consolidation on CT chest, improved but still small amount of hemoptysis overnight  - continue to hold AC  - quantify hemoptysis in single specimen cup  - complete course of abx for pna  - airway clearance with duonebs q6h, acapella if pt can coordinate vs chest PT  - SLP recommendations, NPO

## 2023-03-23 NOTE — PROGRESS NOTE ADULT - ATTENDING COMMENTS
89yM Pashto speaking, HTN, HLD, TIA, severe AS s/p TAVR 4 years ago, Afib  on Coumadin, PPM, CHF, remote esophageal CA s/p chemo and radiation 12 years ago and lung CA (not currently being tx'd), Bell's palsy  p/w hemoptysis in setting of very high INR.  Pt reports blood mixed with phlegm. Denies prior hemoptysis. Currently comfortable at rest. Reports small amount of blood in a tissue now after INR reversed.    Hemoptysis may be from his R sided cancer (which is no longer being tx) vs pneumonia while supratherapeutic INR.    Today reports no hemoptysis. Reports SOB is somewhat improved      - complete course of abx for PNA  - f/u cx, rvp and bcx negative  - quantify amount of hemoptysis being produced in cup rather tissues  - hold a/c if cont to have hemoptysis, now seems to have stopped  - if no further hemoptysis can consider heparin gtt tomorrow  - wean off o2 NC, maintain sat >90%  - concern for aspiration events f/u swallow eval  - no planned tx for NSCLC (reportedly had prior toxicity with treatment). 89yM Serbian speaking, HTN, HLD, TIA, severe AS s/p TAVR 4 years ago, Afib  on Coumadin, PPM, CHF, remote esophageal CA s/p chemo and radiation 12 years ago and lung CA (not currently being tx'd), Bell's palsy  p/w hemoptysis in setting of very high INR.  Pt reports blood mixed with phlegm. Denies prior hemoptysis. Currently comfortable at rest. Reports small amount of blood in a tissue now after INR reversed.    Hemoptysis may be from his R sided cancer (which is no longer being tx) vs pneumonia while supratherapeutic INR.    Today reports no hemoptysis. Reports SOB is somewhat improved      - complete course of abx for PNA  - f/u cx, rvp and bcx negative  - quantify amount of hemoptysis being produced in cup rather tissues  - hold a/c if cont to have hemoptysis, now seems to have stopped  - can consider heparin gtt after 48 hrs w/o hemoptysis  - wean off o2 NC, maintain sat >90%  - concern for aspiration events f/u swallow eval  - no planned tx for NSCLC (reportedly had prior toxicity with treatment).

## 2023-03-23 NOTE — PROGRESS NOTE ADULT - PROBLEM SELECTOR PLAN 9
CT head-No acute intracranial hemorrhage, mass effect, or shift of the midline structures.  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, his swallow test was always abnormal  but pt has learned to adjust his diet and take his time eating,   Seen by speech and swallow, s/p cinesophagram and recommend NPO   Will speak w/ daughter regarding enteral feeds

## 2023-03-23 NOTE — PROGRESS NOTE ADULT - PROBLEM SELECTOR PLAN 3
Likely d/t acute on chronic HFpEF   CXR w/ right pleural effusion increased in size  Diuresing w/ IV Lasix 20mg prn   Wean off O2, monitor O2 sats

## 2023-03-23 NOTE — DIETITIAN INITIAL EVALUATION ADULT - COLLABORATION WITH OTHER PROVIDERS
Diet consistency per MBS recommendations  Diet PO candidancy/ diet consistency per MBS recommendations

## 2023-03-23 NOTE — PROGRESS NOTE ADULT - PROBLEM SELECTOR PLAN 7
Afib w/ RVR    Coagulopathy with INR>8 likely 2/2 coumadin in setting of recent anorexia. s/p Vit k given  INR trended down (Goal INR- 2.5-3)  Holding anticoagulation d/t hemoptysis  Metoprolol 5mg IV q6h for HR control

## 2023-03-23 NOTE — DIETITIAN INITIAL EVALUATION ADULT - ADD RECOMMEND
1. If pt fails MBS, recommend NGT for temporary nutrition (as medically feasible and if consistent with GOC.)  1. Recommend NGT for temporary nutrition (as medically feasible and if consistent with GOC)

## 2023-03-23 NOTE — DIETITIAN INITIAL EVALUATION ADULT - ENTERAL
Initiate Jevity 1.5 @ 20 mL/hr and increase by 25 mL q6 hrs to goal of 45 mL/hr x 24 hrs (1080 mL, 1620 danish, 89 gm pro).

## 2023-03-23 NOTE — DIETITIAN INITIAL EVALUATION ADULT - OTHER INFO
A&Ox4; Colombian speaking. NPO x4 days 2/2 dysphagia; failed swallow assessment 3/22. MBS today. No reported GI issues (nausea/vomiting/diarrhea/constipation.) NKFA. Per HIE, pt with persistent wt loss since 2018. However, acute 5 kg wt loss noted in past 1 month.  A&Ox4; Nicaraguan speaking. Obtained collateral from daughter over phone. Pt has beenNPO x4 days 2/2 dysphagia; failed swallow assessment 3/22. MBS today; results pending.  No reported GI issues (nausea/vomiting/diarrhea/constipation.) NKFA. Per HIE, pt with persistent wt loss since 2018. However, acute 5 kg wt loss noted in past 1 month.  A&Ox4; Egyptian speaking. Obtained collateral from daughter over phone. Pt has been NPO x4 days 2/2 dysphagia; failed swallow assessment 3/22. Pt also failed MBS today.  No reported GI issues (nausea/vomiting/diarrhea/constipation.) NKFA. Per HIE, pt with persistent wt loss since 2018. However, acute 5 kg wt loss noted in past 1 month.

## 2023-03-23 NOTE — PROGRESS NOTE ADULT - SUBJECTIVE AND OBJECTIVE BOX
CHIEF COMPLAINT:Patient is a 89y old  Male who presents with a chief complaint of hemoptysis (23 Mar 2023 15:57)      Interval Events:  Pt reports feeling slightly improved overall, no hemoptysis today per his report.     REVIEW OF SYSTEMS:  [x] All other systems negative except per HPI   [ ] Unable to assess ROS because ________    OBJECTIVE:  ICU Vital Signs Last 24 Hrs  T(C): 36.3 (23 Mar 2023 12:13), Max: 37 (22 Mar 2023 22:30)  T(F): 97.4 (23 Mar 2023 12:13), Max: 98.6 (22 Mar 2023 22:30)  HR: 71 (23 Mar 2023 14:24) (60 - 105)  BP: 140/75 (23 Mar 2023 12:13) (114/60 - 148/75)  BP(mean): --  ABP: --  ABP(mean): --  RR: 18 (23 Mar 2023 12:13) (18 - 19)  SpO2: 100% (23 Mar 2023 14:24) (86% - 100%)    O2 Parameters below as of 23 Mar 2023 14:24  Patient On (Oxygen Delivery Method): nasal cannula                PHYSICAL EXAM:  Gen: NAD  HEENT: MMM  Neck: No JVP elevation  CV: irregular  Lung: rhonchorous throughout  Abd: Soft, NT, ND  Ext: WWP, no CCE  Skin: No rash  Neuro: Rt facial droop    HOSPITAL MEDICATIONS:  MEDICATIONS  (STANDING):  albuterol/ipratropium for Nebulization 3 milliLiter(s) Nebulizer every 6 hours  dextrose 5% + lactated ringers. 1000 milliLiter(s) (50 mL/Hr) IV Continuous <Continuous>  influenza  Vaccine (HIGH DOSE) 0.7 milliLiter(s) IntraMuscular once  metoprolol tartrate Injectable 5 milliGRAM(s) IV Push every 6 hours  piperacillin/tazobactam IVPB.. 3.375 Gram(s) IV Intermittent every 8 hours    MEDICATIONS  (PRN):      LABS:    The Labs were reviewed by me   The Radiology was reviewed by me    EKG tracing reviewed by me    03-23    143  |  102  |  24<H>  ----------------------------<  373<H>  3.9   |  23  |  1.03  03-22    148<H>  |  111<H>  |  31<H>  ----------------------------<  80  4.5   |  23  |  1.16  03-21    144  |  108<H>  |  41<H>  ----------------------------<  82  5.1   |  25  |  1.49<H>    Ca    8.5      23 Mar 2023 08:08  Ca    9.3      22 Mar 2023 06:16  Ca    8.9      21 Mar 2023 10:22  Phos  3.1     03-22  Mg     1.60     03-23      Magnesium, Serum: 1.60 mg/dL (03-23-23 @ 08:08)  Magnesium, Serum: 1.90 mg/dL (03-22-23 @ 06:16)    Phosphorus Level, Serum: 3.1 mg/dL (03-22-23 @ 06:16)      PT/INR - ( 23 Mar 2023 08:08 )   PT: 16.9 sec;   INR: 1.45 ratio         PTT - ( 21 Mar 2023 22:44 )  PTT:35.7 sec                                        9.6    6.94  )-----------( 124      ( 23 Mar 2023 08:08 )             31.8                         10.6   10.10 )-----------( 149      ( 22 Mar 2023 06:16 )             35.6                         10.5   8.37  )-----------( 125      ( 21 Mar 2023 22:44 )             33.6     CAPILLARY BLOOD GLUCOSE      POCT Blood Glucose.: 84 mg/dL (23 Mar 2023 12:12)  POCT Blood Glucose.: 85 mg/dL (23 Mar 2023 08:07)  POCT Blood Glucose.: 97 mg/dL (22 Mar 2023 23:20)        MICROBIOLOGY:     RADIOLOGY:  [ ] Reviewed and interpreted by me    Point of Care Ultrasound Findings:    PFT:    EKG:

## 2023-03-23 NOTE — SWALLOW VFSS/MBS ASSESSMENT ADULT - ADDITIONAL RECOMMENDATIONS
This department to follow up as schedule permits. Medical team advised to reconsult if/as patient continues to be medically optimized. This service remains available for goals of care discussion as needed.

## 2023-03-23 NOTE — PROGRESS NOTE ADULT - SUBJECTIVE AND OBJECTIVE BOX
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 Also w/ hypoxia and given Lasix.     ADDITIONAL REVIEW OF SYSTEMS:    MEDICATIONS  (STANDING):  albuterol/ipratropium for Nebulization 3 milliLiter(s) Nebulizer every 6 hours  dextrose 5% + lactated ringers. 1000 milliLiter(s) (50 mL/Hr) IV Continuous <Continuous>  influenza  Vaccine (HIGH DOSE) 0.7 milliLiter(s) IntraMuscular once  metoprolol tartrate Injectable 5 milliGRAM(s) IV Push every 6 hours  piperacillin/tazobactam IVPB.. 3.375 Gram(s) IV Intermittent every 8 hours    MEDICATIONS  (PRN):      CAPILLARY BLOOD GLUCOSE      POCT Blood Glucose.: 84 mg/dL (23 Mar 2023 12:12)  POCT Blood Glucose.: 85 mg/dL (23 Mar 2023 08:07)  POCT Blood Glucose.: 97 mg/dL (22 Mar 2023 23:20)    I&O's Summary      PHYSICAL EXAM:  Vital Signs Last 24 Hrs  T(C): 36.3 (23 Mar 2023 12:13), Max: 37 (22 Mar 2023 22:30)  T(F): 97.4 (23 Mar 2023 12:13), Max: 98.6 (22 Mar 2023 22:30)  HR: 105 (23 Mar 2023 12:13) (60 - 105)  BP: 140/75 (23 Mar 2023 12:13) (114/60 - 148/75)  BP(mean): --  RR: 18 (23 Mar 2023 12:13) (18 - 19)  SpO2: 96% (23 Mar 2023 12:13) (86% - 100%)    Parameters below as of 23 Mar 2023 12:13  Patient On (Oxygen Delivery Method): nasal cannula  O2 Flow (L/min): 3      CONSTITUTIONAL: NAD, frail, [place]   RESPIRATORY: Normal respiratory effort; decreased breath sounds at bases   CARDIOVASCULAR: Irregular rhythm, normal S1 and S2, no murmur/rub/gallop; No lower extremity edema; Peripheral pulses are 2+ bilaterally  ABDOMEN: Nontender to palpation, normoactive bowel sounds, no rebound/guarding; No hepatosplenomegaly  MUSCLOSKELETAL: no clubbing or cyanosis of digits; no joint swelling or tenderness to palpation  PSYCH: A+O to person, place, and time; affect appropriate  NEURO: Right side facial droop  SKIN: Scaly plaques on shins, ecchymoses on extremities      LABS:                        9.6    6.94  )-----------( 124      ( 23 Mar 2023 08:08 )             31.8     03-23    143  |  102  |  24<H>  ----------------------------<  373<H>  3.9   |  23  |  1.03    Ca    8.5      23 Mar 2023 08:08  Phos  3.1     03-22  Mg     1.60     03-23      PT/INR - ( 23 Mar 2023 08:08 )   PT: 16.9 sec;   INR: 1.45 ratio         PTT - ( 21 Mar 2023 22:44 )  PTT:35.7 sec            RADIOLOGY & ADDITIONAL TESTS:  Results Reviewed:   Imaging Personally Reviewed:  Electrocardiogram Personally Reviewed:    COORDINATION OF CARE:  Care Discussed with Consultants/Other Providers [Y/N]:  Prior or Outpatient Records Reviewed [Y/N]:

## 2023-03-23 NOTE — SWALLOW VFSS/MBS ASSESSMENT ADULT - COMMENTS
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 (see full report for details)     Patient received in Radiology Suite this AM for a cinesophragram. Patient transferred to a specialized seating unit with lateral view projection. Patient is Georgian speaking, [employer] (Max ID#[id number]) utilized for translation. 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 (see full report for details)     Patient received in Radiology Suite this AM for a cinesophragram accompanied by patient's nurse. Patient transferred to a specialized seating unit with lateral view projection. Patient is Sudanese speaking, [employer] (Max ID#[id number]) utilized for translation. 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 (see full report for details)     Patient received in Radiology Suite this AM for a cinesophragram accompanied by patient's nurse. Patient transferred to a specialized seating unit with lateral view projection. Patient is Serbian speaking, [employer] (Max ID#[id number]) utilized for translation. Patient with nasal cannula.

## 2023-03-23 NOTE — DIETITIAN INITIAL EVALUATION ADULT - PERTINENT LABORATORY DATA
03-23    143  |  102  |  24<H>  ----------------------------<  373<H>  3.9   |  23  |  1.03    Ca    8.5      23 Mar 2023 08:08  Phos  3.1     03-22  Mg     1.60     03-23    POCT Blood Glucose.: 85 mg/dL (03-23-23 @ 08:07)

## 2023-03-23 NOTE — DIETITIAN INITIAL EVALUATION ADULT - REASON FOR ADMISSION
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

## 2023-03-23 NOTE — DIETITIAN INITIAL EVALUATION ADULT - ORAL INTAKE PTA/DIET HISTORY
Hx of swallowing difficulty/eating slowly after hx of laryngeal ca.  Hx of swallowing difficulty/eating slowly after hx of laryngeal ca. pt with baseline poor PO intake.

## 2023-03-23 NOTE — DIETITIAN INITIAL EVALUATION ADULT - PERTINENT MEDS FT
MEDICATIONS  (STANDING):  albuterol/ipratropium for Nebulization 3 milliLiter(s) Nebulizer every 6 hours  dextrose 5% + lactated ringers. 1000 milliLiter(s) (50 mL/Hr) IV Continuous <Continuous>  influenza  Vaccine (HIGH DOSE) 0.7 milliLiter(s) IntraMuscular once  metoprolol tartrate Injectable 5 milliGRAM(s) IV Push every 6 hours  piperacillin/tazobactam IVPB.. 3.375 Gram(s) IV Intermittent every 8 hours    MEDICATIONS  (PRN):

## 2023-03-24 NOTE — PROGRESS NOTE ADULT - ATTENDING COMMENTS
89yM Turkmen speaking, HTN, HLD, TIA, severe AS s/p TAVR 4 years ago, Afib  on Coumadin, PPM, CHF, remote esophageal CA s/p chemo and radiation 12 years ago and lung CA (not currently being tx'd), Bell's palsy  p/w hemoptysis in setting of very high INR.  Pt reports blood mixed with phlegm. Denies prior hemoptysis. Currently comfortable at rest. Reports small amount of blood in a tissue now after INR reversed.    Hemoptysis may be from his R sided cancer (which is no longer being tx) vs pneumonia while supratherapeutic INR.    48 hours without hemoptysis. Reports SOB is somewhat improved  Uses o2 at night but in daytime no o2 need at sat approx 90% which is higher than now (80% ra)      - complete course of abx for PNA  - rvp and bcx negative  - quantify amount of hemoptysis being produced in cup rather tissues  - can consider heparin gtt now as is 48 hrs w/o hemoptysis and monitor response  - wean off o2 NC, maintain sat >90%  - concern for aspiration events f/u swallow eval  - no planned tx for NSCLC (reportedly had prior toxicity with treatment)  - restart lasix, monitor cr and volume status    - has mod R pleff simple appearance (L w/ trace effusion) which may be related to HF but cannot r/o parapneumonic process. pt has not been on lasix standing (due to bp and HAYLIE) but daughter reports pt w/ many HF exacerbations and would want to restart lasix and repeat US before doing thoracentesis  - pt NPO and she would want to pursue peg

## 2023-03-24 NOTE — PROGRESS NOTE ADULT - PROBLEM SELECTOR PLAN 3
Likely d/t acute on chronic HFpEF   CXR w/ right pleural effusion increased in size  Start IV Lasix 20mg daily   Wean off O2, monitor O2 sats

## 2023-03-24 NOTE — PROGRESS NOTE ADULT - SUBJECTIVE AND OBJECTIVE BOX
CHIEF COMPLAINT:Patient is a 89y old  Male who presents with a chief complaint of hemoptysis (24 Mar 2023 15:57)      Interval Events:  continues to deny hemoptysis today. Mildly dyspneic after ambulating from bathroom with noted desaturation to 80%. According to daughter uses O2 qHS but not during the day at baseline.    REVIEW OF SYSTEMS:  [x] All other systems negative except per HPI   [ ] Unable to assess ROS because ________    OBJECTIVE:  ICU Vital Signs Last 24 Hrs  T(C): 36.4 (24 Mar 2023 10:54), Max: 36.8 (24 Mar 2023 06:55)  T(F): 97.5 (24 Mar 2023 10:54), Max: 98.2 (24 Mar 2023 06:55)  HR: 85 (24 Mar 2023 17:05) (81 - 99)  BP: 133/72 (24 Mar 2023 17:05) (133/72 - 155/76)  BP(mean): --  ABP: --  ABP(mean): --  RR: 18 (24 Mar 2023 10:54) (18 - 18)  SpO2: 96% (24 Mar 2023 14:03) (95% - 99%)    O2 Parameters below as of 24 Mar 2023 14:03  Patient On (Oxygen Delivery Method): nasal cannula              03-23 @ 07:01  -  03-24 @ 07:00  --------------------------------------------------------  IN: 0 mL / OUT: 275 mL / NET: -275 mL        PHYSICAL EXAM:  Gen: NAD  HEENT: MMM  Neck: supple  CV: irregular  Lung: decreased bs at bases  Abd: Soft, NT, ND  Ext: WWP, no CCE  Skin: No rash  Neuro: Rt facial droop    HOSPITAL MEDICATIONS:  MEDICATIONS  (STANDING):  albuterol/ipratropium for Nebulization 3 milliLiter(s) Nebulizer every 6 hours  furosemide   Injectable 20 milliGRAM(s) IV Push daily  influenza  Vaccine (HIGH DOSE) 0.7 milliLiter(s) IntraMuscular once  metoprolol tartrate Injectable 5 milliGRAM(s) IV Push every 6 hours  piperacillin/tazobactam IVPB.. 3.375 Gram(s) IV Intermittent every 8 hours    MEDICATIONS  (PRN):      LABS:    The Labs were reviewed by me   The Radiology was reviewed by me    EKG tracing reviewed by me    03-23    143  |  102  |  24<H>  ----------------------------<  373<H>  3.9   |  23  |  1.03  03-22    148<H>  |  111<H>  |  31<H>  ----------------------------<  80  4.5   |  23  |  1.16    Ca    8.5      23 Mar 2023 08:08  Ca    9.3      22 Mar 2023 06:16  Mg     1.60     03-23      Magnesium, Serum: 1.60 mg/dL (03-23-23 @ 08:08)  Magnesium, Serum: 1.90 mg/dL (03-22-23 @ 06:16)    Phosphorus Level, Serum: 3.1 mg/dL (03-22-23 @ 06:16)      PT/INR - ( 23 Mar 2023 08:08 )   PT: 16.9 sec;   INR: 1.45 ratio                                                 9.6    6.94  )-----------( 124      ( 23 Mar 2023 08:08 )             31.8                         10.6   10.10 )-----------( 149      ( 22 Mar 2023 06:16 )             35.6                         10.5   8.37  )-----------( 125      ( 21 Mar 2023 22:44 )             33.6     CAPILLARY BLOOD GLUCOSE      POCT Blood Glucose.: 84 mg/dL (24 Mar 2023 12:22)  POCT Blood Glucose.: 84 mg/dL (24 Mar 2023 06:24)  POCT Blood Glucose.: 75 mg/dL (24 Mar 2023 00:23)  POCT Blood Glucose.: 72 mg/dL (23 Mar 2023 22:45)        MICROBIOLOGY:     RADIOLOGY:  [ ] Reviewed and interpreted by me    Point of Care Ultrasound Findings:    PFT:    EKG:

## 2023-03-24 NOTE — CHART NOTE - NSCHARTNOTEFT_GEN_A_CORE
:  [facility]    INDICATION:  hypoxemia    PROCEDURE:  [ ] LIMITED ECHO  [ x] LIMITED CHEST  [ ] LIMITED RETROPERITONEAL  [ ] LIMITED ABDOMINAL  [ ] LIMITED DVT  [ ] NEEDLE GUIDANCE VASCULAR  [ ] NEEDLE GUIDANCE THORACENTESIS  [ ] NEEDLE GUIDANCE PARACENTESIS  [ ] NEEDLE GUIDANCE PERICARDIOCENTESIS  [ ] OTHER    FINDINGS:  Trace Lt pleural effusion  Moderate Rt pleural effusion with adjacent consolidated lung    INTERPRETATION:  Rt pleural effusion with safe window for thoracentesis    Images uploaded to [employer]    Time spent on the procedure was separate from the critical care time spent for patient care.

## 2023-03-24 NOTE — PROGRESS NOTE ADULT - PROBLEM SELECTOR PLAN 6
Acute on chronic HFpEF   On entresto, lasix, bb, dig 3 days/week- On HOLD as pt was NPO and soft BPs  CXR w/ right pleural effusion increased in size  Diuresing w/ IV Lasix 20mg daily   Strict I/O's, daily weights

## 2023-03-24 NOTE — PROGRESS NOTE ADULT - PROBLEM SELECTOR PLAN 8
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 On 6/11/2018, he completed a course of definitive stereotactic body radiotherapy to the left upper lobe using Cyberknife technology  Was started on Keytruda was started every 3 weeks beginning March 2021. The fourth dose was Nasima 15, 2021.  Subsequently, he developed severe GI toxicity and this has been held   As per daughter pt is not on any therapy since  He saw his Oncologist in December 2022 and had a repeat CT   Outpatient d/up w/ oncologist Dr Brenden Hanna- [phone]

## 2023-03-24 NOTE — PROGRESS NOTE ADULT - ASSESSMENT
89yM Kiswahili speaking, HTN, HLD, TIA, severe AS s/p TAVR 4 years ago, Afib  on Coumadin, PPM, CHF on entresto and lasix, bph on flomax and finasteride,  remote esophageal CA s/p chemo and radiation 12 years ago and lung CA (not currently being tx'd), 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, bright red blood and increased weakness x2days    #Hemoptysis  Suspect 2/2 pna i/s/o pna with leukocytosis, elevated procal and RLL consolidation on CT chest. no hemoptysis in ~48 hours  - can try resuming AC, favor heparin gtt in case repeat hemoptysis and for potential thoracentesis pending improvement with diuresis  - diuresis as tolerated, noted h/o CHF with b/l effusion, Rt>Lt  - quantify hemoptysis in single specimen cup if recurs  - complete course of abx for pna  - airway clearance with duonebs q6h, acapella if pt can coordinate vs chest PT  - SLP recommendations, NPO, pursuing PEG

## 2023-03-24 NOTE — PROGRESS NOTE ADULT - PROBLEM SELECTOR PLAN 9
CT head-No acute intracranial hemorrhage, mass effect, or shift of the midline structures.  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, his swallow test was always abnormal  but pt has learned to adjust his diet and take his time eating,   Seen by speech and swallow, s/p cinesophagram and recommend NPO   Spoke w/ daughters and want PEG  Spoke w/ GI and recommend IR consult for PEG placement CT head-No acute intracranial hemorrhage, mass effect, or shift of the midline structures.  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, his swallow test was always abnormal  but pt has learned to adjust his diet and take his time eating,   Seen by speech and swallow, s/p cinesophagram and recommend NPO   Spoke w/ daughters and want PEG  Spoke w/ GI and recommend IR consult for PEG placement  May need NGT in the meantime

## 2023-03-24 NOTE — PROGRESS NOTE ADULT - SUBJECTIVE AND OBJECTIVE BOX
PROGRESS NOTE:     Patient is a 89y old  Male who presents with a chief complaint of hemoptysis (23 Mar 2023 15:57)      SUBJECTIVE / OVERNIGHT EVENTS: No acute events.     ADDITIONAL REVIEW OF SYSTEMS:    MEDICATIONS  (STANDING):  albuterol/ipratropium for Nebulization 3 milliLiter(s) Nebulizer every 6 hours  dextrose 5% + lactated ringers. 1000 milliLiter(s) (40 mL/Hr) IV Continuous <Continuous>  dextrose 5% + lactated ringers. 1000 milliLiter(s) (50 mL/Hr) IV Continuous <Continuous>  influenza  Vaccine (HIGH DOSE) 0.7 milliLiter(s) IntraMuscular once  metoprolol tartrate Injectable 5 milliGRAM(s) IV Push every 6 hours  piperacillin/tazobactam IVPB.. 3.375 Gram(s) IV Intermittent every 8 hours    MEDICATIONS  (PRN):      CAPILLARY BLOOD GLUCOSE      POCT Blood Glucose.: 84 mg/dL (24 Mar 2023 12:22)  POCT Blood Glucose.: 84 mg/dL (24 Mar 2023 06:24)  POCT Blood Glucose.: 75 mg/dL (24 Mar 2023 00:23)  POCT Blood Glucose.: 72 mg/dL (23 Mar 2023 22:45)  POCT Blood Glucose.: 96 mg/dL (23 Mar 2023 17:24)    I&O's Summary    23 Mar 2023 07:01  -  24 Mar 2023 07:00  --------------------------------------------------------  IN: 0 mL / OUT: 275 mL / NET: -275 mL        PHYSICAL EXAM:  Vital Signs Last 24 Hrs  T(C): 36.4 (24 Mar 2023 10:54), Max: 36.8 (24 Mar 2023 06:55)  T(F): 97.5 (24 Mar 2023 10:54), Max: 98.2 (24 Mar 2023 06:55)  HR: 91 (24 Mar 2023 11:45) (71 - 99)  BP: 151/80 (24 Mar 2023 11:45) (136/61 - 155/76)  BP(mean): --  RR: 18 (24 Mar 2023 10:54) (18 - 18)  SpO2: 98% (24 Mar 2023 10:54) (95% - 100%)    Parameters below as of 24 Mar 2023 07:31  Patient On (Oxygen Delivery Method): nasal cannula      CONSTITUTIONAL: NAD, frail, [place]   RESPIRATORY: Normal respiratory effort; decreased breath sounds at bases   CARDIOVASCULAR: Irregular rhythm, normal S1 and S2, no murmur/rub/gallop; No lower extremity edema; Peripheral pulses are 2+ bilaterally  ABDOMEN: Nontender to palpation, normoactive bowel sounds, no rebound/guarding; No hepatosplenomegaly  MUSCLOSKELETAL: no clubbing or cyanosis of digits; no joint swelling or tenderness to palpation  PSYCH: A+O to person, place, and time; affect appropriate  NEURO: Right side facial droop  SKIN: Scaly plaques on shins, ecchymoses on extremities      LABS:                        9.6    6.94  )-----------( 124      ( 23 Mar 2023 08:08 )             31.8     03-23    143  |  102  |  24<H>  ----------------------------<  373<H>  3.9   |  23  |  1.03    Ca    8.5      23 Mar 2023 08:08  Mg     1.60     03-23      PT/INR - ( 23 Mar 2023 08:08 )   PT: 16.9 sec;   INR: 1.45 ratio                     RADIOLOGY & ADDITIONAL TESTS:  Results Reviewed:   Imaging Personally Reviewed:  Electrocardiogram Personally Reviewed:    COORDINATION OF CARE:  Care Discussed with Consultants/Other Providers [Y/N]:  Prior or Outpatient Records Reviewed [Y/N]:

## 2023-03-24 NOTE — CHART NOTE - NSCHARTNOTEFT_GEN_A_CORE
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 Patient with history of dysphagia 2/2 to Laryngeal Cancer, seen by speech and swallow and s/p cineesophagogram this admission, it was recommend for patient to remain NPO. Primary team spoke w/ daughters and they are agreeable to gastrostomy tube placement.     IR consulted for Gastrostomy tube placement.     - Please obtain CT Abdomen (non-con)  - Reach out to IR once imaging performed     [id number]

## 2023-03-24 NOTE — PROGRESS NOTE ADULT - ASSESSMENT
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

## 2023-03-24 NOTE — PROGRESS NOTE ADULT - PROBLEM SELECTOR PLAN 1
Suspect secondary to RUL mass and PNA   In setting of supratherapeutic INR (INR 8 on admission)  CTA chest negative for PE, suspected RLL and POLLO  PNA, right upper lobe mass s/p biopsy.  On Zosyn for suspected asp pna  Holding anticoagulation   Hgb stable   Pulm input appreciated

## 2023-03-25 NOTE — PROGRESS NOTE ADULT - PROBLEM SELECTOR PLAN 1
Suspect secondary to RUL mass and PNA, appears to have subsided    In setting of supratherapeutic INR (INR 8 on admission)  CTA chest negative for PE, suspected RLL and POLLO  PNA, right upper lobe mass s/p biopsy.  On Zosyn for suspected asp pna  Held anticoagulation, can start heparin gtt now and monitor for bleeding    Hgb stable   Pulm input appreciated

## 2023-03-25 NOTE — PROGRESS NOTE ADULT - PROBLEM SELECTOR PLAN 9
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 On 6/11/2018, he completed a course of definitive stereotactic body radiotherapy to the left upper lobe using Cyberknife technology  Was started on Keytruda was started every 3 weeks beginning March 2021. The fourth dose was Nasima 15, 2021.  Subsequently, he developed severe GI toxicity and this has been held   As per daughter pt is not on any therapy since  He saw his Oncologist in December 2022 and had a repeat CT   Outpatient d/up w/ oncologist Dr Brenden Hanna- [phone]

## 2023-03-25 NOTE — PROGRESS NOTE ADULT - ASSESSMENT
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

## 2023-03-25 NOTE — PHYSICAL THERAPY INITIAL EVALUATION ADULT - PLANNED THERAPY INTERVENTIONS, PT EVAL
Patient left positioned for safety in bed in NAD, call bell in reach, all lines intact. +bed alarm./balance training/bed mobility training/gait training/strengthening/transfer training

## 2023-03-25 NOTE — PROGRESS NOTE ADULT - PROBLEM SELECTOR PLAN 6
Acute on chronic HFpEF   On entresto, lasix, bb, dig 3 days/week- On HOLD as pt was NPO and soft BPs  CXR w/ right pleural effusion increased in size  Hold IV Lasix 20mg daily d/t hypernatremia   Strict I/O's, daily weights

## 2023-03-25 NOTE — PROGRESS NOTE ADULT - PROBLEM SELECTOR PLAN 7
Due to free water deficit   Unable to place NGT and give free water boluses   Hold off on IV fluids as it may worsen volume status   Monitor Na

## 2023-03-25 NOTE — PROGRESS NOTE ADULT - SUBJECTIVE AND OBJECTIVE BOX
PROGRESS NOTE:     Patient is a 89y old  Male who presents with a chief complaint of hemoptysis (24 Mar 2023 15:57)      SUBJECTIVE / OVERNIGHT EVENTS: No acute events.     ADDITIONAL REVIEW OF SYSTEMS:    MEDICATIONS  (STANDING):  albuterol/ipratropium for Nebulization 3 milliLiter(s) Nebulizer every 6 hours  dextrose 50% Injectable 25 milliLiter(s) IV Push once  furosemide   Injectable 20 milliGRAM(s) IV Push daily  influenza  Vaccine (HIGH DOSE) 0.7 milliLiter(s) IntraMuscular once  metoprolol tartrate Injectable 5 milliGRAM(s) IV Push every 6 hours  piperacillin/tazobactam IVPB.. 3.375 Gram(s) IV Intermittent every 8 hours    MEDICATIONS  (PRN):      CAPILLARY BLOOD GLUCOSE      POCT Blood Glucose.: 87 mg/dL (25 Mar 2023 12:28)  POCT Blood Glucose.: 78 mg/dL (25 Mar 2023 06:07)  POCT Blood Glucose.: 182 mg/dL (25 Mar 2023 01:12)  POCT Blood Glucose.: 205 mg/dL (25 Mar 2023 01:10)  POCT Blood Glucose.: 71 mg/dL (25 Mar 2023 00:31)  POCT Blood Glucose.: 62 mg/dL (25 Mar 2023 00:28)  POCT Blood Glucose.: 78 mg/dL (24 Mar 2023 18:35)    I&O's Summary      PHYSICAL EXAM:  Vital Signs Last 24 Hrs  T(C): 36.4 (24 Mar 2023 20:27), Max: 36.4 (24 Mar 2023 20:27)  T(F): 97.5 (24 Mar 2023 20:27), Max: 97.5 (24 Mar 2023 20:27)  HR: 71 (24 Mar 2023 20:27) (71 - 96)  BP: 160/67 (24 Mar 2023 20:27) (133/72 - 160/67)  BP(mean): --  RR: 17 (24 Mar 2023 20:27) (17 - 17)  SpO2: 100% (24 Mar 2023 20:27) (96% - 100%)    Parameters below as of 24 Mar 2023 20:27  Patient On (Oxygen Delivery Method): nasal cannula  O2 Flow (L/min): 3      CONSTITUTIONAL: NAD, frail, [place]   RESPIRATORY: Normal respiratory effort; decreased breath sounds at bases   CARDIOVASCULAR: Irregular rhythm, normal S1 and S2, no murmur/rub/gallop; No lower extremity edema; Peripheral pulses are 2+ bilaterally  ABDOMEN: Nontender to palpation, normoactive bowel sounds, no rebound/guarding; No hepatosplenomegaly  MUSCLOSKELETAL: no clubbing or cyanosis of digits; no joint swelling or tenderness to palpation  PSYCH: A+O to person, place, and time; affect appropriate  NEURO: Right side facial droop  SKIN: Scaly plaques on shins, ecchymoses on extremities    LABS:    03-25    151<H>  |  109<H>  |  22  ----------------------------<  80  3.8   |  28  |  1.13    Ca    9.3      25 Mar 2023 05:40  Phos  2.4     03-25  Mg     1.50     03-25                  RADIOLOGY & ADDITIONAL TESTS:  Results Reviewed:   Imaging Personally Reviewed:  Electrocardiogram Personally Reviewed:    COORDINATION OF CARE:  Care Discussed with Consultants/Other Providers [Y/N]:  Prior or Outpatient Records Reviewed [Y/N]:

## 2023-03-25 NOTE — PROGRESS NOTE ADULT - PROBLEM SELECTOR PLAN 10
CT head-No acute intracranial hemorrhage, mass effect, or shift of the midline structures.  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, his swallow test was always abnormal  but pt has learned to adjust his diet and take his time eating,   Seen by speech and swallow, s/p cinesophagram and recommend NPO   Spoke w/ daughters and want PEG  Spoke w/ GI and recommend IR consult for PEG placement, obtaining CT A/P   NGT placement attempted multiple times and unsuccessful

## 2023-03-25 NOTE — PROGRESS NOTE ADULT - PROBLEM SELECTOR PLAN 3
Likely d/t acute on chronic HFpEF   CXR w/ right pleural effusion increased in size  Bedside US 3/24 w/ trace Lt pleural effusion and moderate Rt pleural effusion  Hold IV Lasix d/t hypernatremia   May need thoracentesis   Wean off O2, monitor O2 sats

## 2023-03-25 NOTE — PROGRESS NOTE ADULT - PROBLEM SELECTOR PLAN 8
Afib w/ RVR    Coagulopathy with INR>8 likely 2/2 coumadin in setting of recent anorexia. s/p Vit k given  INR trended down (Goal INR- 2.5-3)  Starting heparin gtt as above   Metoprolol 5mg IV q6h for HR control

## 2023-03-25 NOTE — PHYSICAL THERAPY INITIAL EVALUATION ADULT - PERTINENT HX OF CURRENT PROBLEM, REHAB EVAL
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  CT head negative.

## 2023-03-26 NOTE — PROGRESS NOTE ADULT - PROBLEM SELECTOR PLAN 8
Afib w/ RVR    Coagulopathy with INR>8 likely 2/2 coumadin in setting of recent anorexia. s/p Vit k given  INR trended down (Goal INR- 2.5-3)  Started heparin gtt as above   Metoprolol 5mg IV q6h for HR control

## 2023-03-26 NOTE — PROGRESS NOTE ADULT - PROBLEM SELECTOR PLAN 10
Due to no PO intake   s/p D50  monitor fingersticks Due to no PO intake   D50 prn  monitor fingersticks

## 2023-03-26 NOTE — CONSULT NOTE ADULT - SUBJECTIVE AND OBJECTIVE BOX
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 Patient with history of dysphagia 2/2 to Laryngeal Cancer, seen by speech and swallow and s/p cineesophagogram this admission, it was recommend for patient to remain NPO. Primary team spoke w/ daughters and they are agreeable to gastrostomy tube placement.         Allergies: No Known Allergies        Medications (Abx/Cardiac/Anticoagulation/Blood Products)    furosemide   Injectable: 20 milliGRAM(s) IV Push (03-25 @ 06:00)  heparin  Infusion.: 700 Unit(s)/Hr IV Continuous (03-26 @ 07:08)  heparin  Infusion.: 0 Unit(s)/Hr IV Continuous (03-25 @ 16:45)  metoprolol tartrate Injectable: 5 milliGRAM(s) IV Push (03-26 @ 06:13)  piperacillin/tazobactam IVPB..: 25 mL/Hr IV Intermittent (03-26 @ 06:14)      Home Medications  allopurinol 100 mg oral tablet: 1 tab(s) orally once a day  Coumadin 1 mg oral tablet: 1 tab(s) orally once a day  digoxin 125 mcg (0.125 mg) oral tablet: 1 tab(s) orally 3 times a week Th/Sa/Mo  Entresto 24 mg-26 mg oral tablet: 1 tab(s) orally 2 times a day  Flomax 0.4 mg oral capsule: 2 cap(s) orally once a day  Lasix 20 mg oral tablet: 1 tab(s) orally once a day  Lipitor 10 mg oral tablet: 1 tab(s) orally once a day  Lopressor 100 mg oral tablet: 1 tab(s) orally 2 times a day  Proscar 5 mg oral tablet: 1 tab(s) orally once a day      Data:    T(C): 36.8  HR: 99  BP: 148/78  RR: 20  SpO2: 99%    -WBC 9.53 / HgB 11.3 / Hct 38.1 / Plt 161  -Na 151 / Cl 109 / BUN 30 / Glucose 67  -K 3.9 / CO2 24 / Cr 1.33  -ALT -- / Alk Phos -- / T.Bili --  -INR 1.70 / .2    Radiology:     Assessment/Plan:   -89y Male with dysphagia, remote history of laryngeal ca, not on treatment, IR consulted for gastrostomy      CTA chest from 3/19 reviewed with coverage of the upper abdomen: low lying left hepatic lobe and interposed transverse colon with no percutaneous window for tube placement..  Consider surgical evaluation for gastrostomy tube placement  Alternatively, consider GI consult for evaluation, given remote history of laryngeal ca with no active disease, but window may also be limited for endoscopic window.         Fabio Sánchez MD  Interventional Radiology PGY5  Contact on Microsoft Teams for nonemergent issues    - Nonemergent consults:  [facility] order "Consult- Interventional Radiology", no page required  - Emergent issues (pager): [facility] [phone]; [facility] [phone]; [phone]; DO NOT PAGE FOR SCHEDULING QUESTIONS  - Scheduling questions 8am-6pm : [facility] [phone]; [facility] [phone],   - Clinic/outpatient booking: [facility] [phone]; [facility] [phone]

## 2023-03-26 NOTE — PROGRESS NOTE ADULT - PROBLEM SELECTOR PLAN 2
Likely aspiration PNA  CT- Area of consolidation in the right lower lobe which may represent pneumonia. Multiple nodular opacities in the left upper lung which may be infectious, inflammatory or neoplastic in etiology.  RVP/COVID- negative. BC- NGTD  Continue Zosyn

## 2023-03-26 NOTE — PROGRESS NOTE ADULT - PROBLEM SELECTOR PLAN 6
Acute on chronic HFpEF   On entresto, lasix, bb, dig 3 days/week at home  Holding PO meds while NPO   CXR w/ right pleural effusion increased in size  s/p IV Lasix, now holding d/t hypernatremia   Strict I/O's, daily weights

## 2023-03-26 NOTE — PROGRESS NOTE ADULT - SUBJECTIVE AND OBJECTIVE BOX
PROGRESS NOTE:     Patient is a 89y old  Male who presents with a chief complaint of hemoptysis (26 Mar 2023 08:46)      SUBJECTIVE / OVERNIGHT EVENTS: No acute events.     ADDITIONAL REVIEW OF SYSTEMS:    MEDICATIONS  (STANDING):  albuterol/ipratropium for Nebulization 3 milliLiter(s) Nebulizer every 6 hours  dextrose 50% Injectable 25 milliLiter(s) IV Push once  heparin  Infusion. 800 Unit(s)/Hr (8 mL/Hr) IV Continuous <Continuous>  influenza  Vaccine (HIGH DOSE) 0.7 milliLiter(s) IntraMuscular once  metoprolol tartrate Injectable 5 milliGRAM(s) IV Push every 6 hours  piperacillin/tazobactam IVPB.. 3.375 Gram(s) IV Intermittent every 8 hours    MEDICATIONS  (PRN):  heparin   Injectable 4000 Unit(s) IV Push every 6 hours PRN For aPTT less than 40  heparin   Injectable 2000 Unit(s) IV Push every 6 hours PRN For aPTT between 40 - 57      CAPILLARY BLOOD GLUCOSE      POCT Blood Glucose.: 73 mg/dL (26 Mar 2023 06:56)  POCT Blood Glucose.: 69 mg/dL (26 Mar 2023 06:46)  POCT Blood Glucose.: 75 mg/dL (25 Mar 2023 23:57)  POCT Blood Glucose.: 76 mg/dL (25 Mar 2023 18:32)  POCT Blood Glucose.: 64 mg/dL (25 Mar 2023 18:31)  POCT Blood Glucose.: 87 mg/dL (25 Mar 2023 12:28)    I&O's Summary      PHYSICAL EXAM:  Vital Signs Last 24 Hrs  T(C): 36.8 (26 Mar 2023 06:30), Max: 36.8 (25 Mar 2023 17:50)  T(F): 98.2 (26 Mar 2023 06:30), Max: 98.2 (25 Mar 2023 17:50)  HR: 99 (26 Mar 2023 06:30) (78 - 138)  BP: 148/78 (26 Mar 2023 06:30) (110/71 - 154/72)  BP(mean): --  RR: 20 (26 Mar 2023 06:30) (17 - 20)  SpO2: 99% (26 Mar 2023 06:30) (97% - 100%)    Parameters below as of 26 Mar 2023 06:30  Patient On (Oxygen Delivery Method): nasal cannula  O2 Flow (L/min): 3      CONSTITUTIONAL: NAD, frail, [place]   RESPIRATORY: Normal respiratory effort; decreased breath sounds at bases   CARDIOVASCULAR: Irregular rhythm, normal S1 and S2, no murmur/rub/gallop; No lower extremity edema; Peripheral pulses are 2+ bilaterally  ABDOMEN: Nontender to palpation, normoactive bowel sounds, no rebound/guarding; No hepatosplenomegaly  MUSCLOSKELETAL: no clubbing or cyanosis of digits; no joint swelling or tenderness to palpation  PSYCH: A+O to person, place, and time; affect appropriate  NEURO: Right side facial droop  SKIN: Scaly plaques on shins, ecchymoses on extremities    LABS:                        11.3   9.53  )-----------( 161      ( 26 Mar 2023 08:05 )             38.1     03-26    151<H>  |  109<H>  |  30<H>  ----------------------------<  67<L>  3.9   |  24  |  1.33<H>    Ca    9.0      26 Mar 2023 06:30  Phos  3.8     03-26  Mg     1.50     03-26      PT/INR - ( 26 Mar 2023 06:30 )   PT: 19.8 sec;   INR: 1.70 ratio         PTT - ( 26 Mar 2023 08:05 )  PTT:142.0 sec            RADIOLOGY & ADDITIONAL TESTS:  Results Reviewed:   Imaging Personally Reviewed:  Electrocardiogram Personally Reviewed:    COORDINATION OF CARE:  Care Discussed with Consultants/Other Providers [Y/N]:  Prior or Outpatient Records Reviewed [Y/N]:

## 2023-03-26 NOTE — PROGRESS NOTE ADULT - PROBLEM SELECTOR PLAN 11
CT head-No acute intracranial hemorrhage, mass effect, or shift of the midline structures.  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, his swallow test was always abnormal  but pt has learned to adjust his diet and take his time eating, but now has been getting worse   Seen by speech and swallow, s/p cinesophagram and recommend NPO   Spoke w/ daughters and wish to proceed w/ PEG  Spoke w/ GI and recommend IR consult for PEG placement  IR reviewed recent CTA chest w/ coverage of the upper abdomen, which showed low lying left hepatic lobe and interposed transverse colon with no percutaneous window for tube placement  NGT placement attempted multiple times and unsuccessful, likely d/t scar tissues from previous laryngeal CA and RT  As per GI, call back for possible endoscopic PEG placement once patient's respiratory status improves

## 2023-03-26 NOTE — PROGRESS NOTE ADULT - PROBLEM SELECTOR PLAN 1
Suspect secondary to RUL mass and PNA, appears to have subsided    In setting of supratherapeutic INR (INR 8 on admission)  CTA chest negative for PE, suspected RLL and POLLO  PNA, right upper lobe mass   On Zosyn for suspected asp pna  Held anticoagulation, now started heparin gtt and monitoring for bleeding    Hgb stable   Pulm input appreciated

## 2023-03-26 NOTE — PROGRESS NOTE ADULT - PROBLEM SELECTOR PLAN 3
Likely d/t acute on chronic HFpEF   CXR w/ right pleural effusion increased in size  Bedside US 3/24 w/ trace Lt pleural effusion and moderate Rt pleural effusion  s/p IV Lasix, now holding d/t hypernatremia   May need thoracentesis   Wean off O2, monitor O2 sats

## 2023-03-26 NOTE — PROGRESS NOTE ADULT - PROBLEM SELECTOR PLAN 9
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 On 6/11/2018, he completed a course of definitive stereotactic body radiotherapy to the left upper lobe using Cyberknife technology  Was started on Keytruda was started every 3 weeks beginning March 2021. The fourth dose was Nasima 15, 2021.  Subsequently, he developed severe GI toxicity and this has been held   As per daughter pt is not on any therapy since  He saw his Oncologist in December 2022 and had a repeat CT   Outpatient d/up w/ oncologist Dr Brenden Hanna- [phone]

## 2023-03-27 NOTE — PROGRESS NOTE ADULT - SUBJECTIVE AND OBJECTIVE BOX
[facility] Division of Hospital Medicine  Nadine Thornton MD  Hospitalist   Pager [phone]  Avaliable via MS teams     SUBJECTIVE / OVERNIGHT EVENTS: Pt seen and examined. Patient sitting up in his bed.  pt with no acute complaints. Spoke to patients daughter over phone, explained GI was consulted for PEG placement and will keep her updated regarding plan.     ADDITIONAL REVIEW OF SYSTEMS:    MEDICATIONS  (STANDING):  albuterol/ipratropium for Nebulization 3 milliLiter(s) Nebulizer every 6 hours  dextrose 5%. 1000 milliLiter(s) (40 mL/Hr) IV Continuous <Continuous>  dextrose 50% Injectable 25 milliLiter(s) IV Push once  dextrose 50% Injectable 25 Gram(s) IV Push once  dextrose 50% Injectable 12.5 Gram(s) IV Push once  dextrose 50% Injectable 25 Gram(s) IV Push once  dextrose Oral Gel 15 Gram(s) Oral once  glucagon  Injectable 1 milliGRAM(s) IntraMuscular once  heparin  Infusion. 500 Unit(s)/Hr (5 mL/Hr) IV Continuous <Continuous>  influenza  Vaccine (HIGH DOSE) 0.7 milliLiter(s) IntraMuscular once  magnesium sulfate  IVPB 2 Gram(s) IV Intermittent once  metoprolol tartrate Injectable 5 milliGRAM(s) IV Push every 6 hours    MEDICATIONS  (PRN):  heparin   Injectable 4000 Unit(s) IV Push every 6 hours PRN For aPTT less than 40  heparin   Injectable 2000 Unit(s) IV Push every 6 hours PRN For aPTT between 40 - 57      I&O's Summary      PHYSICAL EXAM:  Vital Signs Last 24 Hrs  T(C): 36.6 (27 Mar 2023 11:35), Max: 36.6 (26 Mar 2023 17:05)  T(F): 97.8 (27 Mar 2023 11:35), Max: 97.9 (27 Mar 2023 00:00)  HR: 91 (27 Mar 2023 14:53) (68 - 118)  BP: 144/65 (27 Mar 2023 11:35) (130/68 - 144/65)  BP(mean): --  RR: 18 (27 Mar 2023 11:35) (18 - 18)  SpO2: 97% (27 Mar 2023 14:53) (97% - 100%)    Parameters below as of 27 Mar 2023 14:53  Patient On (Oxygen Delivery Method): nasal cannula      CONSTITUTIONAL: NAD, frail, [place]   RESPIRATORY: Normal respiratory effort; decreased breath sounds at bases   CARDIOVASCULAR: Irregular rhythm, normal S1 and S2, no murmur/rub/gallop; No lower extremity edema; Peripheral pulses are 2+ bilaterally  ABDOMEN: Nontender to palpation, normoactive bowel sounds, no rebound/guarding; No hepatosplenomegaly  MUSCLOSKELETAL: no clubbing or cyanosis of digits; no joint swelling or tenderness to palpation  PSYCH: A+O to person, place, and time; affect appropriate  NEURO: Right side facial droop  SKIN: Scaly plaques on shins, ecchymoses on extremities    LABS:                        12.3   8.55  )-----------( 192      ( 27 Mar 2023 07:16 )             42.0     03-27    152<H>  |  108<H>  |  36<H>  ----------------------------<  71  3.9   |  30  |  1.50<H>    Ca    9.3      27 Mar 2023 07:16  Phos  3.7     03-27  Mg     1.70     03-27      PT/INR - ( 27 Mar 2023 07:16 )   PT: 20.9 sec;   INR: 1.79 ratio         PTT - ( 27 Mar 2023 07:16 )  PTT:55.3 sec          SARS-CoV-2: Lanie (19 Mar 2023 19:31)

## 2023-03-27 NOTE — PROGRESS NOTE ADULT - ASSESSMENT
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

## 2023-03-27 NOTE — PROGRESS NOTE ADULT - PROBLEM SELECTOR PLAN 1
CT head-No acute intracranial hemorrhage, mass effect, or shift of the midline structures.  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, his swallow test was always abnormal  but pt has learned to adjust his diet and take his time eating, but now has been getting worse   Seen by speech and swallow, s/p cinesophagram and recommend NPO   Spoke w/ daughters and wish to proceed w/ PEG  Spoke w/ GI and recommend IR consult for PEG placement  IR reviewed recent CTA chest w/ coverage of the upper abdomen, which showed low lying left hepatic lobe and interposed transverse colon with no percutaneous window for tube placement  NGT placement attempted multiple times and unsuccessful, likely d/t scar tissues from previous laryngeal CA and RT  As per GI, call back for possible endoscopic PEG placement once patient's respiratory status improves  GI Emailed on 3/27/23- appreciate recs regarding PEG placement

## 2023-03-27 NOTE — PROGRESS NOTE ADULT - PROBLEM SELECTOR PLAN 10
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 On 6/11/2018, he completed a course of definitive stereotactic body radiotherapy to the left upper lobe using Cyberknife technology  Was started on Keytruda was started every 3 weeks beginning March 2021. The fourth dose was Nasima 15, 2021.  Subsequently, he developed severe GI toxicity and this has been held   As per daughter pt is not on any therapy since  He saw his Oncologist in December 2022 and had a repeat CT   Outpatient d/up w/ oncologist Dr Brenden Hanna- [phone]

## 2023-03-27 NOTE — PROGRESS NOTE ADULT - PROBLEM SELECTOR PLAN 3
Likely aspiration PNA  CT- Area of consolidation in the right lower lobe which may represent pneumonia. Multiple nodular opacities in the left upper lung which may be infectious, inflammatory or neoplastic in etiology.  RVP/COVID- negative. BC- NGTD  s/p Zosyn  for suspected asp pna: 3/20-3/26

## 2023-03-27 NOTE — PROGRESS NOTE ADULT - SUBJECTIVE AND OBJECTIVE BOX
CHIEF COMPLAINT:Patient is a 89y old  Male who presents with a chief complaint of hemoptysis (27 Mar 2023 15:30)      Interval Events:    REVIEW OF SYSTEMS:  [x] All other systems negative except per HPI   [ ] Unable to assess ROS because ________    OBJECTIVE:  ICU Vital Signs Last 24 Hrs  T(C): 36.6 (27 Mar 2023 11:35), Max: 36.6 (27 Mar 2023 00:00)  T(F): 97.8 (27 Mar 2023 11:35), Max: 97.9 (27 Mar 2023 00:00)  HR: 98 (27 Mar 2023 17:49) (68 - 118)  BP: 103/65 (27 Mar 2023 17:49) (103/65 - 144/65)  BP(mean): --  ABP: --  ABP(mean): --  RR: 18 (27 Mar 2023 17:49) (18 - 18)  SpO2: 98% (27 Mar 2023 17:49) (97% - 100%)    O2 Parameters below as of 27 Mar 2023 17:49  Patient On (Oxygen Delivery Method): nasal cannula  O2 Flow (L/min): 3              PHYSICAL EXAM:  GENERAL: NAD, well-groomed, well-developed  HEAD:  Atraumatic, Normocephalic  EYES: EOMI, PERRLA, conjunctiva and sclera clear  ENMT: No tonsillar erythema, exudates, or enlargement; Moist mucous membranes, Good dentition, No lesions  NECK: Supple, No JVD, Normal thyroid  CHEST/LUNG: Clear to auscultation bilaterally; No rales, rhonchi, wheezing, or rubs  HEART: Regular rate and rhythm; No murmurs, rubs, or gallops  ABDOMEN: Soft, Nontender, Nondistended; Bowel sounds present  VASCULAR:  2+ Peripheral Pulses, No clubbing, cyanosis, or edema  LYMPH: No lymphadenopathy noted  SKIN: No rashes or lesions  NERVOUS SYSTEM:  Alert & Oriented X3, Good concentration; Motor Strength 5/5 B/L upper and lower extremities; DTRs 2+ intact and symmetric    HOSPITAL MEDICATIONS:  MEDICATIONS  (STANDING):  albuterol/ipratropium for Nebulization 3 milliLiter(s) Nebulizer every 6 hours  dextrose 5%. 1000 milliLiter(s) (40 mL/Hr) IV Continuous <Continuous>  dextrose 50% Injectable 25 milliLiter(s) IV Push once  dextrose 50% Injectable 25 Gram(s) IV Push once  dextrose 50% Injectable 12.5 Gram(s) IV Push once  dextrose 50% Injectable 25 Gram(s) IV Push once  dextrose Oral Gel 15 Gram(s) Oral once  glucagon  Injectable 1 milliGRAM(s) IntraMuscular once  heparin  Infusion. 500 Unit(s)/Hr (5 mL/Hr) IV Continuous <Continuous>  influenza  Vaccine (HIGH DOSE) 0.7 milliLiter(s) IntraMuscular once  magnesium sulfate  IVPB 2 Gram(s) IV Intermittent once  metoprolol tartrate Injectable 5 milliGRAM(s) IV Push every 6 hours    MEDICATIONS  (PRN):  heparin   Injectable 4000 Unit(s) IV Push every 6 hours PRN For aPTT less than 40  heparin   Injectable 2000 Unit(s) IV Push every 6 hours PRN For aPTT between 40 - 57      LABS:    The Labs were reviewed by me   The Radiology was reviewed by me    EKG tracing reviewed by me    03-27    152<H>  |  108<H>  |  36<H>  ----------------------------<  71  3.9   |  30  |  1.50<H>  03-26    151<H>  |  109<H>  |  30<H>  ----------------------------<  67<L>  3.9   |  24  |  1.33<H>  03-25    151<H>  |  109<H>  |  22  ----------------------------<  80  3.8   |  28  |  1.13    Ca    9.3      27 Mar 2023 07:16  Ca    9.0      26 Mar 2023 06:30  Ca    9.3      25 Mar 2023 05:40  Phos  3.7     03-27  Mg     1.70     03-27      Magnesium, Serum: 1.70 mg/dL (03-27-23 @ 07:16)  Magnesium, Serum: 1.50 mg/dL (03-26-23 @ 06:30)  Magnesium, Serum: 1.50 mg/dL (03-25-23 @ 05:40)    Phosphorus Level, Serum: 3.7 mg/dL (03-27-23 @ 07:16)  Phosphorus Level, Serum: 3.8 mg/dL (03-26-23 @ 06:30)  Phosphorus Level, Serum: 2.4 mg/dL (03-25-23 @ 05:40)      PT/INR - ( 27 Mar 2023 07:16 )   PT: 20.9 sec;   INR: 1.79 ratio         PTT - ( 27 Mar 2023 16:15 )  PTT:66.0 sec                                        12.3   8.55  )-----------( 192      ( 27 Mar 2023 07:16 )             42.0                         11.3   9.53  )-----------( 161      ( 26 Mar 2023 08:05 )             38.1                         11.2   8.74  )-----------( 157      ( 26 Mar 2023 06:30 )             37.1     CAPILLARY BLOOD GLUCOSE      POCT Blood Glucose.: 80 mg/dL (27 Mar 2023 12:14)  POCT Blood Glucose.: 147 mg/dL (27 Mar 2023 08:15)  POCT Blood Glucose.: 150 mg/dL (27 Mar 2023 08:01)  POCT Blood Glucose.: 61 mg/dL (27 Mar 2023 07:22)  POCT Blood Glucose.: 58 mg/dL (27 Mar 2023 07:08)  POCT Blood Glucose.: 80 mg/dL (27 Mar 2023 02:14)  POCT Blood Glucose.: 96 mg/dL (26 Mar 2023 17:59)        MICROBIOLOGY:     RADIOLOGY:  [ ] Reviewed and interpreted by me    Point of Care Ultrasound Findings:    PFT:    EKG: CHIEF COMPLAINT:Patient is a 89y old  Male who presents with a chief complaint of hemoptysis (27 Mar 2023 15:30)      Interval Events:  NO new concerns, dyspnea neither worse nor improved, LUE bleeding from skin tear noted.    REVIEW OF SYSTEMS:  [x] All other systems negative except per HPI   [ ] Unable to assess ROS because ________    OBJECTIVE:  ICU Vital Signs Last 24 Hrs  T(C): 36.6 (27 Mar 2023 11:35), Max: 36.6 (27 Mar 2023 00:00)  T(F): 97.8 (27 Mar 2023 11:35), Max: 97.9 (27 Mar 2023 00:00)  HR: 98 (27 Mar 2023 17:49) (68 - 118)  BP: 103/65 (27 Mar 2023 17:49) (103/65 - 144/65)  BP(mean): --  ABP: --  ABP(mean): --  RR: 18 (27 Mar 2023 17:49) (18 - 18)  SpO2: 98% (27 Mar 2023 17:49) (97% - 100%)    O2 Parameters below as of 27 Mar 2023 17:49  Patient On (Oxygen Delivery Method): nasal cannula  O2 Flow (L/min): 3              PHYSICAL EXAM:  Gen: NAD  HEENT: MMM  Neck: supple  CV: irregular  Lung: decreased bs at bases  Abd: Soft, NT, ND  Ext: WWP, no CCE  Skin: No rash  Neuro: Rt facial droop    HOSPITAL MEDICATIONS:  MEDICATIONS  (STANDING):  albuterol/ipratropium for Nebulization 3 milliLiter(s) Nebulizer every 6 hours  dextrose 5%. 1000 milliLiter(s) (40 mL/Hr) IV Continuous <Continuous>  dextrose 50% Injectable 25 milliLiter(s) IV Push once  dextrose 50% Injectable 25 Gram(s) IV Push once  dextrose 50% Injectable 12.5 Gram(s) IV Push once  dextrose 50% Injectable 25 Gram(s) IV Push once  dextrose Oral Gel 15 Gram(s) Oral once  glucagon  Injectable 1 milliGRAM(s) IntraMuscular once  heparin  Infusion. 500 Unit(s)/Hr (5 mL/Hr) IV Continuous <Continuous>  influenza  Vaccine (HIGH DOSE) 0.7 milliLiter(s) IntraMuscular once  magnesium sulfate  IVPB 2 Gram(s) IV Intermittent once  metoprolol tartrate Injectable 5 milliGRAM(s) IV Push every 6 hours    MEDICATIONS  (PRN):  heparin   Injectable 4000 Unit(s) IV Push every 6 hours PRN For aPTT less than 40  heparin   Injectable 2000 Unit(s) IV Push every 6 hours PRN For aPTT between 40 - 57      LABS:    The Labs were reviewed by me   The Radiology was reviewed by me    EKG tracing reviewed by me    03-27    152<H>  |  108<H>  |  36<H>  ----------------------------<  71  3.9   |  30  |  1.50<H>  03-26    151<H>  |  109<H>  |  30<H>  ----------------------------<  67<L>  3.9   |  24  |  1.33<H>  03-25    151<H>  |  109<H>  |  22  ----------------------------<  80  3.8   |  28  |  1.13    Ca    9.3      27 Mar 2023 07:16  Ca    9.0      26 Mar 2023 06:30  Ca    9.3      25 Mar 2023 05:40  Phos  3.7     03-27  Mg     1.70     03-27      Magnesium, Serum: 1.70 mg/dL (03-27-23 @ 07:16)  Magnesium, Serum: 1.50 mg/dL (03-26-23 @ 06:30)  Magnesium, Serum: 1.50 mg/dL (03-25-23 @ 05:40)    Phosphorus Level, Serum: 3.7 mg/dL (03-27-23 @ 07:16)  Phosphorus Level, Serum: 3.8 mg/dL (03-26-23 @ 06:30)  Phosphorus Level, Serum: 2.4 mg/dL (03-25-23 @ 05:40)      PT/INR - ( 27 Mar 2023 07:16 )   PT: 20.9 sec;   INR: 1.79 ratio         PTT - ( 27 Mar 2023 16:15 )  PTT:66.0 sec                                        12.3   8.55  )-----------( 192      ( 27 Mar 2023 07:16 )             42.0                         11.3   9.53  )-----------( 161      ( 26 Mar 2023 08:05 )             38.1                         11.2   8.74  )-----------( 157      ( 26 Mar 2023 06:30 )             37.1     CAPILLARY BLOOD GLUCOSE      POCT Blood Glucose.: 80 mg/dL (27 Mar 2023 12:14)  POCT Blood Glucose.: 147 mg/dL (27 Mar 2023 08:15)  POCT Blood Glucose.: 150 mg/dL (27 Mar 2023 08:01)  POCT Blood Glucose.: 61 mg/dL (27 Mar 2023 07:22)  POCT Blood Glucose.: 58 mg/dL (27 Mar 2023 07:08)  POCT Blood Glucose.: 80 mg/dL (27 Mar 2023 02:14)  POCT Blood Glucose.: 96 mg/dL (26 Mar 2023 17:59)        MICROBIOLOGY:     RADIOLOGY:  [ ] Reviewed and interpreted by me    Point of Care Ultrasound Findings:    PFT:    EKG:

## 2023-03-27 NOTE — PROGRESS NOTE ADULT - PROBLEM SELECTOR PLAN 2
Suspect secondary to RUL mass and PNA, appears to have subsided    In setting of supratherapeutic INR (INR 8 on admission)  CTA chest negative for PE, suspected RLL and POLLO  PNA, right upper lobe mass   s/p Zosyn  for suspected asp pna: 3/20-3/26   Held anticoagulation, now started heparin gtt and monitoring for bleeding    Hgb stable   Pulm input appreciated

## 2023-03-27 NOTE — PROVIDER CONTACT NOTE (HYPOGLYCEMIA EVENT) - NS PROVIDER CONTACT BACKGROUND-HYPO
Age: 89y    Gender: Male    POCT Blood Glucose:  61 mg/dL (03-27-23 @ 07:22)  58 mg/dL (03-27-23 @ 07:08)  80 mg/dL (03-27-23 @ 02:14)  96 mg/dL (03-26-23 @ 17:59)  67 mg/dL (03-26-23 @ 12:19)      eMAR:  dextrose 50% Injectable   25 Gram(s) IV Push (03-27-23 @ 07:43)    patient sugar at 58 with morning q6 finger stick. no coverage insulin. no hypoglycemia protocol.

## 2023-03-27 NOTE — PROGRESS NOTE ADULT - ASSESSMENT
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  - hep gtt initiated, pt denies further hemoptysis this AM  - diuresis as tolerated, noted h/o CHF with b/l effusion, Rt>Lt, however hypernatremic currently  --- if remains dyspneic thora may be offered especially if not able to be diuresed, malignant effusion is also possible, to repeat POCUS  - quantify hemoptysis in single specimen cup if recurs  - complete course of abx for pna  - airway clearance with duonebs q6h, acapella if pt can coordinate vs chest PT  - SLP recommendations, NPO, pursuing PEG

## 2023-03-27 NOTE — PROGRESS NOTE ADULT - ATTENDING COMMENTS
89M AS s/p TAVR, afib on Coumadin, PPM, CHF, remote esophageal CA s/p chemo and radiation 12 years ago and lung CA (currently not on treatment), Bell's palsy p/w hemoptysis in setting of elevated INR and pneumonia.  - No further episodes of hemoptysis, per patient  - Recommend resuming Lasix when able  - Will continue to follow and POCUS in AM for follow up of effusion

## 2023-03-28 NOTE — CONSULT NOTE ADULT - SUBJECTIVE AND OBJECTIVE BOX
Patient is a 89y old  Male who presents with a chief complaint of hemoptysis (28 Mar 2023 07:48)      HPI:  Mr. Aquino is a 89 yo M with PMH of severe AS s/p TAVR 4 years ago, AFib on coumadin, PPM, CHF on entresto and lasix, HTN, HLD, TIA, BPH, remote esophageal cancer s/p chemo and radiation 12 years ago and lung cancer not currently being treated, remote EBV infection 20 years ago with Bell's palsy and residual right-sided facial droop, presenting with hemoptysis and dysphagia. Pending PEG tube placement after GoC discussion with family. Cardiology consulted for pre-operative risk stratification.       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      S/P TAVR (transcatheter aortic valve replacement)        SOCIAL HISTORY:  Allergies    No Known Allergies    Intolerances        HOME MEDICATIONS:  allopurinol 100 mg oral tablet: 1 tab(s) orally once a day (17 Sep 2020 14:24)  Coumadin 1 mg oral tablet: 1 tab(s) orally once a day (20 Mar 2023 01:33)  digoxin 125 mcg (0.125 mg) oral tablet: 1 tab(s) orally 3 times a week Th/Sa/Mo (20 Mar 2023 01:32)  Entresto 24 mg-26 mg oral tablet: 1 tab(s) orally 2 times a day (20 Mar 2023 01:33)  Flomax 0.4 mg oral capsule: 2 cap(s) orally once a day (20 Mar 2023 01:29)  Lasix 20 mg oral tablet: 1 tab(s) orally once a day (20 Mar 2023 01:30)  Lipitor 10 mg oral tablet: 1 tab(s) orally once a day (20 Mar 2023 01:33)  Lopressor 100 mg oral tablet: 1 tab(s) orally 2 times a day (20 Mar 2023 01:32)  Proscar 5 mg oral tablet: 1 tab(s) orally once a day (17 Sep 2020 07:06)      Vital Signs Last 24 Hrs  T(C): 36.2 (28 Mar 2023 10:49), Max: 36.4 (28 Mar 2023 05:50)  T(F): 97.2 (28 Mar 2023 10:49), Max: 97.6 (28 Mar 2023 05:50)  HR: 104 (28 Mar 2023 12:45) (88 - 125)  BP: 125/68 (28 Mar 2023 12:45) (103/65 - 148/73)  BP(mean): --  RR: 18 (28 Mar 2023 12:45) (16 - 18)  SpO2: 98% (28 Mar 2023 12:45) (97% - 100%)    Parameters below as of 28 Mar 2023 12:45  Patient On (Oxygen Delivery Method): nasal cannula  O2 Flow (L/min): 3    I&O's Summary      PHYSICAL EXAM:  GENERAL: NAD, well-developed  HEAD:  Atraumatic, Normocephalic  EYES: EOMI, conjunctiva and sclera clear  NECK: Supple, No JVD  CHEST/LUNG: Clear to auscultation bilaterally; No wheeze  HEART: Regular rate and rhythm; No murmurs, rubs, or gallops  ABDOMEN: Soft, Nontender, Nondistended; Bowel sounds present  EXTREMITIES:  2+ Peripheral Pulses, No clubbing, cyanosis, or edema  PSYCH: AAOx3  NEUROLOGY: non-focal  SKIN: No rashes or lesions    LABS                        11.2   8.16  )-----------( 186      ( 28 Mar 2023 06:49 )             38.1                         12.3   8.55  )-----------( 192      ( 27 Mar 2023 07:16 )             42.0     03-28    149<H>  |  108<H>  |  40<H>  ----------------------------<  85  3.6   |  27  |  1.34<H>  03-27    152<H>  |  108<H>  |  36<H>  ----------------------------<  71  3.9   |  30  |  1.50<H>    Ca    8.9      28 Mar 2023 06:49  Ca    9.3      27 Mar 2023 07:16  Phos  3.5     03-28  Mg     2.20     03-28      CAPILLARY BLOOD GLUCOSE      POCT Blood Glucose.: 99 mg/dL (28 Mar 2023 12:30)  POCT Blood Glucose.: 87 mg/dL (28 Mar 2023 06:46)  POCT Blood Glucose.: 70 mg/dL (28 Mar 2023 00:34)  POCT Blood Glucose.: 73 mg/dL (27 Mar 2023 19:06)    PT/INR - ( 27 Mar 2023 07:16 )   PT: 20.9 sec;   INR: 1.79 ratio         PTT - ( 28 Mar 2023 00:53 )  PTT:61.9 sec                MEDICATIONS  (STANDING):  albuterol/ipratropium for Nebulization 3 milliLiter(s) Nebulizer every 6 hours  dextrose 5%. 1000 milliLiter(s) (40 mL/Hr) IV Continuous <Continuous>  dextrose 5%. 1000 milliLiter(s) (50 mL/Hr) IV Continuous <Continuous>  dextrose 50% Injectable 25 Gram(s) IV Push once  dextrose 50% Injectable 12.5 Gram(s) IV Push once  dextrose 50% Injectable 25 Gram(s) IV Push once  dextrose 50% Injectable 25 milliLiter(s) IV Push once  dextrose Oral Gel 15 Gram(s) Oral once  glucagon  Injectable 1 milliGRAM(s) IntraMuscular once  heparin  Infusion. 500 Unit(s)/Hr (5 mL/Hr) IV Continuous <Continuous>  influenza  Vaccine (HIGH DOSE) 0.7 milliLiter(s) IntraMuscular once  metoprolol tartrate Injectable 5 milliGRAM(s) IV Push every 6 hours    MEDICATIONS  (PRN):  heparin   Injectable 4000 Unit(s) IV Push every 6 hours PRN For aPTT less than 40  heparin   Injectable 2000 Unit(s) IV Push every 6 hours PRN For aPTT between 40 - 57      ECG:  ECHO FINDINGS:  RADIOLOGY & ADDITIONAL STUDIES:     Patient is a 89y old  Male who presents with a chief complaint of hemoptysis (28 Mar 2023 07:48)      HPI:  Mr. Aquino is a 89 yo M with PMH of severe AS s/p TAVR 4 years ago, AFib on coumadin, PPM, CHF on entresto and lasix, HTN, HLD, TIA, BPH, remote esophageal cancer s/p chemo and radiation 12 years ago and lung cancer not currently being treated, remote EBV infection 20 years ago with Bell's palsy and residual right-sided facial droop, presenting with hemoptysis and dysphagia. Pending PEG tube placement after GoC discussion with family. Cardiology consulted for pre-operative risk stratification.       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      S/P TAVR (transcatheter aortic valve replacement)        Allergies    No Known Allergies    Intolerances        HOME MEDICATIONS:  allopurinol 100 mg oral tablet: 1 tab(s) orally once a day (17 Sep 2020 14:24)  Coumadin 1 mg oral tablet: 1 tab(s) orally once a day (20 Mar 2023 01:33)  digoxin 125 mcg (0.125 mg) oral tablet: 1 tab(s) orally 3 times a week Th/Sa/Mo (20 Mar 2023 01:32)  Entresto 24 mg-26 mg oral tablet: 1 tab(s) orally 2 times a day (20 Mar 2023 01:33)  Flomax 0.4 mg oral capsule: 2 cap(s) orally once a day (20 Mar 2023 01:29)  Lasix 20 mg oral tablet: 1 tab(s) orally once a day (20 Mar 2023 01:30)  Lipitor 10 mg oral tablet: 1 tab(s) orally once a day (20 Mar 2023 01:33)  Lopressor 100 mg oral tablet: 1 tab(s) orally 2 times a day (20 Mar 2023 01:32)  Proscar 5 mg oral tablet: 1 tab(s) orally once a day (17 Sep 2020 07:06)      Vital Signs Last 24 Hrs  T(C): 36.2 (28 Mar 2023 10:49), Max: 36.4 (28 Mar 2023 05:50)  T(F): 97.2 (28 Mar 2023 10:49), Max: 97.6 (28 Mar 2023 05:50)  HR: 104 (28 Mar 2023 12:45) (88 - 125)  BP: 125/68 (28 Mar 2023 12:45) (103/65 - 148/73)  BP(mean): --  RR: 18 (28 Mar 2023 12:45) (16 - 18)  SpO2: 98% (28 Mar 2023 12:45) (97% - 100%)    Parameters below as of 28 Mar 2023 12:45  Patient On (Oxygen Delivery Method): nasal cannula  O2 Flow (L/min): 3    I&O's Summary      PHYSICAL EXAM:  GENERAL: frail, elderly man, NAD  HEAD:  Atraumatic, Normocephalic  EYES: EOMI, conjunctiva and sclera clear  NECK: Supple, No JVD  CHEST/LUNG: Clear to auscultation bilaterally; No wheeze  HEART: Regular rate and rhythm; No murmurs, rubs, or gallops  ABDOMEN: Soft, Nontender, Nondistended; Bowel sounds present  EXTREMITIES:  2+ Peripheral Pulses, No clubbing, cyanosis, or edema  PSYCH: AAOx1-2  NEUROLOGY: follows commands  SKIN: ecchymoses throughout    LABS                        11.2   8.16  )-----------( 186      ( 28 Mar 2023 06:49 )             38.1                         12.3   8.55  )-----------( 192      ( 27 Mar 2023 07:16 )             42.0     03-28    149<H>  |  108<H>  |  40<H>  ----------------------------<  85  3.6   |  27  |  1.34<H>  03-27    152<H>  |  108<H>  |  36<H>  ----------------------------<  71  3.9   |  30  |  1.50<H>    Ca    8.9      28 Mar 2023 06:49  Ca    9.3      27 Mar 2023 07:16  Phos  3.5     03-28  Mg     2.20     03-28      CAPILLARY BLOOD GLUCOSE      POCT Blood Glucose.: 99 mg/dL (28 Mar 2023 12:30)  POCT Blood Glucose.: 87 mg/dL (28 Mar 2023 06:46)  POCT Blood Glucose.: 70 mg/dL (28 Mar 2023 00:34)  POCT Blood Glucose.: 73 mg/dL (27 Mar 2023 19:06)    PT/INR - ( 27 Mar 2023 07:16 )   PT: 20.9 sec;   INR: 1.79 ratio         PTT - ( 28 Mar 2023 00:53 )  PTT:61.9 sec                MEDICATIONS  (STANDING):  albuterol/ipratropium for Nebulization 3 milliLiter(s) Nebulizer every 6 hours  dextrose 5%. 1000 milliLiter(s) (40 mL/Hr) IV Continuous <Continuous>  dextrose 5%. 1000 milliLiter(s) (50 mL/Hr) IV Continuous <Continuous>  dextrose 50% Injectable 25 Gram(s) IV Push once  dextrose 50% Injectable 12.5 Gram(s) IV Push once  dextrose 50% Injectable 25 Gram(s) IV Push once  dextrose 50% Injectable 25 milliLiter(s) IV Push once  dextrose Oral Gel 15 Gram(s) Oral once  glucagon  Injectable 1 milliGRAM(s) IntraMuscular once  heparin  Infusion. 500 Unit(s)/Hr (5 mL/Hr) IV Continuous <Continuous>  influenza  Vaccine (HIGH DOSE) 0.7 milliLiter(s) IntraMuscular once  metoprolol tartrate Injectable 5 milliGRAM(s) IV Push every 6 hours    MEDICATIONS  (PRN):  heparin   Injectable 4000 Unit(s) IV Push every 6 hours PRN For aPTT less than 40  heparin   Injectable 2000 Unit(s) IV Push every 6 hours PRN For aPTT between 40 - 57      ECG:  Afib 100-120s, NSVT <6 seconds and frequent PVCs on tele  ECHO FINDINGS: mild MR, moderate MS, moderate AR, selevely dilated LA, normal LVSF EF 69%, severe KIERSTEN, decreased RV systolic function, moderate pHTN  RADIOLOGY & ADDITIONAL STUDIES:

## 2023-03-28 NOTE — PROGRESS NOTE ADULT - PROBLEM SELECTOR PLAN 1
CT head-No acute intracranial hemorrhage, mass effect, or shift of the midline structures.  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, his swallow test was always abnormal  but pt has learned to adjust his diet and take his time eating, but now has been getting worse   Seen by speech and swallow, s/p cinesophagram and recommend NPO   Spoke w/ daughters and wish to proceed w/ PEG  Spoke w/ GI and recommend IR consult for PEG placement  IR reviewed recent CTA chest w/ coverage of the upper abdomen, which showed low lying left hepatic lobe and interposed transverse colon with no percutaneous window for tube placement  NGT placement attempted multiple times and unsuccessful, likely d/t scar tissues from previous laryngeal CA and RT  As per GI, call back for possible endoscopic PEG placement once patient's respiratory status improves  GI Emailed on 3/27/23- appreciate recs regarding PEG placement  ENT consulted on 3/28 for trail of NGT placement  Surgery consulted on 3/28 as GI unable to perform PEG

## 2023-03-28 NOTE — CONSULT NOTE ADULT - SUBJECTIVE AND OBJECTIVE BOX
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 Hemoptysis thought to be 2/2 pulmonary mass vs aspiration PNA.     GI consulted for PEG evaluation.     Allergies:  No Known Allergies    Home Medications:  allopurinol 100 mg oral tablet: 1 tab(s) orally once a day (17 Sep 2020 14:24)  Coumadin 1 mg oral tablet: 1 tab(s) orally once a day (20 Mar 2023 01:33)  digoxin 125 mcg (0.125 mg) oral tablet: 1 tab(s) orally 3 times a week Th/Sa/Mo (20 Mar 2023 01:32)  Entresto 24 mg-26 mg oral tablet: 1 tab(s) orally 2 times a day (20 Mar 2023 01:33)  Flomax 0.4 mg oral capsule: 2 cap(s) orally once a day (20 Mar 2023 01:29)  Lasix 20 mg oral tablet: 1 tab(s) orally once a day (20 Mar 2023 01:30)  Lipitor 10 mg oral tablet: 1 tab(s) orally once a day (20 Mar 2023 01:33)  Lopressor 100 mg oral tablet: 1 tab(s) orally 2 times a day (20 Mar 2023 01:32)  Proscar 5 mg oral tablet: 1 tab(s) orally once a day (17 Sep 2020 07:06)    Hospital Medications:  albuterol/ipratropium for Nebulization 3 milliLiter(s) Nebulizer every 6 hours  dextrose 5%. 1000 milliLiter(s) IV Continuous <Continuous>  dextrose 5%. 1000 milliLiter(s) IV Continuous <Continuous>  dextrose 50% Injectable 25 Gram(s) IV Push once  dextrose 50% Injectable 12.5 Gram(s) IV Push once  dextrose 50% Injectable 25 Gram(s) IV Push once  dextrose 50% Injectable 25 milliLiter(s) IV Push once  dextrose Oral Gel 15 Gram(s) Oral once  glucagon  Injectable 1 milliGRAM(s) IntraMuscular once  heparin   Injectable 4000 Unit(s) IV Push every 6 hours PRN  heparin   Injectable 2000 Unit(s) IV Push every 6 hours PRN  heparin  Infusion. 500 Unit(s)/Hr IV Continuous <Continuous>  influenza  Vaccine (HIGH DOSE) 0.7 milliLiter(s) IntraMuscular once  metoprolol tartrate Injectable 5 milliGRAM(s) IV Push every 6 hours  piperacillin/tazobactam IVPB.. 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    Social History:   Tob: Denies  EtOH: Denies  Illicit Drugs: Denies    ROS:   General:  No wt loss, fevers, chills, night sweats, fatigue  Eyes:  Good vision, no reported pain  ENT:  No sore throat, pain, runny nose, dysphagia  CV:  No pain, palpitations, hypo/hypertension  Pulm:  No dyspnea, cough, tachypnea, wheezing  GI:  see HPI  :  No pain, bleeding, incontinence, nocturia  Muscle:  No pain, weakness  Neuro:  No weakness, tingling, memory problems  Psych:  No fatigue, insomnia, mood problems, depression  Endocrine:  No polyuria, polydipsia, cold/heat intolerance  Heme:  No petechiae, ecchymosis, easy bruisability  Skin:  No rash, tattoos, scars, edema    PHYSICAL EXAM:   GENERAL:  No acute distress  HEENT:  NCAT, no scleral icterus   CHEST:  no respiratory distress  HEART:  Regular rate and rhythm  ABDOMEN:  Soft, non-tender, non-distended  EXTREMITIES: No edema  SKIN:  No rash/erythema/ecchymoses  NEURO:  Alert and oriented x 3    Vital Signs:  Vital Signs Last 24 Hrs  T(C): 36.2 (28 Mar 2023 10:49), Max: 36.4 (28 Mar 2023 05:50)  T(F): 97.2 (28 Mar 2023 10:49), Max: 97.6 (28 Mar 2023 05:50)  HR: 104 (28 Mar 2023 12:45) (88 - 125)  BP: 125/68 (28 Mar 2023 12:45) (103/65 - 148/73)  BP(mean): --  RR: 18 (28 Mar 2023 12:45) (16 - 18)  SpO2: 98% (28 Mar 2023 12:45) (97% - 100%)    Parameters below as of 28 Mar 2023 12:45  Patient On (Oxygen Delivery Method): nasal cannula  O2 Flow (L/min): 3    Daily     Daily     LABS:                        11.2   8.16  )-----------( 186      ( 28 Mar 2023 06:49 )             38.1     Mean Cell Volume: 101.1 fL (03-28-23 @ 06:49)    03-28    149<H>  |  108<H>  |  40<H>  ----------------------------<  85  3.6   |  27  |  1.34<H>    Ca    8.9      28 Mar 2023 06:49  Phos  3.5     03-28  Mg     2.20     03-28        PT/INR - ( 27 Mar 2023 07:16 )   PT: 20.9 sec;   INR: 1.79 ratio         PTT - ( 28 Mar 2023 00:53 )  PTT:61.9 sec                            11.2   8.16  )-----------( 186      ( 28 Mar 2023 06:49 )             38.1                         12.3   8.55  )-----------( 192      ( 27 Mar 2023 07:16 )             42.0                         11.3   9.53  )-----------( 161      ( 26 Mar 2023 08:05 )             38.1                         11.2   8.74  )-----------( 157      ( 26 Mar 2023 06:30 )             37.1                         11.5   9.60  )-----------( 160      ( 26 Mar 2023 00:20 )             38.6       Imaging:    ACC: [id number] EXAM:  CT ANGIO CHEST PULM [facility]   ORDERED BY: JON BELLA     PROCEDURE DATE:  03/19/2023      INTERPRETATION:  CLINICAL INFORMATION: Chest pain. Rule out pulmonary   embolism. History of lung cancer.    COMPARISON: None available    CONTRAST/COMPLICATIONS:  IV Contrast: Omnipaque 350  60 cc administered   40 cc discarded  Oral Contrast: NONE  Complications: None reported at time of study completion    PROCEDURE:  CT Angiography of the Chest.  Sagittal and coronal reformats were performed as well as 3D (MIP)   reconstructions.    FINDINGS:    LUNGS AND AIRWAYS: Patent central airways.  Emphysema. A 2.2 x 2.0 cm   mass in the right upper lobe which contains a metallic biopsy marker. Few   nodular opacities in the left upper lobe measuring up to 1.4 cm (302,   169). Bibasilar groundglass opacities and right lower lobe consolidation.  PLEURA: Moderate bilateral pleural effusions.  MEDIASTINUM AND FREDERICK: No lymphadenopathy.  VESSELS: No pulmonary embolism.  HEART: Cardiomegaly. No pericardial effusion. Status post TAVR,.  CHEST WALL AND LOWER NECK: Left chest wall AICD  VISUALIZED UPPER ABDOMEN: Small volume perihepatic ascites.  BONES: Within normal limits.    IMPRESSION:  No pulmonary embolism.    Area of consolidation in the right lower lobe which may represent   pneumonia.    Multiple nodular opacities in the left upper lung which may be   infectious, inflammatory or neoplastic in etiology.    Right upper lobe mass status post biopsy.    --- End of Report ---    MARCELINO SWARTZ MD; Resident Radiologist  This document has been electronically signed.  ILDA ARRIAGA MD; Attending Radiologist  This document has been electronically signed. Mar 19 2023  7:46PM

## 2023-03-28 NOTE — PROGRESS NOTE ADULT - PROBLEM SELECTOR PLAN 6
Pt stopped by to report her bp yesterday morning by Dr Beyer was 180/80, then by Liliana yesterday when  was seen 160/72, she is taking Diovan//25mg takes 1/2 tablet daily in morning. I did take her bp today 156/82,and SPO2 of 95% room air per pt she does get winded some sob with walking and has gained some weight, did give pt a 2/1/2018 appt with Dr Tang. She wants a call back after noon today when done volunteering at Cardiology today if there are any other information from Dr Tang to pt.   Likely pre-renal HAYLIE, improved  Now holding Lasix   s/p IV fluids   Monitor Cr

## 2023-03-28 NOTE — CONSULT NOTE ADULT - SUBJECTIVE AND OBJECTIVE BOX
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 INR >8. CTA chest negative for PE, suspected RLL and POLLO  PNA, right upper lobe mass s/p biopsy. Procalcitonin >5.  Per daughter, pt has baseline  difficulty with po intake, but reports recent coughing even with sips of water which is unusual. Pt denies cp, sob, no listed h/o cad. Trop 95, 105, ekg paced. Most recent sbp 98, baseline 110 per daughter  (20 Mar 2023 00:20)    Surgery consulted for PEG tube placement. Patient has failed S&S eval and recommendation was made for alternate nutrition. GI and IR evaluated for PEG, but no safe window identified. Per daughter Lashonda, patient has difficulty swallowing at baseline. He had a PEG in 2011 for 1 year (Dr. Chase and Dr. Reyes) while undergoing chemo/rads for laryngeal cancer. Patient is AOx3, lives at home and is independent with ADLs but has become deconditioned while in the hospital.      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 1000 milliLiter(s) (40 mL/Hr) IV Continuous <Continuous>  dextrose 5%. 1000 milliLiter(s) (50 mL/Hr) IV Continuous <Continuous>  dextrose 50% Injectable 25 Gram(s) IV Push once  dextrose 50% Injectable 12.5 Gram(s) IV Push once  dextrose 50% Injectable 25 Gram(s) IV Push once  dextrose 50% Injectable 25 milliLiter(s) IV Push once  dextrose Oral Gel 15 Gram(s) Oral once  glucagon  Injectable 1 milliGRAM(s) IntraMuscular once  heparin  Infusion. 500 Unit(s)/Hr (5 mL/Hr) IV Continuous <Continuous>  influenza  Vaccine (HIGH DOSE) 0.7 milliLiter(s) IntraMuscular once  metoprolol tartrate Injectable 5 milliGRAM(s) IV Push every 6 hours  piperacillin/tazobactam IVPB.. 3.375 Gram(s) IV Intermittent every 8 hours      ALLERGIES:  NKDA  ___________________________________________  REVIEW OF SYSTEMS:  All ROS negative except as per HPI.    ___________________________________________  VITALS:  Vital Signs Last 24 Hrs  T(C): 36.2 (28 Mar 2023 10:49), Max: 36.4 (28 Mar 2023 05:50)  T(F): 97.2 (28 Mar 2023 10:49), Max: 97.6 (28 Mar 2023 05:50)  HR: 104 (28 Mar 2023 12:45) (88 - 125)  BP: 125/68 (28 Mar 2023 12:45) (103/65 - 148/73)  BP(mean): --  RR: 18 (28 Mar 2023 12:45) (16 - 18)  SpO2: 98% (28 Mar 2023 12:45) (98% - 100%)    Parameters below as of 28 Mar 2023 12:45  Patient On (Oxygen Delivery Method): nasal cannula  O2 Flow (L/min): 3      CAPILLARY BLOOD GLUCOSE  POCT Blood Glucose.: 99 mg/dL (28 Mar 2023 12:30)    PHYSICAL EXAM:  General: AAOx3, no acute distress.  Respiratory: breathing comfortably, no increased WOB   Abdomen: soft, nontender, nondistended, no rebound, no guarding  Extremities: Moves all four.     ____________________________________________  LABS:  CBC Full  -  ( 28 Mar 2023 06:49 )  WBC Count : 8.16 K/uL  RBC Count : 3.77 M/uL  Hemoglobin : 11.2 g/dL  Hematocrit : 38.1 %  Platelet Count - Automated : 186 K/uL  Mean Cell Volume : 101.1 fL  Mean Cell Hemoglobin : 29.7 pg  Mean Cell Hemoglobin Concentration : 29.4 gm/dL  Auto Neutrophil # : x  Auto Lymphocyte # : x  Auto Monocyte # : x  Auto Eosinophil # : x  Auto Basophil # : x  Auto Neutrophil % : x  Auto Lymphocyte % : x  Auto Monocyte % : x  Auto Eosinophil % : x  Auto Basophil % : x    03-28    149<H>  |  108<H>  |  40<H>  ----------------------------<  85  3.6   |  27  |  1.34<H>    Ca    8.9      28 Mar 2023 06:49  Phos  3.5     03-28  Mg     2.20     03-28        PT/INR - ( 27 Mar 2023 07:16 )   PT: 20.9 sec;   INR: 1.79 ratio      PTT - ( 28 Mar 2023 00:53 )  PTT:61.9 sec      ____________________________________________  RADIOLOGY & ADDITIONAL STUDIES:  None relevant

## 2023-03-28 NOTE — CONSULT NOTE ADULT - ASSESSMENT
89yM Nepali speaking, HTN, HLD, TIA, severe AS s/p TAVR 4 years ago, Afib on Coumadin, PPM, CHF, laryngeal cancer (10 years ago, s/p chemo/)radiation 12 years ago and lung CA (not currently being tx'd), hx EBV infection with Bell's palsy and residual right-sided facial droop that has been there for 20 years, presents with hemoptysis and multifocal PNA, now resolved. Surgery consulted for PEG placement.    Plan/Recommendations:  - Please obtain medical and cardiac risk stratification for possible PEG, possible lap-assisted PEG tube placement  - Timing of procedure pending medical optimization    D/w Dr. Bora Copeland, PGY3  B Team Surgery   [phone]

## 2023-03-28 NOTE — PROGRESS NOTE ADULT - ASSESSMENT
89yM Portuguese speaking, HTN, HLD, TIA, severe AS s/p TAVR 4 years ago, Afib  on Coumadin, PPM, CHF on entresto and lasix, bph on flomax and finasteride,  remote esophageal CA s/p chemo and radiation 12 years ago and lung CA (not currently being tx'd), 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, bright red blood and increased weakness x2days    #Hemoptysis  Suspect initially 2/2 pna w leukocytosis, elevated procal and RLL consolidation on CT chest.  - hep gtt initiated, pt denies further hemoptysis  - diuresis as tolerated, noted h/o CHF with b/l effusion, Rt>Lt, however hypernatremic currently  --- if remains dyspneic thora may be offered especially if not able to be diuresed, malignant effusion is also possible, no urgent indication  --- repeat pocus with persistent Rt mod pleff and Lt small pleff  - quantify hemoptysis in single specimen cup if recurs  - complete course of abx for pna  - airway clearance with duonebs q6h, acapella if pt can coordinate vs chest PT  - SLP recommendations, NPO with NGT, pursuing PEG

## 2023-03-28 NOTE — PROGRESS NOTE ADULT - SUBJECTIVE AND OBJECTIVE BOX
CHIEF COMPLAINT:Patient is a 89y old  Male who presents with a chief complaint of hemoptysis (27 Mar 2023 17:50)      Interval Events:    REVIEW OF SYSTEMS:  [x] All other systems negative except per HPI   [ ] Unable to assess ROS because ________    OBJECTIVE:  ICU Vital Signs Last 24 Hrs  T(C): 36.4 (28 Mar 2023 05:50), Max: 36.6 (27 Mar 2023 11:35)  T(F): 97.6 (28 Mar 2023 05:50), Max: 97.8 (27 Mar 2023 11:35)  HR: 120 (28 Mar 2023 05:50) (68 - 125)  BP: 112/58 (28 Mar 2023 05:50) (103/65 - 148/73)  BP(mean): --  ABP: --  ABP(mean): --  RR: 16 (28 Mar 2023 05:50) (16 - 18)  SpO2: 99% (28 Mar 2023 05:50) (97% - 100%)    O2 Parameters below as of 28 Mar 2023 05:50  Patient On (Oxygen Delivery Method): nasal cannula                PHYSICAL EXAM:  GENERAL: NAD, well-groomed, well-developed  HEAD:  Atraumatic, Normocephalic  EYES: EOMI, PERRLA, conjunctiva and sclera clear  ENMT: No tonsillar erythema, exudates, or enlargement; Moist mucous membranes, Good dentition, No lesions  NECK: Supple, No JVD, Normal thyroid  CHEST/LUNG: Clear to auscultation bilaterally; No rales, rhonchi, wheezing, or rubs  HEART: Regular rate and rhythm; No murmurs, rubs, or gallops  ABDOMEN: Soft, Nontender, Nondistended; Bowel sounds present  VASCULAR:  2+ Peripheral Pulses, No clubbing, cyanosis, or edema  LYMPH: No lymphadenopathy noted  SKIN: No rashes or lesions  NERVOUS SYSTEM:  Alert & Oriented X3, Good concentration; Motor Strength 5/5 B/L upper and lower extremities; DTRs 2+ intact and symmetric    HOSPITAL MEDICATIONS:  MEDICATIONS  (STANDING):  albuterol/ipratropium for Nebulization 3 milliLiter(s) Nebulizer every 6 hours  dextrose 5%. 1000 milliLiter(s) (40 mL/Hr) IV Continuous <Continuous>  dextrose 5%. 1000 milliLiter(s) (50 mL/Hr) IV Continuous <Continuous>  dextrose 50% Injectable 25 Gram(s) IV Push once  dextrose 50% Injectable 12.5 Gram(s) IV Push once  dextrose 50% Injectable 25 Gram(s) IV Push once  dextrose 50% Injectable 25 milliLiter(s) IV Push once  dextrose Oral Gel 15 Gram(s) Oral once  glucagon  Injectable 1 milliGRAM(s) IntraMuscular once  heparin  Infusion. 500 Unit(s)/Hr (5 mL/Hr) IV Continuous <Continuous>  influenza  Vaccine (HIGH DOSE) 0.7 milliLiter(s) IntraMuscular once  metoprolol tartrate Injectable 5 milliGRAM(s) IV Push every 6 hours    MEDICATIONS  (PRN):  heparin   Injectable 4000 Unit(s) IV Push every 6 hours PRN For aPTT less than 40  heparin   Injectable 2000 Unit(s) IV Push every 6 hours PRN For aPTT between 40 - 57      LABS:    The Labs were reviewed by me   The Radiology was reviewed by me    EKG tracing reviewed by me    03-27    152<H>  |  108<H>  |  36<H>  ----------------------------<  71  3.9   |  30  |  1.50<H>  03-26    151<H>  |  109<H>  |  30<H>  ----------------------------<  67<L>  3.9   |  24  |  1.33<H>    Ca    9.3      27 Mar 2023 07:16  Ca    9.0      26 Mar 2023 06:30  Phos  3.7     03-27  Mg     1.70     03-27      Magnesium, Serum: 1.70 mg/dL (03-27-23 @ 07:16)  Magnesium, Serum: 1.50 mg/dL (03-26-23 @ 06:30)    Phosphorus Level, Serum: 3.7 mg/dL (03-27-23 @ 07:16)  Phosphorus Level, Serum: 3.8 mg/dL (03-26-23 @ 06:30)      PT/INR - ( 27 Mar 2023 07:16 )   PT: 20.9 sec;   INR: 1.79 ratio         PTT - ( 28 Mar 2023 00:53 )  PTT:61.9 sec                                        12.3   8.55  )-----------( 192      ( 27 Mar 2023 07:16 )             42.0                         11.3   9.53  )-----------( 161      ( 26 Mar 2023 08:05 )             38.1                         11.2   8.74  )-----------( 157      ( 26 Mar 2023 06:30 )             37.1     CAPILLARY BLOOD GLUCOSE      POCT Blood Glucose.: 87 mg/dL (28 Mar 2023 06:46)  POCT Blood Glucose.: 70 mg/dL (28 Mar 2023 00:34)  POCT Blood Glucose.: 73 mg/dL (27 Mar 2023 19:06)  POCT Blood Glucose.: 80 mg/dL (27 Mar 2023 12:14)  POCT Blood Glucose.: 147 mg/dL (27 Mar 2023 08:15)  POCT Blood Glucose.: 150 mg/dL (27 Mar 2023 08:01)        MICROBIOLOGY:     RADIOLOGY:  [ ] Reviewed and interpreted by me    Point of Care Ultrasound Findings:    PFT:    EKG: CHIEF COMPLAINT:Patient is a 89y old  Male who presents with a chief complaint of hemoptysis (27 Mar 2023 17:50)      Interval Events:  pending peg placement, no new concerns, dyspneic with exertion.    REVIEW OF SYSTEMS:  [x] All other systems negative except per HPI   [ ] Unable to assess ROS because ________    OBJECTIVE:  ICU Vital Signs Last 24 Hrs  T(C): 36.4 (28 Mar 2023 05:50), Max: 36.6 (27 Mar 2023 11:35)  T(F): 97.6 (28 Mar 2023 05:50), Max: 97.8 (27 Mar 2023 11:35)  HR: 120 (28 Mar 2023 05:50) (68 - 125)  BP: 112/58 (28 Mar 2023 05:50) (103/65 - 148/73)  BP(mean): --  ABP: --  ABP(mean): --  RR: 16 (28 Mar 2023 05:50) (16 - 18)  SpO2: 99% (28 Mar 2023 05:50) (97% - 100%)    O2 Parameters below as of 28 Mar 2023 05:50  Patient On (Oxygen Delivery Method): nasal cannula      PHYSICAL EXAM:  Gen: NAD  HEENT: MMM  Neck: supple  CV: irregular  Lung: decreased bs at bases  Abd: Soft, NT, ND  Ext: WWP, no CCE  Skin: No rash  Neuro: Rt facial droop    HOSPITAL MEDICATIONS:  MEDICATIONS  (STANDING):  albuterol/ipratropium for Nebulization 3 milliLiter(s) Nebulizer every 6 hours  dextrose 5%. 1000 milliLiter(s) (40 mL/Hr) IV Continuous <Continuous>  dextrose 5%. 1000 milliLiter(s) (50 mL/Hr) IV Continuous <Continuous>  dextrose 50% Injectable 25 Gram(s) IV Push once  dextrose 50% Injectable 12.5 Gram(s) IV Push once  dextrose 50% Injectable 25 Gram(s) IV Push once  dextrose 50% Injectable 25 milliLiter(s) IV Push once  dextrose Oral Gel 15 Gram(s) Oral once  glucagon  Injectable 1 milliGRAM(s) IntraMuscular once  heparin  Infusion. 500 Unit(s)/Hr (5 mL/Hr) IV Continuous <Continuous>  influenza  Vaccine (HIGH DOSE) 0.7 milliLiter(s) IntraMuscular once  metoprolol tartrate Injectable 5 milliGRAM(s) IV Push every 6 hours    MEDICATIONS  (PRN):  heparin   Injectable 4000 Unit(s) IV Push every 6 hours PRN For aPTT less than 40  heparin   Injectable 2000 Unit(s) IV Push every 6 hours PRN For aPTT between 40 - 57      LABS:    The Labs were reviewed by me   The Radiology was reviewed by me    EKG tracing reviewed by me    03-27    152<H>  |  108<H>  |  36<H>  ----------------------------<  71  3.9   |  30  |  1.50<H>  03-26    151<H>  |  109<H>  |  30<H>  ----------------------------<  67<L>  3.9   |  24  |  1.33<H>    Ca    9.3      27 Mar 2023 07:16  Ca    9.0      26 Mar 2023 06:30  Phos  3.7     03-27  Mg     1.70     03-27      Magnesium, Serum: 1.70 mg/dL (03-27-23 @ 07:16)  Magnesium, Serum: 1.50 mg/dL (03-26-23 @ 06:30)    Phosphorus Level, Serum: 3.7 mg/dL (03-27-23 @ 07:16)  Phosphorus Level, Serum: 3.8 mg/dL (03-26-23 @ 06:30)      PT/INR - ( 27 Mar 2023 07:16 )   PT: 20.9 sec;   INR: 1.79 ratio         PTT - ( 28 Mar 2023 00:53 )  PTT:61.9 sec                                        12.3   8.55  )-----------( 192      ( 27 Mar 2023 07:16 )             42.0                         11.3   9.53  )-----------( 161      ( 26 Mar 2023 08:05 )             38.1                         11.2   8.74  )-----------( 157      ( 26 Mar 2023 06:30 )             37.1     CAPILLARY BLOOD GLUCOSE      POCT Blood Glucose.: 87 mg/dL (28 Mar 2023 06:46)  POCT Blood Glucose.: 70 mg/dL (28 Mar 2023 00:34)  POCT Blood Glucose.: 73 mg/dL (27 Mar 2023 19:06)  POCT Blood Glucose.: 80 mg/dL (27 Mar 2023 12:14)  POCT Blood Glucose.: 147 mg/dL (27 Mar 2023 08:15)  POCT Blood Glucose.: 150 mg/dL (27 Mar 2023 08:01)        MICROBIOLOGY:     RADIOLOGY:  [ ] Reviewed and interpreted by me    Point of Care Ultrasound Findings:    PFT:    EKG:

## 2023-03-28 NOTE — PROGRESS NOTE ADULT - ATTENDING COMMENTS
89M with AS s/p TAVR, afib on Coumadin, PPM, CHF, remote esophageal CA s/p chemo and radiation 12 years ago and lung CA (currently not on treatment), Bell's palsy p/w hemoptysis in setting of elevated INR and pneumonia.  - POCUS shows persistent bilateral effusions  - No urgent need for thoracentesis at this time but will consider pending clinical course  - Will monitor

## 2023-03-28 NOTE — PROGRESS NOTE ADULT - ASSESSMENT
89yM Frisian speaking, HTN, HLD, TIA, severe AS s/p TAVR 4 years ago, Afib  on Coumadin, PPM, CHF on entresto and lasix, bph on flomax and finasteride,  remote laryngeal l CA s/p chemo and radiation 12 years ago and lung CA (not currently being tx'd), hx EBV infection with Bell's palsy and residual right-sided facial droop that has been there for 20 years, presents with hemoptysis.

## 2023-03-28 NOTE — CONSULT NOTE ADULT - ASSESSMENT
89 yo M with PMH of severe AS s/p TAVR 4 years ago, AFib on coumadin, PPM, CHF on entresto and lasix, HTN, HLD, TIA, BPH, remote esophageal cancer s/p chemo and radiation 12 years ago and lung cancer not currently being treated, remote EBV infection 20 years ago with Bell's palsy and residual right-sided facial droop, presenting with hemoptysis and dysphagia. Pending PEG tube placement after GoC discussion with family. Cardiology consulted for pre-operative risk stratification.    89 yo M with PMH of severe AS s/p TAVR 4 years ago, AFib on coumadin, PPM, CHF on entresto and lasix, HTN, HLD, TIA, BPH, remote esophageal cancer s/p chemo and radiation 12 years ago and lung cancer not currently being treated, remote EBV infection 20 years ago with Bell's palsy and residual right-sided facial droop, presenting with hemoptysis and dysphagia. Pending PEG tube placement after GoC discussion with family. Cardiology consulted for pre-operative risk stratification.     #Pre-operative risk stratification  - EKG with AFib 100-120s, short runs of NSVT and frequent PVCs on tele  - 3/23/23 TTE: mild MR, moderate MS, moderate AR, selevely dilated LA, normal LVSF EF 69%, severe KIERSTEN, decreased RV systolic function, moderate pHTN  - RCRI score 2, 10./1% risk of death, MI, or cardiac arrest  - Early risk 6.7% risk of MI or cardiac arrest  - Pt is optimized from cardiac standpoint to movement forward with PEG tube placement, no contraindication to proceed    #Elevated troponin  Pt currently chest pain free, highly unlikely ACS. Likely iso active medical illness (pneumonia) and poor clearance given HAYLIE  - no need to further trend CE    #Afib with RVR  Currently on IV lopressor q6h, rates 100-120  - rate control currently adequate given active medical problem, continue treatment of PNA    Cardiology will sign-off, please re-consult prn    Case discussed with Dr. Ryan Shay MD  PGY1

## 2023-03-28 NOTE — PROGRESS NOTE ADULT - PROBLEM SELECTOR PLAN 2
Suspect secondary to RUL mass and PNA, appears to have subsided    In setting of supratherapeutic INR (INR 8 on admission)  CTA chest negative for PE, suspected RLL and POLLO  PNA, right upper lobe mass   c/w Zosyn  for suspected asp pna: 3/20 , total of 10-14 days   Held anticoagulation, now started heparin gtt and monitoring for bleeding    Hgb stable   Pulm input appreciated

## 2023-03-28 NOTE — CONSULT NOTE ADULT - SUBJECTIVE AND OBJECTIVE BOX
CC: NGT placement    HPI: 89 y/o M with a history of HTN, HLD, TIA, severe AS s/p TAVR 4 years ago, Afib  on Coumadin, PPM, CHF on entresto and lasix, bph on flomax and finasteride,  remote laryngeal cancer s/p chemo and radiation 12 years ago and lung CA (not currently being tx'd), hx EBV infection with Bell's palsy and residual right-sided facial droop that has been there for 20 years, presents with hemoptysis. ENT called to place NGT. Patient seen and examined at bedside, reports feeling well, no acute respiratory distress or complaints.     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 1000 milliLiter(s) (40 mL/Hr) IV Continuous <Continuous>  dextrose 5%. 1000 milliLiter(s) (50 mL/Hr) IV Continuous <Continuous>  dextrose 50% Injectable 25 Gram(s) IV Push once  dextrose 50% Injectable 12.5 Gram(s) IV Push once  dextrose 50% Injectable 25 Gram(s) IV Push once  dextrose 50% Injectable 25 milliLiter(s) IV Push once  dextrose Oral Gel 15 Gram(s) Oral once  glucagon  Injectable 1 milliGRAM(s) IntraMuscular once  heparin  Infusion. 500 Unit(s)/Hr (5 mL/Hr) IV Continuous <Continuous>  influenza  Vaccine (HIGH DOSE) 0.7 milliLiter(s) IntraMuscular once  metoprolol tartrate Injectable 5 milliGRAM(s) IV Push every 6 hours  piperacillin/tazobactam IVPB.. 3.375 Gram(s) IV Intermittent every 8 hours    MEDICATIONS  (PRN):  heparin   Injectable 4000 Unit(s) IV Push every 6 hours PRN For aPTT less than 40  heparin   Injectable 2000 Unit(s) IV Push every 6 hours PRN For aPTT between 40 - 57      Social History: prior smoker, lives with wife    Family history: No pertinent family history in first degree relatives    ROS:   ENT: all negative except as noted in HPI   CV: denies palpitations  Pulm: denies SOB, cough, hemoptysis  GI: denies change in appetite, indigestion, n/v  : denies pertinent urinary symptoms, urgency  Neuro: denies numbness/tingling, loss of sensation  Psych: denies anxiety  MS: denies muscle weakness, instability  Heme: denies easy bruising or bleeding  Endo: denies heat/cold intolerance, excessive sweating  Vascular: denies LE edema    Vital Signs Last 24 Hrs  T(C): 36.2 (28 Mar 2023 10:49), Max: 36.4 (28 Mar 2023 05:50)  T(F): 97.2 (28 Mar 2023 10:49), Max: 97.6 (28 Mar 2023 05:50)  HR: 104 (28 Mar 2023 12:45) (88 - 125)  BP: 125/68 (28 Mar 2023 12:45) (103/65 - 148/73)  BP(mean): --  RR: 18 (28 Mar 2023 12:45) (16 - 18)  SpO2: 98% (28 Mar 2023 12:45) (97% - 100%)    Parameters below as of 28 Mar 2023 12:45  Patient On (Oxygen Delivery Method): nasal cannula  O2 Flow (L/min): 3                            11.2   8.16  )-----------( 186      ( 28 Mar 2023 06:49 )             38.1    03-28    149<H>  |  108<H>  |  40<H>  ----------------------------<  85  3.6   |  27  |  1.34<H>    Ca    8.9      28 Mar 2023 06:49  Phos  3.5     03-28  Mg     2.20     03-28     PT/INR - ( 27 Mar 2023 07:16 )   PT: 20.9 sec;   INR: 1.79 ratio         PTT - ( 28 Mar 2023 00:53 )  PTT:61.9 sec    PHYSICAL EXAM:  Gen: NAD  Skin: No rashes, bruises, or lesions  Head: Normocephalic, Atraumatic  Face: no edema, erythema, or fluctuance. Parotid glands soft without mass  Eyes: no scleral injection  Ears: Right - ear canal clear, TM intact without effusion or erythema. No evidence of any fluid drainage. No mastoid tenderness, erythema, or ear bulging            Left - ear canal clear, TM intact without effusion or erythema. No evidence of any fluid drainage. No mastoid tenderness, erythema, or ear bulging  Nose: Nares bilaterally patent, no discharge  Mouth: No stridor, no drooling, no trismus.  Mucosa moist, tongue/uvula midline, oropharynx clear  Neck: Flat, supple, no lymphadenopathy, trachea midline, no masses  Lymphatic: No lymphadenopathy  Resp: breathing easily, no stridor  CV: no peripheral edema/cyanosis  GI: nondistended   Peripheral vascular: no JVD or edema  Neuro: residual right-sided facial droop     Procedure: NGT placement  Diagnosis: dysphagia  Verbal consent obtained from patient. Lube applied to small keofeed tube. Keofeed advanced through right nare without resistance. Pt asked to swallow. Tube advanced without resistance, NGT secured at 60cm. Placement confirmed with auscultation of air in stomach. Pending CXR confirmation.

## 2023-03-28 NOTE — CONSULT NOTE ADULT - ASSESSMENT
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 Hemoptysis thought to be 2/2 pulmonary mass vs aspiration PNA.   GI consulted for PEG evaluation.     #PNA, possibly 2/2 aspiration. On supplemental O2  #Severe AS s/p TAVR  #Afib on coumadin at home, now on heparin gtt    Recommendations  - discussed risks and benefits of PEG with daughter Lashonda Glez. Explained that PEG does not take away aspiration risk. Will discuss with father what his wishes are. Will follow up  -      GI will continue to follow.     All recommendations are tentative until note is attested by attending.     Maddy Fong, PGY5  Gastroenterology/Hepatology Fellow  Available on Microsoft Teams  [phone] ([employer] Short Range Pager)  [phone] (Long Range Pager)    After 5pm, please contact the on-call GI fellow. 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 Hemoptysis thought to be 2/2 pulmonary mass vs aspiration PNA.   GI consulted for PEG evaluation.     #PNA, possibly 2/2 aspiration. On supplemental O2  #Severe AS s/p TAVR  #Afib on coumadin at home, now on heparin gtt    Recommendations  - discussed risks and benefits of PEG with daughter Lashonda Glez. Explained that PEG does not take away aspiration risk. Will discuss with father what his wishes are. Will follow up  - if decision made to go ahead with PEG placement, patient would require medical optimization (should be weaned off supplemental O2, IVF to prevent development of hyperNa)    All recommendations are tentative until note is attested by attending.     Maddy Fong, PGY5  Gastroenterology/Hepatology Fellow  Available on Microsoft Teams  [phone] ([employer] Short Range Pager)  [phone] (Long Range Pager)    After 5pm, please contact the on-call GI fellow. [phone]

## 2023-03-28 NOTE — PROGRESS NOTE ADULT - PROBLEM SELECTOR PLAN 10
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 On 6/11/2018, he completed a course of definitive stereotactic body radiotherapy to the left upper lobe using Cyberknife technology  Was started on Keytruda was started every 3 weeks beginning March 2021. The fourth dose was Nasima 15, 2021.  Subsequently, he developed severe GI toxicity and this has been held   As per daughter pt is not on any therapy since  He saw his Oncologist in December 2022 and had a repeat CT   Outpatient d/up w/ oncologist Dr Brenden Hanna- [phone]

## 2023-03-28 NOTE — PROGRESS NOTE ADULT - PROBLEM SELECTOR PLAN 3
Likely aspiration PNA  CT- Area of consolidation in the right lower lobe which may represent pneumonia. Multiple nodular opacities in the left upper lung which may be infectious, inflammatory or neoplastic in etiology.  RVP/COVID- negative. BC- NGTD  c/w Zosyn  for suspected asp pna: 3/20 , total of 10-14 days

## 2023-03-28 NOTE — PROGRESS NOTE ADULT - SUBJECTIVE AND OBJECTIVE BOX
[facility] Division of Hospital Medicine  Nadine Thornton MD  Hospitalist   Pager [phone]  Avaliable via MS teams     SUBJECTIVE / OVERNIGHT EVENTS:  Pt seen and examined. Patient sitting up in his bed.  pt with no acute complaints. Spoke to patients daughter over phone, updated on plan.      ADDITIONAL REVIEW OF SYSTEMS:    MEDICATIONS  (STANDING):  albuterol/ipratropium for Nebulization 3 milliLiter(s) Nebulizer every 6 hours  dextrose 5%. 1000 milliLiter(s) (50 mL/Hr) IV Continuous <Continuous>  dextrose 5%. 1000 milliLiter(s) (40 mL/Hr) IV Continuous <Continuous>  dextrose 50% Injectable 25 Gram(s) IV Push once  dextrose 50% Injectable 12.5 Gram(s) IV Push once  dextrose 50% Injectable 25 Gram(s) IV Push once  dextrose 50% Injectable 25 milliLiter(s) IV Push once  dextrose Oral Gel 15 Gram(s) Oral once  glucagon  Injectable 1 milliGRAM(s) IntraMuscular once  heparin  Infusion. 500 Unit(s)/Hr (5 mL/Hr) IV Continuous <Continuous>  influenza  Vaccine (HIGH DOSE) 0.7 milliLiter(s) IntraMuscular once  metoprolol tartrate Injectable 5 milliGRAM(s) IV Push every 6 hours    MEDICATIONS  (PRN):  heparin   Injectable 4000 Unit(s) IV Push every 6 hours PRN For aPTT less than 40  heparin   Injectable 2000 Unit(s) IV Push every 6 hours PRN For aPTT between 40 - 57      I&O's Summary      PHYSICAL EXAM:  Vital Signs Last 24 Hrs  T(C): 36.2 (28 Mar 2023 10:49), Max: 36.4 (28 Mar 2023 05:50)  T(F): 97.2 (28 Mar 2023 10:49), Max: 97.6 (28 Mar 2023 05:50)  HR: 104 (28 Mar 2023 12:45) (88 - 125)  BP: 125/68 (28 Mar 2023 12:45) (103/65 - 148/73)  BP(mean): --  RR: 18 (28 Mar 2023 12:45) (16 - 18)  SpO2: 98% (28 Mar 2023 12:45) (97% - 100%)    Parameters below as of 28 Mar 2023 12:45  Patient On (Oxygen Delivery Method): nasal cannula  O2 Flow (L/min): 3    CONSTITUTIONAL: NAD, frail, [place]   RESPIRATORY: Normal respiratory effort; decreased breath sounds at bases   CARDIOVASCULAR: Irregular rhythm, normal S1 and S2, no murmur/rub/gallop; No lower extremity edema; Peripheral pulses are 2+ bilaterally  ABDOMEN: Nontender to palpation, normoactive bowel sounds, no rebound/guarding; No hepatosplenomegaly  MUSCLOSKELETAL: no clubbing or cyanosis of digits; no joint swelling or tenderness to palpation  PSYCH: A+O to person, place, and time; affect appropriate  NEURO: Right side facial droop  SKIN: Scaly plaques on shins, ecchymoses on extremities    LABS:                        11.2   8.16  )-----------( 186      ( 28 Mar 2023 06:49 )             38.1     03-28    149<H>  |  108<H>  |  40<H>  ----------------------------<  85  3.6   |  27  |  1.34<H>    Ca    8.9      28 Mar 2023 06:49  Phos  3.5     03-28  Mg     2.20     03-28      PT/INR - ( 27 Mar 2023 07:16 )   PT: 20.9 sec;   INR: 1.79 ratio         PTT - ( 28 Mar 2023 00:53 )  PTT:61.9 sec          SARS-CoV-2: NotDetec (19 Mar 2023 19:31)

## 2023-03-28 NOTE — CONSULT NOTE ADULT - PROBLEM SELECTOR RECOMMENDATION 9
- NGT placed without difficulty  - Chest x-ray ordered to confirmed placement  - ENT sign off at this time  - Primary team updated on the placement of NGT  - Case discussed with Dr. Ca

## 2023-03-28 NOTE — CONSULT NOTE ADULT - ASSESSMENT
89 y/o M with history of dysphagia and malnutrition, ENT consulted for NGT placement. Verbal consent obtained from patient and NGT placed without difficulty via right nare. Patient tolerated the procedure well.

## 2023-03-29 NOTE — PROGRESS NOTE ADULT - ASSESSMENT
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

## 2023-03-29 NOTE — PROGRESS NOTE ADULT - PROBLEM SELECTOR PLAN 4
Likely d/t acute on chronic HFpEF   CXR w/ right pleural effusion increased in size  Bedside US 3/24 w/ trace Lt pleural effusion and moderate Rt pleural effusion  s/p IV Lasix, lasix 20mg by NGT on 3/29 - monitor bmp and if stable resume 20mg qd   As per pulm ;  No urgent need for thoracentesis at this time but will consider pending clinical course  Wean off O2, monitor O2 sats

## 2023-03-29 NOTE — PROGRESS NOTE ADULT - PROBLEM SELECTOR PLAN 9
Afib w/ RVR    Coagulopathy with INR>8 likely 2/2 coumadin in setting of recent anorexia. s/p Vit k given  INR trended down (Goal INR- 2.5-3)  Started heparin gtt as above   resume home metoprolol and digoxin via feeding tube

## 2023-03-29 NOTE — CHART NOTE - NSCHARTNOTEFT_GEN_A_CORE
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     NGT placed by ENT 3/28 (yesterday). Pt seen receiving Jevity 1.5 @ 20 mL/hr. Per RN, No reported GI issues (nausea/vomiting/diarrhea/constipation.) Pt had been NPO x8 days this admission. Surgery was consulted for PEG placement.          Diet, NPO with Tube Feed:   Tube Feeding Modality: Nasogastric  Jevity 1.5 Guillermo (JEVITY1.5RTH)  Total Volume for 24 Hours (mL): 480  Continuous  Starting Tube Feed Rate {mL per Hour}: 20  Until Goal Tube Feed Rate (mL per Hour): 20  Tube Feed Duration (in Hours): 24  Tube Feed Start Time: 16:00 (03-28-23 @ 15:45)      GI: WDL. Last BM 3/25      Enteral /Parenteral Nutrition: 480 mL, 720 guillermo, 31 gm pro     Anthropometrics:   Height (cm): 165.1 (03-22)  Weight (kg): 54.6 (03-21). No new weights   BMI (kg/m2): 20 (03-22)    Edema: none  Pressure Injuries: none    __________________ Pertinent Medications__________________   MEDICATIONS  (STANDING):  albuterol/ipratropium for Nebulization 3 milliLiter(s) Nebulizer every 6 hours  dextrose 5%. 1000 milliLiter(s) (40 mL/Hr) IV Continuous <Continuous>  dextrose 5%. 1000 milliLiter(s) (50 mL/Hr) IV Continuous <Continuous>  dextrose 50% Injectable 25 milliLiter(s) IV Push once  dextrose 50% Injectable 25 Gram(s) IV Push once  dextrose 50% Injectable 12.5 Gram(s) IV Push once  dextrose 50% Injectable 25 Gram(s) IV Push once  dextrose Oral Gel 15 Gram(s) Oral once  glucagon  Injectable 1 milliGRAM(s) IntraMuscular once  heparin  Infusion. 500 Unit(s)/Hr (5 mL/Hr) IV Continuous <Continuous>  influenza  Vaccine (HIGH DOSE) 0.7 milliLiter(s) IntraMuscular once  metoprolol tartrate Injectable 5 milliGRAM(s) IV Push every 6 hours  piperacillin/tazobactam IVPB.. 3.375 Gram(s) IV Intermittent every 8 hours    MEDICATIONS  (PRN):  acetaminophen     Tablet .. 650 milliGRAM(s) Oral every 6 hours PRN Mild Pain (1 - 3), Moderate Pain (4 - 6)  heparin   Injectable 4000 Unit(s) IV Push every 6 hours PRN For aPTT less than 40  heparin   Injectable 2000 Unit(s) IV Push every 6 hours PRN For aPTT between 40 - 57      __________________ Pertinent Labs__________________   03-29 Na149 mmol/L<H> Glu 137 mg/dL<H> K+ 3.3 mmol/L<L> Cr  1.27 mg/dL BUN 36 mg/dL<H> 03-29 Phos 3.2 mg/dL        POCT Blood Glucose.: 129 mg/dL (03-29-23 @ 06:42)  POCT Blood Glucose.: 115 mg/dL (03-29-23 @ 00:51)  POCT Blood Glucose.: 106 mg/dL (03-28-23 @ 17:54)  POCT Blood Glucose.: 99 mg/dL (03-28-23 @ 12:30)  POCT Blood Glucose.: 87 mg/dL (03-28-23 @ 06:46)  POCT Blood Glucose.: 70 mg/dL (03-28-23 @ 00:34)  POCT Blood Glucose.: 73 mg/dL (03-27-23 @ 19:06)  POCT Blood Glucose.: 80 mg/dL (03-27-23 @ 12:14)  POCT Blood Glucose.: 147 mg/dL (03-27-23 @ 08:15)  POCT Blood Glucose.: 150 mg/dL (03-27-23 @ 08:01)  POCT Blood Glucose.: 61 mg/dL (03-27-23 @ 07:22)  POCT Blood Glucose.: 58 mg/dL (03-27-23 @ 07:08)  POCT Blood Glucose.: 80 mg/dL (03-27-23 @ 02:14)  POCT Blood Glucose.: 96 mg/dL (03-26-23 @ 17:59)  POCT Blood Glucose.: 67 mg/dL (03-26-23 @ 12:19)        Estimated Needs:   [x ] no change since previous assessment  [ ] recalculated:       Previous Nutrition Diagnosis: severe protein calorie malnutrition     Nutrition Diagnosis is [X ] ongoing        Recommendations:  1. Increase rate of Jevity 1.5 by 25 mL q6 hrs to goal of 45 mL/hr x 24 hrs (1080 mL, 1620 guillermo, 89 gm pro).   2. Free water per MD.   3. Updated weight.               Monitoring and Evaluation:      [ x] Tolerance to diet prescription [x ] weights [x ] follow up per protocol  [ ] other:

## 2023-03-29 NOTE — PROGRESS NOTE ADULT - SUBJECTIVE AND OBJECTIVE BOX
[facility] Division of Hospital Medicine  Nadine Thornton MD  Hospitalist   Pager [phone]  Avaliable via MS teams     SUBJECTIVE / OVERNIGHT EVENTS:  Pt seen and examined. Patient sitting up in his bed.  pt with no acute complaints. Pt had feeding tube placed on 3/28 with feeds resumed.     ADDITIONAL REVIEW OF SYSTEMS:    MEDICATIONS  (STANDING):  albuterol/ipratropium for Nebulization 3 milliLiter(s) Nebulizer every 6 hours  dextrose 5%. 1000 milliLiter(s) (50 mL/Hr) IV Continuous <Continuous>  dextrose 5%. 1000 milliLiter(s) (40 mL/Hr) IV Continuous <Continuous>  dextrose 50% Injectable 25 Gram(s) IV Push once  dextrose 50% Injectable 12.5 Gram(s) IV Push once  dextrose 50% Injectable 25 Gram(s) IV Push once  dextrose 50% Injectable 25 milliLiter(s) IV Push once  dextrose Oral Gel 15 Gram(s) Oral once  glucagon  Injectable 1 milliGRAM(s) IntraMuscular once  heparin  Infusion. 500 Unit(s)/Hr (5 mL/Hr) IV Continuous <Continuous>  influenza  Vaccine (HIGH DOSE) 0.7 milliLiter(s) IntraMuscular once  metoprolol tartrate Injectable 5 milliGRAM(s) IV Push every 6 hours    MEDICATIONS  (PRN):  heparin   Injectable 4000 Unit(s) IV Push every 6 hours PRN For aPTT less than 40  heparin   Injectable 2000 Unit(s) IV Push every 6 hours PRN For aPTT between 40 - 57      I&O's Summary      PHYSICAL EXAM:  Vital Signs Last 24 Hrs  T(C): 36.8 (29 Mar 2023 11:01), Max: 36.8 (29 Mar 2023 11:01)  T(F): 98.3 (29 Mar 2023 11:01), Max: 98.3 (29 Mar 2023 11:01)  HR: 90 (29 Mar 2023 11:01) (82 - 113)  BP: 130/69 (29 Mar 2023 11:01) (130/52 - 134/64)  BP(mean): --  RR: 18 (29 Mar 2023 11:01) (16 - 18)  SpO2: 99% (29 Mar 2023 11:01) (93% - 100%)    Parameters below as of 28 Mar 2023 22:20  Patient On (Oxygen Delivery Method): nasal cannula      CONSTITUTIONAL: NAD, frail, [place]   RESPIRATORY: Normal respiratory effort; decreased breath sounds at bases   CARDIOVASCULAR: Irregular rhythm, normal S1 and S2, no murmur/rub/gallop; No lower extremity edema; Peripheral pulses are 2+ bilaterally  ABDOMEN: Nontender to palpation, normoactive bowel sounds, no rebound/guarding; No hepatosplenomegaly  MUSCLOSKELETAL: no clubbing or cyanosis of digits; no joint swelling or tenderness to palpation  PSYCH: A+O to person, place, and time; affect appropriate  NEURO: Right side facial droop  SKIN: Scaly plaques on shins, ecchymoses on extremities    LABS:                        11.2   8.16  )-----------( 186      ( 28 Mar 2023 06:49 )             38.1     03-28    149<H>  |  108<H>  |  40<H>  ----------------------------<  85  3.6   |  27  |  1.34<H>    Ca    8.9      28 Mar 2023 06:49  Phos  3.5     03-28  Mg     2.20     03-28      PT/INR - ( 27 Mar 2023 07:16 )   PT: 20.9 sec;   INR: 1.79 ratio         PTT - ( 28 Mar 2023 00:53 )  PTT:61.9 sec          SARS-CoV-2: Lanie (19 Mar 2023 19:31)

## 2023-03-29 NOTE — PROGRESS NOTE ADULT - PROBLEM SELECTOR PLAN 1
CT head-No acute intracranial hemorrhage, mass effect, or shift of the midline structures.  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, his swallow test was always abnormal  but pt has learned to adjust his diet and take his time eating, but now has been getting worse   Seen by speech and swallow, s/p cinesophagram and recommend NPO   Spoke w/ daughters and wish to proceed w/ PEG  Spoke w/ GI and recommend IR consult for PEG placement  IR reviewed recent CTA chest w/ coverage of the upper abdomen, which showed low lying left hepatic lobe and interposed transverse colon with no percutaneous window for tube placement  NGT placement attempted multiple times and unsuccessful, likely d/t scar tissues from previous laryngeal CA and RT  As per GI, call back for possible endoscopic PEG placement once patient's respiratory status improves  GI Emailed on 3/27/23- appreciate recs regarding PEG placement  NGT placed on 3/28 by ENT , feeds started   Surgery consulted on 3/28 as GI unable to perform PEG

## 2023-03-29 NOTE — PROGRESS NOTE ADULT - PROBLEM SELECTOR PLAN 10
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 On 6/11/2018, he completed a course of definitive stereotactic body radiotherapy to the left upper lobe using Cyberknife technology  Was started on Keytruda was started every 3 weeks beginning March 2021. The fourth dose was Nasima 15, 2021.  Subsequently, he developed severe GI toxicity and this has been held   As per daughter pt is not on any therapy since  He saw his Oncologist in December 2022 and had a repeat CT   Outpatient d/up w/ oncologist Dr Brenden Hanna- [phone]

## 2023-03-29 NOTE — PROGRESS NOTE ADULT - ASSESSMENT
89yM Divehi speaking, HTN, HLD, TIA, severe AS s/p TAVR 4 years ago, Afib on Coumadin, PPM, CHF, laryngeal cancer (10 years ago, s/p chemo/)radiation 12 years ago and lung CA (not currently being tx'd), hx EBV infection with Bell's palsy and residual right-sided facial droop that has been there for 20 years, presents with hemoptysis and multifocal PNA, now resolved. Surgery consulted for enteral feeding access. IR/GI also consulted for feeding access. IR deemed there to be no safe window for percutaneous placement. However, per GI documentation, GI believes there is a safe window for PEG placement based on most recent CT A/P. Extensive discussion had between surgical team and patient's daughter yesterday, who stated feeding access would be within patient and family GOC. Per GI documentation, they seem to have had similar discussion, and are waiting to hear back from daughter about final decision from patient.     Plan/Recommendations:  - Recommend GI PEG attempt if patient a)amenable to proceeding with procedure understanding risks/benefits and b)is optimized from cardiac/medical perspective   - Surgery to sign off, please reach out with questions/concerns   - Care per primary team    B Team, [id number]

## 2023-03-29 NOTE — PROGRESS NOTE ADULT - PROBLEM SELECTOR PLAN 3
Likely aspiration PNA  CT- Area of consolidation in the right lower lobe which may represent pneumonia. Multiple nodular opacities in the left upper lung which may be infectious, inflammatory or neoplastic in etiology.  RVP/COVID- negative. BC- NGTD  c/w Zosyn  for suspected asp pna: 3/20 , total of 10-14 days  Encourage acapella , chest PT , duonebs q6H

## 2023-03-29 NOTE — PROGRESS NOTE ADULT - SUBJECTIVE AND OBJECTIVE BOX
24h Events:  No acute events overnight.    Subjective:   Patient seen at bedside this AM. Without complaints this AM. Tolerating TF. Denies pain, fevers, chills, CP, SOB, n/v.     Objective:  Vital Signs  T(C): 36.4 (03-28 @ 22:10), Max: 36.4 (03-28 @ 22:10)  HR: 91 (03-29 @ 03:50) (82 - 113)  BP: 130/52 (03-28 @ 22:10) (125/68 - 134/64)  RR: 16 (03-28 @ 22:10) (16 - 18)  SpO2: 93% (03-29 @ 03:50) (93% - 100%)      Physical Exam:  GEN: Frail appearing elderly male in NAD  RESP: NC 3L, ronchorous respiration  HEENT: GOMEZ feeding tube in nares   ABD: soft, non distended, non tender  EXTR: WWP  NEURO: A/Ox4    Labs:                        11.0   8.33  )-----------( 185      ( 29 Mar 2023 07:01 )             36.3   03-29    149<H>  |  107  |  36<H>  ----------------------------<  137<H>  3.3<L>   |  30  |  1.27    Ca    8.9      29 Mar 2023 07:01  Phos  3.2     03-29  Mg     2.20     03-29      CAPILLARY BLOOD GLUCOSE      POCT Blood Glucose.: 129 mg/dL (29 Mar 2023 06:42)  POCT Blood Glucose.: 115 mg/dL (29 Mar 2023 00:51)  POCT Blood Glucose.: 106 mg/dL (28 Mar 2023 17:54)  POCT Blood Glucose.: 99 mg/dL (28 Mar 2023 12:30)      Imaging:

## 2023-03-30 NOTE — PROGRESS NOTE ADULT - SUBJECTIVE AND OBJECTIVE BOX
[facility] Division of Hospital Medicine  Nadine Thornton MD  Hospitalist   Pager [phone]  Avaliable via MS teams     SUBJECTIVE / OVERNIGHT EVENTS: Pt seen and examined. patient with no acute complaints. Spoke to patient's daughter Lashonda over phone, as per daughter patient has failed multiple swallow evaluations in the past but has ate for past 12 years despite his ca dx , however prior to this hospitalization is when he had the problem, daughter willing to speak to palliative team to help make decision regarding PEG tube vs pleasure feeds.     ADDITIONAL REVIEW OF SYSTEMS:    MEDICATIONS  (STANDING):  albuterol/ipratropium for Nebulization 3 milliLiter(s) Nebulizer every 6 hours  dextrose 5%. 1000 milliLiter(s) (40 mL/Hr) IV Continuous <Continuous>  dextrose 5%. 1000 milliLiter(s) (50 mL/Hr) IV Continuous <Continuous>  dextrose 50% Injectable 25 milliLiter(s) IV Push once  dextrose 50% Injectable 25 Gram(s) IV Push once  dextrose 50% Injectable 12.5 Gram(s) IV Push once  dextrose 50% Injectable 25 Gram(s) IV Push once  dextrose Oral Gel 15 Gram(s) Oral once  glucagon  Injectable 1 milliGRAM(s) IntraMuscular once  heparin  Infusion. 500 Unit(s)/Hr (5 mL/Hr) IV Continuous <Continuous>  influenza  Vaccine (HIGH DOSE) 0.7 milliLiter(s) IntraMuscular once  metoprolol tartrate Injectable 5 milliGRAM(s) IV Push every 6 hours  piperacillin/tazobactam IVPB.. 3.375 Gram(s) IV Intermittent every 8 hours  sodium chloride 3%  Inhalation 4 milliLiter(s) Inhalation every 12 hours    MEDICATIONS  (PRN):  acetaminophen     Tablet .. 650 milliGRAM(s) Oral every 6 hours PRN Mild Pain (1 - 3), Moderate Pain (4 - 6)  heparin   Injectable 4000 Unit(s) IV Push every 6 hours PRN For aPTT less than 40  heparin   Injectable 2000 Unit(s) IV Push every 6 hours PRN For aPTT between 40 - 57      I&O's Summary    29 Mar 2023 07:01  -  30 Mar 2023 07:00  --------------------------------------------------------  IN: 754 mL / OUT: 600 mL / NET: 154 mL        PHYSICAL EXAM:  Vital Signs Last 24 Hrs  T(C): 36.2 (30 Mar 2023 06:00), Max: 36.4 (29 Mar 2023 23:30)  T(F): 97.2 (30 Mar 2023 06:00), Max: 97.5 (29 Mar 2023 23:30)  HR: 81 (30 Mar 2023 09:17) (64 - 829)  BP: 120/59 (30 Mar 2023 06:00) (120/59 - 134/62)  BP(mean): --  RR: 17 (30 Mar 2023 06:00) (17 - 17)  SpO2: 96% (30 Mar 2023 09:17) (92% - 100%)    Parameters below as of 30 Mar 2023 09:17  Patient On (Oxygen Delivery Method): nasal cannula      CONSTITUTIONAL: NAD, frail, [place]   RESPIRATORY: Normal respiratory effort; decreased breath sounds at bases   CARDIOVASCULAR: Irregular rhythm, normal S1 and S2, no murmur/rub/gallop; No lower extremity edema; Peripheral pulses are 2+ bilaterally  ABDOMEN: Nontender to palpation, normoactive bowel sounds, no rebound/guarding; No hepatosplenomegaly  MUSCLOSKELETAL: no clubbing or cyanosis of digits; no joint swelling or tenderness to palpation  PSYCH: A+O to person, place, and time; affect appropriate  NEURO: Right side facial droop  SKIN: Scaly plaques on shins, ecchymoses on extremities    LABS:                        11.3   10.41 )-----------( 182      ( 30 Mar 2023 06:00 )             38.4     03-30    152<H>  |  108<H>  |  31<H>  ----------------------------<  89  4.1   |  30  |  1.33<H>    Ca    9.1      30 Mar 2023 06:00  Phos  2.8     03-30  Mg     2.10     03-30      PTT - ( 30 Mar 2023 06:00 )  PTT:56.0 sec          COVID-19 PCR: NotDetec (29 Mar 2023 19:32)  SARS-CoV-2: NotDetec (19 Mar 2023 19:31)

## 2023-03-30 NOTE — PROGRESS NOTE ADULT - ASSESSMENT
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

## 2023-03-30 NOTE — PROGRESS NOTE ADULT - PROBLEM SELECTOR PLAN 10
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 On 6/11/2018, he completed a course of definitive stereotactic body radiotherapy to the left upper lobe using Cyberknife technology  Was started on Keytruda was started every 3 weeks beginning March 2021. The fourth dose was Nasima 15, 2021.  Subsequently, he developed severe GI toxicity and this has been held   As per daughter pt is not on any therapy since  He saw his Oncologist in December 2022 and had a repeat CT   Outpatient d/up w/ oncologist Dr Brenden Hanna- [phone]

## 2023-03-30 NOTE — PROGRESS NOTE ADULT - PROBLEM SELECTOR PLAN 4
Likely d/t acute on chronic HFpEF   CXR w/ right pleural effusion increased in size  Bedside US 3/24 w/ trace Lt pleural effusion and moderate Rt pleural effusion  s/p IV Lasix, lasix 20mg by NGT on 3/29 - will hold off on further lasix as Na went up   As per pulm ;  No urgent need for thoracentesis at this time but will consider pending clinical course  Wean off O2, monitor O2 sats

## 2023-03-30 NOTE — PROGRESS NOTE ADULT - PROBLEM SELECTOR PLAN 1
CT head-No acute intracranial hemorrhage, mass effect, or shift of the midline structures.  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, his swallow test was always abnormal  but pt has learned to adjust his diet and take his time eating, but now has been getting worse   Seen by speech and swallow, s/p cinesophagram and recommend NPO   Spoke w/ daughters and wish to proceed w/ PEG  Spoke w/ GI and recommend IR consult for PEG placement  IR reviewed recent CTA chest w/ coverage of the upper abdomen, which showed low lying left hepatic lobe and interposed transverse colon with no percutaneous window for tube placement  NGT placement attempted multiple times and unsuccessful, likely d/t scar tissues from previous laryngeal CA and RT  As per GI, call back for possible endoscopic PEG placement once patient's respiratory status improves  GI Emailed on 3/27/23- appreciate recs regarding PEG placement  NGT placed on 3/28 by ENT , feeds started   Daughter undecided if she wants pt to have pleasure feeds vs PEG placement- palliative team consulted to help with decision- Swallow eval reconsulted

## 2023-03-31 NOTE — CONSULT NOTE ADULT - PROBLEM SELECTOR RECOMMENDATION 3
- Seen by speech and swallow team, severe oral dysphagia for puree/mild thickened, NPO started on 3/22  - NGT placed on 3/28 by ENT  - Family wants to pursue PEG placement for continued nutrition  - IR, GI, gen surg consulted for placement  - Discussed with family regarding pleasure feeds/careful hand feeding vs PEG tube  - Family opting for PEG placement  - Aspiration precautions  - PPI ppx - Seen by speech and swallow team, severe oral dysphagia for puree/mild thickened, NPO started on 3/22  - XRAY Cineesophagogram - Aspiration was observed with one or more of the tested consistencies  - NGT placed on 3/28 by ENT  - Family wants to pursue PEG placement for continued nutrition  - IR, GI, gen surg consulted for PEG placement  - Discussed with family regarding pleasure feeds/careful hand feeding vs PEG tube  - Family opting for PEG placement  - Aspiration precautions  - PPI ppx

## 2023-03-31 NOTE — CONSULT NOTE ADULT - PROBLEM SELECTOR RECOMMENDATION 8
- Discussed with daughter that while her goal is to get PEG tube placement for her father, there are inherent risks she needs to consider such as no change or increase in aspiration, increased risk of bleeding, no change or increase in dysphagia, new or worsened pain, intra-operative complications as detailed by surgical teams, cardiac arrest, infection, and so on.  - Discussed other options for feeding such as supervised assisted hand feeding with water intake following swallowing  - Goal: to have PEG tube placed and return home  - See goals of care note for more details

## 2023-03-31 NOTE — PROGRESS NOTE ADULT - PROBLEM SELECTOR PLAN 1
CT head-No acute intracranial hemorrhage, mass effect, or shift of the midline structures.  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, his swallow test was always abnormal  but pt has learned to adjust his diet and take his time eating, but now has been getting worse   Seen by speech and swallow, s/p cinesophagram and recommend NPO   Spoke w/ daughters and wish to proceed w/ PEG  Spoke w/ GI and recommend IR consult for PEG placement  IR reviewed recent CTA chest w/ coverage of the upper abdomen, which showed low lying left hepatic lobe and interposed transverse colon with no percutaneous window for tube placement  NGT placement attempted multiple times and unsuccessful, likely d/t scar tissues from previous laryngeal CA and RT  As per GI, call back for possible endoscopic PEG placement once patient's respiratory status improves  GI Emailed on 3/27/23- appreciate recs regarding PEG placement  NGT placed on 3/28 by ENT , feeds started   Daugther decided she wants PEG placed- GI team informed- plan for PEG on Monday / Tuesday depending on avaliability

## 2023-03-31 NOTE — CONSULT NOTE ADULT - PROBLEM SELECTOR RECOMMENDATION 9
- Initial complaint of hemoptysis, resolved  - Coumadin held for INR to normalize  - On heparin at this time  - Remainder of management as per primary team - Discussed role of palliative care team  - Discussed risks vs benefits of PEG tube placement  - Reviewed alternatives  - Introduced hospice and its role of care as an extra layer of support and that her father would qualify given his advanced cancer status, daughter will consider hospice at a later time  - Reviewed MOLST form with patient and daughter, no changes in directives  - Code status: full code  - Goal: to have PEG tube placed and return home

## 2023-03-31 NOTE — CONSULT NOTE ADULT - SUBJECTIVE AND OBJECTIVE BOX
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 INR >8. CTA chest negative for PE, suspected RLL and POLLO  PNA, right upper lobe mass s/p biopsy. Procalcitonin >5.  Per daughter, pt has baseline  difficulty with po intake, but reports recent coughing even with sips of water which is unusual. Pt denies cp, sob, no listed h/o cad. Trop 95, 105, ekg paced. Most recent sbp 98, baseline 110 per daughter.    Encounter:  Patient seen and evaluated with daughter, Margarita, at bedside this AM. Patient with NGT and nasal cannula in place. States that he feels discomfort from the NGT in his throat.    PERTINENT PM/SXH:   Cardiac Dysrhythmia    Dyslipidemia    Hypertension    BPH (Benign Prostatic Hyperplasia)    Hx TIA  x 8yrs    A-fib    Larynx neoplasm      h/o cardioversion  of Atrial Fibrillation    excision of Basal Cell Carcinoma of Nose    S/P TAVR (transcatheter aortic valve replacement)      FAMILY HISTORY:  Family history of hypertension      Family Hx substance abuse [ ]yes [x ]no  ITEMS NOT CHECKED ARE NOT PRESENT    SOCIAL HISTORY:   Significant other/partner[ ]  Children[x ], Margarita/daughter ([phone])  Hinduism/Spirituality:  Substance hx:  [ ]   Tobacco hx:  [ ]   Alcohol hx: [ ]   Home Opioid hx:  [ ] I-Stop Reference No:  Living Situation: [x]Home  [ ]Long term care  [ ]Rehab [ ]Other    ADVANCE DIRECTIVES:    DNR/MOLST  [ x]  Living Will  [ ]   DECISION MAKER(s):  [ ] Health Care Proxy(s)  [ x] Surrogate(s)  [ ] Guardian           Name(s): Margarita Aquino, Phone Number(s): [phone]    BASELINE (I)ADL(s) (prior to admission):  [place]: [x ]Total  [ ] Moderate [ ]Dependent    Allergies    No Known Allergies    Intolerances    MEDICATIONS  (STANDING):  albuterol/ipratropium for Nebulization 3 milliLiter(s) Nebulizer every 6 hours  dextrose 50% Injectable 25 milliLiter(s) IV Push once  dextrose 50% Injectable 25 Gram(s) IV Push once  dextrose 50% Injectable 12.5 Gram(s) IV Push once  dextrose 50% Injectable 25 Gram(s) IV Push once  dextrose Oral Gel 15 Gram(s) Oral once  digoxin     Tablet 125 MICROGram(s) Oral <User Schedule>  glucagon  Injectable 1 milliGRAM(s) IntraMuscular once  influenza  Vaccine (HIGH DOSE) 0.7 milliLiter(s) IntraMuscular once  metoprolol tartrate 100 milliGRAM(s) Enteral Tube two times a day  piperacillin/tazobactam IVPB.. 3.375 Gram(s) IV Intermittent every 8 hours  sodium chloride 3%  Inhalation 4 milliLiter(s) Inhalation every 12 hours  tamsulosin 0.4 milliGRAM(s) Oral at bedtime    MEDICATIONS  (PRN):  acetaminophen     Tablet .. 650 milliGRAM(s) Oral every 6 hours PRN Mild Pain (1 - 3), Moderate Pain (4 - 6)    PRESENT SYMPTOMS: [ ]Unable to self-report  [ ] CPOT [ ] PAINADs [ ] RDOS  Source if other than patient:  [ x]Family   [ ]Team     Pain: [ ]yes [ x]no  QOL impact -   Location -                    Aggravating factors -  Quality -  Radiation -  Timing-  Severity (0-10 scale):  Minimal acceptable level (0-10 scale):     CPOT:    https://www.[facility].org/getattachment/[email or web address]/Critical-Care-Pain-Observation-Tool-(CPOT)    PAIN AD Score:   http://geriatrictoolkit.[place].edu/cog/painad.pdf (press ctrl +  left click to view)    Dyspnea:                           [x ]Mild [ ]Moderate [ ]Severe      RDOS:  0 to 2  minimal or no respiratory distress   3  mild distress  4 to 6 moderate distress  >7 severe distress  https://[email or web address].net/handouts/hen/Respiratory_Distress_Observation_Scale.pdf (Ctrl +  left click to view)     Anxiety:                             [ ]Mild [ ]Moderate [ ]Severe  Fatigue:                             [ ]Mild [ ]Moderate [ ]Severe  Nausea:                             [ ]Mild [ ]Moderate [ ]Severe  Loss of appetite:              [ ]Mild [ ]Moderate [ ]Severe  Constipation:                    [ ]Mild [ ]Moderate [ ]Severe    PCSSQ[Palliative Care Spiritual Screening Question]   Severity (0-10):  Score of 4 or > indicate consideration of Chaplaincy referral.  Chaplaincy Referral: [ ] yes [ ] refused [ ] following [ ] Deferred     Caregiver [place]? : [ ] yes [ ] no [ ] Deferred [ ] Declined             Social work referral [ ] Patient & Family Centered Care Referral [ ]     Anticipatory Grief present?:  [ ] yes [ ] no  [ ] Deferred                  Social work referral [ ] Chaplaincy Referral[ ]      Other Symptoms:  [x ]All other review of systems negative     Palliative Performance Status Version 2:   60      %    http://[facility].org/files/news/palliative_performance_scale_ppsv2.pdf  PHYSICAL EXAM:  Vital Signs Last 24 Hrs  T(C): 36.3 (31 Mar 2023 12:00), Max: 36.7 (30 Mar 2023 13:36)  T(F): 97.4 (31 Mar 2023 12:00), Max: 98.1 (30 Mar 2023 13:36)  HR: 90 (31 Mar 2023 12:00) (80 - 109)  BP: 137/72 (31 Mar 2023 12:00) (104/47 - 137/72)  BP(mean): --  RR: 18 (31 Mar 2023 12:00) (16 - 19)  SpO2: 98% (31 Mar 2023 12:00) (86% - 100%)    Parameters below as of 31 Mar 2023 12:00  Patient On (Oxygen Delivery Method): nasal cannula  O2 Flow (L/min): 3   I&O's Summary    30 Mar 2023 07:01  -  31 Mar 2023 07:00  --------------------------------------------------------  IN: 850 mL / OUT: 750 mL / NET: 100 mL      GENERAL: [ ]Cachexia    [x ]Alert  [x ]Oriented x 3  [ ]Lethargic  [ ]Unarousable  [x ]Verbal  [ ]Non-Verbal  Behavioral:   [ ] Anxiety  [ ] Delirium [ ] Agitation [ ] Other  HEENT:  [x ]Normal   [ ]Dry mouth   [ x]ET Tube/Trach/NGT  [ ]Oral lesions  PULMONARY:   [ x]Clear [ ]Tachypnea  [ ]Audible excessive secretions   [ ]Rhonchi        [ ]Right [ ]Left [ ]Bilateral  [ ]Crackles        [ ]Right [ ]Left [ ]Bilateral  [ ]Wheezing     [ ]Right [ ]Left [ ]Bilateral  [ ]Diminished breath sounds [ ]right [ ]left [ ]bilateral  CARDIOVASCULAR:    [ x]Regular [ ]Irregular [ ]Tachy  [ ]Bruno [ ]Murmur [ ]Other  GASTROINTESTINAL:  [ x]Soft  [ ]Distended   [ x]+BS  [ x]Non tender [ ]Tender  [ ]Other [ ]PEG [x ]OGT/ NGT  Last BM:  GENITOURINARY:  [ ]Normal [ ] Incontinent   [ ]Oliguria/Anuria   [ ]Chang  MUSCULOSKELETAL:   [ x]Normal   [ ]Weakness  [ ]Bed/Wheelchair bound [ ]Edema  NEUROLOGIC:   [x ]No focal deficits  [ ]Cognitive impairment  [ ]Dysphagia [ ]Dysarthria [ ]Paresis [ ]Other   SKIN:   [ ]Normal  [ ]Rash  [ ]Other  [ ]Pressure ulcer(s)       Present on admission [ ]y [ ]n    CRITICAL CARE:  [ ] Shock Present  [ ]Septic [ ]Cardiogenic [ ]Neurologic [ ]Hypovolemic  [ ]  Vasopressors [ ]  Inotropes   [ ]Respiratory failure present [ ]Mechanical ventilation [ ]Non-invasive ventilatory support [ ]High flow    [ ]Acute  [ ]Chronic [ ]Hypoxic  [ ]Hypercarbic [ ]Other  [ ]Other organ failure     LABS:                        11.3   8.59  )-----------( 149      ( 31 Mar 2023 09:23 )             38.2   03-31    146<H>  |  102  |  28<H>  ----------------------------<  341<H>  3.8   |  30  |  1.12    Ca    8.5      31 Mar 2023 06:08  Phos  2.6     03-31  Mg     1.90     03-31    PTT - ( 31 Mar 2023 09:23 )  PTT:72.1 sec      RADIOLOGY & ADDITIONAL STUDIES:    PROTEIN CALORIE MALNUTRITION PRESENT: [ ]mild [ ]moderate [ ]severe [ ]underweight [ ]morbid obesity  https://www.andeal.org/vault/2440/web/files/ONC/Table_Clinical%20Characteristics%20to%20Document%20Malnutrition-White%20JV%20et%20al%202012.pdf    Height (cm): 165.1 (03-22-23 @ 20:18)  Weight (kg): 54.6 (03-21-23 @ 15:31)  BMI (kg/m2): 20 (03-22-23 @ 20:18)    [ ]PPSV2 < or = to 30% [ ]significant weight loss  [ ]poor nutritional intake  [ ]anasarca[ ]Artificial Nutrition      Other REFERRALS:  [ ]Hospice  [ ]Child Life  [ ]Social Work  [ ]Case management [ ]Holistic Therapy  : [id number]    HPI:  89 y/o M Persian speaking, HTN, HLD, TIA, severe AS s/p TAVR 4 years ago, Afib  on Coumadin, PPM, CHF on entresto and lasix, bph on flomax and finasteride,  remote esophageal CA s/p chemo and radiation 12 years ago and lung CA (not currently being tx'd), 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 INR >8. CTA chest negative for PE, suspected RLL and POLLO  PNA, right upper lobe mass s/p biopsy. Procalcitonin >5.  Per daughter, pt has baseline  difficulty with po intake, but reports recent coughing even with sips of water which is unusual. Pt denies cp, sob, no listed h/o cad. Trop 95, 105, ekg paced. Most recent sbp 98, baseline 110 per daughter.    Encounter:  Patient seen and evaluated with daughter, Margarita, at bedside this AM. Patient with NGT and nasal cannula in place. States that he feels discomfort from the NGT in his throat.    PERTINENT PM/SXH:   Cardiac Dysrhythmia  Dyslipidemia  Hypertension  BPH (Benign Prostatic Hyperplasia)  Hx TIA  x 8yrs  A-fib  Larynx neoplasm  h/o cardioversion  of Atrial Fibrillation  excision of Basal Cell Carcinoma of Nose  S/P TAVR (transcatheter aortic valve replacement)    FAMILY HISTORY:  Family history of hypertension    ITEMS NOT CHECKED ARE NOT PRESENT    SOCIAL HISTORY:   Significant other/partner[x ]  Children[x ], Margarita/daughter ([phone])  Pentecostal/Spirituality: Gnosticist  Substance hx:  [ ]   Tobacco hx:  [ ]   Alcohol hx: [ ]   Home Opioid hx:  [ ] I-Stop Reference No:  Living Situation: [x]Home  [ ]Long term care  [ ]Rehab [ ]Other    ADVANCE DIRECTIVES:    MOLST[ ]   Living Will  [ ]   DECISION MAKER(s):  [ ] Health Care Proxy(s)  [ x] Surrogate(s)  [ ] Guardian           Name(s): Margarita Aquino, Phone Number(s): [phone]    BASELINE (I)ADL(s) (prior to admission):  [place]: [x ]Total  [ ] Moderate [ ]Dependent    Allergies    No Known Allergies    Intolerances    MEDICATIONS  (STANDING):  albuterol/ipratropium for Nebulization 3 milliLiter(s) Nebulizer every 6 hours  dextrose 50% Injectable 25 milliLiter(s) IV Push once  dextrose 50% Injectable 25 Gram(s) IV Push once  dextrose 50% Injectable 12.5 Gram(s) IV Push once  dextrose 50% Injectable 25 Gram(s) IV Push once  dextrose Oral Gel 15 Gram(s) Oral once  digoxin     Tablet 125 MICROGram(s) Oral <User Schedule>  glucagon  Injectable 1 milliGRAM(s) IntraMuscular once  influenza  Vaccine (HIGH DOSE) 0.7 milliLiter(s) IntraMuscular once  metoprolol tartrate 100 milliGRAM(s) Enteral Tube two times a day  piperacillin/tazobactam IVPB.. 3.375 Gram(s) IV Intermittent every 8 hours  sodium chloride 3%  Inhalation 4 milliLiter(s) Inhalation every 12 hours  tamsulosin 0.4 milliGRAM(s) Oral at bedtime    MEDICATIONS  (PRN):  acetaminophen     Tablet .. 650 milliGRAM(s) Oral every 6 hours PRN Mild Pain (1 - 3), Moderate Pain (4 - 6)    PRESENT SYMPTOMS: [ ]Unable to self-report  [ ] CPOT [ ] PAINADs [ ] RDOS  Source if other than patient:  [ x]Family   [ ]Team     Pain: [ ]yes [ x]no  QOL impact -   Location -                    Aggravating factors -  Quality -  Radiation -  Timing-  Severity (0-10 scale):  Minimal acceptable level (0-10 scale):     CPOT:    https://www.[facility].org/getattachment/[email or web address]/Critical-Care-Pain-Observation-Tool-(CPOT)    PAIN AD Score:   http://geriatrictoolkit.[place].[facility]/cog/painad.pdf (press ctrl +  left click to view)    Dyspnea:                           [x ]Mild [ ]Moderate [ ]Severe      RDOS:  0 to 2  minimal or no respiratory distress   3  mild distress  4 to 6 moderate distress  >7 severe distress  https://homecareinformation.net/handouts/hen/Respiratory_Distress_Observation_Scale.pdf (Ctrl +  left click to view)     Anxiety:                             [ ]Mild [ ]Moderate [ ]Severe  Fatigue:                             [ ]Mild [ ]Moderate [ ]Severe  Nausea:                             [ ]Mild [ ]Moderate [ ]Severe  Loss of appetite:              [ ]Mild [ ]Moderate [ ]Severe  Constipation:                    [ ]Mild [ ]Moderate [ ]Severe    PCSSQ[Palliative Care Spiritual Screening Question]   Severity (0-10):  Score of 4 or > indicate consideration of Chaplaincy referral.  Chaplaincy Referral: [ ] yes [ ] refused [ ] following [ ] Deferred     Caregiver [place]? : [ ] yes [ ] no [ ] Deferred [ ] Declined             Social work referral [ ] Patient & Family Centered Care Referral [ ]     Anticipatory Grief present?:  [ ] yes [ ] no  [ ] Deferred                  Social work referral [ ] Chaplaincy Referral[ ]      Other Symptoms:  [x ]All other review of systems negative     Palliative Performance Status Version 2:   60      %    http://npcrc.org/files/news/palliative_performance_scale_ppsv2.pdf    PHYSICAL EXAM:  Vital Signs Last 24 Hrs  T(C): 36.3 (31 Mar 2023 12:00), Max: 36.7 (30 Mar 2023 13:36)  T(F): 97.4 (31 Mar 2023 12:00), Max: 98.1 (30 Mar 2023 13:36)  HR: 90 (31 Mar 2023 12:00) (80 - 109)  BP: 137/72 (31 Mar 2023 12:00) (104/47 - 137/72)  BP(mean): --  RR: 18 (31 Mar 2023 12:00) (16 - 19)  SpO2: 98% (31 Mar 2023 12:00) (86% - 100%)    Parameters below as of 31 Mar 2023 12:00  Patient On (Oxygen Delivery Method): nasal cannula  O2 Flow (L/min): 3   I&O's Summary    30 Mar 2023 07:01  -  31 Mar 2023 07:00  --------------------------------------------------------  IN: 850 mL / OUT: 750 mL / NET: 100 mL      GENERAL: [ ]Cachexia    [x ]Alert  [x ]Oriented x 3  [ ]Lethargic  [ ]Unarousable  [x ]Verbal  [ ]Non-Verbal  Behavioral:   [ ] Anxiety  [ ] Delirium [ ] Agitation [x ] Other- calm  HEENT:  [x ]Normal   [ ]Dry mouth   [ x]ET Tube/Trach/NGT  [ ]Oral lesions  PULMONARY:   [ x]Clear [ ]Tachypnea  [ ]Audible excessive secretions   [ ]Rhonchi        [ ]Right [ ]Left [ ]Bilateral  [ ]Crackles        [ ]Right [ ]Left [ ]Bilateral  [ ]Wheezing     [ ]Right [ ]Left [ ]Bilateral  [ ]Diminished breath sounds [ ]right [ ]left [ ]bilateral  CARDIOVASCULAR:    [ x]Regular [ ]Irregular [ ]Tachy  [ ]Bruno [ ]Murmur [ ]Other  GASTROINTESTINAL:  [ x]Soft  [ ]Distended   [ x]+BS  [ x]Non tender [ ]Tender  [ ]Other [ ]PEG [x ]OGT/ NGT  Last BM:  fecal incontinence   GENITOURINARY:  [ ]Normal [ ] Incontinent   [ ]Oliguria/Anuria   [ x]Chang  MUSCULOSKELETAL:   [ x]Normal   [ ]Weakness  [ ]Bed/Wheelchair bound [ ]Edema  NEUROLOGIC:   [x ]No focal deficits  [ ]Cognitive impairment  [ ]Dysphagia [ ]Dysarthria [ ]Paresis [ ]Other   SKIN: Incontinence Associated Dermatitis. See nursing skin assessment for further details   [ ]Normal  [ ]Rash  [ ]Other  [ ]Pressure ulcer(s)       Present on admission [ ]y [ ]n    CRITICAL CARE:  [ ] Shock Present  [ ]Septic [ ]Cardiogenic [ ]Neurologic [ ]Hypovolemic  [ ]  Vasopressors [ ]  Inotropes   [ ]Respiratory failure present [ ]Mechanical ventilation [ ]Non-invasive ventilatory support [ ]High flow    [ ]Acute  [ ]Chronic [ ]Hypoxic  [ ]Hypercarbic [ ]Other  [ ]Other organ failure     LABS:                        11.3   8.59  )-----------( 149      ( 31 Mar 2023 09:23 )             38.2   03-31    146<H>  |  102  |  28<H>  ----------------------------<  341<H>  3.8   |  30  |  1.12    Ca    8.5      31 Mar 2023 06:08  Phos  2.6     03-31  Mg     1.90     03-31    PTT - ( 31 Mar 2023 09:23 )  PTT:72.1 sec      RADIOLOGY & ADDITIONAL STUDIES: reviewed     PROTEIN CALORIE MALNUTRITION PRESENT: [ ]mild [ ]moderate [ x]severe [ ]underweight [ ]morbid obesity  https://www.andeal.org/vault/7060/web/files/ONC/Table_Clinical%20Characteristics%20to%20Document%20Malnutrition-White%20JV%20et%20al%202012.pdf    Height (cm): 165.1 (03-22-23 @ 20:18)  Weight (kg): 54.6 (03-21-23 @ 15:31)  BMI (kg/m2): 20 (03-22-23 @ 20:18)    [ ]PPSV2 < or = to 30% [ ]significant weight loss  [ ]poor nutritional intake  [ ]anasarca[ ]Artificial Nutrition      Other REFERRALS:  [ ]Hospice  [ ]Child Life  [ ]Social Work  [x ]Case management [ ]Holistic Therapy

## 2023-03-31 NOTE — CONSULT NOTE ADULT - PROBLEM SELECTOR RECOMMENDATION 2
- Likely aspiration etiology given severe dysphagia  - ABX therapy  - Supplemental O2 therapy  - Remainder of management as per primary team - Likely aspiration etiology given severe dysphagia  - CT Chest 3/19 - Area of consolidation in the right lower lobe which may represent pneumonia. Multiple nodular opacities in the left upper lung which may be infectious, inflammatory or neoplastic in etiology.  - ABX therapy  - Supplemental O2 therapy  - Remainder of management as per primary team

## 2023-03-31 NOTE — CONSULT NOTE ADULT - NS ATTEST RISK PROBLEM GEN_ALL_CORE FT
Patient being at high risk of morbidity/mortality due to hx of malignancy and respiratory failure due to aspiration pneumonia due to dysphagia.

## 2023-03-31 NOTE — PROGRESS NOTE ADULT - PROBLEM SELECTOR PLAN 10
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 On 6/11/2018, he completed a course of definitive stereotactic body radiotherapy to the left upper lobe using Cyberknife technology  Was started on Keytruda was started every 3 weeks beginning March 2021. The fourth dose was Nasima 15, 2021.  Subsequently, he developed severe GI toxicity and this has been held   As per daughter pt is not on any therapy since  He saw his Oncologist in December 2022 and had a repeat CT   Outpatient d/up w/ oncologist Dr Brenden Hanna- [phone]

## 2023-03-31 NOTE — CONSULT NOTE ADULT - PROBLEM SELECTOR RECOMMENDATION 7
- Discussed with daughter that while her goal is to get PEG tube placement for her father, there are inherent risks she needs to consider such as no change or increase in aspiration, increased risk of bleeding, no change or increase in dysphagia, new or worsened pain, intra-operative complications as detailed by surgical teams, cardiac arrest, infection, and so on.  - Discussed other options for feeding such as supervised assisted hand feeding with water intake following swallowing and so on  - Goal: to have PEG tube placed and return home  - See goals of care note for more details - Diagnosed 12 years ago  - S/p chemo and radiation therapy  - Management as per primary team

## 2023-03-31 NOTE — PROVIDER CONTACT NOTE (OTHER) - ACTION/TREATMENT ORDERED:
300ml of NACL bolus was ordered. Morning blood pressure pills were held. continue to monitor patient.

## 2023-03-31 NOTE — GOALS OF CARE CONVERSATION - ADVANCED CARE PLANNING - CONVERSATION DETAILS
Goals of care discussion conducted by Dr. Colleen Viera ([facility] attending), Dr. Herson Charles (fellow). Patient was able to participate in conversation with daughter/surrogate, Margarita Aquino, at bedside. Armenian  was utilized ([id number]) in additional interpretation by daughter.    [facility] team discussed the need for PEG tube placement as patient and daughter want to pursue this medical course. Patient previously was experiencing reduced PO intake and dysphagia which prompted this hospital visit and was seen and evaluated by interventional radiology, gastroenterology, and general surgery for PEG placement options. Team prompted patient and daughter for their understanding of what a PEG tube is and they were able to demonstrate that it was for feeding when the oral route is lost. Discussed that although patient was seen and evaluated by speech and swallow team, daughter can assist with careful handfeeding if she wanted to if patient was willing as an alternative option for feeding. Discussed that the risk of PEG tube placement as elevated due to patient's age, frailty, cardiac complexity, increased bleeding risk, and cancer diagnoses. Benefit would be an available route for food intake but it may not necessarily decrease the risk of aspiration and in turn, aspiration related illnesses. Patient and daughter both demonstrated understanding and stated that their goal was to have the PEG tube placed and return home following hospitalization.    [facility] team reviewed MOLST with patient and daughter. They want to maintain full code status. Trial of antibiotics and fluids. PEG tube placement falls within their goals.    Introduced concept of hospice as extra layer of support for patient as he would qualify due to his advanced cancer status but they will defer for now.

## 2023-03-31 NOTE — CONSULT NOTE ADULT - PROBLEM SELECTOR RECOMMENDATION 5
- Diagnosed 12 years ago  - S/p chemo and radiation therapy  - Management as per primary team Please assist with ADLs PRN.  PT evaluation appreciated  Skin care as per protocol

## 2023-03-31 NOTE — CONSULT NOTE ADULT - ASSESSMENT
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

## 2023-03-31 NOTE — PROGRESS NOTE ADULT - PROBLEM SELECTOR PLAN 9
Afib w/ RVR    Coagulopathy with INR>8 likely 2/2 coumadin in setting of recent anorexia. s/p Vit k given  INR trended down (Goal INR- 2.5-3)  Started heparin gtt as above - heparin gtt was held on 3/31 due to traumatic rivas- may resume on 4/1 if urine is clear and hgb stable   resume home metoprolol and digoxin via feeding tube

## 2023-03-31 NOTE — PROGRESS NOTE ADULT - SUBJECTIVE AND OBJECTIVE BOX
[facility] Division of Hospital Medicine  Nadine Thornton MD  Hospitalist   Pager [phone]  Avaliable via MS teams     SUBJECTIVE / OVERNIGHT EVENTS: Pt seen and examined. Patient with no acute complaints. Pt's daughter at bedside and states she wants PEG placement . States patient was able to do all his own ADL"s prior to this hospitilization. Overnight, patient had urinary retention and had rivas placed after 3 straight caths.   ADDITIONAL REVIEW OF SYSTEMS:    MEDICATIONS  (STANDING):  albuterol/ipratropium for Nebulization 3 milliLiter(s) Nebulizer every 6 hours  dextrose 50% Injectable 25 milliLiter(s) IV Push once  dextrose 50% Injectable 25 Gram(s) IV Push once  dextrose 50% Injectable 12.5 Gram(s) IV Push once  dextrose 50% Injectable 25 Gram(s) IV Push once  dextrose Oral Gel 15 Gram(s) Oral once  digoxin     Tablet 125 MICROGram(s) Oral <User Schedule>  doxazosin 1 milliGRAM(s) Oral at bedtime  glucagon  Injectable 1 milliGRAM(s) IntraMuscular once  heparin  Infusion 700 Unit(s)/Hr (7 mL/Hr) IV Continuous <Continuous>  influenza  Vaccine (HIGH DOSE) 0.7 milliLiter(s) IntraMuscular once  metoprolol tartrate 100 milliGRAM(s) Enteral Tube two times a day  piperacillin/tazobactam IVPB.. 3.375 Gram(s) IV Intermittent every 8 hours  sodium chloride 3%  Inhalation 4 milliLiter(s) Inhalation every 12 hours    MEDICATIONS  (PRN):  acetaminophen     Tablet .. 650 milliGRAM(s) Oral every 6 hours PRN Mild Pain (1 - 3), Moderate Pain (4 - 6)      I&O's Summary    30 Mar 2023 07:01  -  31 Mar 2023 07:00  --------------------------------------------------------  IN: 850 mL / OUT: 750 mL / NET: 100 mL    31 Mar 2023 07:01  -  31 Mar 2023 18:37  --------------------------------------------------------  IN: 1095 mL / OUT: 0 mL / NET: 1095 mL        PHYSICAL EXAM:  Vital Signs Last 24 Hrs  T(C): 36.3 (31 Mar 2023 12:00), Max: 36.6 (30 Mar 2023 22:02)  T(F): 97.4 (31 Mar 2023 12:00), Max: 97.8 (30 Mar 2023 22:02)  HR: 90 (31 Mar 2023 12:00) (80 - 109)  BP: 137/72 (31 Mar 2023 12:00) (104/47 - 137/72)  BP(mean): --  RR: 18 (31 Mar 2023 12:00) (18 - 19)  SpO2: 98% (31 Mar 2023 12:00) (86% - 100%)    Parameters below as of 31 Mar 2023 12:00  Patient On (Oxygen Delivery Method): nasal cannula  O2 Flow (L/min): 3    CONSTITUTIONAL: NAD, frail, [place]   RESPIRATORY: Normal respiratory effort; decreased breath sounds at bases   CARDIOVASCULAR: Irregular rhythm, normal S1 and S2, no murmur/rub/gallop; No lower extremity edema; Peripheral pulses are 2+ bilaterally  ABDOMEN: Nontender to palpation, normoactive bowel sounds, no rebound/guarding; No hepatosplenomegaly  MUSCLOSKELETAL: no clubbing or cyanosis of digits; no joint swelling or tenderness to palpation  PSYCH: A+O to person, place, and time; affect appropriate  NEURO: Right side facial droop  SKIN: Scaly plaques on shins, ecchymoses on extremities      LABS:                        11.3   8.59  )-----------( 149      ( 31 Mar 2023 09:23 )             38.2     03-31    146<H>  |  102  |  28<H>  ----------------------------<  341<H>  3.8   |  30  |  1.12    Ca    8.5      31 Mar 2023 06:08  Phos  2.6     03-31  Mg     1.90     03-31      PTT - ( 31 Mar 2023 09:23 )  PTT:72.1 sec          COVID-19 PCR: Deltedaniella (29 Mar 2023 19:32)  SARS-CoV-2: NotDetec (19 Mar 2023 19:31)

## 2023-03-31 NOTE — PROGRESS NOTE ADULT - ASSESSMENT
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

## 2023-03-31 NOTE — CONSULT NOTE ADULT - PROBLEM SELECTOR RECOMMENDATION 4
- Diagnosed 4 years ago  - Not undergoing active therapy nor planning to  - Management as per primary team Nutrition recommendations appreciated

## 2023-03-31 NOTE — CONSULT NOTE ADULT - PROBLEM SELECTOR RECOMMENDATION 6
- Discussed role of palliative care team  - Discussed with daughter that while her goal is to get PEG tube placement for her father, there are inherent risks she needs to consider such as no change or increase in aspiration, increased risk of bleeding, no change or increase in dysphagia, new or worsened pain, intra-operative complications as detailed by surgical teams, cardiac arrest, infection, and so on.  - Discussed other options for feeding such as supervised assisted hand feeding with water intake following swallowing and so on  - Introduced hospice and its role of care as an extra layer of support and that her father would qualify given his advanced cancer status, daughter will consider hospice at a later time  - Reviewed MOLST form with patient and daughter, no changes in directives  - Code status: full code  - Goal: to have PEG tube placed and return home - Discussed role of palliative care team  - Discussed risks vs benefits of PEG tube placement  - Reviewed alternatives  - Introduced hospice and its role of care as an extra layer of support and that her father would qualify given his advanced cancer status, daughter will consider hospice at a later time  - Reviewed MOLST form with patient and daughter, no changes in directives  - Code status: full code  - Goal: to have PEG tube placed and return home - Diagnosed 4 years ago  - Not undergoing active therapy at this time  - Management as per primary team

## 2023-03-31 NOTE — PROVIDER CONTACT NOTE (OTHER) - BACKGROUND
pt was admitted for hemoptysis 11 days ago. pt desaturated to 86% with low blood pressure and normal heart rate.

## 2023-03-31 NOTE — CHART NOTE - NSCHARTNOTEFT_GEN_A_CORE
Patient's family discussing PEG vs pleasure feeds, palliative team consulted to assist with decision making. GI team previously discussed risks/benefits of PEG with family. Please reach out to GI team if patient and family wish to go ahead with PEG placement.    Maddy Fong, PGY5  Gastroenterology/Hepatology Fellow  Available on [employer] Teams  [phone] ([employer] Short Range Pager)  [phone] (Long Range Pager)    After 5pm, please contact the on-call GI fellow. [phone]

## 2023-04-01 NOTE — PROGRESS NOTE ADULT - ASSESSMENT
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

## 2023-04-01 NOTE — PROGRESS NOTE ADULT - PROBLEM SELECTOR PLAN 1
CT head-No acute intracranial hemorrhage, mass effect, or shift of the midline structures.  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, his swallow test was always abnormal  but pt has learned to adjust his diet and take his time eating, but now has been getting worse   Seen by speech and swallow, s/p cinesophagram and recommend NPO   Spoke w/ daughters and wish to proceed w/ PEG  Spoke w/ GI and recommend IR consult for PEG placement  IR reviewed recent CTA chest w/ coverage of the upper abdomen, which showed low lying left hepatic lobe and interposed transverse colon with no percutaneous window for tube placement  NGT placement attempted multiple times and unsuccessful, likely d/t scar tissues from previous laryngeal CA and RT  As per GI, call back for possible endoscopic PEG placement once patient's respiratory status improves  GI Emailed on 3/27/23- appreciate recs regarding PEG placement  NGT placed on 3/28 by ENT , feeds started   Daugther decided she wants PEG placed- GI team informed- plan for PEG on Monday / Tuesday depending on availability

## 2023-04-01 NOTE — PROGRESS NOTE ADULT - PROBLEM SELECTOR PLAN 10
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 On 6/11/2018, he completed a course of definitive stereotactic body radiotherapy to the left upper lobe using Cyberknife technology  Was started on Keytruda was started every 3 weeks beginning March 2021. The fourth dose was Nasima 15, 2021.  Subsequently, he developed severe GI toxicity and this has been held   As per daughter pt is not on any therapy since  He saw his Oncologist in December 2022 and had a repeat CT   Outpatient d/up w/ oncologist Dr Brenden Hanna- [phone]

## 2023-04-01 NOTE — PROGRESS NOTE ADULT - SUBJECTIVE AND OBJECTIVE BOX
[facility] Division of Hospital Medicine  Nadine Thornton MD  Hospitalist   Pager [phone]  Avaliable via MS teams     SUBJECTIVE / OVERNIGHT EVENTS: pt seen and examined. patient with no acute complaints. pt rivas cleared up.     ADDITIONAL REVIEW OF SYSTEMS:    MEDICATIONS  (STANDING):  albuterol/ipratropium for Nebulization 3 milliLiter(s) Nebulizer every 6 hours  dextrose 50% Injectable 25 milliLiter(s) IV Push once  dextrose 50% Injectable 25 Gram(s) IV Push once  dextrose 50% Injectable 12.5 Gram(s) IV Push once  dextrose 50% Injectable 25 Gram(s) IV Push once  dextrose Oral Gel 15 Gram(s) Oral once  digoxin     Tablet 125 MICROGram(s) Oral <User Schedule>  doxazosin 1 milliGRAM(s) Oral at bedtime  glucagon  Injectable 1 milliGRAM(s) IntraMuscular once  heparin  Infusion 700 Unit(s)/Hr (8 mL/Hr) IV Continuous <Continuous>  influenza  Vaccine (HIGH DOSE) 0.7 milliLiter(s) IntraMuscular once  metoprolol tartrate 100 milliGRAM(s) Enteral Tube two times a day  piperacillin/tazobactam IVPB.. 3.375 Gram(s) IV Intermittent every 8 hours  sodium chloride 3%  Inhalation 4 milliLiter(s) Inhalation every 12 hours    MEDICATIONS  (PRN):  acetaminophen     Tablet .. 650 milliGRAM(s) Oral every 6 hours PRN Mild Pain (1 - 3), Moderate Pain (4 - 6)      I&O's Summary    31 Mar 2023 07:01  -  01 Apr 2023 07:00  --------------------------------------------------------  IN: 1605 mL / OUT: 650 mL / NET: 955 mL        PHYSICAL EXAM:  Vital Signs Last 24 Hrs  T(C): 36.6 (01 Apr 2023 09:00), Max: 37 (01 Apr 2023 05:15)  T(F): 97.8 (01 Apr 2023 09:00), Max: 98.6 (01 Apr 2023 05:15)  HR: 78 (01 Apr 2023 10:19) (78 - 96)  BP: 121/76 (01 Apr 2023 09:00) (116/70 - 127/79)  BP(mean): --  RR: 18 (01 Apr 2023 09:00) (18 - 18)  SpO2: 97% (01 Apr 2023 10:19) (97% - 100%)    Parameters below as of 01 Apr 2023 10:19  Patient On (Oxygen Delivery Method): nasal cannula      CONSTITUTIONAL: NAD, frail, [place]   RESPIRATORY: Normal respiratory effort; decreased breath sounds at bases   CARDIOVASCULAR: Irregular rhythm, normal S1 and S2, no murmur/rub/gallop; No lower extremity edema; Peripheral pulses are 2+ bilaterally  ABDOMEN: Nontender to palpation, normoactive bowel sounds, no rebound/guarding; No hepatosplenomegaly  MUSCLOSKELETAL: no clubbing or cyanosis of digits; no joint swelling or tenderness to palpation  PSYCH: A+O to person, place, and time; affect appropriate  NEURO: Right side facial droop  SKIN: Scaly plaques on shins, ecchymoses on extremities      LABS:                        10.3   7.53  )-----------( 146      ( 01 Apr 2023 04:52 )             35.1     04-01    146<H>  |  105  |  27<H>  ----------------------------<  122<H>  4.0   |  32<H>  |  1.02    Ca    8.6      01 Apr 2023 04:52  Phos  3.1     04-01  Mg     2.00     04-01      PTT - ( 01 Apr 2023 10:49 )  PTT:71.9 sec          COVID-19 PCR: Deltec (29 Mar 2023 19:32)  SARS-CoV-2: NotDetec (19 Mar 2023 19:31)

## 2023-04-01 NOTE — PROGRESS NOTE ADULT - PROBLEM SELECTOR PLAN 9
Afib w/ RVR    Coagulopathy with INR>8 likely 2/2 coumadin in setting of recent anorexia. s/p Vit k given  INR trended down (Goal INR- 2.5-3)  Started heparin gtt as above - heparin gtt was held on 3/31 due to traumatic rivas- resumed since urine is clear and hgb stable   resume home metoprolol and digoxin via feeding tube

## 2023-04-02 NOTE — PROGRESS NOTE ADULT - PROBLEM SELECTOR PLAN 10
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 On 6/11/2018, he completed a course of definitive stereotactic body radiotherapy to the left upper lobe using Cyberknife technology  Was started on Keytruda was started every 3 weeks beginning March 2021. The fourth dose was Nasima 15, 2021.  Subsequently, he developed severe GI toxicity and this has been held   As per daughter pt is not on any therapy since  He saw his Oncologist in December 2022 and had a repeat CT   Outpatient d/up w/ oncologist Dr Brenden Hanna- [phone]

## 2023-04-02 NOTE — PROGRESS NOTE ADULT - SUBJECTIVE AND OBJECTIVE BOX
[facility] Division of Hospital Medicine  Nadine Thornton MD  Hospitalist   Pager [phone]  Avaliable via MS teams     SUBJECTIVE / OVERNIGHT EVENTS: pt seen and examined. patient with no acute complaints.    ADDITIONAL REVIEW OF SYSTEMS:    MEDICATIONS  (STANDING):  albuterol/ipratropium for Nebulization 3 milliLiter(s) Nebulizer every 6 hours  dextrose 50% Injectable 25 milliLiter(s) IV Push once  dextrose 50% Injectable 25 Gram(s) IV Push once  dextrose 50% Injectable 12.5 Gram(s) IV Push once  dextrose 50% Injectable 25 Gram(s) IV Push once  dextrose Oral Gel 15 Gram(s) Oral once  digoxin     Tablet 125 MICROGram(s) Oral <User Schedule>  doxazosin 1 milliGRAM(s) Oral at bedtime  glucagon  Injectable 1 milliGRAM(s) IntraMuscular once  heparin  Infusion 700 Unit(s)/Hr (8 mL/Hr) IV Continuous <Continuous>  influenza  Vaccine (HIGH DOSE) 0.7 milliLiter(s) IntraMuscular once  metoprolol tartrate 100 milliGRAM(s) Enteral Tube two times a day  piperacillin/tazobactam IVPB.. 3.375 Gram(s) IV Intermittent every 8 hours  sodium chloride 3%  Inhalation 4 milliLiter(s) Inhalation every 12 hours    MEDICATIONS  (PRN):  acetaminophen     Tablet .. 650 milliGRAM(s) Oral every 6 hours PRN Mild Pain (1 - 3), Moderate Pain (4 - 6)      I&O's Summary    01 Apr 2023 07:01  -  02 Apr 2023 07:00  --------------------------------------------------------  IN: 1000 mL / OUT: 1200 mL / NET: -200 mL    02 Apr 2023 07:01  -  02 Apr 2023 15:18  --------------------------------------------------------  IN: 560 mL / OUT: 300 mL / NET: 260 mL        PHYSICAL EXAM:  Vital Signs Last 24 Hrs  T(C): 36.4 (02 Apr 2023 12:04), Max: 37 (02 Apr 2023 06:15)  T(F): 97.5 (02 Apr 2023 12:04), Max: 98.6 (02 Apr 2023 06:15)  HR: 65 (02 Apr 2023 12:04) (65 - 85)  BP: 121/60 (02 Apr 2023 12:04) (110/60 - 121/60)  BP(mean): --  RR: 18 (02 Apr 2023 12:04) (18 - 18)  SpO2: 96% (02 Apr 2023 12:04) (96% - 99%)    Parameters below as of 02 Apr 2023 12:04  Patient On (Oxygen Delivery Method): nasal cannula  O2 Flow (L/min): 2    Parameters below as of 01 Apr 2023 10:19  Patient On (Oxygen Delivery Method): nasal cannula      CONSTITUTIONAL: NAD, frail, [place]   RESPIRATORY: Normal respiratory effort; decreased breath sounds at bases   CARDIOVASCULAR: Irregular rhythm, normal S1 and S2, no murmur/rub/gallop; No lower extremity edema; Peripheral pulses are 2+ bilaterally  ABDOMEN: Nontender to palpation, normoactive bowel sounds, no rebound/guarding; No hepatosplenomegaly  MUSCLOSKELETAL: no clubbing or cyanosis of digits; no joint swelling or tenderness to palpation  PSYCH: A+O to person, place, and time; affect appropriate  NEURO: Right side facial droop  SKIN: Scaly plaques on shins, ecchymoses on extremities        LABS:                          10.1   7.85  )-----------( 151      ( 02 Apr 2023 05:36 )             33.5     04-02    141  |  101  |  26<H>  ----------------------------<  115<H>  4.2   |  33<H>  |  0.96    Ca    8.7      02 Apr 2023 05:36  Phos  3.0     04-02  Mg     1.90     04-02      PTT - ( 02 Apr 2023 05:36 )  PTT:80.6 sec              COVID-19 PCR: NotDetec (29 Mar 2023 19:32)  SARS-CoV-2: NotDetec (19 Mar 2023 19:31)

## 2023-04-02 NOTE — PROGRESS NOTE ADULT - PROBLEM SELECTOR PLAN 1
CT head-No acute intracranial hemorrhage, mass effect, or shift of the midline structures.  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, his swallow test was always abnormal  but pt has learned to adjust his diet and take his time eating, but now has been getting worse   Seen by speech and swallow, s/p cinesophagram and recommend NPO   Spoke w/ daughters and wish to proceed w/ PEG  Spoke w/ GI and recommend IR consult for PEG placement  IR reviewed recent CTA chest w/ coverage of the upper abdomen, which showed low lying left hepatic lobe and interposed transverse colon with no percutaneous window for tube placement  NGT placement attempted multiple times and unsuccessful, likely d/t scar tissues from previous laryngeal CA and RT  As per GI, call back for possible endoscopic PEG placement once patient's respiratory status improves  GI Emailed on 3/27/23- appreciate recs regarding PEG placement  NGT placed on 3/28 by ENT , feeds started   Daughter decided she wants PEG placed- GI team informed- plan for PEG on Monday / Tuesday depending on availability

## 2023-04-02 NOTE — PROGRESS NOTE ADULT - ASSESSMENT
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

## 2023-04-02 NOTE — PROGRESS NOTE ADULT - PROBLEM SELECTOR PLAN 3
Likely aspiration PNA  CT- Area of consolidation in the right lower lobe which may represent pneumonia. Multiple nodular opacities in the left upper lung which may be infectious, inflammatory or neoplastic in etiology.  RVP/COVID- negative. BC- NGTD  s/p Zosyn  for suspected asp pna: 3/20 , total of 14 days  Encourage acapella , chest PT , duonebs q6H

## 2023-04-03 NOTE — PROGRESS NOTE ADULT - ATTENDING COMMENTS
spoke with Lashonda morenita today to discuss imaging  CT A/P reviewed, no real window for endoscopic PEG placement  Would recommend direct visualization for G tube placement  Daughter understands G tube would not decrease aspiration events, but would solely be a source of nutrition.    Restart hep gtt   Consult surgery

## 2023-04-03 NOTE — PROGRESS NOTE ADULT - PROBLEM SELECTOR PLAN 2
Suspect secondary to RUL mass and PNA, appears to have subsided    In setting of supratherapeutic INR (INR 8 on admission)  CTA chest negative for PE, suspected RLL and POLLO  PNA, right upper lobe mass   s/p course of Zosyn for suspected aspiration PNA   Held anticoagulation, now started heparin gtt and monitoring for bleeding    Hgb stable   Pulm input appreciated

## 2023-04-03 NOTE — PROGRESS NOTE ADULT - PROBLEM SELECTOR PLAN 8
Due to free water deficit   Hold off on IV fluids as it may worsen volume status   Continue free water boluses via NGT   Monitor Na

## 2023-04-03 NOTE — PROGRESS NOTE ADULT - ASSESSMENT
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 Hemoptysis thought to be 2/2 pulmonary mass vs aspiration PNA.   GI consulted for PEG evaluation.     #PNA, possibly 2/2 aspiration. On supplemental O2  #Severe AS s/p TAVR  #Afib on coumadin at home, now on heparin gtt    Recommendations  - reviewed CT scan again, concern for no percutaneous window for tube placement.  - Surgery consult for lap-assisted PEG tube placement    Daughter aware and in agreement with plan.     Recommendations preliminary until signed by attending.     Christophe Schroeder MD  Gastroenterology/Hepatology Fellow  1st option: [phone] (text or call), ONLY available from 7:00 am to 5:00 pm.   **Contact on-call GI fellow via answering service ([phone]) from 5pm-7am AND on weekends/holidays**  2nd option: Available via Microsoft Teams  3rd option: Pager: [phone]

## 2023-04-03 NOTE — PROGRESS NOTE ADULT - PROBLEM SELECTOR PLAN 9
Afib w/ RVR    Coagulopathy with INR>8 likely 2/2 coumadin in setting of recent anorexia. s/p Vit k given  INR trended down (Goal INR- 2.5-3)  Continue heparin gtt as above, currently being held for PEG placement   resumed home metoprolol and digoxin via feeding tube

## 2023-04-03 NOTE — PROGRESS NOTE ADULT - PROBLEM SELECTOR PLAN 7
Acute on chronic HFpEF   CXR w/ right pleural effusion increased in size  s/p Lasix, now holding   Resumed Metoprolol and Digoxin   Holding Entresto   Strict I/O's, daily weights

## 2023-04-03 NOTE — PROGRESS NOTE ADULT - PROBLEM SELECTOR PLAN 10
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 On 6/11/2018, he completed a course of definitive stereotactic body radiotherapy to the left upper lobe using Cyberknife technology  Was started on Keytruda was started every 3 weeks beginning March 2021. The fourth dose was Nasima 15, 2021.  Subsequently, he developed severe GI toxicity and this has been held   As per daughter pt is not on any therapy since  He saw his Oncologist in December 2022 and had a repeat CT   Outpatient d/up w/ oncologist Dr Brenden Hanna- [phone]

## 2023-04-03 NOTE — PROVIDER CONTACT NOTE (MEDICATION) - SITUATION
Patient blood pressure is 99/68. Patient is stable in bed, awake and oriented. Held metoprolol per order. Patient blood pressure is 99/62. Patient is stable in bed, awake and oriented. Held metoprolol per order.

## 2023-04-03 NOTE — CHART NOTE - NSCHARTNOTEFT_GEN_A_CORE
heparin gtt was restarted, notified by nursing pt having hematuria before restarting on heparin gtt.  large clot in cannister, don't think pt could have passed clot of that size without extreme discomfort.  rivas initially with punch color urine, rivas and bladder irrigated currently Megan color wine.  6:15pm vitals decreased, repeat vitals pending and stat cbc

## 2023-04-03 NOTE — PROGRESS NOTE ADULT - PROBLEM SELECTOR PLAN 3
Likely aspiration PNA  CT- Area of consolidation in the right lower lobe which may represent pneumonia. Multiple nodular opacities in the left upper lung which may be infectious, inflammatory or neoplastic in etiology.  RVP/COVID- negative. BC- NGTD  s/p Zosyn  for suspected asp pna  Encourage acapella , chest PT , duonebs q6H

## 2023-04-03 NOTE — PROGRESS NOTE ADULT - ASSESSMENT
89yM Czech speaking, HTN, HLD, TIA, severe AS s/p TAVR 4 years ago, Afib on Coumadin, PPM, CHF, laryngeal cancer (10 years ago, s/p chemo/)radiation 12 years ago and lung CA (not currently being tx'd), hx EBV infection with Bell's palsy and residual right-sided facial droop that has been there for 20 years, presents with hemoptysis and multifocal PNA, now resolved. Surgery consulted for enteral feeding access. IR/GI also consulted for feeding access. IR deemed there to be no safe window for percutaneous placement. However, per GI documentation, GI believes there is a safe window for PEG placement based on most recent CT A/P. On re-evaluation, GI decided that there was no safe window for PEG. Therefore Surgery reconsulted for possible lap assisted PEG or G Tube. After reviewing case further, patient is deemed to be a poor surgical candidate for elective operation that is likely not going to affect his overall survival, does not decrease the risk of aspiration, and could carry significant risks such as prolonged intubation, ICU admission, wound infection. His poor surgical candidacy is due to his cardiac hx and pulmonary hx (lunc ca, supplemental O2 requirement, recent PNA, ongoing productive cough, hx emphysema).     Plan/Recommendations:  - No acute surgical intervention   - Surgery to sign off, please reach out with questions/concerns   - Care per primary team    Discussed with Attending Surgeon Dr. Smith Team, [id number]

## 2023-04-03 NOTE — CHART NOTE - NSCHARTNOTEFT_GEN_A_CORE
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     Nutrition Course: Pt A&Ox4, French speaking able to make needs known in English. Pt currently NPO, NGT feeding on hold with plan for PEG placement. GI consulted, per note 4/3, no percutaneous window for tube placement and surgery consult for lap-assisted PEG tube placement. Pt was receiving Jevity 1.5 @45ml/hr x24 hrs to provide 1080ml total formula, 1620kcal (29kcal/kg), 68g pro (1.2 g/kg), 820ml free water in formula. Receiving 250ml free water flush q6h (total 1000ml free water flush). Pt denies any nausea, vomiting, diarrhea, or constipation with current TF. Will recommend same when PEG placed and feeding initiated.      Diet Prescription: Diet, NPO with Tube Feed:   Tube Feeding Modality: Nasogastric  Jevity 1.5 Guillermo (JEVITY1.5RTH)  Total Volume for 24 Hours (mL): 1080  Continuous  Starting Tube Feed Rate {mL per Hour}: 20  Increase Tube Feed Rate by (mL): 25     Every 6 hours  Until Goal Tube Feed Rate (mL per Hour): 45  Tube Feed Duration (in Hours): 24  Tube Feed Start Time: 12:00 (03-29-23 @ 11:46)    Pertinent Medications: MEDICATIONS  (STANDING):  albuterol/ipratropium for Nebulization 3 milliLiter(s) Nebulizer every 6 hours  dextrose 50% Injectable 25 Gram(s) IV Push once  dextrose 50% Injectable 12.5 Gram(s) IV Push once  dextrose 50% Injectable 25 Gram(s) IV Push once  dextrose 50% Injectable 25 milliLiter(s) IV Push once  dextrose Oral Gel 15 Gram(s) Oral once  digoxin     Tablet 125 MICROGram(s) Oral <User Schedule>  doxazosin 1 milliGRAM(s) Oral at bedtime  glucagon  Injectable 1 milliGRAM(s) IntraMuscular once  heparin  Infusion 800 Unit(s)/Hr (8 mL/Hr) IV Continuous <Continuous>  influenza  Vaccine (HIGH DOSE) 0.7 milliLiter(s) IntraMuscular once  metoprolol tartrate 100 milliGRAM(s) Enteral Tube two times a day    MEDICATIONS  (PRN):  acetaminophen     Tablet .. 650 milliGRAM(s) Oral every 6 hours PRN Mild Pain (1 - 3), Moderate Pain (4 - 6)  guaiFENesin Oral Liquid (Sugar-Free) 100 milliGRAM(s) Oral every 6 hours PRN Cough    Pertinent Labs: 04-03 Na142 mmol/L Glu 74 mg/dL K+ 4.7 mmol/L Cr  0.95 mg/dL BUN 26 mg/dL<H> 04-03 Phos 3.2 mg/dL     CAPILLARY BLOOD GLUCOSE  POCT Blood Glucose.: 93 mg/dL (03 Apr 2023 05:31)  POCT Blood Glucose.: 98 mg/dL (02 Apr 2023 23:45)  POCT Blood Glucose.: 115 mg/dL (02 Apr 2023 17:59)      Weight: Height (cm): 165.1 (03-22 @ 20:18)  Weight (kg): 54.6 (03-21 @ 15:31)  BMI (kg/m2): 20 (03-22 @ 20:18)  Weight Assessment: No new weight to assess.      Physical Assessment, per flowsheets:  Edema: No edema noted per RN flowsheets   Skin: Skin tear left arm. No pressure injuries       Estimated Needs:   [X] No change since previous assessment  30-35kcal/kg (8532-8355 kcal/d)  1.4-1.6 g/kg (76-87 g/d)    Previous Nutrition Diagnosis: severe protein calorie malnutrition   Nutrition Diagnosis is [x ] ongoing   New Nutrition Diagnosis: [x ] not applicable     Interventions:   1) When feedings initiated continue current TF regimen  2) Obtain weekly weights to trend weights and to adjust TF accordingly   2) If bolus feedings warranted upon discharge recommend: Jevity 1.5 270ml Q6h (4x/day).  3) RD available prn    Monitor & Evaluate:  PO intake, tolerance to diet/supplement, nutrition related lab values, weight trends, BMs/GI distress, hydration status, skin integrity.    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     Nutrition Course: Pt A&Ox4, Greek speaking able to make needs known in English. Pt currently NPO, NGT feeding on hold with plan for PEG placement. GI consulted, per note 4/3, no percutaneous window for tube placement and surgery consult for lap-assisted PEG tube placement. Pt was receiving Jevity 1.5 @45ml/hr x24 hrs to provide 1080ml total formula, 1620kcal (29kcal/kg), 68g pro (1.2 g/kg), 820ml free water in formula. Receiving 250ml free water flush q6h (total 1000ml free water flush). Pt denies any nausea, vomiting, diarrhea, or constipation with current TF. Will recommend same when PEG placed and feeding initiated.      Diet Prescription: Diet, NPO with Tube Feed:   Tube Feeding Modality: Nasogastric  Jevity 1.5 Guillermo (JEVITY1.5RTH)  Total Volume for 24 Hours (mL): 1080  Continuous  Starting Tube Feed Rate {mL per Hour}: 20  Increase Tube Feed Rate by (mL): 25     Every 6 hours  Until Goal Tube Feed Rate (mL per Hour): 45  Tube Feed Duration (in Hours): 24  Tube Feed Start Time: 12:00 (03-29-23 @ 11:46)    Pertinent Medications: MEDICATIONS  (STANDING):  albuterol/ipratropium for Nebulization 3 milliLiter(s) Nebulizer every 6 hours  dextrose 50% Injectable 25 Gram(s) IV Push once  dextrose 50% Injectable 12.5 Gram(s) IV Push once  dextrose 50% Injectable 25 Gram(s) IV Push once  dextrose 50% Injectable 25 milliLiter(s) IV Push once  dextrose Oral Gel 15 Gram(s) Oral once  digoxin     Tablet 125 MICROGram(s) Oral <User Schedule>  doxazosin 1 milliGRAM(s) Oral at bedtime  glucagon  Injectable 1 milliGRAM(s) IntraMuscular once  heparin  Infusion 800 Unit(s)/Hr (8 mL/Hr) IV Continuous <Continuous>  influenza  Vaccine (HIGH DOSE) 0.7 milliLiter(s) IntraMuscular once  metoprolol tartrate 100 milliGRAM(s) Enteral Tube two times a day    MEDICATIONS  (PRN):  acetaminophen     Tablet .. 650 milliGRAM(s) Oral every 6 hours PRN Mild Pain (1 - 3), Moderate Pain (4 - 6)  guaiFENesin Oral Liquid (Sugar-Free) 100 milliGRAM(s) Oral every 6 hours PRN Cough    Pertinent Labs: 04-03 Na142 mmol/L Glu 74 mg/dL K+ 4.7 mmol/L Cr  0.95 mg/dL BUN 26 mg/dL<H> 04-03 Phos 3.2 mg/dL     CAPILLARY BLOOD GLUCOSE  POCT Blood Glucose.: 93 mg/dL (03 Apr 2023 05:31)  POCT Blood Glucose.: 98 mg/dL (02 Apr 2023 23:45)  POCT Blood Glucose.: 115 mg/dL (02 Apr 2023 17:59)      Weight: Height (cm): 165.1 (03-22 @ 20:18)  Weight (kg): 54.6 (03-21 @ 15:31)  BMI (kg/m2): 20 (03-22 @ 20:18)  Weight Assessment: No new weight to assess.      Physical Assessment, per flowsheets:  Edema: No edema noted per RN flowsheets   Skin: Skin tear left arm. No pressure injuries       Estimated Needs:   [X] No change since previous assessment  30-35kcal/kg (5018-0952 kcal/d)  1.4-1.6 g/kg (76-87 g/d)    Previous Nutrition Diagnosis: severe protein calorie malnutrition   Nutrition Diagnosis is [x ] ongoing   New Nutrition Diagnosis: [x ] not applicable     Interventions:   1) When feedings initiated continue current TF regimen  2) Obtain weekly weights to trend weights and to adjust TF accordingly   2) If bolus feedings warranted upon discharge recommend: Jevity 1.5 270ml Q6h (4x/day).  3) RD available prn    Monitor & Evaluate:  [place] to EN, nutrition related lab values, weight trends, BMs/GI distress, hydration status, skin integrity.    Ivonne Allan MS, RD| [id number] (Also available on TEAMS)

## 2023-04-03 NOTE — PROVIDER CONTACT NOTE (CHANGE IN STATUS NOTIFICATION) - BACKGROUND
Patient on heparin gtt, Rivas was placed for retention, patient has had red urine in bag since previous shift, but with no clots and PA was aware. Nurse went to empty rivas bag at 1745 and noticed clot in container.

## 2023-04-03 NOTE — PROGRESS NOTE ADULT - SUBJECTIVE AND OBJECTIVE BOX
Interval Events:   No acute events    Hospital Medications:  acetaminophen     Tablet .. 650 milliGRAM(s) Oral every 6 hours PRN  albuterol/ipratropium for Nebulization 3 milliLiter(s) Nebulizer every 6 hours  dextrose 50% Injectable 25 milliLiter(s) IV Push once  dextrose 50% Injectable 25 Gram(s) IV Push once  dextrose 50% Injectable 12.5 Gram(s) IV Push once  dextrose 50% Injectable 25 Gram(s) IV Push once  dextrose Oral Gel 15 Gram(s) Oral once  digoxin     Tablet 125 MICROGram(s) Oral <User Schedule>  doxazosin 1 milliGRAM(s) Oral at bedtime  glucagon  Injectable 1 milliGRAM(s) IntraMuscular once  guaiFENesin Oral Liquid (Sugar-Free) 100 milliGRAM(s) Oral every 6 hours PRN  influenza  Vaccine (HIGH DOSE) 0.7 milliLiter(s) IntraMuscular once  metoprolol tartrate 100 milliGRAM(s) Enteral Tube two times a day      ROS:     PHYSICAL EXAM:   Vital Signs:  Vital Signs Last 24 Hrs  T(C): 36.7 (03 Apr 2023 12:07), Max: 36.7 (03 Apr 2023 05:54)  T(F): 98.1 (03 Apr 2023 12:07), Max: 98.1 (03 Apr 2023 12:07)  HR: 73 (03 Apr 2023 12:07) (65 - 83)  BP: 122/65 (03 Apr 2023 12:07) (117/52 - 128/64)  BP(mean): --  RR: 17 (03 Apr 2023 12:07) (16 - 17)  SpO2: 100% (03 Apr 2023 12:07) (94% - 100%)    Parameters below as of 03 Apr 2023 12:07  Patient On (Oxygen Delivery Method): nasal cannula      Daily     Daily     GENERAL:  NAD, Appears stated age  HEENT:  NC/AT,  conjunctivae clear and pink, sclera -anicteric  CHEST:  Normal Effort, Breath sounds clear  HEART:  RRR, S1 + S2, no murmurs  ABDOMEN:  Soft, non-tender, non-distended, normoactive bowel sounds,  no masses  EXTREMITIES:  no cyanosis or edema  SKIN:  Warm & Dry. No rash or erythema  NEURO:  Alert, oriented, no focal deficit    LABS:                        9.1    8.76  )-----------( 165      ( 03 Apr 2023 05:50 )             30.1     Mean Cell Volume: 101.3 fL (04-03-23 @ 05:50)    04-03    142  |  103  |  26<H>  ----------------------------<  74  4.7   |  29  |  0.95    Ca    8.8      03 Apr 2023 05:50  Phos  3.2     04-03  Mg     1.90     04-03        PT/INR - ( 03 Apr 2023 05:50 )   PT: 15.3 sec;   INR: 1.32 ratio         PTT - ( 03 Apr 2023 05:50 )  PTT:116.2 sec                            9.1    8.76  )-----------( 165      ( 03 Apr 2023 05:50 )             30.1                         10.1   7.85  )-----------( 151      ( 02 Apr 2023 05:36 )             33.5                         10.3   7.53  )-----------( 146      ( 01 Apr 2023 04:52 )             35.1       Imaging: Images reviewed.

## 2023-04-03 NOTE — PROGRESS NOTE ADULT - SUBJECTIVE AND OBJECTIVE BOX
PROGRESS NOTE:     Patient is a 89y old  Male who presents with a chief complaint of hemoptysis (02 Apr 2023 15:17)      SUBJECTIVE / OVERNIGHT EVENTS: No acute events.     ADDITIONAL REVIEW OF SYSTEMS:    MEDICATIONS  (STANDING):  albuterol/ipratropium for Nebulization 3 milliLiter(s) Nebulizer every 6 hours  dextrose 50% Injectable 25 milliLiter(s) IV Push once  dextrose 50% Injectable 25 Gram(s) IV Push once  dextrose 50% Injectable 12.5 Gram(s) IV Push once  dextrose 50% Injectable 25 Gram(s) IV Push once  dextrose Oral Gel 15 Gram(s) Oral once  digoxin     Tablet 125 MICROGram(s) Oral <User Schedule>  doxazosin 1 milliGRAM(s) Oral at bedtime  glucagon  Injectable 1 milliGRAM(s) IntraMuscular once  influenza  Vaccine (HIGH DOSE) 0.7 milliLiter(s) IntraMuscular once  metoprolol tartrate 100 milliGRAM(s) Enteral Tube two times a day    MEDICATIONS  (PRN):  acetaminophen     Tablet .. 650 milliGRAM(s) Oral every 6 hours PRN Mild Pain (1 - 3), Moderate Pain (4 - 6)  guaiFENesin Oral Liquid (Sugar-Free) 100 milliGRAM(s) Oral every 6 hours PRN Cough      CAPILLARY BLOOD GLUCOSE      POCT Blood Glucose.: 93 mg/dL (03 Apr 2023 05:31)  POCT Blood Glucose.: 98 mg/dL (02 Apr 2023 23:45)  POCT Blood Glucose.: 115 mg/dL (02 Apr 2023 17:59)    I&O's Summary    02 Apr 2023 07:01  -  03 Apr 2023 07:00  --------------------------------------------------------  IN: 1764 mL / OUT: 1450 mL / NET: 314 mL        PHYSICAL EXAM:  Vital Signs Last 24 Hrs  T(C): 36.7 (03 Apr 2023 12:07), Max: 36.7 (03 Apr 2023 05:54)  T(F): 98.1 (03 Apr 2023 12:07), Max: 98.1 (03 Apr 2023 12:07)  HR: 73 (03 Apr 2023 12:07) (65 - 83)  BP: 122/65 (03 Apr 2023 12:07) (117/52 - 128/64)  BP(mean): --  RR: 17 (03 Apr 2023 12:07) (16 - 17)  SpO2: 100% (03 Apr 2023 12:07) (94% - 100%)    Parameters below as of 03 Apr 2023 12:07  Patient On (Oxygen Delivery Method): nasal cannula      CONSTITUTIONAL: NAD, frail, [place]. +NGT   RESPIRATORY: Normal respiratory effort; decreased breath sounds at bases   CARDIOVASCULAR: Irregular rhythm, normal S1 and S2, no murmur/rub/gallop; No lower extremity edema; Peripheral pulses are 2+ bilaterally  ABDOMEN: Nontender to palpation, normoactive bowel sounds, no rebound/guarding; No hepatosplenomegaly  MUSCLOSKELETAL: no clubbing or cyanosis of digits; no joint swelling or tenderness to palpation  PSYCH: A+O to person, place, and time; affect appropriate  NEURO: Right side facial droop  SKIN: Scaly plaques on shins, ecchymoses on extremities    LABS:                        9.1    8.76  )-----------( 165      ( 03 Apr 2023 05:50 )             30.1     04-03    142  |  103  |  26<H>  ----------------------------<  74  4.7   |  29  |  0.95    Ca    8.8      03 Apr 2023 05:50  Phos  3.2     04-03  Mg     1.90     04-03      PT/INR - ( 03 Apr 2023 05:50 )   PT: 15.3 sec;   INR: 1.32 ratio         PTT - ( 03 Apr 2023 05:50 )  PTT:116.2 sec            RADIOLOGY & ADDITIONAL TESTS:  Results Reviewed:   Imaging Personally Reviewed:  Electrocardiogram Personally Reviewed:    COORDINATION OF CARE:  Care Discussed with Consultants/Other Providers [Y/N]:  Prior or Outpatient Records Reviewed [Y/N]:

## 2023-04-03 NOTE — PROGRESS NOTE ADULT - ASSESSMENT
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

## 2023-04-03 NOTE — PROGRESS NOTE ADULT - SUBJECTIVE AND OBJECTIVE BOX
24h Events:  Planned for PEG w GI today, however no safe window per GI    Subjective:   Patient seen at bedside this PM.     Objective:  Vital Signs  T(C): 36.7 (04-03 @ 12:07), Max: 36.7 (04-03 @ 05:54)  HR: 73 (04-03 @ 12:07) (68 - 74)  BP: 122/64 (04-03 @ 19:29) (99/62 - 125/61)  RR: 18 (04-03 @ 18:15) (16 - 18)  SpO2: 95% (04-03 @ 18:15) (95% - 100%)  04-02-23 @ 07:01  -  04-03-23 @ 07:00  --------------------------------------------------------  IN:  Total IN: 0 mL    OUT:    Indwelling Catheter - Urethral (mL): 1450 mL  Total OUT: 1450 mL    Total NET: -1450 mL      04-03-23 @ 07:01  -  04-03-23 @ 19:48  --------------------------------------------------------  IN:  Total IN: 0 mL    OUT:    Indwelling Catheter - Urethral (mL): 400 mL  Total OUT: 400 mL    Total NET: -400 mL          Physical Exam:  GEN: Elderly, frail appearing male  RESP: NC  ABD: soft, non tender, non distended  EXTR: WWP  : Chang w dawna hematuria  NEURO: A/Ox3    Labs:                        9.1    8.76  )-----------( 165      ( 03 Apr 2023 05:50 )             30.1   04-03    142  |  103  |  26<H>  ----------------------------<  74  4.7   |  29  |  0.95    Ca    8.8      03 Apr 2023 05:50  Phos  3.2     04-03  Mg     1.90     04-03      CAPILLARY BLOOD GLUCOSE      POCT Blood Glucose.: 83 mg/dL (03 Apr 2023 17:33)  POCT Blood Glucose.: 93 mg/dL (03 Apr 2023 05:31)  POCT Blood Glucose.: 98 mg/dL (02 Apr 2023 23:45)      Imaging:

## 2023-04-03 NOTE — PROGRESS NOTE ADULT - PROBLEM SELECTOR PLAN 4
Likely d/t acute on chronic HFpEF   CXR w/ right pleural effusion increased in size  Bedside US 3/24 w/ trace Lt pleural effusion and moderate Rt pleural effusion  s/p Lasix - will hold off on further lasix as Na went up   As per pulm ;  No urgent need for thoracentesis at this time but will consider pending clinical course  Wean off O2, currently on 1L nc, monitor O2 sats

## 2023-04-03 NOTE — PROGRESS NOTE ADULT - PROBLEM SELECTOR PLAN 1
CT head-No acute intracranial hemorrhage, mass effect, or shift of the midline structures.  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, his swallow test was always abnormal  but pt has learned to adjust his diet and take his time eating, but now has been getting worse   Seen by speech and swallow, s/p cinesophagram and recommended NPO   Spoke w/ daughters and wish to proceed w/ PEG  Spoke w/ GI and recommend IR consult for PEG placement  IR reviewed recent CTA chest w/ coverage of the upper abdomen, which showed low lying left hepatic lobe and interposed transverse colon with no percutaneous window for tube placement  NGT placement attempted multiple times and unsuccessful, likely d/t scar tissues from previous laryngeal CA and RT  GI Emailed on 3/27/23- appreciate recs regarding PEG placement  NGT placed on 3/28 by ENT, feeds started   Daughter decided she wants PEG placed- GI team informed and plan for PEG today

## 2023-04-04 NOTE — PROGRESS NOTE ADULT - PROBLEM SELECTOR PLAN 2
Acute blood loss anemia d/t gross hematuria   Hold heparin drip   Serial CBC's, keep hgb above 7   Continue w/ Chang  Urology consult if +clots

## 2023-04-04 NOTE — PROGRESS NOTE ADULT - SUBJECTIVE AND OBJECTIVE BOX
PROGRESS NOTE:     Patient is a 89y old  Male who presents with a chief complaint of hemoptysis (03 Apr 2023 19:48)      SUBJECTIVE / OVERNIGHT EVENTS: Pt w/ hematuria.     ADDITIONAL REVIEW OF SYSTEMS:    MEDICATIONS  (STANDING):  albuterol/ipratropium for Nebulization 3 milliLiter(s) Nebulizer every 6 hours  dextrose 50% Injectable 25 milliLiter(s) IV Push once  dextrose 50% Injectable 25 Gram(s) IV Push once  dextrose 50% Injectable 12.5 Gram(s) IV Push once  dextrose 50% Injectable 25 Gram(s) IV Push once  dextrose Oral Gel 15 Gram(s) Oral once  digoxin     Tablet 125 MICROGram(s) Oral <User Schedule>  doxazosin 1 milliGRAM(s) Oral at bedtime  glucagon  Injectable 1 milliGRAM(s) IntraMuscular once  influenza  Vaccine (HIGH DOSE) 0.7 milliLiter(s) IntraMuscular once  metoprolol tartrate 100 milliGRAM(s) Enteral Tube two times a day    MEDICATIONS  (PRN):  acetaminophen     Tablet .. 650 milliGRAM(s) Oral every 6 hours PRN Mild Pain (1 - 3), Moderate Pain (4 - 6)  guaiFENesin Oral Liquid (Sugar-Free) 100 milliGRAM(s) Oral every 6 hours PRN Cough      CAPILLARY BLOOD GLUCOSE      POCT Blood Glucose.: 149 mg/dL (04 Apr 2023 12:32)  POCT Blood Glucose.: 109 mg/dL (04 Apr 2023 06:07)  POCT Blood Glucose.: 100 mg/dL (03 Apr 2023 23:20)  POCT Blood Glucose.: 83 mg/dL (03 Apr 2023 17:33)    I&O's Summary    03 Apr 2023 07:01  -  04 Apr 2023 07:00  --------------------------------------------------------  IN: 1114 mL / OUT: 800 mL / NET: 314 mL        PHYSICAL EXAM:  Vital Signs Last 24 Hrs  T(C): 36.7 (04 Apr 2023 12:13), Max: 36.8 (04 Apr 2023 05:40)  T(F): 98.1 (04 Apr 2023 12:13), Max: 98.2 (04 Apr 2023 05:40)  HR: 60 (04 Apr 2023 12:13) (60 - 90)  BP: 98/56 (04 Apr 2023 12:13) (98/56 - 122/64)  BP(mean): --  RR: 18 (04 Apr 2023 12:13) (17 - 18)  SpO2: 98% (04 Apr 2023 12:13) (95% - 98%)    Parameters below as of 04 Apr 2023 12:13  Patient On (Oxygen Delivery Method): nasal cannula  O2 Flow (L/min): 1        CONSTITUTIONAL: NAD, frail, [place]. +NGT   RESPIRATORY: Normal respiratory effort; decreased breath sounds at bases   CARDIOVASCULAR: Irregular rhythm, normal S1 and S2, no murmur/rub/gallop; No lower extremity edema; Peripheral pulses are 2+ bilaterally  ABDOMEN: Nontender to palpation, normoactive bowel sounds, no rebound/guarding; No hepatosplenomegaly  : Chang w/ gross hematuria   MUSCLOSKELETAL: no clubbing or cyanosis of digits; no joint swelling or tenderness to palpation  PSYCH: A+O to person, place, and time; affect appropriate  NEURO: Right side facial droop  SKIN: Scaly plaques on shins, ecchymoses on extremities    LABS:                        7.3    7.99  )-----------( 187      ( 04 Apr 2023 14:20 )             24.4     04-04    139  |  100  |  26<H>  ----------------------------<  111<H>  4.5   |  30  |  0.97    Ca    8.9      04 Apr 2023 06:05  Phos  3.2     04-04  Mg     2.00     04-04      PT/INR - ( 03 Apr 2023 05:50 )   PT: 15.3 sec;   INR: 1.32 ratio         PTT - ( 04 Apr 2023 06:10 )  PTT:80.3 sec            RADIOLOGY & ADDITIONAL TESTS:  Results Reviewed:   Imaging Personally Reviewed:  Electrocardiogram Personally Reviewed:    COORDINATION OF CARE:  Care Discussed with Consultants/Other Providers [Y/N]:  Prior or Outpatient Records Reviewed [Y/N]:

## 2023-04-04 NOTE — PROGRESS NOTE ADULT - PROBLEM SELECTOR PLAN 10
Afib w/ RVR    Coagulopathy with INR>8 likely 2/2 coumadin in setting of recent anorexia. s/p Vit k given  INR trended down (Goal INR- 2.5-3)  Hold heparin gtt d/t hematuria   resumed home metoprolol and digoxin via feeding tube

## 2023-04-04 NOTE — PROGRESS NOTE ADULT - PROBLEM SELECTOR PLAN 3
Suspect secondary to RUL mass and PNA, appears to have subsided    In setting of supratherapeutic INR (INR 8 on admission)  CTA chest negative for PE, suspected RLL and POLLO  PNA, right upper lobe mass   s/p course of Zosyn for suspected aspiration PNA   Held anticoagulation, then started heparin gtt but now discontinued again d/t hematuria   Pulm input appreciated

## 2023-04-04 NOTE — PROGRESS NOTE ADULT - PROBLEM SELECTOR PLAN 11
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 On 6/11/2018, he completed a course of definitive stereotactic body radiotherapy to the left upper lobe using Cyberknife technology  Was started on Keytruda was started every 3 weeks beginning March 2021. The fourth dose was Nasima 15, 2021.  Subsequently, he developed severe GI toxicity and this has been held   As per daughter pt is not on any therapy since  He saw his Oncologist in December 2022 and had a repeat CT   Outpatient d/up w/ oncologist Dr Brenden Hanna- [phone]

## 2023-04-04 NOTE — ADVANCED PRACTICE NURSE CONSULT - RECOMMEDATIONS
Topical recommendations:     Nasogastric Tube- Cleanse nare with NS. Pat dry. Apply Liquid barrier film to periwound skin. Apply Stat-lock NGT lee over center of nare, not touch any skin circumferentially. Change every three days or prn if soiled or compromised.     Nasal cannula- Offload with trach ties, monitor for skin breakdown as per protocol.     Bilateral arm- Cleanse with NS, pat dry. Apply Liquid barrier film to periwound skin (allow to dry). Apply Adaptic touch to base, cover with Aquacel hydrofiber, cover with gauze and secure with small kerlix. Change every other day or PRN if compromised. If dressing stuck to base please saturate it with normal saline prior to removal.     Continue low air loss bed therapy,  heel elevation with offloading boots, turn & reposition q2h with Z-flow fluidized pillow, continue moisture management with barrier creams as specified above & single breathable pad, continue measures to decrease friction/shear.   Plan discussed with primary RN, ACP provider (Shannon) and patient.     Please contact Wound/Ostomy Care Service Line if we can be of further assistance (ext [phone]).

## 2023-04-04 NOTE — CONSULT NOTE ADULT - SUBJECTIVE AND OBJECTIVE BOX
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 89-year-old male with history of hypertension, TIA, aortic stenosis s/p valve replacement, a fib on Coumadin, CHF, BPH on flomax and finasteride has followed with Dr. Anne and Dr. Cardoso at [facility] for urinary retention requiring catherization, laryngeal cancer treated with chemo and radiation 12 years ago and lung cancer currently not being treated due to intolerance of chemo. Admitted for hemoptysis and dysphasia. Found to have gross hematuria. On admission INR was 8 likely due to Coumadin toxicity, was treated with vitamin K and now down trended to 1.32. Not currently on anticoagulation.     AVSS. 1 L nasal cannula. WBC eight, H/age 7.3/24.4 from 8.3/28, creatinine 0.97. From 1.7 on admission. Chang place by primary team.           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      S/P TAVR (transcatheter aortic valve replacement)          MEDICATIONS  (STANDING):  albuterol/ipratropium for Nebulization 3 milliLiter(s) Nebulizer every 6 hours  dextrose 50% Injectable 25 milliLiter(s) IV Push once  dextrose 50% Injectable 25 Gram(s) IV Push once  dextrose 50% Injectable 12.5 Gram(s) IV Push once  dextrose 50% Injectable 25 Gram(s) IV Push once  dextrose Oral Gel 15 Gram(s) Oral once  digoxin     Tablet 125 MICROGram(s) Oral <User Schedule>  doxazosin 1 milliGRAM(s) Oral at bedtime  glucagon  Injectable 1 milliGRAM(s) IntraMuscular once  influenza  Vaccine (HIGH DOSE) 0.7 milliLiter(s) IntraMuscular once  metoprolol tartrate 100 milliGRAM(s) Enteral Tube two times a day    MEDICATIONS  (PRN):  acetaminophen     Tablet .. 650 milliGRAM(s) Oral every 6 hours PRN Mild Pain (1 - 3), Moderate Pain (4 - 6)  guaiFENesin Oral Liquid (Sugar-Free) 100 milliGRAM(s) Oral every 6 hours PRN Cough      FAMILY HISTORY:  Family history of hypertension        Allergies    No Known Allergies    Intolerances        SOCIAL HISTORY:    REVIEW OF SYSTEMS: Otherwise negative as stated in HPI    Physical Exam  Vital signs  T(C): 36.7 (04-04-23 @ 12:13), Max: 36.8 (04-04-23 @ 05:40)  HR: 87 (04-04-23 @ 15:34)  BP: 98/56 (04-04-23 @ 12:13)  SpO2: 98% (04-04-23 @ 12:13)  Wt(kg): --    Output    OUT:    Indwelling Catheter - Urethral (mL): 800 mL  Total OUT: 800 mL    Total NET: -800 mL          Gen: awake and alert. NAD.   Pulm: Normal work of breathing on RA  Abd: Soft, nontender, nondistended.   : Chang cathter in place w/ gross hematuria    LABS:      04-04 @ 14:20    WBC 7.99  / Hct 24.4  / SCr --       04-04 @ 06:05    WBC 8.05  / Hct 27.9  / SCr 0.97     04-04    139  |  100  |  26<H>  ----------------------------<  111<H>  4.5   |  30  |  0.97    Ca    8.9      04 Apr 2023 06:05  Phos  3.2     04-04  Mg     2.00     04-04      PT/INR - ( 03 Apr 2023 05:50 )   PT: 15.3 sec;   INR: 1.32 ratio         PTT - ( 04 Apr 2023 06:10 )  PTT:80.3 sec      Urine Cx:  Blood Cx:    RADIOLOGY:

## 2023-04-04 NOTE — PROGRESS NOTE ADULT - PROBLEM SELECTOR PLAN 9
Due to free water deficit, resolved   Hold off on IV fluids as it may worsen volume status   Continue free water boluses via NGT   Monitor Na

## 2023-04-04 NOTE — PROGRESS NOTE ADULT - PROBLEM SELECTOR PLAN 1
CT head-No acute intracranial hemorrhage, mass effect, or shift of the midline structures.  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, his swallow test was always abnormal  but pt has learned to adjust his diet and take his time eating, but now has been getting worse   Seen by speech and swallow, s/p cinesophagram and recommended NPO   Daughters wished to pursue PEG  IR reviewed CTA chest w/ coverage of the upper abdomen, which showed low lying left hepatic lobe and interposed transverse colon with no percutaneous window for tube placement  NGT placed on 3/28 by ENT, feeds started   Seen by GI, concern for no percutaneous window for tube placement  Seen by surgery, not a surgical candidate   Will revisit GOC w/ family

## 2023-04-04 NOTE — CONSULT NOTE ADULT - ASSESSMENT
89-year-old male with history of hypertension, TIA, aortic stenosis s/p valve replacement, a fib on Coumadin, CHF, BPH on flomax and finasteride has followed with Dr. Anne and Dr. Cardoso at [facility] for urinary retention requiring catherization, laryngeal cancer treated with chemo and radiation 12 years ago and lung cancer currently not being treated due to intolerance of chemo c/b gross hematuria.    AVSS. 1 L nasal cannula.   WBC 8, H/H 7.3/24.4 from 8.3/28, creatinine 0.97. From 1.7 on admission. UA negative with only 2 RBC March, 2023, no culture/   Rivas place by primary team, with hx of urinary retention in the past.     Plan  - Continue finasteride and flomax  - Obtain urine culture and UA  - Continue rivas per primary team, needs to be changed every 4 weeks  - Trend H/H, transfuse as needed  - Further plan pending evaluation and discussion with attending    [facility]  [address] [place], [place] [id number] [phone]     89-year-old male with history of hypertension, TIA, aortic stenosis s/p valve replacement, a fib on Coumadin, CHF, BPH on flomax and finasteride has followed with Dr. Anne and Dr. Cardoso at [facility] for urinary retention requiring catherization, laryngeal cancer treated with chemo and radiation 12 years ago and lung cancer currently not being treated due to intolerance of chemo c/b gross hematuria.    AVSS. 1 L nasal cannula.   WBC 8, H/H 7.3/24.4 from 8.3/28, creatinine 0.97. From 1.7 on admission. UA negative with only 2 RBC March, 2023, no culture/   Rivas place by primary team, with hx of urinary retention in the past.   At the bedside, 300cc of clot evacuated and 3 way rivas catheter placed. Urine was irrigated to clear punch,    Plan  - Continue finasteride and flomax  - Obtain urine culture and UA  - Continue rivas per primary team, needs to be changed every 4 weeks  - Trend H/H, transfuse as needed  - WIll need eventual gross hematuria work up with CT urogram which can be done inpatient or outpatient, and outpatient cystoscopy.  - COntinue rivas, no CBI needs at this time  - Plan discussed with Dr. Cordell [facility] [place] [facility]  [address] [place] [id number] [phone]

## 2023-04-04 NOTE — PROGRESS NOTE ADULT - PROBLEM SELECTOR PLAN 4
Likely aspiration PNA  CT- Area of consolidation in the right lower lobe which may represent pneumonia. Multiple nodular opacities in the left upper lung which may be infectious, inflammatory or neoplastic in etiology.  RVP/COVID- negative. BC- NGTD  s/p Zosyn  for suspected asp pna  Encourage acapella , chest PT , duonebs

## 2023-04-04 NOTE — CHART NOTE - NSCHARTNOTEFT_GEN_A_CORE
Patient was started on CBI because the color got darker and was not draining well. He was manually irrigated with minimal clots.   Will check color in AM  Irrigate PRN

## 2023-04-04 NOTE — PROVIDER CONTACT NOTE (CHANGE IN STATUS NOTIFICATION) - RECOMMENDATIONS
Continue transfusion. Monitor BP.
ACP will flush indwelling catheter until urine is noted to be clear. Heparin gtt continued.

## 2023-04-04 NOTE — PROVIDER CONTACT NOTE (CHANGE IN STATUS NOTIFICATION) - ACTION/TREATMENT ORDERED:
Repeat BP in 15 minutes.
ACP will flush indwelling catheter until urine is noted to be clear. Heparin gtt continued.

## 2023-04-04 NOTE — ADVANCED PRACTICE NURSE CONSULT - REASON FOR CONSULT
Patient seen on skin care rounds after wound care referral received for assessment of skin impairment and recommendations of topical management. 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 Surgery consulted for enteral feeding access. IR/GI also cnsulted for feeding access. IR deemed there to be no safe window for percutaneous placement. However, per GI documentation, GI believes there is a safe window for PEG placement based on most recent CT A/P. On re-evaluation, GI decided that there was no safe window. Therefore Surgery reconsulted for possible lap assisted PEG or G Tube- not a surgical candidate at this time. Palliative onboard, as per discussion with primary NP possible pleasure feeds. Patient found to have severe protein calorie malnutrition. Chart reviewed: WBC 8.05, h/h 8.3/27.9, platelets 179, BMI 20.0, Ino 15.

## 2023-04-04 NOTE — PROVIDER CONTACT NOTE (CHANGE IN STATUS NOTIFICATION) - SITUATION
Patient BP 98/46. Patient stable and no distress noted in bed. Blood transfusion was started. No distress noted.
Patient on heparin gtt and is having hematuria with noticeable clots in Chang bag. Informed ACP of findings. Patient stable, no distress noted at this time.

## 2023-04-04 NOTE — CHART NOTE - NSCHARTNOTEFT_GEN_A_CORE
This am patient noted with significant hematuria. CBC noted with decrease in hgb. Chang irrigated, clots noted. Heparin gtt dc'd.  CBC repeated resulting with continued drop in hgb. Call placed to daughter/hcp Lashonda. Risks and benefits of blood transfusion reviewed and Lashonda consented to blood product transfusion.  Urology consult called for evaluation.   Case discussed with Dr. Rincon.

## 2023-04-04 NOTE — ADVANCED PRACTICE NURSE CONSULT - ASSESSMENT
General: A&Ox4, bedbound, continent of stool, indwelling urinary cathter with sanguinous drainage with clots in tubing- NP notified. Patient cachectic with temporal wasting, prominent bony prominences. On supplemental oxygen via nasal cannula. NG tube to right nare. Skin warm, fragile thin, poor skin turgor,  scattered areas of ecchymosis on bilateral upper extremities. Blanchable erythema on bilateral heels.     Category 3 skin tears:   Right arm measuring 2xsk5vww1.2cm   Left wrist measuring [id number]. Both tears exposing maroon moist base, moderate sanguinous drainage, no odor. Periwound skin with eccymosis. No induration, no erythema, no increased warmth. Goals of care: Manage drainage, moist wound healing, prevent further tearing.

## 2023-04-05 NOTE — PROGRESS NOTE ADULT - PROBLEM SELECTOR PLAN 3
Likely d/t traumatic Chang insertion   Holding anticoagulation   Started on CBI as per urology   Continue Chang   Continue finasteride and flomax  Recommend CT Urogram  Will also need outpatient cystoscopy with urology on follow up

## 2023-04-05 NOTE — PROGRESS NOTE ADULT - PROBLEM SELECTOR PLAN 1
CT head-No acute intracranial hemorrhage, mass effect, or shift of the midline structures.  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, his swallow test was always abnormal  but pt has learned to adjust his diet and take his time eating, but now has been getting worse   Seen by speech and swallow, s/p cinesophagram and recommended NPO   Daughters wished to pursue PEG  IR reviewed CTA chest w/ coverage of the upper abdomen, which showed low lying left hepatic lobe and interposed transverse colon with no percutaneous window for tube placement  NGT placed on 3/28 by ENT, feeds started   Seen by GI, concern for no percutaneous window for tube placement  Seen by surgery, not a surgical candidate   Spoke w/ family and wish to keep NGT for now, will consider hospice and pleasure feeds

## 2023-04-05 NOTE — PROGRESS NOTE ADULT - PROBLEM SELECTOR PLAN 2
Acute blood loss anemia d/t gross hematuria   Hold heparin drip   s/p 1 unit PRBC 4/4 with appropriate response   Serial CBC's, keep hgb above 7   Continue w/ Chang

## 2023-04-05 NOTE — PROGRESS NOTE ADULT - SUBJECTIVE AND OBJECTIVE BOX
PROGRESS NOTE:     Patient is a 89y old  Male who presents with a chief complaint of hemoptysis (05 Apr 2023 07:09)      SUBJECTIVE / OVERNIGHT EVENTS: No acute events.     ADDITIONAL REVIEW OF SYSTEMS:    MEDICATIONS  (STANDING):  albuterol/ipratropium for Nebulization 3 milliLiter(s) Nebulizer every 6 hours  dextrose 50% Injectable 25 milliLiter(s) IV Push once  dextrose 50% Injectable 25 Gram(s) IV Push once  dextrose 50% Injectable 12.5 Gram(s) IV Push once  dextrose 50% Injectable 25 Gram(s) IV Push once  dextrose Oral Gel 15 Gram(s) Oral once  digoxin     Tablet 125 MICROGram(s) Oral <User Schedule>  doxazosin 1 milliGRAM(s) Oral at bedtime  glucagon  Injectable 1 milliGRAM(s) IntraMuscular once  influenza  Vaccine (HIGH DOSE) 0.7 milliLiter(s) IntraMuscular once  metoprolol tartrate 100 milliGRAM(s) Enteral Tube two times a day    MEDICATIONS  (PRN):  acetaminophen     Tablet .. 650 milliGRAM(s) Oral every 6 hours PRN Mild Pain (1 - 3), Moderate Pain (4 - 6)  guaiFENesin Oral Liquid (Sugar-Free) 100 milliGRAM(s) Oral every 6 hours PRN Cough      CAPILLARY BLOOD GLUCOSE      POCT Blood Glucose.: 119 mg/dL (05 Apr 2023 12:06)  POCT Blood Glucose.: 117 mg/dL (05 Apr 2023 04:20)  POCT Blood Glucose.: 150 mg/dL (04 Apr 2023 21:24)  POCT Blood Glucose.: 149 mg/dL (04 Apr 2023 17:31)  POCT Blood Glucose.: 149 mg/dL (04 Apr 2023 12:32)    I&O's Summary    04 Apr 2023 07:01  -  05 Apr 2023 07:00  --------------------------------------------------------  IN: 800 mL / OUT: 900 mL / NET: -100 mL        PHYSICAL EXAM:  Vital Signs Last 24 Hrs  T(C): 36.8 (05 Apr 2023 12:00), Max: 37 (05 Apr 2023 05:15)  T(F): 98.3 (05 Apr 2023 12:00), Max: 98.6 (05 Apr 2023 05:15)  HR: 60 (05 Apr 2023 12:00) (60 - 122)  BP: 121/55 (05 Apr 2023 12:00) (98/46 - 143/49)  BP(mean): --  RR: 18 (05 Apr 2023 12:00) (18 - 20)  SpO2: 100% (05 Apr 2023 12:00) (98% - 100%)    Parameters below as of 05 Apr 2023 09:15  Patient On (Oxygen Delivery Method): nasal cannula      CONSTITUTIONAL: NAD, frail, [place]. +NGT   RESPIRATORY: Normal respiratory effort; decreased breath sounds at bases   CARDIOVASCULAR: Irregular rhythm, normal S1 and S2, no murmur/rub/gallop; No lower extremity edema; Peripheral pulses are 2+ bilaterally  ABDOMEN: Nontender to palpation, normoactive bowel sounds, no rebound/guarding; No hepatosplenomegaly  : +Chang  MUSCLOSKELETAL: no clubbing or cyanosis of digits; no joint swelling or tenderness to palpation  PSYCH: A+O to person, place, and time; affect appropriate  NEURO: Right side facial droop  SKIN: Scaly plaques on shins, ecchymoses on extremities      LABS:                        9.3    13.89 )-----------( 200      ( 04 Apr 2023 23:05 )             29.7     04-04    139  |  100  |  26<H>  ----------------------------<  111<H>  4.5   |  30  |  0.97    Ca    8.9      04 Apr 2023 06:05  Phos  3.2     04-04  Mg     2.00     04-04      PTT - ( 04 Apr 2023 06:10 )  PTT:80.3 sec            RADIOLOGY & ADDITIONAL TESTS:  Results Reviewed:   Imaging Personally Reviewed:  Electrocardiogram Personally Reviewed:    COORDINATION OF CARE:  Care Discussed with Consultants/Other Providers [Y/N]:  Prior or Outpatient Records Reviewed [Y/N]:

## 2023-04-05 NOTE — PROGRESS NOTE ADULT - PROBLEM SELECTOR PLAN 12
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 On 6/11/2018, he completed a course of definitive stereotactic body radiotherapy to the left upper lobe using Cyberknife technology  Was started on Keytruda was started every 3 weeks beginning March 2021. The fourth dose was Nasima 15, 2021.  Subsequently, he developed severe GI toxicity and this has been held   As per daughter pt is not on any therapy since  He saw his Oncologist in December 2022 and had a repeat CT   Outpatient d/up w/ oncologist Dr Brenden Hanna- [phone]

## 2023-04-05 NOTE — PROGRESS NOTE ADULT - ASSESSMENT
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

## 2023-04-05 NOTE — PROGRESS NOTE ADULT - ASSESSMENT
89-year-old male with history of hypertension, TIA, aortic stenosis s/p valve replacement, a fib on Coumadin, CHF, BPH on flomax and finasteride has followed with Dr. Anne and Dr. 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    Received 1 uPRBC last night, responded with Hb >9  CBI ran overnight and clamped in morning, two hours later with dark maroon urine requiring hand irrigation of small clot, CBI resumed with slow gtt    Plan  - Continue CBI, urology to wean  - If rivas not draining may hand irrigate by RN/primary team, please contact urology if this does not resolve  - Trend H/H, transfuse as needed  - Continue to hold heparin gtt (home med Coumadin for Afib)  - Continue finasteride and flomax  - Continue rivas per primary team, needs to be changed every 4 weeks  - For gross hematuria evaluation, AUA guidelines recommend CT Abdomen Pelvis w/ and W/o IV Contrast with delayed images (CT Urogram). Patient's Cr 0.97, would likely tolerate. This can be performed inpatient versus outpatient. Will also need outpatient cystoscopy with urology on follow up.     D/w Dr. Cordell Cobb [facility] for Urology  [address] [place] [id number] [phone]

## 2023-04-05 NOTE — PROVIDER CONTACT NOTE (OTHER) - RECOMMENDATIONS
acp notified
300ml of NACL bolus was ordered. Morning blood pressure pills were held. continue to monitor patient.
Continue to monitor patient. recheck in an hour. Blood pressure medication to be administered as per provider order.

## 2023-04-05 NOTE — PROGRESS NOTE ADULT - CONVERSATION DETAILS
Spoke w/ daughter about patient's condition. Unable to place PEG due to anatomy and medical comorbidities. Discussed options including pleasure feeds and hospice. Daughter would like to keep NGT in place for now. Long term, she is interested in pleasure feeds and probably hospice. Will place hospice referral. Statement Selected

## 2023-04-05 NOTE — PROVIDER CONTACT NOTE (OTHER) - ACTION/TREATMENT ORDERED:
Continue to monitor patient. recheck in an hour. Blood pressure medication to be administered as per provider order.

## 2023-04-05 NOTE — PROGRESS NOTE ADULT - SUBJECTIVE AND OBJECTIVE BOX
Subjective    Patient seen and examined. States he feels okay, denies issues with catheter.    Objective    Vital signs  T(F): , Max: 98.5 (04-04-23 @ 22:15)  HR: 62 (04-05-23 @ 03:32)  BP: 141/60 (04-04-23 @ 22:15)  SpO2: 98% (04-05-23 @ 03:32)  Wt(kg): --    Output     04-04 @ 07:01  -  04-05 @ 07:00  --------------------------------------------------------  IN: 0 mL / OUT: 900 mL / NET: -900 mL        General: NAD, NGT  Abdomen: soft/non-tender/non-distended  : 3 way rivas in place (CBI clamped 2 hours prior) draining dark maroon urine, irrigated small clot to clear pink, resumed CBI at slow gtt    Labs      04-04 @ 23:05    WBC 13.89 / Hct 29.7  / SCr --       04-04 @ 14:20    WBC 7.99  / Hct 24.4  / SCr --

## 2023-04-06 NOTE — PROGRESS NOTE ADULT - ASSESSMENT
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

## 2023-04-06 NOTE — PROGRESS NOTE ADULT - PROBLEM SELECTOR PLAN 1
Likely d/t acute on chronic HFpEF, now w/ worsening hypoxia overnight likely d/t aspiration    CXR w/ increasing pulmonary vascular congestion. Unchanged moderate bilateral pleural effusions with adjacent atelectasis.  s/p Lasix - will hold off on further lasix as Na went up   As per pulm ;  No urgent need for thoracentesis at this time but will consider pending clinical course  Holding tube feeds for now   Start Zosyn if febrile   Supplemental O2, monitor O2 sats

## 2023-04-06 NOTE — PROGRESS NOTE ADULT - PROBLEM SELECTOR PLAN 3
Likely d/t traumatic Chang insertion   Holding anticoagulation   Started on CBI as per urology, now clamped    Continue Chang   Continue finasteride and flomax  Consider CT Urogram  Will also need outpatient cystoscopy with urology on follow up.

## 2023-04-06 NOTE — PROGRESS NOTE ADULT - PROBLEM SELECTOR PLAN 2
CT head-No acute intracranial hemorrhage, mass effect, or shift of the midline structures.  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, his swallow test was always abnormal  but pt has learned to adjust his diet and take his time eating, but now has been getting worse   Seen by speech and swallow, s/p cinesophagram and recommended NPO   Daughters wished to pursue PEG  IR reviewed CTA chest w/ coverage of the upper abdomen, which showed low lying left hepatic lobe and interposed transverse colon with no percutaneous window for tube placement  NGT placed on 3/28 by ENT, holding tube feeds d/t likely aspiration   Seen by GI, concern for no percutaneous window for tube placement  Seen by surgery, not a surgical candidate  Spoke w/ family, agrees to hospice referral and eventual pleasure feeds

## 2023-04-06 NOTE — PROGRESS NOTE ADULT - SUBJECTIVE AND OBJECTIVE BOX
PROGRESS NOTE:     Patient is a 89y old  Male who presents with a chief complaint of hemoptysis (06 Apr 2023 08:58)      SUBJECTIVE / OVERNIGHT EVENTS: Hypoxia overnight, tube feeds suctioned.     ADDITIONAL REVIEW OF SYSTEMS:    MEDICATIONS  (STANDING):  albuterol/ipratropium for Nebulization 3 milliLiter(s) Nebulizer every 6 hours  dextrose 50% Injectable 25 milliLiter(s) IV Push once  dextrose 50% Injectable 25 Gram(s) IV Push once  dextrose 50% Injectable 12.5 Gram(s) IV Push once  dextrose 50% Injectable 25 Gram(s) IV Push once  dextrose Oral Gel 15 Gram(s) Oral once  digoxin     Tablet 125 MICROGram(s) Oral <User Schedule>  doxazosin 1 milliGRAM(s) Oral at bedtime  glucagon  Injectable 1 milliGRAM(s) IntraMuscular once  influenza  Vaccine (HIGH DOSE) 0.7 milliLiter(s) IntraMuscular once  metoprolol tartrate 100 milliGRAM(s) Enteral Tube two times a day    MEDICATIONS  (PRN):  acetaminophen     Tablet .. 650 milliGRAM(s) Oral every 6 hours PRN Mild Pain (1 - 3), Moderate Pain (4 - 6)  guaiFENesin Oral Liquid (Sugar-Free) 100 milliGRAM(s) Oral every 6 hours PRN Cough      CAPILLARY BLOOD GLUCOSE      POCT Blood Glucose.: 89 mg/dL (06 Apr 2023 12:48)  POCT Blood Glucose.: 103 mg/dL (06 Apr 2023 06:29)  POCT Blood Glucose.: 123 mg/dL (06 Apr 2023 00:44)  POCT Blood Glucose.: 133 mg/dL (05 Apr 2023 17:49)    I&O's Summary    05 Apr 2023 07:01  -  06 Apr 2023 07:00  --------------------------------------------------------  IN: 500 mL / OUT: 7175 mL / NET: -6675 mL    06 Apr 2023 07:01  -  06 Apr 2023 14:05  --------------------------------------------------------  IN: 0 mL / OUT: 0 mL / NET: 0 mL        PHYSICAL EXAM:  Vital Signs Last 24 Hrs  T(C): 37 (06 Apr 2023 05:00), Max: 37 (06 Apr 2023 05:00)  T(F): 98.6 (06 Apr 2023 05:00), Max: 98.6 (06 Apr 2023 05:00)  HR: 72 (06 Apr 2023 10:12) (61 - 75)  BP: 135/65 (06 Apr 2023 05:00) (126/51 - 143/64)  BP(mean): --  RR: 20 (06 Apr 2023 08:56) (18 - 20)  SpO2: 93% (06 Apr 2023 08:56) (77% - 100%)    Parameters below as of 06 Apr 2023 10:12  Patient On (Oxygen Delivery Method): nasal cannula      CONSTITUTIONAL: NAD, frail, [place]. +NGT   RESPIRATORY: Normal respiratory effort; decreased breath sounds at bases   CARDIOVASCULAR: Irregular rhythm, normal S1 and S2, no murmur/rub/gallop; No lower extremity edema; Peripheral pulses are 2+ bilaterally  ABDOMEN: Nontender to palpation, normoactive bowel sounds, no rebound/guarding; No hepatosplenomegaly  : +Chang  MUSCLOSKELETAL: no clubbing or cyanosis of digits; no joint swelling or tenderness to palpation  PSYCH: A+O to person, place, and time; affect appropriate  NEURO: Right side facial droop  SKIN: Scaly plaques on shins, ecchymoses on extremities    LABS:                        9.0    11.88 )-----------( 212      ( 06 Apr 2023 08:13 )             29.8     04-06    141  |  100  |  34<H>  ----------------------------<  85  5.7<H>   |  32<H>  |  0.97    Ca    8.8      06 Apr 2023 08:13                  RADIOLOGY & ADDITIONAL TESTS:  Results Reviewed:   Imaging Personally Reviewed:  Electrocardiogram Personally Reviewed:    COORDINATION OF CARE:  Care Discussed with Consultants/Other Providers [Y/N]:  Prior or Outpatient Records Reviewed [Y/N]:

## 2023-04-06 NOTE — PROGRESS NOTE ADULT - SUBJECTIVE AND OBJECTIVE BOX
Subjective    Patient seen and examined. States he feels well, no issues with catheter.     Objective    Vital signs  T(F): , Max: 98.6 (04-06-23 @ 05:00)  HR: 72 (04-06-23 @ 10:12)  BP: 135/65 (04-06-23 @ 05:00)  SpO2: 93% (04-06-23 @ 08:56)  Wt(kg): --    Output     04-05 @ 07:01  -  04-06 @ 07:00  --------------------------------------------------------  IN: 500 mL / OUT: 7175 mL / NET: -6675 mL    04-06 @ 07:01  -  04-06 @ 12:58  --------------------------------------------------------  IN: 0 mL / OUT: 0 mL / NET: 0 mL        General: NAD  Abdomen: soft/non-tender/non-distended  : CBI clamped, rivas draining clear yellow urine    Labs      04-06 @ 08:13    WBC 11.88 / Hct 29.8  / SCr 0.97     04-04 @ 23:05    WBC 13.89 / Hct 29.7  / SCr --

## 2023-04-06 NOTE — PATIENT PROFILE ADULT - FALL HARM RISK - HARM RISK INTERVENTIONS
5 patient that the PSA is 1.39 most recently.  This is normal range and stable. fair balance Assistance with ambulation/Assistance OOB with selected safe patient handling equipment/Communicate Risk of Fall with Harm to all staff/Discuss with provider need for PT consult/Monitor gait and stability/Provide patient with walking aids - walker, cane, crutches/Reinforce activity limits and safety measures with patient and family/Tailored Fall Risk Interventions/Visual Cue: Yellow wristband and red socks/Bed in lowest position, wheels locked, appropriate side rails in place/Call bell, personal items and telephone in reach/Instruct patient to call for assistance before getting out of bed or chair/Non-slip footwear when patient is out of bed/[place]oline to call system/Physically safe environment - no spills, clutter or unnecessary equipment/Purposeful Proactive Rounding/Room/bathroom lighting operational, light cord in reach

## 2023-04-06 NOTE — PROGRESS NOTE ADULT - ASSESSMENT
89-year-old male with history of hypertension, TIA, aortic stenosis s/p valve replacement, a fib on Coumadin, CHF, BPH on flomax and finasteride has followed with Dr. Anne and Dr. 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    Received 1 uPRBC last night, responded with Hb >9  CBI ran overnight and clamped in morning, two hours later with dark maroon urine requiring hand irrigation of small clot, CBI resumed with slow gtt    Plan  - CBI stopped, rivas now draining clear yellow urine  - If rivas not draining may hand irrigate by RN/primary team, please contact urology if this does not resolve  - Trend H/H, transfuse as needed  - May resume anticoagulation (home med Coumadin for Afib)  - Continue finasteride and flomax  - Continue rivas per, needs to be changed every 4 weeks  - For gross hematuria evaluation, AUA guidelines recommend CT Abdomen Pelvis w/ and W/o IV Contrast with delayed images (CT Urogram). Patient's Cr 0.97, would likely tolerate. This can be performed inpatient versus outpatient. Will also need outpatient cystoscopy with urology on follow up with Dr. Rhoda Anne  - Please call urology with further questions or if hematuria worsens such that color is dark maroon/red with clots    D/w Dr. Cordell Cobb [facility] for Urology  [address] [place] [address] [phone]

## 2023-04-06 NOTE — PROGRESS NOTE ADULT - PROBLEM SELECTOR PLAN 12
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 On 6/11/2018, he completed a course of definitive stereotactic body radiotherapy to the left upper lobe using Cyberknife technology  Was started on Keytruda was started every 3 weeks beginning March 2021. The fourth dose was Nasima 15, 2021.  Subsequently, he developed severe GI toxicity and this has been held   As per daughter pt is not on any therapy since  He saw his Oncologist in December 2022 and had a repeat CT   Outpatient d/up w/ oncologist Dr Brenden Hanna- [phone]

## 2023-04-06 NOTE — PROGRESS NOTE ADULT - PROBLEM SELECTOR PLAN 4
Acute blood loss anemia d/t gross hematuria   Hold heparin drip   s/p PRBC 4/4 with appropriate response   Serial CBC's, keep hgb above 7

## 2023-04-06 NOTE — CHART NOTE - NSCHARTNOTEFT_GEN_A_CORE
Notified by RN that pt desatted on NC to 70%. Pt was placed on NRB with improvement of O2 sat to 96%. Pt seen at bedside. Nasopharyngeal suctioning was performed with presumed tube feeds suctioned. Pt was then weaned back down to 4L NC satting at 100%. Pt afebrile and rest of vitals signs are stable. Pt alert and denies any complaints.    Vital Signs Last 24 Hrs  T(C): 36.8 (05 Apr 2023 21:45), Max: 36.8 (05 Apr 2023 12:00)  T(F): 98.3 (05 Apr 2023 21:45), Max: 98.3 (05 Apr 2023 12:00)  HR: 61 (06 Apr 2023 03:46) (60 - 80)  BP: 143/64 (05 Apr 2023 21:45) (121/55 - 143/64)  BP(mean): --  RR: 18 (05 Apr 2023 21:45) (18 - 18)  SpO2: 98% (06 Apr 2023 03:46) (96% - 100%)    Plan:  Will hold tube feeds for now  CXR ordered  Will continue to monitor

## 2023-04-06 NOTE — PROGRESS NOTE ADULT - PROBLEM SELECTOR PLAN 6
Likely aspiration PNA  CT- Area of consolidation in the right lower lobe which may represent pneumonia. Multiple nodular opacities in the left upper lung which may be infectious, inflammatory or neoplastic in etiology.  RVP/COVID- negative. BC- NGTD  s/p Zosyn  for suspected asp pna  Encourage acapella, chest PT , duonebs

## 2023-04-07 NOTE — PROGRESS NOTE ADULT - PROBLEM SELECTOR PLAN 10
Due to free water deficit, resolved   Hold off on IV fluids as it may worsen volume status   May resume free water boluses via NGT   Monitor Na

## 2023-04-07 NOTE — PROGRESS NOTE ADULT - PROBLEM SELECTOR PLAN 9
Acute on chronic HFpEF   CXR w/ right pleural effusion increased in size  s/p Lasix, will dose again today   Resumed Metoprolol and Digoxin   Holding Entresto   Strict I/O's, daily weights

## 2023-04-07 NOTE — PROGRESS NOTE ADULT - PROBLEM SELECTOR PLAN 2
CT head-No acute intracranial hemorrhage, mass effect, or shift of the midline structures.  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, his swallow test was always abnormal  but pt has learned to adjust his diet and take his time eating, but now has been getting worse   Seen by speech and swallow, s/p cinesophagram and recommended NPO   Daughters wished to pursue PEG  IR reviewed CTA chest w/ coverage of the upper abdomen, which showed low lying left hepatic lobe and interposed transverse colon with no percutaneous window for tube placement  NGT placed on 3/28 by ENT, resume tube feeds   Seen by GI, concern for no percutaneous window for tube placement  Seen by surgery, not a surgical candidate  Spoke w/ family, agrees to hospice referral and eventual pleasure feeds

## 2023-04-07 NOTE — PROGRESS NOTE ADULT - ASSESSMENT
89yM Persian speaking, HTN, HLD, TIA, severe AS s/p TAVR 4 years ago, Afib  on Coumadin, PPM, CHF on entresto and lasix, bph on flomax and finasteride,  remote laryngeal l CA s/p chemo and radiation 12 years ago and lung CA (not currently being tx'd), hx EBV infection with Bell's palsy and residual right-sided facial droop that has been there for 20 years, presents with hemoptysis.

## 2023-04-07 NOTE — PROGRESS NOTE ADULT - PROBLEM SELECTOR PLAN 1
Likely d/t acute on chronic HFpEF, now w/ worsening hypoxia 4/6 likely d/t aspiration    CXR w/ increasing pulmonary vascular congestion. Unchanged moderate bilateral pleural effusions with adjacent atelectasis.  s/p Lasix, will give dose today   As per pulm ;  No urgent need for thoracentesis at this time but will consider pending clinical course  Supplemental O2, monitor O2 sats

## 2023-04-07 NOTE — PROGRESS NOTE ADULT - SUBJECTIVE AND OBJECTIVE BOX
PROGRESS NOTE:     Patient is a 89y old  Male who presents with a chief complaint of hemoptysis (06 Apr 2023 14:04)      SUBJECTIVE / OVERNIGHT EVENTS: No acute events.     ADDITIONAL REVIEW OF SYSTEMS:    MEDICATIONS  (STANDING):  albuterol/ipratropium for Nebulization 3 milliLiter(s) Nebulizer every 6 hours  dextrose 5%. 1000 milliLiter(s) (30 mL/Hr) IV Continuous <Continuous>  dextrose 50% Injectable 25 milliLiter(s) IV Push once  dextrose 50% Injectable 25 Gram(s) IV Push once  dextrose 50% Injectable 12.5 Gram(s) IV Push once  dextrose 50% Injectable 25 Gram(s) IV Push once  dextrose Oral Gel 15 Gram(s) Oral once  digoxin     Tablet 125 MICROGram(s) Oral <User Schedule>  doxazosin 1 milliGRAM(s) Oral at bedtime  glucagon  Injectable 1 milliGRAM(s) IntraMuscular once  influenza  Vaccine (HIGH DOSE) 0.7 milliLiter(s) IntraMuscular once  metoprolol tartrate 100 milliGRAM(s) Enteral Tube two times a day    MEDICATIONS  (PRN):  acetaminophen     Tablet .. 650 milliGRAM(s) Oral every 6 hours PRN Mild Pain (1 - 3), Moderate Pain (4 - 6)  guaiFENesin Oral Liquid (Sugar-Free) 100 milliGRAM(s) Oral every 6 hours PRN Cough      CAPILLARY BLOOD GLUCOSE      POCT Blood Glucose.: 94 mg/dL (07 Apr 2023 12:30)  POCT Blood Glucose.: 82 mg/dL (07 Apr 2023 07:13)  POCT Blood Glucose.: 142 mg/dL (06 Apr 2023 22:46)  POCT Blood Glucose.: 76 mg/dL (06 Apr 2023 21:14)  POCT Blood Glucose.: 84 mg/dL (06 Apr 2023 17:52)    I&O's Summary    06 Apr 2023 07:01  -  07 Apr 2023 07:00  --------------------------------------------------------  IN: 0 mL / OUT: 550 mL / NET: -550 mL        PHYSICAL EXAM:  Vital Signs Last 24 Hrs  T(C): 36.5 (07 Apr 2023 10:08), Max: 36.8 (06 Apr 2023 20:30)  T(F): 97.7 (07 Apr 2023 10:08), Max: 98.3 (06 Apr 2023 20:30)  HR: 77 (07 Apr 2023 10:08) (64 - 77)  BP: 140/56 (07 Apr 2023 10:08) (137/60 - 148/59)  BP(mean): --  RR: 18 (07 Apr 2023 10:08) (18 - 18)  SpO2: 98% (07 Apr 2023 10:08) (98% - 100%)    Parameters below as of 07 Apr 2023 09:39  Patient On (Oxygen Delivery Method): nasal cannula      CONSTITUTIONAL: NAD, frail, [place]. +NGT   RESPIRATORY: Normal respiratory effort; decreased breath sounds at bases   CARDIOVASCULAR: Irregular rhythm, normal S1 and S2, no murmur/rub/gallop; No lower extremity edema; Peripheral pulses are 2+ bilaterally  ABDOMEN: Nontender to palpation, normoactive bowel sounds, no rebound/guarding; No hepatosplenomegaly  : +Chang  MUSCLOSKELETAL: no clubbing or cyanosis of digits; no joint swelling or tenderness to palpation  PSYCH: A+O to person, place, and time; affect appropriate  NEURO: Right side facial droop  SKIN: Scaly plaques on shins, ecchymoses on extremities    LABS:                        8.0    8.75  )-----------( 246      ( 07 Apr 2023 08:02 )             26.1     04-07    140  |  101  |  33<H>  ----------------------------<  89  5.1   |  33<H>  |  1.00    Ca    8.9      07 Apr 2023 08:02                  RADIOLOGY & ADDITIONAL TESTS:  Results Reviewed:   Imaging Personally Reviewed:  Electrocardiogram Personally Reviewed:    COORDINATION OF CARE:  Care Discussed with Consultants/Other Providers [Y/N]:  Prior or Outpatient Records Reviewed [Y/N]:

## 2023-04-07 NOTE — PROGRESS NOTE ADULT - PROBLEM SELECTOR PLAN 3
Likely d/t traumatic Chang insertion   Holding anticoagulation   Started on CBI as per urology, now clamped    Continue Chang   Continue finasteride and flomax  Urology recommended CT Urogram and outpatient cystoscopy, but would defer if patient going to hospice

## 2023-04-07 NOTE — PROGRESS NOTE ADULT - PROBLEM SELECTOR PLAN 12
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 On 6/11/2018, he completed a course of definitive stereotactic body radiotherapy to the left upper lobe using Cyberknife technology  Was started on Keytruda was started every 3 weeks beginning March 2021. The fourth dose was Nasima 15, 2021.  Subsequently, he developed severe GI toxicity and this has been held   As per daughter pt is not on any therapy since  He saw his Oncologist in December 2022 and had a repeat CT   Outpatient d/up w/ oncologist Dr Brenden Hanna- [phone]

## 2023-04-08 NOTE — PROGRESS NOTE ADULT - PROBLEM SELECTOR PLAN 11
Afib w/ RVR    Coagulopathy with INR>8 likely 2/2 coumadin in setting of recent anorexia. s/p Vit k given  INR trended down (Goal INR- 2.5-3)  Hold heparin gtt d/t hematuria, spoke w/ daughter regarding risks of bleeding w/ anticoagulation and expresses agreement    resumed home metoprolol and digoxin via feeding tube

## 2023-04-08 NOTE — PROGRESS NOTE ADULT - SUBJECTIVE AND OBJECTIVE BOX
PROGRESS NOTE:     Patient is a 89y old  Male who presents with a chief complaint of hemoptysis (07 Apr 2023 13:16)      SUBJECTIVE / OVERNIGHT EVENTS: No acute events.     ADDITIONAL REVIEW OF SYSTEMS:    MEDICATIONS  (STANDING):  albuterol/ipratropium for Nebulization 3 milliLiter(s) Nebulizer every 6 hours  dextrose 50% Injectable 25 milliLiter(s) IV Push once  dextrose 50% Injectable 25 Gram(s) IV Push once  dextrose 50% Injectable 12.5 Gram(s) IV Push once  dextrose 50% Injectable 25 Gram(s) IV Push once  dextrose Oral Gel 15 Gram(s) Oral once  digoxin     Tablet 125 MICROGram(s) Oral <User Schedule>  doxazosin 1 milliGRAM(s) Oral at bedtime  furosemide   Injectable 20 milliGRAM(s) IV Push once  glucagon  Injectable 1 milliGRAM(s) IntraMuscular once  influenza  Vaccine (HIGH DOSE) 0.7 milliLiter(s) IntraMuscular once  metoprolol tartrate 100 milliGRAM(s) Enteral Tube two times a day    MEDICATIONS  (PRN):  acetaminophen     Tablet .. 650 milliGRAM(s) Oral every 6 hours PRN Mild Pain (1 - 3), Moderate Pain (4 - 6)  guaiFENesin Oral Liquid (Sugar-Free) 100 milliGRAM(s) Oral every 6 hours PRN Cough      CAPILLARY BLOOD GLUCOSE      POCT Blood Glucose.: 99 mg/dL (08 Apr 2023 00:11)  POCT Blood Glucose.: 91 mg/dL (07 Apr 2023 17:59)    I&O's Summary    07 Apr 2023 07:01  -  08 Apr 2023 07:00  --------------------------------------------------------  IN: 270 mL / OUT: 625 mL / NET: -355 mL        PHYSICAL EXAM:  Vital Signs Last 24 Hrs  T(C): 36.8 (08 Apr 2023 12:54), Max: 36.8 (08 Apr 2023 05:45)  T(F): 98.2 (08 Apr 2023 12:54), Max: 98.3 (08 Apr 2023 05:45)  HR: 70 (08 Apr 2023 12:54) (62 - 77)  BP: 128/54 (08 Apr 2023 12:54) (125/56 - 149/72)  BP(mean): --  RR: 18 (08 Apr 2023 12:54) (17 - 19)  SpO2: 100% (08 Apr 2023 12:54) (93% - 100%)    Parameters below as of 08 Apr 2023 12:54  Patient On (Oxygen Delivery Method): nasal cannula  O2 Flow (L/min): 3      CONSTITUTIONAL: NAD, frail, [place]. +NGT   RESPIRATORY: Normal respiratory effort; decreased breath sounds at bases   CARDIOVASCULAR: Irregular rhythm, normal S1 and S2, no murmur/rub/gallop; No lower extremity edema; Peripheral pulses are 2+ bilaterally  ABDOMEN: Nontender to palpation, normoactive bowel sounds, no rebound/guarding; No hepatosplenomegaly  : +Chang  MUSCLOSKELETAL: no clubbing or cyanosis of digits; no joint swelling or tenderness to palpation  PSYCH: A+O to person, place, and time; affect appropriate  NEURO: Right side facial droop  SKIN: Scaly plaques on shins, ecchymoses on extremities    LABS:                        8.0    8.75  )-----------( 246      ( 07 Apr 2023 08:02 )             26.1     04-07    140  |  101  |  33<H>  ----------------------------<  89  5.1   |  33<H>  |  1.00    Ca    8.9      07 Apr 2023 08:02                  RADIOLOGY & ADDITIONAL TESTS:  Results Reviewed:   Imaging Personally Reviewed:  Electrocardiogram Personally Reviewed:    COORDINATION OF CARE:  Care Discussed with Consultants/Other Providers [Y/N]:  Prior or Outpatient Records Reviewed [Y/N]:

## 2023-04-08 NOTE — PROGRESS NOTE ADULT - PROBLEM SELECTOR PLAN 2
CT head-No acute intracranial hemorrhage, mass effect, or shift of the midline structures.  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, his swallow test was always abnormal  but pt has learned to adjust his diet and take his time eating, but now has been getting worse   Seen by speech and swallow, s/p cinesophagram and recommended NPO   Daughters wished to pursue PEG  IR reviewed CTA chest w/ coverage of the upper abdomen, which showed low lying left hepatic lobe and interposed transverse colon with no percutaneous window for tube placement  NGT placed on 3/28 by ENT, resumed tube feeds   Seen by GI, concern for no percutaneous window for tube placement  Seen by surgery, not a surgical candidate  Spoke w/ family, agrees to hospice and pleasure feeds

## 2023-04-08 NOTE — PROGRESS NOTE ADULT - PROBLEM SELECTOR PLAN 3
Likely d/t traumatic Chang insertion   Holding anticoagulation   Started on CBI as per urology, now clamped    Continue Chang   Continue finasteride and flomax  Urology recommended CT Urogram and outpatient cystoscopy, but would defer as patient will be going to hospice

## 2023-04-08 NOTE — PROGRESS NOTE ADULT - PROBLEM SELECTOR PLAN 9
Acute on chronic HFpEF   CXR w/ right pleural effusion increased in size  Will give Lasix 20mg IVP x 1 again today   Resumed Metoprolol and Digoxin   Holding Entresto   Strict I/O's, daily weights

## 2023-04-08 NOTE — PROGRESS NOTE ADULT - PROBLEM SELECTOR PLAN 12
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 On 6/11/2018, he completed a course of definitive stereotactic body radiotherapy to the left upper lobe using Cyberknife technology  Was started on Keytruda was started every 3 weeks beginning March 2021. The fourth dose was Nasima 15, 2021.  Subsequently, he developed severe GI toxicity and this has been held   As per daughter pt is not on any therapy since  He saw his Oncologist in December 2022 and had a repeat CT   Outpatient d/up w/ oncologist Dr Brenden Hanna- [phone]

## 2023-04-08 NOTE — PROGRESS NOTE ADULT - PROBLEM SELECTOR PLAN 1
Likely d/t acute on chronic HFpEF, now w/ worsening hypoxia 4/6 likely d/t aspiration    CXR w/ increasing pulmonary vascular congestion. Unchanged moderate bilateral pleural effusions with adjacent atelectasis.  Will give Lasix 20mg IVP x 1 again today   As per pulm;  No urgent need for thoracentesis at this time but will consider pending clinical course  Supplemental O2, monitor O2 sats

## 2023-04-08 NOTE — PROGRESS NOTE ADULT - ASSESSMENT
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

## 2023-04-09 NOTE — PROGRESS NOTE ADULT - PROBLEM SELECTOR PLAN 2
CT head-No acute intracranial hemorrhage, mass effect, or shift of the midline structures.  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, his swallow test was always abnormal  but pt has learned to adjust his diet and take his time eating, but now has been getting worse   Seen by speech and swallow, s/p cinesophagram and recommended NPO   Daughters wished to pursue PEG  IR reviewed CTA chest w/ coverage of the upper abdomen, which showed low lying left hepatic lobe and interposed transverse colon with no percutaneous window for tube placement  NGT placed on 3/28 by ENT, resumed tube feeds   Seen by GI, concern for no percutaneous window for tube placement  Seen by surgery, not a surgical candidate  Family agrees to hospice w/ pleasure feeds

## 2023-04-09 NOTE — PROGRESS NOTE ADULT - PROBLEM SELECTOR PLAN 1
Likely d/t acute on chronic HFpEF, now w/ worsening hypoxia 4/6 likely d/t aspiration    CXR w/ increasing pulmonary vascular congestion. Unchanged moderate bilateral pleural effusions with adjacent atelectasis.  Will give Lasix 20mg IVP x 1 again, c/w Lasix prn   As per pulm; no urgent need for thoracentesis at this time   Supplemental O2, monitor O2 sats

## 2023-04-09 NOTE — PROGRESS NOTE ADULT - CONVERSATION DETAILS
Spoke w/ daughter and HCP (Lashonda) regarding poor overall prognosis. Daughter expresses understanding and would like for father to go home w/ hospice services. She wants father to be DNR/DNI.

## 2023-04-09 NOTE — PROGRESS NOTE ADULT - PROBLEM SELECTOR PLAN 12
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 On 6/11/2018, he completed a course of definitive stereotactic body radiotherapy to the left upper lobe using Cyberknife technology  Was started on Keytruda was started every 3 weeks beginning March 2021. The fourth dose was Nasima 15, 2021.  Subsequently, he developed severe GI toxicity and this has been held   As per daughter pt is not on any therapy since  He saw his Oncologist in December 2022 and had a repeat CT   Outpatient d/up w/ oncologist Dr Brenden Hanna- [phone]

## 2023-04-09 NOTE — PROGRESS NOTE ADULT - PROBLEM SELECTOR PLAN 11
Afib w/ RVR    Coagulopathy with INR>8 likely 2/2 coumadin in setting of recent anorexia. s/p Vit k given  INR trended down (Goal INR- 2.5-3)  Hold heparin gtt d/t hematuria, patient to remain off anticoagulation d/t high risk of bleeding   resumed home metoprolol and digoxin via feeding tube

## 2023-04-09 NOTE — PROGRESS NOTE ADULT - PROBLEM SELECTOR PLAN 8
Marilou Sykes MA 12/22/2020 12:35 PM CST      ----- Message -----  From: Yulia Damon  Sent: 12/22/2020 10:07 AM CST  To: [facility] Nurse Msg Pool  Subject: Other     Sorry for the delay, I have been on vacation and forgot to send through before we left. The blood pressure hasn't changed much so will get the labs done after [place] and then connect with you about having to add another medication. I have been taking the medication on an empty stomach so hopefully the blood work will reflect that.  11/30 167/78 P58  12/2 158/72 P60  12/5 161/79 P59  12/20 152/71 P55  12/22 151/65 P58     Thanks Elisabet, enjoy the holiday season.    Digna     Likely pre-renal HAYLIE, improved  s/p IV fluids   Monitor Cr

## 2023-04-09 NOTE — CHART NOTE - NSCHARTNOTEFT_GEN_A_CORE
provider brought to bedside for pt with SPO2 85% on 5l N/C, placed on NRB with SPO2 improving to 100%.  Pt seen and examined at bedside, pt Awake Alert breathing equal and unlabored, no accessory muscle use, pt speaks in full sentences.  Lungs have rhonchi bilaterally, Pt suctioned and tube feeds paused.  Will order cxr to evaluate for new aspiration and titrate down oxygen as pt tolerates.  Dr. Rincon informed of event.

## 2023-04-09 NOTE — PROGRESS NOTE ADULT - ASSESSMENT
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

## 2023-04-09 NOTE — PROGRESS NOTE ADULT - PROBLEM SELECTOR PLAN 4
Acute blood loss anemia d/t gross hematuria   Hold heparin drip   s/p PRBC 4/4 with appropriate response   Monitored CBC

## 2023-04-09 NOTE — PROGRESS NOTE ADULT - CONVERSATION/DISCUSSION
Diagnosis/Prognosis/Hospice Referral
Diagnosis/Prognosis/MOLST Discussed
Diagnosis/Prognosis/MOLST Discussed
Prognosis/MOLST Discussed

## 2023-04-09 NOTE — PROGRESS NOTE ADULT - PROBLEM SELECTOR PLAN 9
Acute on chronic HFpEF   CXR w/ right pleural effusion increased in size  Will give Lasix 20mg IVP x 1 again, c/w Lasix prn   Resumed Metoprolol and Digoxin   Holding Entresto   Strict I/O's, daily weights

## 2023-04-09 NOTE — PROGRESS NOTE ADULT - SUBJECTIVE AND OBJECTIVE BOX
PROGRESS NOTE:     Patient is a 89y old  Male who presents with a chief complaint of hemoptysis (08 Apr 2023 16:00)      SUBJECTIVE / OVERNIGHT EVENTS: No acute events.     ADDITIONAL REVIEW OF SYSTEMS:    MEDICATIONS  (STANDING):  albuterol/ipratropium for Nebulization 3 milliLiter(s) Nebulizer every 6 hours  dextrose 50% Injectable 25 milliLiter(s) IV Push once  dextrose 50% Injectable 25 Gram(s) IV Push once  dextrose 50% Injectable 12.5 Gram(s) IV Push once  dextrose 50% Injectable 25 Gram(s) IV Push once  dextrose Oral Gel 15 Gram(s) Oral once  digoxin     Tablet 125 MICROGram(s) Oral <User Schedule>  doxazosin 1 milliGRAM(s) Oral at bedtime  glucagon  Injectable 1 milliGRAM(s) IntraMuscular once  influenza  Vaccine (HIGH DOSE) 0.7 milliLiter(s) IntraMuscular once  metoprolol tartrate 100 milliGRAM(s) Enteral Tube two times a day    MEDICATIONS  (PRN):  acetaminophen     Tablet .. 650 milliGRAM(s) Oral every 6 hours PRN Mild Pain (1 - 3), Moderate Pain (4 - 6)  guaiFENesin Oral Liquid (Sugar-Free) 100 milliGRAM(s) Oral every 6 hours PRN Cough      CAPILLARY BLOOD GLUCOSE      POCT Blood Glucose.: 96 mg/dL (08 Apr 2023 23:12)  POCT Blood Glucose.: 111 mg/dL (08 Apr 2023 17:32)    I&O's Summary    08 Apr 2023 07:01  -  09 Apr 2023 07:00  --------------------------------------------------------  IN: 250 mL / OUT: 900 mL / NET: -650 mL        PHYSICAL EXAM:  Vital Signs Last 24 Hrs  T(C): 36.4 (09 Apr 2023 07:00), Max: 36.9 (08 Apr 2023 17:30)  T(F): 97.6 (09 Apr 2023 07:00), Max: 98.4 (08 Apr 2023 17:30)  HR: 80 (09 Apr 2023 07:43) (70 - 84)  BP: 128/56 (09 Apr 2023 07:00) (128/54 - 150/64)  BP(mean): --  RR: 18 (09 Apr 2023 07:00) (18 - 18)  SpO2: 100% (09 Apr 2023 07:00) (97% - 100%)    Parameters below as of 09 Apr 2023 07:43  Patient On (Oxygen Delivery Method): nasal cannula        CONSTITUTIONAL: NAD, frail, [place]. +NGT   RESPIRATORY: Normal respiratory effort; decreased breath sounds at bases   CARDIOVASCULAR: Irregular rhythm, normal S1 and S2, no murmur/rub/gallop; No lower extremity edema; Peripheral pulses are 2+ bilaterally  ABDOMEN: Nontender to palpation, normoactive bowel sounds, no rebound/guarding; No hepatosplenomegaly  : +Chang w/ yellow urine   MUSCLOSKELETAL: no clubbing or cyanosis of digits; no joint swelling or tenderness to palpation  PSYCH: A+O to person, place, and time; affect appropriate  NEURO: Right side facial droop  SKIN: Scaly plaques on shins, ecchymoses on extremities    LABS:                      RADIOLOGY & ADDITIONAL TESTS:  Results Reviewed:   Imaging Personally Reviewed:  Electrocardiogram Personally Reviewed:    COORDINATION OF CARE:  Care Discussed with Consultants/Other Providers [Y/N]:  Prior or Outpatient Records Reviewed [Y/N]:

## 2023-04-10 NOTE — PROGRESS NOTE ADULT - PROBLEM SELECTOR PLAN 11
Afib w/ RVR    Coagulopathy with INR>8 likely 2/2 coumadin in setting of recent anorexia. s/p Vit k given  INR trended down (Goal INR- 2.5-3)  Hold heparin gtt d/t hematuria, patient to remain off anticoagulation d/t high risk of bleeding   resumed home metoprolol and digoxin via feeding tube Due to free water deficit, resolved   Hold off on IV fluids as it may worsen volume status   May resume free water boluses via NGT   Monitor Na

## 2023-04-10 NOTE — PROGRESS NOTE ADULT - PROBLEM SELECTOR PLAN 8
Likely pre-renal HAYLIE, improved  s/p IV fluids   Monitor Cr Patient with elevated CE, Trop 95, 105-->161  EKG- Vpaced with no evidence of ischemia  TTE- Moderate mitral stenosis. Bioprosthetic AVR, severely dilated LA. Grossly normal LV systolic function. Severe RA  enlargement. RV enlargement with decreased RV systolic function.   Continuous cardiac monitoring to monitor for arrhythmias  Appreciate Cards recs- Likely demand ischemia in the setting of infection and HAYLIE. EKG and CE not consistent with ACS, would not treat for acs.

## 2023-04-10 NOTE — PATIENT PROFILE ADULT - BRADEN MOBILITY
Phoned patient to schedule his ER follow up appointment. Left a message for patient to call back to schedule.  
(3) slightly limited

## 2023-04-10 NOTE — PROGRESS NOTE ADULT - PROBLEM SELECTOR PLAN 9
Acute on chronic HFpEF   CXR w/ right pleural effusion increased in size  Will give Lasix 20mg IVP x 1 again, c/w Lasix prn   Resumed Metoprolol and Digoxin   Holding Entresto   Strict I/O's, daily weights Likely pre-renal HAYLIE, improved  s/p IV fluids   Monitor Cr

## 2023-04-10 NOTE — PROGRESS NOTE ADULT - PROBLEM SELECTOR PLAN 1
Likely d/t acute on chronic HFpEF, now w/ worsening hypoxia 4/6 likely d/t aspiration    CXR w/ increasing pulmonary vascular congestion. Unchanged moderate bilateral pleural effusions with adjacent atelectasis. Offer lasix PRN.  As per pulm; no urgent need for thoracentesis at this time   Supplemental O2, monitor O2 sats

## 2023-04-10 NOTE — PROGRESS NOTE ADULT - PROBLEM SELECTOR PLAN 12
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 On 6/11/2018, he completed a course of definitive stereotactic body radiotherapy to the left upper lobe using Cyberknife technology  Was started on Keytruda was started every 3 weeks beginning March 2021. The fourth dose was Nasima 15, 2021.  Subsequently, he developed severe GI toxicity and this has been held   As per daughter pt is not on any therapy since  He saw his Oncologist in December 2022 and had a repeat CT   Outpatient d/up w/ oncologist Dr Brenden Hanna- [phone] Afib w/ RVR    Coagulopathy with INR>8 likely 2/2 coumadin in setting of recent anorexia. s/p Vit k given  INR trended down (Goal INR- 2.5-3)  Hold heparin gtt d/t hematuria, patient to remain off anticoagulation d/t high risk of bleeding   resumed home metoprolol and digoxin via feeding tube

## 2023-04-10 NOTE — CHART NOTE - NSCHARTNOTEFT_GEN_A_CORE
RRT called overhead for AMS i/s/o sepsis. Rectal Temp 103F /40 HR 75bpm, SpO2 96% on 6L NC, FS 57. An amp of D50 given during RRT for possible hypoglycemia induced AMS, FS s/p amp 140. Tylenol administered i/s/o Temp 103F. Tube feeds held for possible aspiration event. Chest x-ray ordered.    Problem/Plan: Lethargy i/s/o sepsis vs hypoglycemia  - IV Tylenol   - Vancomycin and Zosyn ordered for empiric coverage  - F/u CBC, BMP, procalcitonin  - F/u MRSA swab  - F/u RVP  - F/u BCx   - F/u CXR  - Tube feeds held i/s/o possible aspiration event. Will consider D5LR at 75cc/hr.  - If Pt continues to experience AMS, will consider CTH.

## 2023-04-10 NOTE — PROGRESS NOTE ADULT - SUBJECTIVE AND OBJECTIVE BOX
[facility] Division of Hospital Medicine  Esau ArtisDO  Available via MS Teams  In house pager [phone]    SUBJECTIVE / OVERNIGHT EVENTS:  Yesterday evening with worsening hypoxia.  This morning with AMS and fever, RRT called. Started on empiric abx, CXR performed.   I spoke to his daughter Lashonda this afternoon - she saw him this morning and says he is more confused.  She is worried about his fever.  She is still considering home hospice, though she says there are not many people able to care for him at home, and is now open to the idea of inpatient hospice.     ADDITIONAL REVIEW OF SYSTEMS:    MEDICATIONS  (STANDING):  albuterol/ipratropium for Nebulization 3 milliLiter(s) Nebulizer every 6 hours  dextrose 50% Injectable 25 milliLiter(s) IV Push once  dextrose 50% Injectable 25 Gram(s) IV Push once  dextrose 50% Injectable 12.5 Gram(s) IV Push once  dextrose 50% Injectable 25 Gram(s) IV Push once  dextrose Oral Gel 15 Gram(s) Oral once  digoxin     Tablet 125 MICROGram(s) Oral <User Schedule>  doxazosin 1 milliGRAM(s) Oral at bedtime  glucagon  Injectable 1 milliGRAM(s) IntraMuscular once  influenza  Vaccine (HIGH DOSE) 0.7 milliLiter(s) IntraMuscular once  metoprolol tartrate 100 milliGRAM(s) Enteral Tube two times a day  piperacillin/tazobactam IVPB.. 3.375 Gram(s) IV Intermittent every 8 hours    MEDICATIONS  (PRN):  acetaminophen     Tablet .. 650 milliGRAM(s) Oral every 6 hours PRN Mild Pain (1 - 3), Moderate Pain (4 - 6)  guaiFENesin Oral Liquid (Sugar-Free) 100 milliGRAM(s) Oral every 6 hours PRN Cough      I&O's Summary    2023 07:01  -  10 Apr 2023 07:00  --------------------------------------------------------  IN: 120 mL / OUT: 850 mL / NET: -730 mL        PHYSICAL EXAM:  Vital Signs Last 24 Hrs  T(C): 36.7 (10 Apr 2023 11:39), Max: 39.7 (10 Apr 2023 05:50)  T(F): 98.1 (10 Apr 2023 11:39), Max: 103.5 (10 Apr 2023 05:50)  HR: 75 (10 Apr 2023 11:39) (67 - 88)  BP: 99/40 (10 Apr 2023 11:39) (99/40 - 126/66)  BP(mean): --  RR: 17 (10 Apr 2023 11:39) (17 - 18)  SpO2: 95% (10 Apr 2023 11:39) (95% - 100%)    Parameters below as of 10 Apr 2023 11:39  Patient On (Oxygen Delivery Method): nasal cannula      CONSTITUTIONAL: NAD, well-developed, well-groomed  EYES: PERRLA; conjunctiva and sclera clear  ENMT: Moist oral mucosa, no pharyngeal injection or exudates; normal dentition; NGT in place  NECK: Supple, no palpable masses; no thyromegaly  RESPIRATORY: Normal respiratory effort; lungs are clear to auscultation bilaterally  CARDIOVASCULAR: Regular rate and rhythm, normal S1 and S2, no murmur/rub/gallop; No lower extremity edema; Peripheral pulses are 2+ bilaterally  ABDOMEN: Nontender to palpation, normoactive bowel sounds, no rebound/guarding; No hepatosplenomegaly  MUSCULOSKELETAL: no clubbing or cyanosis of digits; no joint swelling or tenderness to palpation  PSYCH: A+O tx0, awake, following commands, answering some questions, indicating when he doesn't understand certain questions, somewhat difficult to understands  NEUROLOGY: CN 2-12 are intact and symmetric; no gross sensory deficits   SKIN: No rashes; no palpable lesions    LABS:                        8.1    23.90 )-----------( 325      ( 10 Apr 2023 06:15 )             27.2           PT/INR - ( 10 Apr 2023 06:15 )   PT: 16.8 sec;   INR: 1.44 ratio         PTT - ( 10 Apr 2023 06:15 )  PTT:29.2 sec      Urinalysis Basic - ( 10 Apr 2023 08:01 )    Color: Dark Brown / Appearance: Turbid / S.019 / pH: x  Gluc: x / Ketone: Negative  / Bili: Negative / Urobili: <2 mg/dL   Blood: x / Protein: 100 mg/dL / Nitrite: Negative   Leuk Esterase: Large / RBC: 4 /HPF / WBC >50 /HPF   Sq Epi: x / Non Sq Epi: x / Bacteria: TNTC        SARS-CoV-2: NotDetec (10 Apr 2023 08:01)  COVID-19 PCR: NotDetec (2023 09:37)  COVID-19 PCR: NotDetec (29 Mar 2023 19:32)  SARS-CoV-2: NotDetec (19 Mar 2023 19:31)      COMMUNICATION:  Care Discussed with Consultants/Other Providers and Details of Discussion: d/w medicine NP  Discussions with Patient/Family: d/w daughter Lashonda over the phone

## 2023-04-10 NOTE — PROGRESS NOTE ADULT - PROBLEM SELECTOR PLAN 2
CT head-No acute intracranial hemorrhage, mass effect, or shift of the midline structures.  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, his swallow test was always abnormal  but pt has learned to adjust his diet and take his time eating, but now has been getting worse   Seen by speech and swallow, s/p cinesophagram and recommended NPO   Daughters wished to pursue PEG  IR reviewed CTA chest w/ coverage of the upper abdomen, which showed low lying left hepatic lobe and interposed transverse colon with no percutaneous window for tube placement  NGT placed on 3/28 by ENT, resumed tube feeds   Seen by GI, concern for no percutaneous window for tube placement  Seen by surgery, not a surgical candidate  Currently tube feeds held after aspiration event, may trial again tomorrow  Family agrees to hospice w/ pleasure feeds Likely aspiration PNA  CT- Area of consolidation in the right lower lobe which may represent pneumonia. Multiple nodular opacities in the left upper lung which may be infectious, inflammatory or neoplastic in etiology.  RVP/COVID- negative. BC- NGTD  s/p Zosyn  for suspected asp pna  Encourage acapella, chest PT , duonebs    New fever and acute encephalopathy 4/10 - CXR repeated. TFs held.   Supplemental O2 requirements up and down, currently still need NC.  Likely repeat aspiration event with PNA.   Restarted on zosyn since 4/10.   Daughter wishes to trial him on antibiotics and monitor response before withdrawing care.

## 2023-04-10 NOTE — PROGRESS NOTE ADULT - PROBLEM SELECTOR PLAN 5
Suspect secondary to RUL mass and PNA, appears to have subsided    In setting of supratherapeutic INR (INR 8 on admission)  CTA chest negative for PE, suspected RLL and POLLO  PNA, right upper lobe mass   s/p course of Zosyn for suspected aspiration PNA   Held anticoagulation, then started heparin gtt but now discontinued again d/t hematuria   Pulm input appreciated Likely d/t traumatic Chang insertion   Holding anticoagulation   Started on CBI as per urology, now clamped    Continue Chang   Continue finasteride and flomax  Urology recommended CT Urogram and outpatient cystoscopy, but would defer as patient will be going to hospice

## 2023-04-10 NOTE — PROGRESS NOTE ADULT - PROBLEM SELECTOR PLAN 7
Patient with elevated CE, Trop 95, 105-->161  EKG- Vpaced with no evidence of ischemia  TTE- Moderate mitral stenosis. Bioprosthetic AVR, severely dilated LA. Grossly normal LV systolic function. Severe RA  enlargement. RV enlargement with decreased RV systolic function.   Continuous cardiac monitoring to monitor for arrhythmias  Appreciate Cards recs- Likely demand ischemia in the setting of infection and HAYLIE. EKG and CE not consistent with ACS, would not treat for acs. Suspect secondary to RUL mass and PNA, appears to have subsided    In setting of supratherapeutic INR (INR 8 on admission)  CTA chest negative for PE, suspected RLL and POLLO  PNA, right upper lobe mass   s/p course of Zosyn for suspected aspiration PNA   Held anticoagulation, then started heparin gtt but now discontinued again d/t hematuria   Pulm input appreciated

## 2023-04-10 NOTE — PROGRESS NOTE ADULT - ASSESSMENT
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

## 2023-04-10 NOTE — PROGRESS NOTE ADULT - PROBLEM SELECTOR PLAN 4
Acute blood loss anemia d/t gross hematuria   Hold heparin drip   s/p PRBC 4/4 with appropriate response   Monitored CBC CT head-No acute intracranial hemorrhage, mass effect, or shift of the midline structures.  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, his swallow test was always abnormal  but pt has learned to adjust his diet and take his time eating, but now has been getting worse   Seen by speech and swallow, s/p cinesophagram and recommended NPO   Daughters wished to pursue PEG  IR reviewed CTA chest w/ coverage of the upper abdomen, which showed low lying left hepatic lobe and interposed transverse colon with no percutaneous window for tube placement  NGT placed on 3/28 by ENT, resumed tube feeds   Seen by GI, concern for no percutaneous window for tube placement  Seen by surgery, not a surgical candidate  Currently tube feeds held after aspiration event, may trial again tomorrow  Family agrees to hospice w/ pleasure feeds

## 2023-04-10 NOTE — PROGRESS NOTE ADULT - PROBLEM SELECTOR PLAN 10
Due to free water deficit, resolved   Hold off on IV fluids as it may worsen volume status   May resume free water boluses via NGT   Monitor Na Acute on chronic HFpEF   CXR w/ right pleural effusion increased in size  Will give Lasix 20mg IVP x 1 again, c/w Lasix prn   Resumed Metoprolol and Digoxin   Holding Entresto   Strict I/O's, daily weights

## 2023-04-10 NOTE — RAPID RESPONSE TEAM SUMMARY - NSOTHERINTERVENTIONSRRT_GEN_ALL_CORE
- f/u MRSA, RVP, UA, CXR, culture  - f/u am labs  - FSQ6 while on D5LR  - pause TF iso high risk for aspiration, put pt on aspiration precautions and head of bed elevation to minimize risk for aspiration  - advised primary team that if pt keeps having AMS despite sepsis being managed, may need a CTH (3/20 CTH -ve, pt was previously on heparin gtt)

## 2023-04-10 NOTE — PROGRESS NOTE ADULT - PROBLEM SELECTOR PLAN 6
Likely aspiration PNA  CT- Area of consolidation in the right lower lobe which may represent pneumonia. Multiple nodular opacities in the left upper lung which may be infectious, inflammatory or neoplastic in etiology.  RVP/COVID- negative. BC- NGTD  s/p Zosyn  for suspected asp pna  Encourage acapella, chest PT , duonebs    New fever and acute encephalopathy 4/10 - CXR repeated. TFs held.   Supplemental O2 requirements up and down, currently still need NC.  Likely repeat aspiration event with PNA.   Restarted on zosyn since 4/10.   Daughter wishes to trial him on antibiotics and monitor response before withdrawing care. Acute blood loss anemia d/t gross hematuria   Hold heparin drip   s/p PRBC 4/4 with appropriate response   Monitored CBC

## 2023-04-10 NOTE — PROGRESS NOTE ADULT - PROBLEM SELECTOR PLAN 3
Likely d/t traumatic Chang insertion   Holding anticoagulation   Started on CBI as per urology, now clamped    Continue Chang   Continue finasteride and flomax  Urology recommended CT Urogram and outpatient cystoscopy, but would defer as patient will be going to hospice - febrile with rising leukocytosis 4/10, acute metabolic encephalopathy and sepsis  - partially likely aspiration-related with PNA  - UA also grossly positive  - started on Zosyn 4/10  - follow up urine culture, blood culture

## 2023-04-11 NOTE — PROGRESS NOTE ADULT - ASSESSMENT
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

## 2023-04-11 NOTE — CONSULT NOTE ADULT - ATTENDING COMMENTS
89yM Yakut speaking, HTN, HLD, TIA, severe AS s/p TAVR 4 years ago, Afib  on Coumadin, PPM, CHF, remote esophageal CA s/p chemo and radiation 12 years ago and lung CA (not currently being tx'd), Bell's palsy  p/w hemoptysis in setting of very high INR.  Pt reports blood mixed with phlegm. Denies prior hemoptysis. Currently comfortable at rest. Reports small amount of blood in a tissue now after INR reversed.    Hemoptysis may be from his R sided cancer (which is no longer being tx) vs pneumonia while supratherapeutic INR. Would cont to hold his a/c for today given cont small amount of hemoptysis.     - complete course of abx for PNA  - f/u cx, rvp and bcx negative  - quantify amount of hemoptysis being produced in cup  - hold a/c today  - concern for aspiration events when eating/drinking?, consider swallow eval  - no planned tx for NSCLC (reportedly had prior toxicity with treatment)
dysphagia requiring enteral tube access for nutrition support as is consistent with his [facility]  timing of surgical intervention pending medical optimization
Agree with fellow assessment/plan.  Long discussion by phone with daughter regarding PEG. There is a small viable window on CT 3/19, and that may improve with gastric insufflation.   However, PEG will not decrease the risk for aspiration, and at patient's age, it's really his perrogative if he wants to eat by mouth and accept the risk of aspiration vs PEG. Daughter will discuss with pt and get back to us. In the meantime, rate control and wean O2 per primary team.
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    Patient was being managed for hypoxia.  Was being treated for heart failure as well as aspiration pneumonia after CT thorax found right lower lobe consolidation.  Patient received a course of piperacillin/tazobactam.    On 4/10/2023, patient was noted to have become more encephalopathic and febrile with a Tmax 103.5.  Labs were notable for development of leukocytosis up to 30.  Blood culture was obtained as part of work-up and is now positive with MRSA.     Impression  #MRSA bacteremia  #Presence of TAVR, PPM–concern for infection/source of bacteremia  #Acute hypoxic respiratory failure status post piperacillin/tazobactam course for possible pneumonia  #Fever    Recommendations  Discontinue piperacillin/tazobactam  Obtain vancomycin level, plan to dose vancomycin accordingly  Obtain repeat blood cultures every 48 hours until clear, follow pending blood culture  Obtain TTE  EP evaluation given cardiac hardware, PPM  Overall, prognosis guarded–goals of care discussions per primary team  Follow fever curve and WBC count    Sebas Garcia MD  Division of Infectious Diseases
The patient was seen and examined with the Cardiology Consultation Teaching Service.     He is a 89-year-old man with lung cancer, prior TAVR, chronic heart failure with reduced LV function and atrial fibrillation who presented with hemoptysis and dysphagia. We are consulted for lara-operative risk stratification prior to PEG tube placement.     Some difficulty using the Indonesian , but the patient seems to be denying experiencing chest pain or dyspnea.     PMH/PSH:  Lung cancer  Chronic heart failure with reduced LV function  Severe aortic stenosis with subsequent TAVR in 2019  Atrial fibrillation on warfarin  Hypertension  Dyslipidemia  Bell's palsy  TIA  Esophageal cancer with chemotherapy and radiation therapy    Chronically ill-appearing elderly man in no acute distress  Alert  Afebrile  Vital signs stable  Borderline tachycardia  JVP is not elevated  Clear lungs  Normal heart sounds  Extremities are warm and perfused  No peripheral edema    Macrocytic anemia  PTT 62 on unfractionated heparin  Hypernatremia  GFR 50    ECG demonstrates atrial fibrillation with NSVT and PVCs, also seen on telemetry    Echocardiography demonstrates mitral annular calcification with moderately elevated gradients across the mitral valve, a bioprosthesis in the aortic position with normal gradients and moderate paravalvular leak, grossly normal LV systolic function and LV size, severe RA enlargement, RV enlargement and moderately elevated pulmonary pressures    Impression and Recommendations:   89-year-old man with lung cancer, prior TAVR, chronic heart failure with reduced LV function and atrial fibrillation who presented with hemoptysis and dysphagia. We are consulted for lara-operative risk stratification prior to PEG tube placement.     There are no absolute contraindications to the planned procedure. There is no evidence of ongoing myocardial ischemia, acutely decompensated heart failure, or unstable cardiac arrhythmia. His atrial fibrillation is reasonably rate controlled, although consistency with rate control will be difficult to achieve using intermittent intravenous medications only.    The patient's Revised Cardiac Risk Index estimates a 10.1% 30-day risk of death, MI or cardiac arrest. The patient's Early Perioperative Risk is 6.7% for 30-day risk of myocardial infarction or cardiac arrest.     These risk estimates should be incorporated into a shared decision making conversation between the patient or their surrogate and the performing practitioner regarding the risks, benefits and alternatives to the procedure and whether to proceed. Additional cardiac testing is unlikely to change this risk assessment or procedural outcomes from a cardiac perspective.     In addition, I agree with the above assessment, that I do not believe this patient's hs-troponin elevation is consistent with acute coronary syndrome, and is likely reflective of his significant ongoing medical co-morbidities.     Please call cardiology with additional questions or concerns.    Raudel Davis MD  Cardiology  [phone]
Patient was seen and evaluated with Dr. Charles , Geriatric Medicine Fellow, during bedside rounds.   Agree with above findings and recommendations, edited documentation as appropriate to reflect the plan of care discussed with patient and myself with the following additions:    89 y/o M Japanese speaking, HTN, HLD, TIA, severe AS s/p TAVR 4 years ago, Afib  on Coumadin, PPM, CHF on entresto and lasix, bph on flomax and finasteride,  remote esophageal CA s/p chemo and radiation 12 years ago and lung CA (not currently being tx'd), 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, bright red blood and increased weakness.  Palliative Care consulted for complex decision making  related to goals of care discussions.    Swallow recommendations appreciated. Patient with dysphagia.   XRAY Cineesophagogram - Aspiration was observed with one or more of the tested consistencies  CT Chest 3/19 - Area of consolidation in the right lower lobe which may represent pneumonia. Multiple nodular opacities in the left upper lung which may be infectious, inflammatory or neoplastic in etiology.  GI, IR, and surgery recommendations appreciated for PEG placement.    Japanese : [id number]  Patient seen and examined at bedside. Patient states he has discomfort from NG tube, which makes it difficult for him to speak. Denies nausea, dyspnea.   GOC discussion with morenita Gallego at bedside.  Patient lives at home with wife. Patient has 2 daughters who live nearby. Prior to hospitalization, patient was independent with ADLs.  Daughter Lashonda shares that patient with chronic dysphagia , which patient and family has modified his diet accordingly. However, patient and family have noted that dysphagia has worsened recently and they are concerned about adequate intake for his nutritional status. Daughter states patient history of head and neck cancer and had PEG placed at the time which was later removed.   Family understands that current artificial nutrition via NGT is a temporary form of nutrition. Discussed the risks and benefits of long term artificial nutrition via a PEG tube including risk of infection, self dislodgement, continued risk of aspiration and not improving overall patient's quality of life. Introduced concept of pleasure feeds/comfort feeds.  Daughter Lashonda appreciative of information; she states she has had discussed with patient in depth yesterday about this and patient shared while he would prefer not to have feeding tube, he is in agreement with it if it can increase his nutritional intake to prolong his life. Lashonda shares that patient and family interested in pursing PEG at this time.  Advanced care directives for code status preferences discussed. Lashonda shares per discussions with patient, patient is a FULL CODE.   Hospice philosophy of care/services introduced as patient no longer receiving DMT. Daughter to further consider later on if interested in future.   20 minutes spent on goals of care/ advanced care planning.     Case reviewed with primary team  Thank you for allowing us to participate in your patient's care. Please page [phone] for any questions/concerns.

## 2023-04-11 NOTE — PROGRESS NOTE ADULT - PROBLEM SELECTOR PLAN 10
Acute on chronic HFpEF   CXR w/ right pleural effusion increased in size  c/w Lasix PRN  Resumed Metoprolol and Digoxin   Holding Entresto   Strict I/O's, daily weights

## 2023-04-11 NOTE — CONSULT NOTE ADULT - ASSESSMENT
89 yo M with a PMH of HTN, TIA, severe AS s/p TAVR, AF, PPM, CHF, BPH, laryngeal cancer, lung SCC s/p Keytruda, R sided Bell's Palsy, who initially presented to the ED with hemoptysis.     Admitted for acute hypoxic respiratory failure  Thought to be multifactorial 2/2 HFpEF, aspiration pneumonia i/s/o dysphagia  CT Chest with RLL consolidation, multiple L upper lung nodular opacities, and RUL mass  Treated for CHF, also with NGT in place for dysphagia   Received Zosyn 3/19-3/26 and 3/28-4/2  On 4/10 pt with acute encephalopathy, fever to 103.5, s/p RRT   New leukocytosis of 30, HAYLIE (Cr 1.3)  Blood culture from 4/10 with MRSA bacteremia  Currently on Zosyn (4/10-) and also s/p 2 doses of Vancomycin (4/10 x1, 4/11 x1)  Repeat blood cultures and a urine culture pending      Pt seen and examined  Full consult note to follow  89 yo M with a PMH of HTN, TIA, severe AS s/p TAVR, AF, PPM, CHF, BPH, laryngeal cancer, lung SCC s/p Keytruda, R sided Bell's Palsy, who initially presented to the ED with hemoptysis.     Admitted for acute hypoxic respiratory failure  Thought to be multifactorial 2/2 HFpEF, aspiration pneumonia i/s/o dysphagia  CT Chest with RLL consolidation, multiple L upper lung nodular opacities, and RUL mass  Treated for CHF, also with NGT in place for dysphagia   Received Zosyn 3/19-3/26 and 3/28-4/2  On 4/10 pt with acute encephalopathy, fever to 103.5, s/p RRT   New leukocytosis of 30, HAYLIE (Cr 1.3)  Blood culture from 4/10 with MRSA bacteremia  Currently on Zosyn (4/10-) and also s/p 2 doses of Vancomycin (4/10 x1, 4/11 x1)  Repeat blood cultures and a urine culture pending    OVERALL  Respiratory failure  Fevers, encephalopathy, leukocytosis, sepsis  MRSA bacteremia in a 89 yo patient with significant cardiac hardware (TAVR, PPM)  Source of bacteremia ?    RECOMMENDATIONS  -Given HAYLIE and the fact that patient received Vancomycin 1g IV x1 today, would check a random level in AM on 4/12 and re-dose based on level   -Stop Zosyn - pt has received almost ~2 weeks of Zosyn during this hospital course  -While a UTI could be there, the pyuria in pt's UA is explained by the Chang and we also have an explanation for pt's sepsis (MRSA bacteremia)  -F/u repeat blood cultures sent on 4/11   -Check TTE  -Consider EP evaluation although pt is likely a poor surgical candidate and surgery might not be part of GOC (defer discussion to Primary Team)   -Aspiration precautions     All recommendations are tentative pending Attending Attestation.    Aidan Charles MD, PGY-4   ID Fellow  [employer] Teams Preferred  After 5pm/weekends call [phone]  89 yo M with a PMH of HTN, TIA, severe AS s/p TAVR, AF, PPM, CHF, BPH, laryngeal cancer, lung SCC s/p Keytruda, R sided Bell's Palsy, who initially presented to the ED with hemoptysis.     Admitted for acute hypoxic respiratory failure  Thought to be multifactorial 2/2 HFpEF, aspiration pneumonia i/s/o dysphagia  CT Chest with RLL consolidation, multiple L upper lung nodular opacities, and RUL mass  Treated for CHF, also with NGT in place for dysphagia   Received Zosyn 3/19-3/26 and 3/28-4/2  On 4/10 pt with acute encephalopathy, fever to 103.5, s/p RRT   New leukocytosis of 30, HAYLIE (Cr 1.3)  Blood culture from 4/10 with MRSA bacteremia  Currently on Zosyn (4/10-) and also s/p 2 doses of Vancomycin (4/10 x1, 4/11 x1)  Repeat blood cultures and a urine culture pending    OVERALL  Respiratory failure  Fevers, encephalopathy, leukocytosis, sepsis  MRSA bacteremia in a 89 yo patient with significant cardiac hardware (TAVR, PPM)  Source of bacteremia ?    RECOMMENDATIONS  -Given HAYLIE and the fact that patient received Vancomycin 1g IV x1 today, would check a random level in AM on 4/12 and re-dose Vancomycin based on level   -Stop Zosyn - pt has received almost ~2 weeks of Zosyn during this hospital course  -While a UTI could be there, the pyuria in pt's UA is explained by the Chang and we also have an explanation for pt's sepsis (MRSA bacteremia)  -F/u repeat blood cultures sent on 4/11   -Check TTE  -Consider EP evaluation although pt is likely a poor surgical candidate and surgery might not be part of GOC (defer discussion to Primary Team)   -Aspiration precautions     All recommendations are tentative pending Attending Attestation.    Aidan Charles MD, PGY-4   ID Fellow  [employer] Teams Preferred  After 5pm/weekends call [phone]

## 2023-04-11 NOTE — PROGRESS NOTE ADULT - SUBJECTIVE AND OBJECTIVE BOX
[facility] of [facility]  sEau ArtisDO  Available via MS Teams  In house pager [phone]    SUBJECTIVE / OVERNIGHT EVENTS:  No acute events overnight.  More awake today. Says he feels fine. More engaged in conversation overall. Denies acute complaints.    ADDITIONAL REVIEW OF SYSTEMS:    MEDICATIONS  (STANDING):  albuterol/ipratropium for Nebulization 3 milliLiter(s) Nebulizer every 6 hours  dextrose 50% Injectable 25 milliLiter(s) IV Push once  dextrose 50% Injectable 25 Gram(s) IV Push once  dextrose 50% Injectable 12.5 Gram(s) IV Push once  dextrose 50% Injectable 25 Gram(s) IV Push once  dextrose Oral Gel 15 Gram(s) Oral once  digoxin     Tablet 125 MICROGram(s) Oral <User Schedule>  doxazosin 1 milliGRAM(s) Oral at bedtime  glucagon  Injectable 1 milliGRAM(s) IntraMuscular once  influenza  Vaccine (HIGH DOSE) 0.7 milliLiter(s) IntraMuscular once  metoprolol tartrate 100 milliGRAM(s) Enteral Tube two times a day    MEDICATIONS  (PRN):  acetaminophen     Tablet .. 650 milliGRAM(s) Oral every 6 hours PRN Mild Pain (1 - 3), Moderate Pain (4 - 6)  guaiFENesin Oral Liquid (Sugar-Free) 100 milliGRAM(s) Oral every 6 hours PRN Cough      I&O's Summary    10 Apr 2023 07:01  -  2023 07:00  --------------------------------------------------------  IN: 0 mL / OUT: 300 mL / NET: -300 mL        PHYSICAL EXAM:  Vital Signs Last 24 Hrs  T(C): 36.4 (2023 12:04), Max: 36.4 (2023 05:00)  T(F): 97.5 (2023 12:04), Max: 97.5 (2023 05:00)  HR: 92 (2023 12:04) (74 - 109)  BP: 101/50 (2023 12:04) (96/38 - 114/51)  BP(mean): --  RR: 17 (2023 12:04) (17 - 20)  SpO2: 98% (2023 12:04) (98% - 100%)    Parameters below as of 2023 12:04  Patient On (Oxygen Delivery Method): nasal cannula  O2 Flow (L/min): 0.6    CONSTITUTIONAL: NAD, well-developed, well-groomed, thin  EYES: PERRLA; conjunctiva and sclera clear  ENMT: Moist oral mucosa, no pharyngeal injection or exudates; normal dentition; NGT in place  NECK: Supple, no palpable masses; no thyromegaly  RESPIRATORY: Normal respiratory effort; lungs are clear to auscultation bilaterally  CARDIOVASCULAR: Regular rate and rhythm, normal S1 and S2, no murmur/rub/gallop; No lower extremity edema; Peripheral pulses are 2+ bilaterally  ABDOMEN: Nontender to palpation, normoactive bowel sounds, no rebound/guarding; No hepatosplenomegaly  MUSCULOSKELETAL:  Normal gait; no clubbing or cyanosis of digits; no joint swelling or tenderness to palpation  PSYCH: A+O to person, place, and time; affect appropriate  NEUROLOGY: awake, alert, answering all questions appropriately, moving all extremities spontaneously, no evidence of CN deficits  SKIN: No rashes; no palpable lesions  : rivas catheter in place with dark yellow urine ~300cc    LABS:                        8.1    30.03 )-----------( 323      ( 2023 02:21 )             26.7     04-11    139  |  96<L>  |  40<H>  ----------------------------<  88  4.6   |  33<H>  |  1.39<H>    Ca    8.6      2023 02:21  Phos  3.7     04-11  Mg     2.10     04-11      PT/INR - ( 10 Apr 2023 06:15 )   PT: 16.8 sec;   INR: 1.44 ratio         PTT - ( 10 Apr 2023 06:15 )  PTT:29.2 sec      Urinalysis Basic - ( 10 Apr 2023 08:01 )    Color: Dark Brown / Appearance: Turbid / S.019 / pH: x  Gluc: x / Ketone: Negative  / Bili: Negative / Urobili: <2 mg/dL   Blood: x / Protein: 100 mg/dL / Nitrite: Negative   Leuk Esterase: Large / RBC: 4 /HPF / WBC >50 /HPF   Sq Epi: x / Non Sq Epi: x / Bacteria: TNTC        Culture - Blood (collected 10 Apr 2023 06:15)  Source: .Blood Blood-Peripheral  Gram Stain (2023 00:27):    Growth in aerobic bottle: Gram Positive Cocci in Clusters    Growth in anaerobic bottle: Gram Positive Cocci in Clusters  Preliminary Report (2023 00:27):    Growth in aerobic bottle: Gram Positive Cocci in Clusters    Growth in anaerobic bottle: Gram Positive Cocci in Clusters    ***Blood Panel PCR results on this specimen are available    approximately 3 hours after the Gram stain result.***    Gram stain, PCR, and/or culture results may not always    correspond due to difference in methodologies.    ************************************************************    This PCR assay was performed by multiplex PCR. This    Assay tests for 66 bacterial and resistance gene targets.    Please refer to the [facility] Labs test directory    at [email or web address] for details.  Organism: Blood Culture PCR (10 Apr 2023 23:14)  Organism: Blood Culture PCR (10 Apr 2023 23:14)      SARS-CoV-2: NotDetec (10 Apr 2023 08:01)  COVID-19 PCR: NotDetec (2023 09:37)  COVID-19 PCR: NotDetec (29 Mar 2023 19:32)  SARS-CoV-2: NotDetec (19 Mar 2023 19:31)      COMMUNICATION:  Care Discussed with Consultants/Other Providers and Details of Discussion: d/w medicine NP  Discussions with Patient/Family: d/w daughter Lashonda over the phone

## 2023-04-11 NOTE — PROGRESS NOTE ADULT - PROBLEM SELECTOR PLAN 4
CT head-No acute intracranial hemorrhage, mass effect, or shift of the midline structures.  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, his swallow test was always abnormal  but pt has learned to adjust his diet and take his time eating, but now has been getting worse   Seen by speech and swallow, s/p cinesophagram and recommended NPO   Daughters wished to pursue PEG  IR reviewed CTA chest w/ coverage of the upper abdomen, which showed low lying left hepatic lobe and interposed transverse colon with no percutaneous window for tube placement  NGT placed on 3/28 by ENT, resumed tube feeds   Seen by GI, concern for no percutaneous window for tube placement  Seen by surgery, not a surgical candidate  Restarting TFs today after possible aspiration event 4/10, monitor for further aspiration events  Family agrees to hospice w/ pleasure feeds on discharge

## 2023-04-11 NOTE — PROVIDER CONTACT NOTE (CRITICAL VALUE NOTIFICATION) - BACKGROUND
Patient admitted for hemoptysis has a history of heaturia, AFIB and HAYLIE
Patient admitted for aspiration pneumonia. PSH pacemaker placement
Patient admitted for asp. pneuomnia. PMH HTN, Afib,

## 2023-04-11 NOTE — PROGRESS NOTE ADULT - PROBLEM SELECTOR PLAN 9
Likely pre-renal HAYLIE, holding diuresis at present  Cr had improved but now again increasing in setting of new sepsis and holding of TFs  Monitor Cr  May warrant IV hydration

## 2023-04-11 NOTE — PROGRESS NOTE ADULT - PROBLEM SELECTOR PLAN 3
- fever, leukocytosis, AMS, hypoxia, in setting of new MRSA bacteremia since 4/10  - less likely due to aspiration PNA, though this may be source of bacteremia  - pyuria on UA with numerous bacteria, though potentially from indwelling catheter with colonization  - ID following  - on vancomycin for MRSA bacteremia  - holding further zosyn at the moment  - mental status acutely worsened morning of 4/10, now improving on examination and per family

## 2023-04-11 NOTE — PROGRESS NOTE ADULT - PROBLEM SELECTOR PLAN 2
- BCx 4/10 with gram positive cocci, MRSA by PCR  - started on vancomycin; f/u culture sensitivities  - ID consulted  - pending repeat TTE for vegetations; may warrant KULWINDER but overall may not be the best surgical candidate  - repeat Cx q48h until cleared

## 2023-04-11 NOTE — CONSULT NOTE ADULT - SUBJECTIVE AND OBJECTIVE BOX
Patient is a 89 yo Male who presented with a chief complaint of hemoptysis     HPI:  89 yo M with a PMH of HTN, TIA, severe AS s/p TAVR, AF, PPM, CHF, BPH, laryngeal cancer, lung SCC s/p Keytruda, R sided Bell's Palsy, who initially presented to the ED with hemoptysis.     Admitted for acute hypoxic respiratory failure, thought to be multifactorial 2/2 HFpEF and suspected aspiration pneumonia i/s/o dysphagia - CT Chest on admission with multiple findings including RLL consolidation c/f pneumonia, multiple L upper lung nodular opacities, and RUL mass s/p biopsy. Pt was treated for his CHF and also received a course of Zosyn (3/19-3/26, and 3/28-). On 4/10 pt's hospital course was complicated by acute encephalopathy, hypoglycemia and fever (T max 103.5). Noted to also have new leukocytosis to 30. Blood culture drawn on 4/10 with MRSA bacteremia. Pt currently on Zosyn since 4/10 and also has received 2 doses of Vancomycin (4/10 x1, 4/11 x1). Repeat blood cultures and a urine culture pending. ID consulted for recommendations.      REVIEW OF SYSTEMS  Unable to obtain   AMS     prior hospital charts reviewed [V]  primary team notes reviewed [V]  other consultant notes reviewed [V]    PAST MEDICAL & SURGICAL HISTORY:  Cardiac Dysrhythmia    Dyslipidemia    Hypertension    BPH (Benign Prostatic Hyperplasia)    Hx TIA  x 8yrs    A-fib    Larynx neoplasm    h/o cardioversion  of Atrial Fibrillation    excision of Basal Cell Carcinoma of Nose    S/P TAVR (transcatheter aortic valve replacement)    SOCIAL HISTORY:  - Denied smoking/vaping/alcohol/recreational drug use    FAMILY HISTORY:  Family history of hypertension    Allergies  No Known Allergies    ANTIMICROBIALS:  piperacillin/tazobactam IVPB.. 3.375 every 8 hours    ANTIMICROBIALS (past 90 days):  MEDICATIONS  (STANDING):  piperacillin/tazobactam IVPB..   25 mL/Hr IV Intermittent (23 @ 22:09)   25 mL/Hr IV Intermittent (23 @ 13:47)   25 mL/Hr IV Intermittent (23 @ 06:14)   25 mL/Hr IV Intermittent (23 @ 21:15)   25 mL/Hr IV Intermittent (23 @ 16:35)   25 mL/Hr IV Intermittent (23 @ 08:45)   25 mL/Hr IV Intermittent (23 @ 00:00)   25 mL/Hr IV Intermittent (23 @ 17:07)   25 mL/Hr IV Intermittent (23 @ 11:47)   25 mL/Hr IV Intermittent (23 @ 02:55)   25 mL/Hr IV Intermittent (23 @ 18:49)   25 mL/Hr IV Intermittent (23 @ 05:54)   25 mL/Hr IV Intermittent (23 @ 22:51)   25 mL/Hr IV Intermittent (23 @ 14:02)   25 mL/Hr IV Intermittent (23 @ 06:13)   25 mL/Hr IV Intermittent (23 @ 23:03)   25 mL/Hr IV Intermittent (23 @ 12:48)   25 mL/Hr IV Intermittent (23 @ 02:31)   25 mL/Hr IV Intermittent (23 @ 18:16)   25 mL/Hr IV Intermittent (23 @ 10:36)   25 mL/Hr IV Intermittent (23 @ 02:46)    piperacillin/tazobactam IVPB..   25 mL/Hr IV Intermittent (04-10-23 @ 08:14)    piperacillin/tazobactam IVPB..   25 mL/Hr IV Intermittent (23 @ 05:55)   25 mL/Hr IV Intermittent (04-10-23 @ 23:23)   25 mL/Hr IV Intermittent (04-10-23 @ 16:33)    piperacillin/tazobactam IVPB..   25 mL/Hr IV Intermittent (23 @ 06:29)   25 mL/Hr IV Intermittent (23 @ 22:27)   25 mL/Hr IV Intermittent (23 @ 14:39)   25 mL/Hr IV Intermittent (23 @ 05:30)   25 mL/Hr IV Intermittent (23 @ 21:42)   25 mL/Hr IV Intermittent (23 @ 13:09)   25 mL/Hr IV Intermittent (23 @ 05:45)   25 mL/Hr IV Intermittent (23 @ 22:20)   25 mL/Hr IV Intermittent (23 @ 13:23)   25 mL/Hr IV Intermittent (23 @ 06:01)   25 mL/Hr IV Intermittent (23 @ 22:07)   25 mL/Hr IV Intermittent (23 @ 13:55)   25 mL/Hr IV Intermittent (23 @ 05:52)   25 mL/Hr IV Intermittent (23 @ 22:49)   25 mL/Hr IV Intermittent (23 @ 15:39)    piperacillin/tazobactam IVPB...   200 mL/Hr IV Intermittent (23 @ 17:16)    vancomycin  IVPB   250 mL/Hr IV Intermittent (23 @ 05:04)    vancomycin  IVPB   250 mL/Hr IV Intermittent (04-10-23 @ 07:02)    OTHER MEDS:   MEDICATIONS  (STANDING):  acetaminophen     Tablet .. 650 every 6 hours PRN  albuterol/ipratropium for Nebulization 3 every 6 hours  dextrose 50% Injectable 25 once  dextrose 50% Injectable 25 once  dextrose 50% Injectable 12.5 once  dextrose 50% Injectable 25 once  dextrose Oral Gel 15 once  digoxin     Tablet 125 <User Schedule>  doxazosin 1 at bedtime  glucagon  Injectable 1 once  guaiFENesin Oral Liquid (Sugar-Free) 100 every 6 hours PRN  influenza  Vaccine (HIGH DOSE) 0.7 once  metoprolol tartrate 100 two times a day    VITALS:  Vital Signs Last 24 Hrs  T(F): 97.5 (23 @ 12:04), Max: 103.5 (04-10-23 @ 05:50)    Vital Signs Last 24 Hrs  HR: 92 (23 @ 12:04) (74 - 109)  BP: 101/50 (23 @ 12:04) (96/38 - 114/51)  RR: 17 (23 @ 12:04)  SpO2: 98% (23 @ 12:04) (98% - 100%)  Wt(kg): --    EXAM:  General: Patient in no acute distress  HEENT: NCAT, NGT in place   CV: S1+S2  Lungs: Crackles b/l   Abd: Soft, nontender   Ext: No cyanosis  Neuro: Calm  Alert and oriented to self only   Skin: No rash   Multiple scattered ecchymoses   LE venous stasis changes     Labs:                        8.1    30.03 )-----------( 323      ( 2023 02:21 )             26.7         139  |  96<L>  |  40<H>  ----------------------------<  88  4.6   |  33<H>  |  1.39<H>    Ca    8.6      2023 02:21  Phos  3.7       Mg     2.10         WBC Trend:  WBC Count: 30.03 (23 @ 02:21)  WBC Count: 23.90 (04-10-23 @ 06:15)  WBC Count: 8.75 (23 @ 08:02)    Band Neutrophils %: 6.9 % (23 @ 02:21)  Auto Neutrophil #: 23.28 K/uL (04-10-23 @ 06:15)  Band Neutrophils %: 5.2 % (04-10-23 @ 06:15)  Auto Neutrophil #: 6.99 K/uL (23 @ 05:50)  Auto Neutrophil #: 6.36 K/uL (23 @ 05:36)    Creatine Trend:  Creatinine, Serum: 1.39 ()  Creatinine, Serum: 1.00 ()  Creatinine, Serum: 0.97 ()    Liver Biochemical Testing Trend:  Alanine Aminotransferase (ALT/SGPT): 11 ()  Alanine Aminotransferase (ALT/SGPT): 10 ()  Aspartate Aminotransferase (AST/SGOT): 12 (23 @ 02:07)  Aspartate Aminotransferase (AST/SGOT): 16 (23 @ 16:36)  Bilirubin Total, Serum: 0.8 ()  Bilirubin Total, Serum: 0.6 ()    Auto Eosinophil %: 0.8 % (04-10-23 @ 06:15)    Urinalysis Basic - ( 10 Apr 2023 08:01 )    Color: Dark Brown / Appearance: Turbid / S.019 / pH: x  Gluc: x / Ketone: Negative  / Bili: Negative / Urobili: <2 mg/dL   Blood: x / Protein: 100 mg/dL / Nitrite: Negative   Leuk Esterase: Large / RBC: 4 /HPF / WBC >50 /HPF   Sq Epi: x / Non Sq Epi: x / Bacteria: TNTC    MICROBIOLOGY:    Culture - Blood (collected 10 Apr 2023 06:15)  Source: .Blood Blood-Peripheral  Preliminary Report:    Growth in aerobic bottle: Gram Positive Cocci in Clusters    Growth in anaerobic bottle: Gram Positive Cocci in Clusters    ***Blood Panel PCR results on this specimen are available    approximately 3 hours after the Gram stain result.***    Gram stain, PCR, and/or culture results may not always    correspond due to difference in methodologies.    ************************************************************    This PCR assay was performed by multiplex PCR. This    Assay tests for 66 bacterial and resistance gene targets.    Please refer to the [facility] Labs test directory    at [email or web address] for details.  Organism: Blood Culture PCR  Organism: Blood Culture PCR    Sensitivities:      Method Type: PCR      -  Methicillin resistant Staphylococcus aureus (MRSA): Detec    Culture - Blood (collected 19 Mar 2023 16:50)  Source: .Blood Blood-Peripheral  Final Report:    No Growth Final    Culture - Blood (collected 19 Mar 2023 16:31)  Source: .Blood Blood-Peripheral  Final Report:    No Growth Final    Rapid RVP Result: NotDetec (04-10 @ 08:01)    COVID-19 PCR: NotDetec (23 @ 09:37)  COVID-19 PCR: NotDetec (23 @ 19:32)    Procalcitonin, Serum: 1.14 (04-10)    Blood Gas Venous - Lactate: 3.8 (04-10 @ 06:15)    RADIOLOGY:  imaging below personally reviewed    < from: Xray Chest 1 View AP/PA (04.10.23 @ 07:54) >  IMPRESSION:  Unchanged moderate bilateral pleural effusions and bilateral lower lung   patchy opacities.    --- End of Report ---    < end of copied text >    < from: CT Angio Chest PE Protocol w/ IV Cont (23 @ 18:34) >    IMPRESSION:  No pulmonary embolism.    Area of consolidation in the right lower lobe which may represent   pneumonia.    Multiple nodular opacities in the left upper lung which may be   infectious, inflammatory or neoplastic in etiology.    Right upper lobe mass status post biopsy.    --- End of Report ---    < end of copied text > Patient is a 89 yo Male who presented with a chief complaint of hemoptysis     HPI:  89 yo M with a PMH of HTN, TIA, severe AS s/p TAVR, AF, PPM, CHF, BPH, laryngeal cancer, lung SCC s/p Keytruda, R sided Bell's Palsy, who initially presented to the ED with hemoptysis.     Admitted for acute hypoxic respiratory failure, thought to be multifactorial 2/2 HFpEF and suspected aspiration pneumonia i/s/o dysphagia - CT Chest on admission with multiple findings including RLL consolidation c/f pneumonia, multiple L upper lung nodular opacities, and RUL mass s/p biopsy. Pt was treated for his CHF and also received a course of Zosyn (3/19-3/26, and 3/28-). On 4/10 pt's hospital course was complicated by acute encephalopathy, hypoglycemia and fever (T max 103.5). Noted to also have new leukocytosis to 30. Blood culture drawn on 4/10 with MRSA bacteremia. Pt currently on Zosyn since 4/10 and also has received 2 doses of Vancomycin (4/10 x1, 4/11 x1). Repeat blood cultures and a urine culture pending. ID consulted for recommendations.      REVIEW OF SYSTEMS  Unable to obtain   AMS     prior hospital charts reviewed [V]  primary team notes reviewed [V]  other consultant notes reviewed [V]    PAST MEDICAL & SURGICAL HISTORY:  Cardiac Dysrhythmia    Dyslipidemia    Hypertension    BPH (Benign Prostatic Hyperplasia)    Hx TIA  x 8yrs    A-fib    Larynx neoplasm    h/o cardioversion  of Atrial Fibrillation    excision of Basal Cell Carcinoma of Nose    S/P TAVR (transcatheter aortic valve replacement)    SOCIAL HISTORY:  - Denied smoking/vaping/alcohol/recreational drug use    FAMILY HISTORY:  Family history of hypertension    Allergies  No Known Allergies    ANTIMICROBIALS:  piperacillin/tazobactam IVPB.. 3.375 every 8 hours    ANTIMICROBIALS (past 90 days):  MEDICATIONS  (STANDING):  piperacillin/tazobactam IVPB..   25 mL/Hr IV Intermittent (23 @ 22:09)   25 mL/Hr IV Intermittent (23 @ 13:47)   25 mL/Hr IV Intermittent (23 @ 06:14)   25 mL/Hr IV Intermittent (23 @ 21:15)   25 mL/Hr IV Intermittent (23 @ 16:35)   25 mL/Hr IV Intermittent (23 @ 08:45)   25 mL/Hr IV Intermittent (23 @ 00:00)   25 mL/Hr IV Intermittent (23 @ 17:07)   25 mL/Hr IV Intermittent (23 @ 11:47)   25 mL/Hr IV Intermittent (23 @ 02:55)   25 mL/Hr IV Intermittent (23 @ 18:49)   25 mL/Hr IV Intermittent (23 @ 05:54)   25 mL/Hr IV Intermittent (23 @ 22:51)   25 mL/Hr IV Intermittent (23 @ 14:02)   25 mL/Hr IV Intermittent (23 @ 06:13)   25 mL/Hr IV Intermittent (23 @ 23:03)   25 mL/Hr IV Intermittent (23 @ 12:48)   25 mL/Hr IV Intermittent (23 @ 02:31)   25 mL/Hr IV Intermittent (23 @ 18:16)   25 mL/Hr IV Intermittent (23 @ 10:36)   25 mL/Hr IV Intermittent (23 @ 02:46)    piperacillin/tazobactam IVPB..   25 mL/Hr IV Intermittent (04-10-23 @ 08:14)    piperacillin/tazobactam IVPB..   25 mL/Hr IV Intermittent (23 @ 05:55)   25 mL/Hr IV Intermittent (04-10-23 @ 23:23)   25 mL/Hr IV Intermittent (04-10-23 @ 16:33)    piperacillin/tazobactam IVPB..   25 mL/Hr IV Intermittent (23 @ 06:29)   25 mL/Hr IV Intermittent (23 @ 22:27)   25 mL/Hr IV Intermittent (23 @ 14:39)   25 mL/Hr IV Intermittent (23 @ 05:30)   25 mL/Hr IV Intermittent (23 @ 21:42)   25 mL/Hr IV Intermittent (23 @ 13:09)   25 mL/Hr IV Intermittent (23 @ 05:45)   25 mL/Hr IV Intermittent (23 @ 22:20)   25 mL/Hr IV Intermittent (23 @ 13:23)   25 mL/Hr IV Intermittent (23 @ 06:01)   25 mL/Hr IV Intermittent (23 @ 22:07)   25 mL/Hr IV Intermittent (23 @ 13:55)   25 mL/Hr IV Intermittent (23 @ 05:52)   25 mL/Hr IV Intermittent (23 @ 22:49)   25 mL/Hr IV Intermittent (23 @ 15:39)    piperacillin/tazobactam IVPB...   200 mL/Hr IV Intermittent (23 @ 17:16)    vancomycin  IVPB   250 mL/Hr IV Intermittent (23 @ 05:04)    vancomycin  IVPB   250 mL/Hr IV Intermittent (04-10-23 @ 07:02)    OTHER MEDS:   MEDICATIONS  (STANDING):  acetaminophen     Tablet .. 650 every 6 hours PRN  albuterol/ipratropium for Nebulization 3 every 6 hours  dextrose 50% Injectable 25 once  dextrose 50% Injectable 25 once  dextrose 50% Injectable 12.5 once  dextrose 50% Injectable 25 once  dextrose Oral Gel 15 once  digoxin     Tablet 125 <User Schedule>  doxazosin 1 at bedtime  glucagon  Injectable 1 once  guaiFENesin Oral Liquid (Sugar-Free) 100 every 6 hours PRN  influenza  Vaccine (HIGH DOSE) 0.7 once  metoprolol tartrate 100 two times a day    VITALS:  Vital Signs Last 24 Hrs  T(F): 97.5 (23 @ 12:04), Max: 103.5 (04-10-23 @ 05:50)    Vital Signs Last 24 Hrs  HR: 92 (23 @ 12:04) (74 - 109)  BP: 101/50 (23 @ 12:04) (96/38 - 114/51)  RR: 17 (23 @ 12:04)  SpO2: 98% (23 @ 12:04) (98% - 100%)  Wt(kg): --    EXAM:  General: Patient in no acute distress  HEENT: NCAT, NGT in place   CV: S1+S2  Lungs: Crackles b/l   Abd: Soft, nontender   : Chang in place   Ext: No cyanosis  Neuro: Calm  Alert and oriented to self only   Skin: No rash   Multiple scattered ecchymoses   LE venous stasis changes     Labs:                        8.1    30.03 )-----------( 323      ( 2023 02:21 )             26.7         139  |  96<L>  |  40<H>  ----------------------------<  88  4.6   |  33<H>  |  1.39<H>    Ca    8.6      2023 02:21  Phos  3.7       Mg     2.10         WBC Trend:  WBC Count: 30.03 (23 @ 02:21)  WBC Count: 23.90 (04-10-23 @ 06:15)  WBC Count: 8.75 (23 @ 08:02)    Band Neutrophils %: 6.9 % (23 @ 02:21)  Auto Neutrophil #: 23.28 K/uL (04-10-23 @ 06:15)  Band Neutrophils %: 5.2 % (04-10-23 @ 06:15)  Auto Neutrophil #: 6.99 K/uL (23 @ 05:50)  Auto Neutrophil #: 6.36 K/uL (23 @ 05:36)    Creatine Trend:  Creatinine, Serum: 1.39 ()  Creatinine, Serum: 1.00 ()  Creatinine, Serum: 0.97 ()    Liver Biochemical Testing Trend:  Alanine Aminotransferase (ALT/SGPT): 11 ()  Alanine Aminotransferase (ALT/SGPT): 10 ()  Aspartate Aminotransferase (AST/SGOT): 12 (23 @ 02:07)  Aspartate Aminotransferase (AST/SGOT): 16 (23 @ 16:36)  Bilirubin Total, Serum: 0.8 ()  Bilirubin Total, Serum: 0.6 ()    Auto Eosinophil %: 0.8 % (04-10-23 @ 06:15)    Urinalysis Basic - ( 10 Apr 2023 08:01 )    Color: Dark Brown / Appearance: Turbid / S.019 / pH: x  Gluc: x / Ketone: Negative  / Bili: Negative / Urobili: <2 mg/dL   Blood: x / Protein: 100 mg/dL / Nitrite: Negative   Leuk Esterase: Large / RBC: 4 /HPF / WBC >50 /HPF   Sq Epi: x / Non Sq Epi: x / Bacteria: TNTC    MICROBIOLOGY:    Culture - Blood (collected 10 Apr 2023 06:15)  Source: .Blood Blood-Peripheral  Preliminary Report:    Growth in aerobic bottle: Gram Positive Cocci in Clusters    Growth in anaerobic bottle: Gram Positive Cocci in Clusters    ***Blood Panel PCR results on this specimen are available    approximately 3 hours after the Gram stain result.***    Gram stain, PCR, and/or culture results may not always    correspond due to difference in methodologies.    ************************************************************    This PCR assay was performed by multiplex PCR. This    Assay tests for 66 bacterial and resistance gene targets.    Please refer to the [facility] Labs test directory    at [email or web address] for details.  Organism: Blood Culture PCR  Organism: Blood Culture PCR    Sensitivities:      Method Type: PCR      -  Methicillin resistant Staphylococcus aureus (MRSA): Detec    Culture - Blood (collected 19 Mar 2023 16:50)  Source: .Blood Blood-Peripheral  Final Report:    No Growth Final    Culture - Blood (collected 19 Mar 2023 16:31)  Source: .Blood Blood-Peripheral  Final Report:    No Growth Final    Rapid RVP Result: NotDetec (04-10 @ 08:01)    COVID-19 PCR: NotDetec (23 @ 09:37)  COVID-19 PCR: NotDetec (23 @ 19:32)    Procalcitonin, Serum: 1.14 (04-10)    Blood Gas Venous - Lactate: 3.8 (04-10 @ 06:15)    RADIOLOGY:  imaging below personally reviewed    < from: Xray Chest 1 View AP/PA (04.10.23 @ 07:54) >  IMPRESSION:  Unchanged moderate bilateral pleural effusions and bilateral lower lung   patchy opacities.    --- End of Report ---    < end of copied text >    < from: CT Angio Chest PE Protocol w/ IV Cont (23 @ 18:34) >    IMPRESSION:  No pulmonary embolism.    Area of consolidation in the right lower lobe which may represent   pneumonia.    Multiple nodular opacities in the left upper lung which may be   infectious, inflammatory or neoplastic in etiology.    Right upper lobe mass status post biopsy.    --- End of Report ---    < end of copied text >

## 2023-04-12 NOTE — PROGRESS NOTE ADULT - PROBLEM SELECTOR PLAN 2
- BCx 4/10 with gram positive cocci, MRSA by PCR  - started on vancomycin; f/u culture sensitivities  - ID consulted  - pending repeat TTE for vegetations; may warrant KULWINDER but overall may not be the best surgical candidate  - repeat Cx q48h until cleared - repeat tomorrow 4/13 after 2 days of vancomycin dosed  - EP consulted given presence of cardiac hardware (PPM)

## 2023-04-12 NOTE — CONSULT NOTE ADULT - ASSESSMENT
89yoM w/ PMHx severe AS s/p TAVR, afib on coumadin, PPM, HTN, HLD, TIA, BPH, remote esophageal cancer s/p chemo and RT, remove EBV infection 20 years ago with Bell's palsy and residual right sided facial droop initially presenting with hemoptysis and dysphagia. CT chest showed multiple findings including RLL consolidation c/w PNA, multiple L upper lung nodular opacities, RUL mass s/p course of Zosyn.  Hospital course was complicated by acute encephalopathy, hypoglycemia and fever (Tmax 103.5) found have leukocytosis.  Blood cultures positive for MRSA bacteremia and was started on vancomycin.  As per charts, ongoing GOC discussions with family indicate hospice and pleasure feeds post hospitalization.  EPS consulted for PPM management in setting of MRSA bacteremia.      MRSA bacteremia  89yoM w/ PMHx severe AS s/p TAVR, afib on coumadin, PPM, HTN, HLD, TIA, BPH, remote esophageal cancer s/p chemo and RT, remove EBV infection 20 years ago with Bell's palsy and residual right sided facial droop initially presenting with hemoptysis and dysphagia. CT chest showed multiple findings including RLL consolidation c/w PNA, multiple L upper lung nodular opacities, RUL mass s/p course of Zosyn.  Hospital course was complicated by acute encephalopathy, hypoglycemia and fever (Tmax 103.5) found have leukocytosis.  Blood cultures positive for MRSA bacteremia and was started on vancomycin.  As per charts, ongoing GOC discussions with family indicate hospice and pleasure feeds post hospitalization.  EPS consulted for PPM management in setting of MRSA bacteremia.      MRSA bacteremia  Patient not pacer dependent but  82.6%  Discussed with patient's daughter/HCP regarding further management including long term antibiotics versus device extraction; reviewed risks and benefits   Daughter at this time would defer to conservative management as patient is frail and would not fare well with invasive procedures  Continue abx recs as per I/D   Continue with GOC discussions with family

## 2023-04-12 NOTE — CONSULT NOTE ADULT - PROVIDER SPECIALTY LIST ADULT
Gastroenterology
Electrophysiology
ENT
Infectious Disease
Surgery
Cardiology
Ophthalmology
Cardiology
Urology
Intervent Radiology
Pulmonology
Palliative Care

## 2023-04-12 NOTE — CONSULT NOTE ADULT - CONSULT REASON
Gross hematuria
MRSA bacteremia
PEG tube pre-op risk stratification
MRSA bacteremia
TLOV
gastrostomy
elevated CE
peg
PEG tube placement
NGT placement
Hemoptysis
Goals of Care

## 2023-04-12 NOTE — CONSULT NOTE ADULT - ASSESSMENT
Assessment and Recommendations:  89y male w/ pmhx/ochx of HTN, HLD, TIA, severe AS s/p TAVR 4 years ago, Afib on Coumadin, PPM, CHF,  remote esophageal CA s/p chemo and radiation, lung CA, hx EBV infection with Bell's palsy and residual right-sided facial droop, Neovascular ARMD OU, CE/PCIOL OU admitted for hemoptysis and increased weakness. Ophthalmology consulted for TLOV last night unknown eye of unknown duration, currently vision at baseline per pt.    # Transient Loss of Vision    ***INCOMPLETE NOTE***     Outpatient follow-up: Patient should follow-up with his/her ophthalmologist or with [facility] Department of Ophthalmology upon discharge at the address below     [address] [place] [address]. [address]  [place] [address]  [phone] Assessment and Recommendations:  89y male w/ pmhx/ochx of HTN, HLD, TIA, severe AS s/p TAVR 4 years ago, Afib on Coumadin, PPM, CHF,  remote esophageal CA s/p chemo and radiation, lung CA, hx EBV infection with Bell's palsy and residual right-sided facial droop, Neovascular ARMD OU, CE/PCIOL OU admitted for hemoptysis and increased weakness. Ophthalmology consulted for TLOV last night unknown eye of unknown duration, currently vision at baseline per pt.    # Transient Loss of Vision  - pt reports seeing "completely black" last night but cannot quantify duration or which eye  - visual acuity currently back to baseline: 20/800 OD, CF OS. Decreased in setting of advanced macular degeneration  - GCA ROS negative  - Hx of TIA and Afib - would pursue stroke workup  - CTH pending  - rec MRI brain and orbits w/w/o con (if able to get contrast)  - neurology consult    # Advanced neovascular macular degeneration OU  - visual acuity back at baseline  - follow-up with Dr. Nixon outpatient    Pt seen and discussed with Dr. Espinoza, attending.     Outpatient follow-up: Patient should follow-up with his/her ophthalmologist or with [facility] Department of Ophthalmology upon discharge at the address below     [address] [place] [address]. [address]  [place] [address]  [phone]

## 2023-04-12 NOTE — PROGRESS NOTE ADULT - SUBJECTIVE AND OBJECTIVE BOX
[facility] Division of Hospital Medicine  Esau ArtisDO  Available via MS Teams  In house pager [phone]    SUBJECTIVE / OVERNIGHT EVENTS:  No acute events in interim.  This morning patient told daughter (at bedside, who informed me) that he had difficulty seeing the TV and his watch. Unclear duration of visual deficit. Difficult to characterize as patient does not offer specifics.      ADDITIONAL REVIEW OF SYSTEMS:    MEDICATIONS  (STANDING):  albuterol/ipratropium for Nebulization 3 milliLiter(s) Nebulizer every 6 hours  dextrose 50% Injectable 25 milliLiter(s) IV Push once  dextrose 50% Injectable 25 Gram(s) IV Push once  dextrose 50% Injectable 12.5 Gram(s) IV Push once  dextrose 50% Injectable 25 Gram(s) IV Push once  dextrose Oral Gel 15 Gram(s) Oral once  digoxin     Tablet 125 MICROGram(s) Oral <User Schedule>  doxazosin 1 milliGRAM(s) Oral at bedtime  glucagon  Injectable 1 milliGRAM(s) IntraMuscular once  influenza  Vaccine (HIGH DOSE) 0.7 milliLiter(s) IntraMuscular once  metoprolol tartrate 100 milliGRAM(s) Enteral Tube two times a day    MEDICATIONS  (PRN):  acetaminophen     Tablet .. 650 milliGRAM(s) Oral every 6 hours PRN Mild Pain (1 - 3), Moderate Pain (4 - 6)  guaiFENesin Oral Liquid (Sugar-Free) 100 milliGRAM(s) Oral every 6 hours PRN Cough      I&O's Summary    11 Apr 2023 07:01  -  12 Apr 2023 07:00  --------------------------------------------------------  IN: 1030 mL / OUT: 400 mL / NET: 630 mL        PHYSICAL EXAM:  Vital Signs Last 24 Hrs  T(C): 36.3 (12 Apr 2023 05:00), Max: 36.4 (11 Apr 2023 12:04)  T(F): 97.3 (12 Apr 2023 05:00), Max: 97.5 (11 Apr 2023 12:04)  HR: 81 (12 Apr 2023 09:46) (74 - 106)  BP: 90/55 (12 Apr 2023 05:00) (90/55 - 110/52)  BP(mean): --  RR: 18 (12 Apr 2023 05:00) (17 - 18)  SpO2: 98% (12 Apr 2023 09:46) (95% - 100%)    Parameters below as of 12 Apr 2023 09:46  Patient On (Oxygen Delivery Method): nasal cannula      CONSTITUTIONAL: NAD, well-developed, well-groomed, speaking full sentences  EYES: PERRLA; conjunctiva and sclera clear  ENMT: Moist oral mucosa, no pharyngeal injection or exudates; normal dentition; NGT in place with TFs running  NECK: Supple, no palpable masses; no thyromegaly  RESPIRATORY: Normal respiratory effort; lungs are clear to auscultation bilaterally  CARDIOVASCULAR: Regular rate and rhythm, normal S1 and S2, no murmur/rub/gallop; No lower extremity edema; Peripheral pulses are 2+ bilaterally  ABDOMEN: Nontender to palpation, normoactive bowel sounds, no rebound/guarding; No hepatosplenomegaly  MUSCULOSKELETAL:  no clubbing or cyanosis of digits; no joint swelling or tenderness to palpation  PSYCH: A+O to person, place, and time; affect appropriate  NEUROLOGY: CN 2-12 are intact and symmetric; no gross sensory deficits   SKIN: No rashes; no palpable lesions    LABS:                        7.2    17.72 )-----------( 256      ( 12 Apr 2023 07:00 )             24.1     04-12    141  |  99  |  44<H>  ----------------------------<  129<H>  4.3   |  31  |  1.46<H>    Ca    8.5      12 Apr 2023 07:00  Phos  3.2     04-12  Mg     2.20     04-12                Culture - Blood (collected 11 Apr 2023 03:26)  Source: .Blood Blood-Venous  Gram Stain (12 Apr 2023 10:48):    Growth in aerobic bottle: Gram Positive Cocci in Clusters  Preliminary Report (12 Apr 2023 10:48):    Growth in aerobic bottle: Gram Positive Cocci in Clusters    Culture - Blood (collected 11 Apr 2023 02:00)  Source: .Blood Blood-Peripheral  Gram Stain (12 Apr 2023 08:04):    Growth in aerobic bottle: Gram Positive Cocci in Clusters  Preliminary Report (12 Apr 2023 08:04):    Growth in aerobic bottle: Gram Positive Cocci in Clusters    Culture - Urine (collected 10 Apr 2023 16:26)  Source: Clean Catch Clean Catch (Midstream)  Final Report (11 Apr 2023 16:14):    >=3 organisms. Probable collection contamination.    Culture - Blood (collected 10 Apr 2023 06:15)  Source: .Blood Blood-Peripheral  Gram Stain (11 Apr 2023 00:27):    Growth in aerobic bottle: Gram Positive Cocci in Clusters    Growth in anaerobic bottle: Gram Positive Cocci in Clusters  Preliminary Report (11 Apr 2023 16:51):    Growth in aerobic and anaerobic bottles: Staphylococcus aureus    ***Blood Panel PCR results on this specimen are available    approximately 3 hours after the Gram stain result.***    Gram stain, PCR, and/or culture results may not always    correspond dueto difference in methodologies.    ************************************************************    This PCR assay was performed by multiplex PCR. This    Assay tests for 66 bacterial and resistance gene targets.    Please refer to the [facility] test directory    at [email or web address] for details.  Organism: Blood Culture PCR (10 Apr 2023 23:14)  Organism: Blood Culture PCR (10 Apr 2023 23:14)      SARS-CoV-2: NotDetec (10 Apr 2023 08:01)  COVID-19 PCR: NotDetec (02 Apr 2023 09:37)  COVID-19 PCR: NotDetec (29 Mar 2023 19:32)  SARS-CoV-2: NotDetec (19 Mar 2023 19:31)        COMMUNICATION:  Care Discussed with Consultants/Other Providers and Details of Discussion: d/w medicine PA; d/w ophthalmology, EP for consults  Discussions with Patient/Family: d/w daughter Lashonda at bedside this morning 4/12

## 2023-04-12 NOTE — PROVIDER CONTACT NOTE (CRITICAL VALUE NOTIFICATION) - ASSESSMENT
Patient A&Ox4, VS as documented, Afib w/ pacing on tele. Patient denies chest pain and SOB. NPO. Blood glucose monitored Q6H. Heparin running at 7 mL/hr. Meds given as per orders, tolerated well. Safety/fall protocols maintained, call bell within reach, hourly rounding, will continue to monitor.
Pt stable.
Stable
Patient A&Ox4, VS as documented, afib on tele. heparin running at 7 mL/hr.  Patient denies chest pain and SOB. I&O monitored. Blood glucose monitored Q6H. Meds given as per orders, tolerated well. Safety/fall protocols maintained, call bell within reach, hourly rounding, will continue to monitor.
Patient is sleeping comfortably in bed free of fever.
Pt stable.

## 2023-04-12 NOTE — PROGRESS NOTE ADULT - PROBLEM SELECTOR PLAN 3
- fever, leukocytosis, AMS, hypoxia, in setting of new MRSA bacteremia since 4/10  - less likely due to aspiration PNA, though this may be source of bacteremia  - pyuria on UA with numerous bacteria, though potentially from indwelling catheter with colonization  - ID following  - on vancomycin for MRSA bacteremia  - holding further zosyn  - mental status acutely worsened morning of 4/10, now improving on examination and per family

## 2023-04-12 NOTE — PROGRESS NOTE ADULT - PROBLEM SELECTOR PLAN 1
Likely d/t acute on chronic HFpEF, now w/ worsening hypoxia 4/6 likely d/t aspiration    CXR w/ increasing pulmonary vascular congestion. Unchanged moderate bilateral pleural effusions with adjacent atelectasis. Offer lasix PRN.  As per pulm; no urgent need for thoracentesis at this time   Supplemental O2, monitor O2 sats  Still requiring 6Lpm via NC, wean as tolerated

## 2023-04-12 NOTE — PROCEDURE NOTE - ADDITIONAL PROCEDURE DETAILS
Single chamber pacemaker in VVIR mode   Normal sensing and pacing via iterative testing  Excellent threshold capture  No events recorded   No reprogramming    Pacemaker Medtronic [id number] Reedurban XT SR MRI [id number] implanted 9/17/2020    RV lead Medtronic 4074 Capsure Sense [id number] implanted 4/11/2011

## 2023-04-12 NOTE — PROGRESS NOTE ADULT - PROBLEM SELECTOR PLAN 4
- Pt reporting transient difficulties with seeing TV and watch overnight 4/11, seemingly now resolved, but does not characterize loss further  - Ophthalmology consulted, appreciate input  - Pending CT head  - Would ideally obtain CTA head/neck but will hold off at present given recent rise in Cr  - Given presence of MRSA bacteremia, there is a concern for septic emboli but still being worked up for presence of vegetations

## 2023-04-12 NOTE — PROGRESS NOTE ADULT - SUBJECTIVE AND OBJECTIVE BOX
Follow Up: MRSA bacteremia     Interval History/ROS: Seen and examined at bedside    REVIEW OF SYSTEMS  Unable to obtain    Allergies  No Known Allergies    ANTIMICROBIALS:    vancomycin  IVPB 1000 once    OTHER MEDS: MEDICATIONS  (STANDING):  acetaminophen     Tablet .. 650 every 6 hours PRN  albuterol/ipratropium for Nebulization 3 every 6 hours  dextrose 50% Injectable 25 once  dextrose 50% Injectable 25 once  dextrose 50% Injectable 12.5 once  dextrose 50% Injectable 25 once  dextrose Oral Gel 15 once  digoxin     Tablet 125 <User Schedule>  doxazosin 1 at bedtime  glucagon  Injectable 1 once  guaiFENesin Oral Liquid (Sugar-Free) 100 every 6 hours PRN  influenza  Vaccine (HIGH DOSE) 0.7 once  metoprolol tartrate 100 two times a day    Vital Signs Last 24 Hrs  T(F): 97.3 (04-12-23 @ 05:00), Max: 103.5 (04-10-23 @ 05:50)    Vital Signs Last 24 Hrs  HR: 81 (04-12-23 @ 09:46) (74 - 106)  BP: 90/55 (04-12-23 @ 05:00) (90/55 - 110/52)  RR: 18 (04-12-23 @ 05:00)  SpO2: 98% (04-12-23 @ 09:46) (95% - 100%)  Wt(kg): --    EXAM:  General: Patient in NAD   HEENT: NCAT, NGT in place   CV: S1+S2  Lungs: Crackles b/l   Abd: Soft, nontender   : Chang in place   Ext: No cyanosis  Neuro: Calm  Alert and oriented to self only   Skin: No rash   Multiple scattered ecchymoses   LE venous stasis changes     Labs:                        7.2    17.72 )-----------( 256      ( 12 Apr 2023 07:00 )             24.1     04-12    141  |  99  |  44<H>  ----------------------------<  129<H>  4.3   |  31  |  1.46<H>    Ca    8.5      12 Apr 2023 07:00  Phos  3.2     04-12  Mg     2.20     04-12    WBC Trend:  WBC Count: 17.72 (04-12-23 @ 07:00)  WBC Count: 30.03 (04-11-23 @ 02:21)  WBC Count: 23.90 (04-10-23 @ 06:15)    Creatine Trend:  Creatinine, Serum: 1.46 (04-12)  Creatinine, Serum: 1.39 (04-11)  Creatinine, Serum: 1.00 (04-07)  Creatinine, Serum: 0.97 (04-06)    Liver Biochemical Testing Trend:  Alanine Aminotransferase (ALT/SGPT): 11 (03-20)  Alanine Aminotransferase (ALT/SGPT): 10 (03-19)  Aspartate Aminotransferase (AST/SGOT): 12 (03-20-23 @ 02:07)  Aspartate Aminotransferase (AST/SGOT): 16 (03-19-23 @ 16:36)  Bilirubin Total, Serum: 0.8 (03-20)  Bilirubin Total, Serum: 0.6 (03-19)    MICROBIOLOGY:  Vancomycin Level, Random: 12.3 (04-12 @ 07:00)    Culture - Blood (collected 11 Apr 2023 03:26)  Source: .Blood Blood-Venous  Preliminary Report:    Growth in aerobic bottle: Gram Positive Cocci in Clusters    Culture - Blood (collected 11 Apr 2023 02:00)  Source: .Blood Blood-Peripheral  Preliminary Report:    Growth in aerobic bottle: Gram Positive Cocci in Clusters    Culture - Urine (collected 10 Apr 2023 16:26)  Source: Clean Catch Clean Catch (Midstream)  Final Report:    >=3 organisms. Probable collection contamination.    Culture - Blood (collected 10 Apr 2023 06:15)  Source: .Blood Blood-Peripheral  Preliminary Report:    Growth in aerobic and anaerobic bottles: Staphylococcus aureus    ***Blood Panel PCR results on this specimen are available    approximately 3 hours after the Gram stain result.***    Gram stain, PCR, and/or culture results may not always    correspond dueto difference in methodologies.    ************************************************************    This PCR assay was performed by multiplex PCR. This    Assay tests for 66 bacterial and resistance gene targets.    Please refer to the [facility] Labs test directory    at [email or web address] for details.  Organism: Blood Culture PCR  Organism: Blood Culture PCR    Sensitivities:      Method Type: PCR      -  Methicillin resistant Staphylococcus aureus (MRSA): Detec    Culture - Blood (collected 19 Mar 2023 16:50)  Source: .Blood Blood-Peripheral  Final Report:    No Growth Final    Culture - Blood (collected 19 Mar 2023 16:31)  Source: .Blood Blood-Peripheral  Final Report:    No Growth Final    Rapid RVP Result: NotDetec (04-10 @ 08:01)    COVID-19 PCR: NotDetec (04-02-23 @ 09:37)  COVID-19 PCR: NotDetec (03-29-23 @ 19:32)    Procalcitonin, Serum: 1.14 (04-10)    Blood Gas Venous - Lactate: 3.8 (04-10 @ 06:15)    RADIOLOGY:  imaging below personally reviewed    < from: Xray Chest 1 View AP/PA (04.10.23 @ 07:54) >  IMPRESSION:  Unchanged moderate bilateral pleural effusions and bilateral lower lung   patchy opacities.    --- End of Report ---    < end of copied text >

## 2023-04-12 NOTE — PROGRESS NOTE ADULT - ASSESSMENT
89 yo M with a PMH of HTN, TIA, severe AS s/p TAVR, AF, PPM, CHF, BPH, laryngeal cancer, lung SCC s/p Keytruda, R sided Bell's Palsy, who initially presented to the ED with hemoptysis.     Admitted for acute hypoxic respiratory failure  Multifactorial 2/2 HFpEF, aspiration pneumonia i/s/o dysphagia  Treated with Zosyn 3/19-3/26 and 3/28-4/2 and diuresis    S/p RRT on 4/10 for acute encephalopathy, fever, sepsis, leukocytosis  Found to have MRSA bacteremia, + blood cultures from 4/10 and 4/11  Currently on Vancomycin     OVERALL  Hypoxic respiratory failure  Fevers, encephalopathy, leukocytosis, sepsis  MRSA bacteremia in a 89 yo patient with significant cardiac hardware (TAVR, PPM)    RECOMMENDATIONS  -Random Vancomycin level today 12.3, would continue Vancomycin 1g IV QD   -Repeat cultures every 48h until bacteremia clears   -Check TTE (pending)   -Recommend EP evaluation although pt is likely a poor surgical candidate and surgery might not be part of GOC (defer discussion to Primary Team)   -Aspiration precautions     All recommendations are tentative pending Attending Attestation. 89 yo M with a PMH of HTN, TIA, severe AS s/p TAVR, AF, PPM, CHF, BPH, laryngeal cancer, lung SCC s/p Keytruda, R sided Bell's Palsy, who initially presented to the ED with hemoptysis.     Admitted for acute hypoxic respiratory failure  Multifactorial 2/2 HFpEF, aspiration pneumonia i/s/o dysphagia  Treated with Zosyn 3/19-3/26 and 3/28-4/2 and diuresis    S/p RRT on 4/10 for acute encephalopathy, fever, sepsis, leukocytosis  Found to have MRSA bacteremia, + blood cultures from 4/10 and 4/11  Currently on Vancomycin     OVERALL  Hypoxic respiratory failure  Fevers, encephalopathy, leukocytosis, sepsis  MRSA bacteremia in a 89 yo patient with significant cardiac hardware (TAVR, PPM)    RECOMMENDATIONS  -Random Vancomycin level today 12.3, would continue Vancomycin 1g IV QD for now with daily BMP to monitor Cr closely   -Repeat blood cultures every 48h until bacteremia clears   -Check TTE (pending)   -Recommend EP evaluation given TAVR, PPM  -Aspiration precautions       Aidan Charles MD, PGY-4   ID Fellow  [facility] Teams Preferred  After 5pm/weekends call [phone]  89 yo M with a PMH of HTN, TIA, severe AS s/p TAVR, AF, PPM, CHF, BPH, laryngeal cancer, lung SCC s/p Keytruda, R sided Bell's Palsy, who initially presented to the ED with hemoptysis.     Admitted for acute hypoxic respiratory failure  Multifactorial 2/2 HFpEF, aspiration pneumonia i/s/o dysphagia  Treated with Zosyn 3/19-3/26 and 3/28-4/2 and diuresis    S/p RRT on 4/10 for acute encephalopathy, fever, sepsis, leukocytosis  Found to have MRSA bacteremia, + blood cultures from 4/10 and 4/11  Currently on Vancomycin     OVERALL  Hypoxic respiratory failure  Fevers, encephalopathy, leukocytosis, sepsis  MRSA bacteremia in a 89 yo patient with significant cardiac hardware (TAVR, PPM)    RECOMMENDATIONS  -Random Vancomycin level today 12.3, would continue Vancomycin for now with daily BMP to monitor Cr closely   -Decrease dose of Vancomycin to 750mg IV QD  -Repeat blood cultures every 48h until bacteremia clears   -Check TTE (pending)   -Recommend EP evaluation given TAVR, PPM  -Aspiration precautions       Aidan Charles MD, PGY-4   ID Fellow  [facility] Teams Preferred  After 5pm/weekends call [phone]

## 2023-04-12 NOTE — CHART NOTE - NSCHARTNOTEFT_GEN_A_CORE
opthalmology - consulted for change in vision and rec mri brain and orbits with and without contrast and neuro consult r/o tia (has hx tia about 20 years ago). discussed with daughter tien, requesting we hold off on mri at this time; does not think father could lay flat for mri as he is coughing  while sitting up, does want to continue with head ct as this is a shorter test, ep will be talking with tien shortly re their findings and recommendations based on echo. as there are multiple medical issues all occurring we did discuss home hospice pending ep recs and how  the patient / family  would   like to proceed

## 2023-04-12 NOTE — PROGRESS NOTE ADULT - ATTENDING COMMENTS
-likely contributing to the hypokalemia 89-year-old male with a past medical history significant for hypertension, severe aortic stenosis status post TAVR, PPM placement, laryngeal cancer, lung squamous cell carcinoma status post Keytruda therapy who was admitted to the hospital due to hemoptysis.    Overnight: No acute events.    Patient seen examined at bedside.  Unable to obtain ROS.    Impression  #MRSA bacteremia  #Presence of TAVR, PPM–concern for infection/source of bacteremia  #Acute hypoxic respiratory failure status post piperacillin/tazobactam course for possible pneumonia  #Fever  #HAYLIE    TTE on 412 without evidence for endocarditis  Repeat Bcx positive    Recommendations  Continue vancomycin at 750 mg daily, follow level in setting of rising creatinine  Obtain repeat blood cultures every 48 hours until clear, follow pending blood culture  Obtain KULWINDER if within goals of care and if feasible  EP evaluation given cardiac hardware, PPM  Overall, prognosis guarded–goals of care discussions per primary team  Follow fever curve and WBC count    Sebas Garcia MD  Division of Infectious Diseases

## 2023-04-12 NOTE — CONSULT NOTE ADULT - SUBJECTIVE AND OBJECTIVE BOX
[facility] DEPARTMENT OF OPHTHALMOLOGY - INITIAL ADULT CONSULT  -----------------------------------------------------------------------------------------------------------  Nimco Bosch MD PGY-3  -----------------------------------------------------------------------------------------------------------    HPI:  89yM Macedonian speaking, HTN, HLD, TIA, severe AS s/p TAVR 4 years ago, Afib  on Coumadin, PPM, CHF on entresto and lasix, bph on flomax and finasteride,  remote esophageal CA s/p chemo and radiation 12 years ago and lung CA (not currently being tx'd), 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 INR >8. CTA chest negative for PE, suspected RLL and POLLO  PNA, right upper lobe mass s/p biopsy. Procalcitonin >5.  Per daughter, pt has baseline  difficulty with po intake, but reports recent coughing even with sips of water which is unusual. Pt denies cp, sob, no listed h/o cad. Trop 95, 105, ekg paced. Most recent sbp 98, baseline 110 per daughter  (20 Mar 2023 00:20)    Interval History: Ophthalmology consulted for TLOV last night. Pt is poor historian and cannot categorize further. Collateral obtained from daughter. Pt was complaining to her last night that he could not see the TV at all - vision was black. Does not know if both eyes or one eye vision loss. Unknown duration. Pt reports currently vision is back to baseline and can see TV. He follows with Dr. Tello Nixon, last seen in 8/2022. 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 History of pseudophakia OU.  VA per last note 20/400 OD, CF OS. OCT mac w/o SRF, +drusen and RPE changes.     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      S/P TAVR (transcatheter aortic valve replacement)        Past Ocular History: denies surg/laser  Ophthalmic Medications: none  FAMILY HISTORY:  Family history of hypertension     no glc/amd  Social History: no etoh/tobacco    MEDICATIONS  (STANDING):  albuterol/ipratropium for Nebulization 3 milliLiter(s) Nebulizer every 6 hours  dextrose 50% Injectable 25 milliLiter(s) IV Push once  dextrose 50% Injectable 25 Gram(s) IV Push once  dextrose 50% Injectable 12.5 Gram(s) IV Push once  dextrose 50% Injectable 25 Gram(s) IV Push once  dextrose Oral Gel 15 Gram(s) Oral once  digoxin     Tablet 125 MICROGram(s) Oral <User Schedule>  doxazosin 1 milliGRAM(s) Oral at bedtime  glucagon  Injectable 1 milliGRAM(s) IntraMuscular once  influenza  Vaccine (HIGH DOSE) 0.7 milliLiter(s) IntraMuscular once  metoprolol tartrate 100 milliGRAM(s) Enteral Tube two times a day    MEDICATIONS  (PRN):  acetaminophen     Tablet .. 650 milliGRAM(s) Oral every 6 hours PRN Mild Pain (1 - 3), Moderate Pain (4 - 6)  guaiFENesin Oral Liquid (Sugar-Free) 100 milliGRAM(s) Oral every 6 hours PRN Cough    Allergies & Intolerances:   No Known Allergies    Review of Systems:  Constitutional: No fever, chills  Eyes: No blurry vision, flashes, floaters, FBS, erythema, discharge, double vision, OU  Neuro: No tremors  Cardiovascular: No chest pain, palpitations  Respiratory: No SOB, no cough  GI: No nausea, vomiting, abdominal pain  : No dysuria  Skin: no rash  Psych: no depression  Endocrine: no polyuria, polydipsia  Heme/lymph: no swelling    VITALS: T(C): 36.7 (04-12-23 @ 12:10)  T(F): 98 (04-12-23 @ 12:10), Max: 98 (04-12-23 @ 12:10)  HR: 100 (04-12-23 @ 12:10) (74 - 106)  BP: 96/66 (04-12-23 @ 12:10) (90/55 - 110/52)  RR:  (17 - 18)  SpO2:  (95% - 100%)  Wt(kg): --  General: AAO x 3, appropriate mood and affect    Ophthalmology Exam:  Visual acuity (cc): 20/800 PHNI OD, CF PHNI OS  Pupils: PERRL OU, no APD  Ttono: 11, 9  Extraocular movements (EOMs): Full OU, no pain, no diplopia  Confrontational Visual Field (CVF): Full OD, global constriction OS  Color Plates: 0/12 OU    Pen Light Exam (PLE)  External: Flat OU  Lids/Lashes/Lacrimal Ducts: Flat OU    Sclera/Conjunctiva: W+Q OU  Cornea: Cl OD, 2+ SPK OS  Anterior Chamber: D+F OU    Iris: Flat OU  Lens: PCIOL OU    Fundus Exam: dilated with 1% tropicamide and 2.5% phenylephrine  Approval obtained from primary team for dilation  Patient aware that pupils can remained dilated for at least 4-6 hours  Exam performed with 20D lens    Vitreous: wnl OU  Disc, cup/disc: sharp and pink, 0.3 OU PPA OU  Macula: GA OU  Vessels: wnl OU  Periphery: wnl OU

## 2023-04-12 NOTE — CONSULT NOTE ADULT - SUBJECTIVE AND OBJECTIVE BOX
Date of Admission: 03/19/2023    CHIEF COMPLAINT: hemoptysis     HISTORY OF PRESENT ILLNESS:  This is a 89yoM w/ PMHx severe AS s/p TAVR, afib on coumadin, PPM, HTN, HLD, TIA, BPH, remote esophageal cancer s/p chemo and RT, remove EBV infection 20 years ago with Bell's palsy and residual right sided facial droop initially presenting with hemoptysis and dysphagia. CT chest showed multiple findings including RLL consolidation c/w PNA, multiple L upper lung nodular opacities, RUL mass s/p course of Zosyn.  Hospital course was complicated by acute encephalopathy, hypoglycemia and fever (Tmax 103.5) found have leukocytosis.  Blood cultures positive for MRSA bacteremia and was started on vancomycin.  As per charts, ongoing GOC discussions with family indicate hospice and pleasure feeds post hospitalization.  EPS consulted for PPM management in setting of MRSA bacteremia.      Allergies  No Known Allergies  Intolerances    MEDICATIONS:  digoxin     Tablet 125 MICROGram(s) Oral <User Schedule>  doxazosin 1 milliGRAM(s) Oral at bedtime  metoprolol tartrate 100 milliGRAM(s) Enteral Tube two times a day  albuterol/ipratropium for Nebulization 3 milliLiter(s) Nebulizer every 6 hours  guaiFENesin Oral Liquid (Sugar-Free) 100 milliGRAM(s) Oral every 6 hours PRN  acetaminophen     Tablet .. 650 milliGRAM(s) Oral every 6 hours PRN  dextrose 50% Injectable 25 milliLiter(s) IV Push once  dextrose 50% Injectable 25 Gram(s) IV Push once  dextrose 50% Injectable 12.5 Gram(s) IV Push once  dextrose 50% Injectable 25 Gram(s) IV Push once  dextrose Oral Gel 15 Gram(s) Oral once  glucagon  Injectable 1 milliGRAM(s) IntraMuscular once  influenza  Vaccine (HIGH DOSE) 0.7 milliLiter(s) IntraMuscular once    PAST MEDICAL & SURGICAL HISTORY:  Cardiac Dysrhythmia  Dyslipidemia  Hypertension  BPH (Benign Prostatic Hyperplasia)  Hx TIA  x 8yrs  A-fib  Larynx neoplasm  h/o cardioversion  of Atrial Fibrillation  excision of Basal Cell Carcinoma of Nose  S/P TAVR (transcatheter aortic valve replacement)    FAMILY HISTORY:  Family history of hypertension    REVIEW OF SYSTEMS:  See HPI. Otherwise, 10 point ROS done and otherwise negative.    PHYSICAL EXAM:  T(C): 36.7 (04-12-23 @ 12:10), Max: 36.7 (04-12-23 @ 12:10)  HR: 100 (04-12-23 @ 12:10) (74 - 106)  BP: 96/66 (04-12-23 @ 12:10) (90/55 - 110/52)  RR: 17 (04-12-23 @ 12:10) (17 - 18)  SpO2: 95% (04-12-23 @ 12:10) (95% - 100%)  Wt(kg): --  I&O's Summary    11 Apr 2023 07:01  -  12 Apr 2023 07:00  --------------------------------------------------------  IN: 1030 mL / OUT: 400 mL / NET: 630 mL    12 Apr 2023 07:01  -  12 Apr 2023 13:23  --------------------------------------------------------  IN: 680 mL / OUT: 0 mL / NET: 680 mL    Appearance: Normal	  HEENT:   Normal oral mucosa, PERRL, EOMI	  Lymphatic: No lymphadenopathy  Cardiovascular: Normal S1 S2, No JVD, No murmurs, No edema  Respiratory: Lungs clear to auscultation	  Psychiatry: A & O x 3, Mood & affect appropriate  Gastrointestinal:  Soft, Non-tender, + BS	  Skin: No rashes, No ecchymoses, No cyanosis	  Neurologic: Non-focal  Extremities: Normal range of motion, No clubbing, cyanosis or edema  Vascular: Peripheral pulses palpable 2+ bilaterally    LABS:	 	  CBC Full  -  ( 12 Apr 2023 07:00 )  WBC Count : 17.72 K/uL  Hemoglobin : 7.2 g/dL  Hematocrit : 24.1 %  Platelet Count - Automated : 256 K/uL  Mean Cell Volume : 100.0 fL  Mean Cell Hemoglobin : 29.9 pg  Mean Cell Hemoglobin Concentration : 29.9 gm/dL  Auto Neutrophil # : 15.79 K/uL  Auto Lymphocyte # : 0.50 K/uL  Auto Monocyte # : 1.02 K/uL  Auto Eosinophil # : 0.02 K/uL  Auto Basophil # : 0.01 K/uL  Auto Neutrophil % : 89.8 %  Auto Lymphocyte % : 2.8 %  Auto Monocyte % : 5.8 %  Auto Eosinophil % : 0.1 %  Auto Basophil % : 0.1 %    04-12    141  |  99  |  44<H>  ----------------------------<  129<H>  4.3   |  31  |  1.46<H>  04-11    139  |  96<L>  |  40<H>  ----------------------------<  88  4.6   |  33<H>  |  1.39<H>    Ca    8.5      12 Apr 2023 07:00  Ca    8.6      11 Apr 2023 02:21  Phos  3.2     04-12  Phos  3.7     04-11  Mg     2.20     04-12  Mg     2.10     04-11

## 2023-04-12 NOTE — CONSULT NOTE ADULT - CONSULT REQUESTED DATE/TIME
04-Apr-2023 16:45
26-Mar-2023 08:46
12-Apr-2023 13:05
28-Mar-2023 13:15
20-Mar-2023 00:36
28-Mar-2023 14:24
28-Mar-2023 15:43
11-Apr-2023 12:11
28-Mar-2023 13:11
12-Apr-2023 13:22
21-Mar-2023 16:13
31-Mar-2023 12:47

## 2023-04-12 NOTE — CONSULT NOTE ADULT - CONSULT REQUESTED BY NAME
MED
ED
Med
medicine
Fiona
Primary Team
primary team
medicine
primary team
Medicine
Dr. Thornton [facility]
Dr. Thornton

## 2023-04-12 NOTE — CHART NOTE - NSCHARTNOTEFT_GEN_A_CORE
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     Nutrition Course: Pt A&Ox3, observed tube feed running @ 30ml/hr. Denies any GI distress, no nausea, vomiting, diarrhea, or constipation. Discussed with RN, TF running at lower rate 2/2 RRT and concern for aspiration event 4/10. Current Tf regimen not meeting pt's needs: Jevity 1.5@30ml/hr provide 720ml total formula, 1080kcal, 45g pro, 547ml free water in formula. Per GI there was no percutaneous window for PEG and pt is not a surgical candidate. Per GOC note 4/5 family wants to continue NGT feedings for now and home on pleasure feeds. AS medically feasible wound recommend increasing TF to recommended goal rate of 45ml/hr x24 hrs to provide 1080ml total formula, 1620kcal (29kcal/kg), 68g pro (1.2 g/kg), 820ml free water in formula. Receiving 250ml free water flush Q6h.    Diet Prescription: Diet, NPO with Tube Feed:   Tube Feeding Modality: Nasogastric  Jevity 1.5 Guillermo (JEVITY1.5RTH)  Total Volume for 24 Hours (mL): 720  Continuous  Starting Tube Feed Rate {mL per Hour}: 10  Increase Tube Feed Rate by (mL): 10     Every 6 hours  Until Goal Tube Feed Rate (mL per Hour): 30  Tube Feed Duration (in Hours): 24  Tube Feed Start Time: 14:00 (04-11-23 @ 15:22)    Pertinent Medications: MEDICATIONS  (STANDING):  albuterol/ipratropium for Nebulization 3 milliLiter(s) Nebulizer every 6 hours  dextrose 50% Injectable 25 milliLiter(s) IV Push once  dextrose 50% Injectable 25 Gram(s) IV Push once  dextrose 50% Injectable 12.5 Gram(s) IV Push once  dextrose 50% Injectable 25 Gram(s) IV Push once  dextrose Oral Gel 15 Gram(s) Oral once  digoxin     Tablet 125 MICROGram(s) Oral <User Schedule>  doxazosin 1 milliGRAM(s) Oral at bedtime  glucagon  Injectable 1 milliGRAM(s) IntraMuscular once  influenza  Vaccine (HIGH DOSE) 0.7 milliLiter(s) IntraMuscular once  metoprolol tartrate 100 milliGRAM(s) Enteral Tube two times a day    MEDICATIONS  (PRN):  acetaminophen     Tablet .. 650 milliGRAM(s) Oral every 6 hours PRN Mild Pain (1 - 3), Moderate Pain (4 - 6)  guaiFENesin Oral Liquid (Sugar-Free) 100 milliGRAM(s) Oral every 6 hours PRN Cough    Pertinent Labs: 04-12 Na141 mmol/L Glu 129 mg/dL<H> K+ 4.3 mmol/L Cr  1.46 mg/dL<H> BUN 44 mg/dL<H> 04-12 Phos 3.2 mg/dL     CAPILLARY BLOOD GLUCOSE  POCT Blood Glucose.: 213 mg/dL (12 Apr 2023 12:23)  POCT Blood Glucose.: 137 mg/dL (12 Apr 2023 07:08)  POCT Blood Glucose.: 111 mg/dL (11 Apr 2023 23:20)  POCT Blood Glucose.: 94 mg/dL (11 Apr 2023 17:59)      Weight: Height (cm): 165.1 (03-22 @ 20:18)  Weight (kg): 54.6 (03-21 @ 15:31)  BMI (kg/m2): 20 (03-22 @ 20:18)  Weight Assessment: No new weight to assess       Physical Assessment, per flowsheets:  Edema: No edema noted per RN flowsheets   Skin: Intact w/ no pressure injuries noted per RN flowsheets       Estimated Needs:   [X] No change since previous assessment, based on dosing weight  lbs / kg   kcal daily @ kcal/kg,  gm protein daily @ gm/kg   [ ] recalculated, with consideration for, based on weight    Previous Nutrition Diagnosis: severe protein calorie malnutrition   Nutrition Diagnosis is [x ] ongoing  [ ] resolved [ ] not applicable   New Nutrition Diagnosis: Inadequate energy related to concern for aspiration as evidence by current TF regimen not running at recommended goal rate.    Interventions:   1) As medically feasible recommend gradually increasing goal rate of Jevity to 45ml/hr x24 hours.  2) Obtain new weight, Obtain weekly weights   3) RD available prn    Monitor & Evaluate:  tolerance to EN, nutrition related lab values, weight trends, BMs/GI distress, hydration status, skin integrity.    [place]vonne [place], RD| [id number] (Also available on TEAMS)

## 2023-04-12 NOTE — CONSULT NOTE ADULT - NS ATTEND AMEND GEN_ALL_CORE FT
89yoM w/ PMHx severe AS s/p TAVR, afib on coumadin, PPM, HTN, HLD, TIA, BPH, remote esophageal cancer s/p chemo and RT, remove EBV infection 20 years ago with Bell's palsy and residual right sided facial droop initially presenting with hemoptysis and dysphagia. CT chest showed multiple findings including RLL consolidation c/w PNA, multiple L upper lung nodular opacities, RUL mass s/p course of Zosyn.  Hospital course was complicated by acute encephalopathy, hypoglycemia and fever (Tmax 103.5) found have leukocytosis.  Blood cultures positive for MRSA bacteremia and was started on vancomycin.  As per charts, ongoing GOC discussions with family indicate hospice and pleasure feeds post hospitalization.  EPS consulted for PPM management in setting of MRSA bacteremia.  Patient not pacer dependent but  82.6%. Discussed with patient's daughter/HCP regarding further management including long term antibiotics versus device extraction; reviewed risks and benefits. Daughter at this time would defer to conservative management as patient is frail and would not fare well with invasive procedures. Continue abx recs as per I/D. Continue with GOC discussions with family
This is a type II MI in the setting of acute medical illness. No plans for ischemic evaluation.

## 2023-04-13 NOTE — PROVIDER CONTACT NOTE (CRITICAL VALUE NOTIFICATION) - NAME OF MD/NP/PA/DO NOTIFIED:
Amelia Conner
Donato Collado
Amelia Conner
Shannon Combs
Amelia Conner
MICHAEL Conner
Shannon Combs
ACP Jj Bundy

## 2023-04-13 NOTE — CHART NOTE - NSCHARTNOTEFT_GEN_A_CORE
desaturation to 70-80's on 6L, not in distress and offered no complaints, placed on NRB with improvement of sat to 88-96% desaturation to 70-80's on 6L, not in distress and offered no complaints, placed on NRB with improvement of sat to 88-96%    addendum to above - 4/13 6pm   events of earlier and current able to wean NRB to venturi mask - sats 90's will continue to monitor desaturation to 70-80's on 6L, not in distress and offered no complaints, placed on NRB with improvement of sat to 88-96%    addendum to above - 4/13 6pm   events of earlier and current able to wean NRB to venturi mask - sats 90's will continue to monitor  daughter at bedside

## 2023-04-13 NOTE — PROGRESS NOTE ADULT - ASSESSMENT
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 Found to have MRSA bacteremia.    Impression  #MRSA bacteremia  #Presence of TAVR, PPM–concern for infection/source of bacteremia  #Acute hypoxic respiratory failure status post piperacillin/tazobactam course for possible pneumonia  #Fever  #HAYLIE  #Ongoing use of vancomycin    TTE on 412 without evidence for endocarditis  Repeat Bcx positive  CT head on 4/12 with evidence for CVA, concern for septic emboli  Vancomycin trough 19.3    Recommendations  Continue vancomycin at 750 mg daily, follow level in setting of rising creatinine  Obtain repeat blood cultures every 48 hours until clear, follow pending blood culture  Obtain KULWINDER if within goals of care and if feasible  Concern for uncontrolled source involving cardiac hardware given persistent bacteremia     Overall, prognosis guarded–goals of care discussions per primary team  Follow fever curve and WBC count    Sebas Garcia MD  Division of Infectious Diseases 89-year-old male with a past medical history significant for hypertension, severe aortic stenosis status post TAVR, PPM placement, laryngeal cancer, lung squamous cell carcinoma status post Keytruda therapy who was admitted to the hospital due to hemoptysis. Found to have MRSA bacteremia.    Impression  #MRSA bacteremia  #Presence of TAVR, PPM–concern for infection/source of bacteremia  #Acute hypoxic respiratory failure status post piperacillin/tazobactam course for possible pneumonia  #Fever  #HAYLIE  #Ongoing use of vancomycin  TTE on 4/12 without evidence for endocarditis  Repeat Bcx positive as well   CT head on 4/12 with evidence for CVA, concern for septic emboli  Vancomycin trough 19.3    Recommendations  Continue vancomycin at 750 mg daily, follow level in setting of rising creatinine  Obtain repeat blood cultures every 48 hours until clear, follow pending blood cultures  Obtain KULWINDER if within goals of care and if feasible  Concern for uncontrolled source involving cardiac hardware given persistent bacteremia   Overall, prognosis guarded–goals of care discussions per primary team  Follow fever curve and WBC count    Sebas Garcia MD  Division of Infectious Diseases

## 2023-04-13 NOTE — PROVIDER CONTACT NOTE (CRITICAL VALUE NOTIFICATION) - SITUATION
Positive MRSA in Blood Cultures
Pt blood culture for 4/11, aerobic bottle Gram (+) cocci in clusters. Pt with MRSA Bacteremia. Pt already on Vanco, dosed daily per random vanco lab result.
aPTT 130.8
Pt blood cultures drawn 4/11 Aerobic bottle shows gram + cocci in clusters. Pt with already known MRSA/Bacteremia and being treated with IV Vancomycin.
aPTT 142
Aerobic growth noted with gram positive Cocci in clusters
Blood cultures from 4/10 show +MRSA in aerobic and anaerobic bottle. Also shows gram + cocci in clusters in both bottles. Pt already on IV Vancomycin.

## 2023-04-13 NOTE — CHART NOTE - NSCHARTNOTEFT_GEN_A_CORE
received call from radiology - ct head with acute R occipital infarct,  no evidence for hemorrhagic  transformation.  ct results discussed with attending at patients bedside, plan to discuss new findings with daughter re VÍCTOR

## 2023-04-13 NOTE — PROGRESS NOTE ADULT - PROBLEM SELECTOR PLAN 5
CT head-No acute intracranial hemorrhage, mass effect, or shift of the midline structures.  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, his swallow test was always abnormal  but pt has learned to adjust his diet and take his time eating, but now has been getting worse   Seen by speech and swallow, s/p cinesophagram and recommended NPO   Daughters wished to pursue PEG  IR reviewed CTA chest w/ coverage of the upper abdomen, which showed low lying left hepatic lobe and interposed transverse colon with no percutaneous window for tube placement  NGT placed on 3/28 by ENT, resumed tube feeds   Seen by GI, concern for no percutaneous window for tube placement  Seen by surgery, not a surgical candidate  Restarted TFs after possible aspiration event 4/10, monitor for further aspiration events  Family agrees to hospice w/ pleasure feeds on discharge

## 2023-04-13 NOTE — PROVIDER CONTACT NOTE (CRITICAL VALUE NOTIFICATION) - TEST AND RESULT REPORTED:
.7
aPTT 130.8
Positive MRSA in Blood Cultures
Blood Cultues +
Blood cultures +
aPTT 142
+ blood cultures
Aerobic growth noted with gram positive Cocci in clusters

## 2023-04-13 NOTE — PROVIDER CONTACT NOTE (CRITICAL VALUE NOTIFICATION) - PERSON GIVING RESULT:
GABRIELA Lozano
Lab- Crista Beal
Wily Rice
TALI BERUMEN
Imani Biggs
Jennifer Floyd
Leonila BERUMEN
Bilal Victor Hugo

## 2023-04-13 NOTE — PROGRESS NOTE ADULT - PROBLEM SELECTOR PLAN 1
Likely d/t acute on chronic HFpEF, now w/ worsening hypoxia 4/6 likely d/t aspiration    CXR w/ increasing pulmonary vascular congestion. Unchanged moderate bilateral pleural effusions with adjacent atelectasis. Offer lasix PRN.  As per pulm; no urgent need for thoracentesis at this time   Supplemental O2, monitor O2 sats  O2 requirements fluctuating, this morning on 6Lpm but later requiring NRB - cannot tolerate BIPAP, may consider HFNC

## 2023-04-13 NOTE — PROGRESS NOTE ADULT - PROBLEM SELECTOR PLAN 4
- reported transient left-sided vision less overnight 4/11, still somewhat present but difficult to elicit from patient  - CT head showed acute right occipital stroke  - not a candidate for tPA or thrombectomy  - daughter wishes not to pursue MRI at this time  - ophtho evaluated, appreciate input  - suspicious for embolic stroke given MRSA bacteremia

## 2023-04-13 NOTE — PROGRESS NOTE ADULT - SUBJECTIVE AND OBJECTIVE BOX
Follow Up:  bacteremia    Interval History/ROS:  Overnight: CT head reveals acute CVA.  Patient remains afebrile otherwise hemodynamically stable however requiring more supplemental oxygen at 5 L.  Latest labs show leukocytosis of 18.7, BMP with creatinine to 1.39, vancomycin trough on 4/13/2023 19.3.  Repeat blood cultures from 4/11/2023 growing MRSA.    Patient seen and examined at bedside.  Alert however unable to provide ROS due to AMS.  Allergies  No Known Allergies    ANTIMICROBIALS:  vancomycin  IVPB 750 every 24 hours      OTHER MEDS:  MEDICATIONS  (STANDING):  acetaminophen     Tablet .. 650 every 6 hours PRN  albuterol/ipratropium for Nebulization 3 every 6 hours  dextrose 50% Injectable 25 once  dextrose 50% Injectable 25 once  dextrose 50% Injectable 12.5 once  dextrose 50% Injectable 25 once  dextrose Oral Gel 15 once  digoxin     Tablet 125 <User Schedule>  doxazosin 1 at bedtime  glucagon  Injectable 1 once  guaiFENesin Oral Liquid (Sugar-Free) 100 every 6 hours PRN  influenza  Vaccine (HIGH DOSE) 0.7 once  metoprolol tartrate 100 two times a day      Vital Signs Last 24 Hrs  T(C): 36.3 (13 Apr 2023 11:57), Max: 37.1 (12 Apr 2023 21:31)  T(F): 97.4 (13 Apr 2023 11:57), Max: 98.7 (12 Apr 2023 21:31)  HR: 106 (13 Apr 2023 11:57) (74 - 106)  BP: 92/51 (13 Apr 2023 11:57) (92/51 - 117/70)  BP(mean): --  RR: 16 (13 Apr 2023 11:57) (16 - 18)  SpO2: 93% (13 Apr 2023 11:57) (93% - 100%)    Parameters below as of 13 Apr 2023 11:57  Patient On (Oxygen Delivery Method): nasal cannula  O2 Flow (L/min): 5      PHYSICAL EXAM:  Constitutional: non-toxic, no distress  HEAD/EYES: anicteric, no conjunctival injection  ENT:  supple, no thrush  Cardiovascular:   normal S1, S2, no murmur, no edema  Respiratory:  clear BS bilaterally, no wheezes, no rales  GI:  soft, non-tender, normal bowel sounds  :  no rivas, no CVA tenderness  Musculoskeletal:  no synovitis, normal ROM  Neurologic: awake and alert, normal strength, no focal findings  Skin:  no rash, no erythema, no phlebitis  Heme/Onc: no lymphadenopathy   Psychiatric:  awake, alert, appropriate mood                                8.2    18.70 )-----------( 259      ( 13 Apr 2023 05:51 )             28.3       04-13    141  |  97<L>  |  51<H>  ----------------------------<  121<H>  4.4   |  34<H>  |  1.39<H>    Ca    8.9      13 Apr 2023 05:51  Phos  2.4     04-13  Mg     2.60     04-13            MICROBIOLOGY:  Vancomycin Level, Trough: 19.3 ug/mL (04-13-23 @ 05:51)  v    Culture - Blood (collected 11 Apr 2023 03:26)  Source: .Blood Blood-Venous  Gram Stain (12 Apr 2023 15:49):    Growth in aerobic bottle: Gram Positive Cocci in Clusters    Growth in anaerobic bottle: Gram Positive Cocci in Clusters  Final Report (13 Apr 2023 09:51):    Growth in aerobic and anaerobic bottles: Methicillin Resistant    Staphylococcus aureus    See previous culture [id number]    Culture - Blood (collected 11 Apr 2023 02:00)  Source: .Blood Blood-Peripheral  Gram Stain (12 Apr 2023 18:20):    Growth in aerobic bottle: Gram Positive Cocci in Clusters    Growth in anaerobic bottle: Gram Positive Cocci in Clusters  Preliminary Report (12 Apr 2023 21:46):    Growth in aerobic bottle: Methicillin Resistant Staphylococcus aureus    See previous culture [id number]    Growth in anaerobic bottle: Gram Positive Cocci in Clusters    Culture - Urine (collected 10 Apr 2023 16:26)  Source: Clean Catch Clean Catch (Midstream)  Final Report (11 Apr 2023 16:14):    >=3 organisms. Probable collection contamination.    Culture - Blood (collected 10 Apr 2023 06:15)  Source: .Blood Blood-Peripheral  Gram Stain (11 Apr 2023 00:27):    Growth in aerobic bottle: Gram Positive Cocci in Clusters    Growth in anaerobic bottle: Gram Positive Cocci in Clusters  Final Report (12 Apr 2023 17:19):    Growth in aerobic and anaerobic bottles: Methicillin Resistant    Staphylococcus aureus    ***Blood Panel PCR results on this specimen are available    approximately 3 hours after the Gram stain result.***    Gram stain, PCR, and/or culture results may notalways    correspond due to difference in methodologies.    ************************************************************    This PCR assay was performed by multiplex PCR. This    Assay tests for 66 bacterial and resistance gene targets.    Please refer to the  Labs test directory    at [email or web address] for details.  Organism: Blood Culture PCR  Methicillin resistant Staphylococcus aureus (12 Apr 2023 17:19)  Organism: Methicillin resistant Staphylococcus aureus (12 Apr 2023 17:19)      Method Type: VENESSA      -  Ampicillin/Sulbactam: R <=8/4      -  Cefazolin: R 8      -  Clindamycin: S <=0.25      -  Daptomycin: S 0.5      -  Erythromycin: S <=0.25      -  Gentamicin: S <=1 Should not be used as monotherapy      -  Linezolid: S 2      -  Oxacillin: R >2      -  Penicillin: R >8      -  Rifampin: S <=1 Should not be used as monotherapy      -  Tetracycline: S <=1      -  Trimethoprim/Sulfamethoxazole: R >2/38      -  Vancomycin: S 2  Organism: Blood Culture PCR (12 Apr 2023 17:19)      Method Type: PCR      -  Methicillin resistant Staphylococcus aureus (MRSA): Detec              Rapid RVP Result: NotDetec (04-10 @ 08:01)        RADIOLOGY:   Follow Up:  bacteremia    Interval History/ROS:  Overnight: CT head reveals acute CVA.  Patient remains afebrile otherwise hemodynamically stable however requiring more supplemental oxygen at 5 L.  Latest labs show leukocytosis of 18.7, BMP with creatinine to 1.39, vancomycin trough on 4/13/2023 19.3.  Repeat blood cultures from 4/11/2023 growing MRSA.    Patient seen and examined at bedside.  Lethargic, NG tube in place - unable to obtain ROS.  Allergies  No Known Allergies    ANTIMICROBIALS:  vancomycin  IVPB 750 every 24 hours      OTHER MEDS:  MEDICATIONS  (STANDING):  acetaminophen     Tablet .. 650 every 6 hours PRN  albuterol/ipratropium for Nebulization 3 every 6 hours  dextrose 50% Injectable 25 once  dextrose 50% Injectable 25 once  dextrose 50% Injectable 12.5 once  dextrose 50% Injectable 25 once  dextrose Oral Gel 15 once  digoxin     Tablet 125 <User Schedule>  doxazosin 1 at bedtime  glucagon  Injectable 1 once  guaiFENesin Oral Liquid (Sugar-Free) 100 every 6 hours PRN  influenza  Vaccine (HIGH DOSE) 0.7 once  metoprolol tartrate 100 two times a day      Vital Signs Last 24 Hrs  T(C): 36.3 (13 Apr 2023 11:57), Max: 37.1 (12 Apr 2023 21:31)  T(F): 97.4 (13 Apr 2023 11:57), Max: 98.7 (12 Apr 2023 21:31)  HR: 106 (13 Apr 2023 11:57) (74 - 106)  BP: 92/51 (13 Apr 2023 11:57) (92/51 - 117/70)  BP(mean): --  RR: 16 (13 Apr 2023 11:57) (16 - 18)  SpO2: 93% (13 Apr 2023 11:57) (93% - 100%)    Parameters below as of 13 Apr 2023 11:57  Patient On (Oxygen Delivery Method): nasal cannula  O2 Flow (L/min): 5      PHYSICAL EXAM:  Constitutional: lethargic, no distress, ill appearing, NG tube in place  Cardiovascular:   normal S1, S2, no murmur, no edema  Respiratory:  clear BS bilaterally, no wheezes, no rales  GI:  soft, non-tender, normal bowel sounds  :  no rivas, no CVA tenderness  Musculoskeletal:  no synovitis, normal ROM  Skin:  no rash, no erythema, no phlebitis                                  8.2    18.70 )-----------( 259      ( 13 Apr 2023 05:51 )             28.3       04-13    141  |  97<L>  |  51<H>  ----------------------------<  121<H>  4.4   |  34<H>  |  1.39<H>    Ca    8.9      13 Apr 2023 05:51  Phos  2.4     04-13  Mg     2.60     04-13            MICROBIOLOGY:  Vancomycin Level, Trough: 19.3 ug/mL (04-13-23 @ 05:51)  v    Culture - Blood (collected 11 Apr 2023 03:26)  Source: .Blood Blood-Venous  Gram Stain (12 Apr 2023 15:49):    Growth in aerobic bottle: Gram Positive Cocci in Clusters    Growth in anaerobic bottle: Gram Positive Cocci in Clusters  Final Report (13 Apr 2023 09:51):    Growth in aerobic and anaerobic bottles: Methicillin Resistant    Staphylococcus aureus    See previous culture [id number]    Culture - Blood (collected 11 Apr 2023 02:00)  Source: .Blood Blood-Peripheral  Gram Stain (12 Apr 2023 18:20):    Growth in aerobic bottle: Gram Positive Cocci in Clusters    Growth in anaerobic bottle: Gram Positive Cocci in Clusters  Preliminary Report (12 Apr 2023 21:46):    Growth in aerobic bottle: Methicillin Resistant Staphylococcus aureus    See previous culture [id number]    Growth in anaerobic bottle: Gram Positive Cocci in Clusters    Culture - Urine (collected 10 Apr 2023 16:26)  Source: Clean Catch Clean Catch (Midstream)  Final Report (11 Apr 2023 16:14):    >=3 organisms. Probable collection contamination.    Culture - Blood (collected 10 Apr 2023 06:15)  Source: .Blood Blood-Peripheral  Gram Stain (11 Apr 2023 00:27):    Growth in aerobic bottle: Gram Positive Cocci in Clusters    Growth in anaerobic bottle: Gram Positive Cocci in Clusters  Final Report (12 Apr 2023 17:19):    Growth in aerobic and anaerobic bottles: Methicillin Resistant    Staphylococcus aureus    ***Blood Panel PCR results on this specimen are available    approximately 3 hours after the Gram stain result.***    Gram stain, PCR, and/or culture results may notalways    correspond due to difference in methodologies.    ************************************************************    This PCR assay was performed by multiplex PCR. This    Assay tests for 66 bacterial and resistance gene targets.    Please refer to the [facility] test directory    at [email or web address] for details.  Organism: Blood Culture PCR  Methicillin resistant Staphylococcus aureus (12 Apr 2023 17:19)  Organism: Methicillin resistant Staphylococcus aureus (12 Apr 2023 17:19)      Method Type: VENESSA      -  Ampicillin/Sulbactam: R <=8/4      -  Cefazolin: R 8      -  Clindamycin: S <=0.25      -  Daptomycin: S 0.5      -  Erythromycin: S <=0.25      -  Gentamicin: S <=1 Should not be used as monotherapy      -  Linezolid: S 2      -  Oxacillin: R >2      -  Penicillin: R >8      -  Rifampin: S <=1 Should not be used as monotherapy      -  Tetracycline: S <=1      -  Trimethoprim/Sulfamethoxazole: R >2/38      -  Vancomycin: S 2  Organism: Blood Culture PCR (12 Apr 2023 17:19)      Method Type: PCR      -  Methicillin resistant Staphylococcus aureus (MRSA): Detec    Rapid RVP Result: Lanie (04-10 @ 08:01)

## 2023-04-13 NOTE — PROGRESS NOTE ADULT - PROBLEM SELECTOR PLAN 2
- BCx 4/10 with gram positive cocci, MRSA by PCR  - started on vancomycin; f/u culture sensitivities  - ID consulted  - repeat TTE without vegetations  - EP consulted given presence of cardiac hardware - has PPM and has prosthetic AVR  - on discussion between EP and daughter (and on my own discussion with the daughter) decision made to defer further invasive testing such as KULWINDER or replacement of hardware, would opt for conservative measures  - repeat Cx q48h until cleared  - appreciate ID input about potential duration of abx if no potential treatment of the source  - family do not wish to withdraw antibiotics  - if long-term abx planned, would need clearance of BCx before line placement

## 2023-04-13 NOTE — PROGRESS NOTE ADULT - SUBJECTIVE AND OBJECTIVE BOX
[facility] Medicine  Esau ArtisDO  Available via MS Teams  In house pager [phone]    SUBJECTIVE / OVERNIGHT EVENTS:  No acute events overnight.  Patient does not make complaints known.  Discussed CT findings with daughter over the phone. She reiterated a desire to continue abx but wishes to avoid invasive measures such as KULWINDER or removal of cardiac hardware.    ADDITIONAL REVIEW OF SYSTEMS:    MEDICATIONS  (STANDING):  albuterol/ipratropium for Nebulization 3 milliLiter(s) Nebulizer every 6 hours  dextrose 50% Injectable 25 milliLiter(s) IV Push once  dextrose 50% Injectable 25 Gram(s) IV Push once  dextrose 50% Injectable 12.5 Gram(s) IV Push once  dextrose 50% Injectable 25 Gram(s) IV Push once  dextrose Oral Gel 15 Gram(s) Oral once  digoxin     Tablet 125 MICROGram(s) Oral <User Schedule>  doxazosin 1 milliGRAM(s) Oral at bedtime  glucagon  Injectable 1 milliGRAM(s) IntraMuscular once  influenza  Vaccine (HIGH DOSE) 0.7 milliLiter(s) IntraMuscular once  metoprolol tartrate 100 milliGRAM(s) Enteral Tube two times a day  vancomycin  IVPB 750 milliGRAM(s) IV Intermittent every 24 hours    MEDICATIONS  (PRN):  acetaminophen     Tablet .. 650 milliGRAM(s) Oral every 6 hours PRN Mild Pain (1 - 3), Moderate Pain (4 - 6)  guaiFENesin Oral Liquid (Sugar-Free) 100 milliGRAM(s) Oral every 6 hours PRN Cough      I&O's Summary    12 Apr 2023 07:01  -  13 Apr 2023 07:00  --------------------------------------------------------  IN: 1110 mL / OUT: 0 mL / NET: 1110 mL    13 Apr 2023 07:01  -  13 Apr 2023 14:50  --------------------------------------------------------  IN: 370 mL / OUT: 0 mL / NET: 370 mL        PHYSICAL EXAM:  Vital Signs Last 24 Hrs  T(C): 36.3 (13 Apr 2023 11:57), Max: 37.1 (12 Apr 2023 21:31)  T(F): 97.4 (13 Apr 2023 11:57), Max: 98.7 (12 Apr 2023 21:31)  HR: 106 (13 Apr 2023 11:57) (74 - 106)  BP: 92/51 (13 Apr 2023 11:57) (92/51 - 117/70)  BP(mean): --  RR: 16 (13 Apr 2023 11:57) (16 - 18)  SpO2: 93% (13 Apr 2023 11:57) (93% - 100%)    Parameters below as of 13 Apr 2023 11:57  Patient On (Oxygen Delivery Method): nasal cannula  O2 Flow (L/min): 5    CONSTITUTIONAL: NAD, well-developed, well-groomed  EYES: PERRLA; conjunctiva and sclera clear  ENMT: Moist oral mucosa, no pharyngeal injection or exudates; normal dentition  NECK: Supple, no palpable masses; no thyromegaly  RESPIRATORY: Normal respiratory effort; lungs are clear to auscultation bilaterally  CARDIOVASCULAR: Regular rate and rhythm, normal S1 and S2, no murmur/rub/gallop; No lower extremity edema; Peripheral pulses are 2+ bilaterally  ABDOMEN: Nontender to palpation, normoactive bowel sounds, no rebound/guarding; No hepatosplenomegaly  MUSCULOSKELETAL: no clubbing or cyanosis of digits; no joint swelling or tenderness to palpation  PSYCH: A+O to person, place, and time; affect appropriate  NEUROLOGY: CN 2-12 are intact and symmetric; left eye droop (chronic), no gross sensory deficits   SKIN: No rashes; no palpable lesions    LABS:                        8.2    18.70 )-----------( 259      ( 13 Apr 2023 05:51 )             28.3     04-13    141  |  97<L>  |  51<H>  ----------------------------<  121<H>  4.4   |  34<H>  |  1.39<H>    Ca    8.9      13 Apr 2023 05:51  Phos  2.4     04-13  Mg     2.60     04-13                Culture - Blood (collected 11 Apr 2023 03:26)  Source: .Blood Blood-Venous  Gram Stain (12 Apr 2023 15:49):    Growth in aerobic bottle: Gram Positive Cocci in Clusters    Growth in anaerobic bottle: Gram Positive Cocci in Clusters  Final Report (13 Apr 2023 09:51):    Growth in aerobic and anaerobic bottles: Methicillin Resistant    Staphylococcus aureus    See previous culture [id number]    Culture - Blood (collected 11 Apr 2023 02:00)  Source: .Blood Blood-Peripheral  Gram Stain (12 Apr 2023 18:20):    Growth in aerobic bottle: Gram Positive Cocci in Clusters    Growth in anaerobic bottle: Gram Positive Cocci in Clusters  Final Report (13 Apr 2023 14:27):    Growth in aerobic and anaerobic bottles: Methicillin Resistant    Staphylococcus aureus    See previous culture [id number]    Culture - Urine (collected 10 Apr 2023 16:26)  Source: Clean Catch Clean Catch (Midstream)  Final Report (11 Apr 2023 16:14):    >=3 organisms. Probable collection contamination.      SARS-CoV-2: NotDetec (10 Apr 2023 08:01)  COVID-19 PCR: NotDetec (02 Apr 2023 09:37)  COVID-19 PCR: NotDetec (29 Mar 2023 19:32)  SARS-CoV-2: NotDetec (19 Mar 2023 19:31)        COMMUNICATION:  Care Discussed with Consultants/Other Providers and Details of Discussion: d/w medicine PA  Discussions with Patient/Family: d/w daughter Lashonda over the phone today

## 2023-04-13 NOTE — PROGRESS NOTE ADULT - ASSESSMENT
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

## 2023-04-14 NOTE — PROGRESS NOTE ADULT - SUBJECTIVE AND OBJECTIVE BOX
Follow Up:      Interval History/ROS:    Allergies  No Known Allergies        ANTIMICROBIALS:  vancomycin  IVPB 750 every 24 hours      OTHER MEDS:  MEDICATIONS  (STANDING):  acetaminophen     Tablet .. 650 every 6 hours PRN  dextrose 50% Injectable 25 once  dextrose 50% Injectable 25 once  dextrose 50% Injectable 12.5 once  dextrose 50% Injectable 25 once  dextrose Oral Gel 15 once  digoxin     Tablet 125 <User Schedule>  doxazosin 1 at bedtime  glucagon  Injectable 1 once  guaiFENesin Oral Liquid (Sugar-Free) 100 every 6 hours PRN  influenza  Vaccine (HIGH DOSE) 0.7 once  metoprolol tartrate 100 two times a day      Vital Signs Last 24 Hrs  T(C): 36.4 (14 Apr 2023 04:48), Max: 37.1 (13 Apr 2023 17:44)  T(F): 97.6 (14 Apr 2023 04:48), Max: 98.7 (13 Apr 2023 17:44)  HR: 89 (14 Apr 2023 04:48) (60 - 105)  BP: 91/55 (14 Apr 2023 04:48) (86/48 - 104/59)  BP(mean): --  RR: 18 (14 Apr 2023 04:48) (16 - 18)  SpO2: 100% (14 Apr 2023 04:48) (91% - 100%)    Parameters below as of 14 Apr 2023 04:48  Patient On (Oxygen Delivery Method): mask, nonrebreather  O2 Flow (L/min): 15      PHYSICAL EXAM:  Constitutional: non-toxic, no distress  HEAD/EYES: anicteric, no conjunctival injection  ENT:  supple, no thrush  Cardiovascular:   normal S1, S2, no murmur, no edema  Respiratory:  clear BS bilaterally, no wheezes, no rales  GI:  soft, non-tender, normal bowel sounds  :  no rivas, no CVA tenderness  Musculoskeletal:  no synovitis, normal ROM  Neurologic: awake and alert, normal strength, no focal findings  Skin:  no rash, no erythema, no phlebitis  Heme/Onc: no lymphadenopathy   Psychiatric:  awake, alert, appropriate mood                                7.8    18.06 )-----------( 203      ( 14 Apr 2023 06:42 )             26.2       04-14    136  |  94<L>  |  63<H>  ----------------------------<  131<H>  4.4   |  31  |  1.92<H>    Ca    8.1<L>      14 Apr 2023 06:42  Phos  2.7     04-14  Mg     2.40     04-14            MICROBIOLOGY:  v    Culture - Blood (collected 11 Apr 2023 03:26)  Source: .Blood Blood-Venous  Gram Stain (12 Apr 2023 15:49):    Growth in aerobic bottle: Gram Positive Cocci in Clusters    Growth in anaerobic bottle: Gram Positive Cocci in Clusters  Final Report (13 Apr 2023 09:51):    Growth in aerobic and anaerobic bottles: Methicillin Resistant    Staphylococcus aureus    See previous culture [id number]    Culture - Blood (collected 11 Apr 2023 02:00)  Source: .Blood Blood-Peripheral  Gram Stain (12 Apr 2023 18:20):    Growth in aerobic bottle: Gram Positive Cocci in Clusters    Growth in anaerobic bottle: Gram Positive Cocci in Clusters  Final Report (13 Apr 2023 14:27):    Growth in aerobic and anaerobic bottles: Methicillin Resistant    Staphylococcus aureus    See previous culture [id number]    Culture - Urine (collected 10 Apr 2023 16:26)  Source: Clean Catch Clean Catch (Midstream)  Final Report (11 Apr 2023 16:14):    >=3 organisms. Probable collection contamination.    Culture - Blood (collected 10 Apr 2023 06:15)  Source: .Blood Blood-Peripheral  Gram Stain (11 Apr 2023 00:27):    Growth in aerobic bottle: Gram Positive Cocci in Clusters    Growth in anaerobic bottle: Gram Positive Cocci in Clusters  Final Report (12 Apr 2023 17:19):    Growth in aerobic and anaerobic bottles: Methicillin Resistant    Staphylococcus aureus    ***Blood Panel PCR results on this specimen are available    approximately 3 hours after the Gram stain result.***    Gram stain, PCR, and/or culture results may notalways    correspond due to difference in methodologies.    ************************************************************    This PCR assay was performed by multiplex PCR. This    Assay tests for 66 bacterial and resistance gene targets.    Please refer to the [facility] [employer] test directory    at [email or web address] for details.  Organism: Blood Culture PCR  Methicillin resistant Staphylococcus aureus (12 Apr 2023 17:19)  Organism: Methicillin resistant Staphylococcus aureus (12 Apr 2023 17:19)      Method Type: VENESSA      -  Ampicillin/Sulbactam: R <=8/4      -  Cefazolin: R 8      -  Clindamycin: S <=0.25      -  Daptomycin: S 0.5      -  Erythromycin: S <=0.25      -  Gentamicin: S <=1 Should not be used as monotherapy      -  Linezolid: S 2      -  Oxacillin: R >2      -  Penicillin: R >8      -  Rifampin: S <=1 Should not be used as monotherapy      -  Tetracycline: S <=1      -  Trimethoprim/Sulfamethoxazole: R >2/38      -  Vancomycin: S 2  Organism: Blood Culture PCR (12 Apr 2023 17:19)      Method Type: PCR      -  Methicillin resistant Staphylococcus aureus (MRSA): Detec              Rapid RVP Result: NotDetec (04-10 @ 08:01)        RADIOLOGY:   Follow Up:  bacteremia    Interval History/ROS:  Seen and examined at bedside Alert appears comfortable. no distress    Allergies  No Known Allergies        ANTIMICROBIALS:  vancomycin  IVPB 750 every 24 hours      OTHER MEDS:  MEDICATIONS  (STANDING):  acetaminophen     Tablet .. 650 every 6 hours PRN  dextrose 50% Injectable 25 once  dextrose 50% Injectable 25 once  dextrose 50% Injectable 12.5 once  dextrose 50% Injectable 25 once  dextrose Oral Gel 15 once  digoxin     Tablet 125 <User Schedule>  doxazosin 1 at bedtime  glucagon  Injectable 1 once  guaiFENesin Oral Liquid (Sugar-Free) 100 every 6 hours PRN  influenza  Vaccine (HIGH DOSE) 0.7 once  metoprolol tartrate 100 two times a day      Vital Signs Last 24 Hrs  T(C): 36.4 (14 Apr 2023 04:48), Max: 37.1 (13 Apr 2023 17:44)  T(F): 97.6 (14 Apr 2023 04:48), Max: 98.7 (13 Apr 2023 17:44)  HR: 89 (14 Apr 2023 04:48) (60 - 105)  BP: 91/55 (14 Apr 2023 04:48) (86/48 - 104/59)  BP(mean): --  RR: 18 (14 Apr 2023 04:48) (16 - 18)  SpO2: 100% (14 Apr 2023 04:48) (91% - 100%)    Parameters below as of 14 Apr 2023 04:48  Patient On (Oxygen Delivery Method): mask, nonrebreather  O2 Flow (L/min): 15      PHYSICAL EXAM:  Constitutional: non-toxic, no distress  HEAD/EYES: anicteric, no conjunctival injection  ENT:  supple, no thrush  Cardiovascular:   normal S1, S2, no murmur, no edema  Respiratory:  clear BS bilaterally, no wheezes, no rales  GI:  soft, non-tender, normal bowel sounds  :  no rivas, no CVA tenderness  Musculoskeletal:  no synovitis, normal ROM  Neurologic: awake and alert, normal strength, no focal findings  Skin:  no rash, no erythema, no phlebitis  Heme/Onc: no lymphadenopathy   Psychiatric:  awake, alert, appropriate mood                                7.8    18.06 )-----------( 203      ( 14 Apr 2023 06:42 )             26.2       04-14    136  |  94<L>  |  63<H>  ----------------------------<  131<H>  4.4   |  31  |  1.92<H>    Ca    8.1<L>      14 Apr 2023 06:42  Phos  2.7     04-14  Mg     2.40     04-14            MICROBIOLOGY:  v    Culture - Blood (collected 11 Apr 2023 03:26)  Source: .Blood Blood-Venous  Gram Stain (12 Apr 2023 15:49):    Growth in aerobic bottle: Gram Positive Cocci in Clusters    Growth in anaerobic bottle: Gram Positive Cocci in Clusters  Final Report (13 Apr 2023 09:51):    Growth in aerobic and anaerobic bottles: Methicillin Resistant    Staphylococcus aureus    See previous culture [id number]    Culture - Blood (collected 11 Apr 2023 02:00)  Source: .Blood Blood-Peripheral  Gram Stain (12 Apr 2023 18:20):    Growth in aerobic bottle: Gram Positive Cocci in Clusters    Growth in anaerobic bottle: Gram Positive Cocci in Clusters  Final Report (13 Apr 2023 14:27):    Growth in aerobic and anaerobic bottles: Methicillin Resistant    Staphylococcus aureus    See previous culture [id number]    Culture - Urine (collected 10 Apr 2023 16:26)  Source: Clean Catch Clean Catch (Midstream)  Final Report (11 Apr 2023 16:14):    >=3 organisms. Probable collection contamination.    Culture - Blood (collected 10 Apr 2023 06:15)  Source: .Blood Blood-Peripheral  Gram Stain (11 Apr 2023 00:27):    Growth in aerobic bottle: Gram Positive Cocci in Clusters    Growth in anaerobic bottle: Gram Positive Cocci in Clusters  Final Report (12 Apr 2023 17:19):    Growth in aerobic and anaerobic bottles: Methicillin Resistant    Staphylococcus aureus    ***Blood Panel PCR results on this specimen are available    approximately 3 hours after the Gram stain result.***    Gram stain, PCR, and/or culture results may notalways    correspond due to difference in methodologies.    ************************************************************    This PCR assay was performed by multiplex PCR. This    Assay tests for 66 bacterial and resistance gene targets.    Please refer to the [facility] test directory    at [email or web address] for details.  Organism: Blood Culture PCR  Methicillin resistant Staphylococcus aureus (12 Apr 2023 17:19)  Organism: Methicillin resistant Staphylococcus aureus (12 Apr 2023 17:19)      Method Type: VENESSA      -  Ampicillin/Sulbactam: R <=8/4      -  Cefazolin: R 8      -  Clindamycin: S <=0.25      -  Daptomycin: S 0.5      -  Erythromycin: S <=0.25      -  Gentamicin: S <=1 Should not be used as monotherapy      -  Linezolid: S 2      -  Oxacillin: R >2      -  Penicillin: R >8      -  Rifampin: S <=1 Should not be used as monotherapy      -  Tetracycline: S <=1      -  Trimethoprim/Sulfamethoxazole: R >2/38      -  Vancomycin: S 2  Organism: Blood Culture PCR (12 Apr 2023 17:19)      Method Type: PCR      -  Methicillin resistant Staphylococcus aureus (MRSA): Detec    Rapid RVP Result: Lanie (04-10 @ 08:01)

## 2023-04-14 NOTE — PROGRESS NOTE ADULT - ASSESSMENT
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

## 2023-04-14 NOTE — PROGRESS NOTE ADULT - PROBLEM SELECTOR PLAN 1
Likely d/t acute on chronic HFpEF, now w/ worsening hypoxia 4/6 likely d/t aspiration    CXR w/ increasing pulmonary vascular congestion. Unchanged moderate bilateral pleural effusions with adjacent atelectasis. Offer lasix PRN.  As per pulm; no urgent need for thoracentesis at this time   Supplemental O2, monitor O2 sats  O2 requirements fluctuating, currently requiring NRB - cannot tolerate BIPAP, may consider HFNC.  Repeat CXR to assess if he needs additional diuresis though clear on auscultation

## 2023-04-14 NOTE — PROGRESS NOTE ADULT - PROBLEM SELECTOR PLAN 10
Likely pre-renal HAYLIE, holding diuresis at present  Monitor Cr - worsening in setting of hypotension  If presence of abd pain, may also consider renal infarcts given presence of bacteremia, though no confirmed endocarditis

## 2023-04-14 NOTE — PROGRESS NOTE ADULT - TIME BILLING
review of laboratory data, radiology results, discussion with primary team\patient, and monitoring for potential decompensation. Interventions were performed as documented above
Time-based billing (NON-critical care).     50 minutes spent on total encounter; more than 50% of the visit was spent counseling and / or coordinating care by the attending physician.  The necessity of the time spent during the encounter on this date of service was due to:     review of laboratory data, radiology results, consultants' recommendations, documentation in [place], discussion with patient, daughter Lashonda over the phone today 4/13, ACP and interdisciplinary staff (such as , social workers, etc). Interventions were performed as documented above.
Time-based billing (NON-critical care).     50 minutes spent on total encounter; more than 50% of the visit was spent counseling and / or coordinating care by the attending physician.  The necessity of the time spent during the encounter on this date of service was due to:     review of laboratory data, radiology results, consultants' recommendations, documentation in [place], discussion with patient/family, ACP and interdisciplinary staff (such as , social workers, etc). Interventions were performed as documented above.
Time-based billing (NON-critical care).     50 minutes spent on total encounter; more than 50% of the visit was spent counseling and / or coordinating care by the attending physician.  The necessity of the time spent during the encounter on this date of service was due to:     review of laboratory data, radiology results, consultants' recommendations, documentation in [place], discussion with patient, patient's daughter Lashonda over the phone today (4/11), ACP and interdisciplinary staff (such as , social workers, etc). Interventions were performed as documented above.
Time-based billing (NON-critical care).     50 minutes spent on total encounter; more than 50% of the visit was spent counseling and / or coordinating care by the attending physician.  The necessity of the time spent during the encounter on this date of service was due to:     review of laboratory data, radiology results, consultants' recommendations, documentation in [place], discussion with patient/family at bedside, ACP, and interdisciplinary staff (such as , social workers, etc). Interventions were performed as documented above.
Time-based billing (NON-critical care).     50 minutes spent on total encounter; more than 50% of the visit was spent counseling and / or coordinating care by the attending physician.  The necessity of the time spent during the encounter on this date of service was due to:     review of laboratory data, radiology results, consultants' recommendations, documentation in [place], discussion with patient/daughter, ACP and interdisciplinary staff (such as , social workers, etc). Interventions were performed as documented above.

## 2023-04-14 NOTE — PROGRESS NOTE ADULT - REASON FOR ADMISSION
hemoptysis

## 2023-04-14 NOTE — PROGRESS NOTE ADULT - NUTRITIONAL ASSESSMENT
This patient has been assessed with a concern for Malnutrition and has been determined to have a diagnosis/diagnoses of Severe protein-calorie malnutrition.    This patient is being managed with:   Diet NPO-  Entered: Mar 23 2023  1:32PM  
This patient has been assessed with a concern for Malnutrition and has been determined to have a diagnosis/diagnoses of Severe protein-calorie malnutrition.    This patient is being managed with:   Diet NPO with Tube Feed-  Tube Feeding Modality: Nasogastric  Jevity 1.5 Guillermo (JEVITY1.5RTH)  Total Volume for 24 Hours (mL): 720  Continuous  Starting Tube Feed Rate {mL per Hour}: 10  Increase Tube Feed Rate by (mL): 10     Every 6 hours  Until Goal Tube Feed Rate (mL per Hour): 30  Tube Feed Duration (in Hours): 24  Tube Feed Start Time: 14:00  Entered: Apr 7 2023  1:44PM  
This patient has been assessed with a concern for Malnutrition and has been determined to have a diagnosis/diagnoses of Severe protein-calorie malnutrition.    This patient is being managed with:   Diet NPO with Tube Feed-  Tube Feeding Modality: Nasogastric  Jevity 1.5 Guillermo (JEVITY1.5RTH)  Total Volume for 24 Hours (mL): 1080  Continuous  Starting Tube Feed Rate {mL per Hour}: 20  Increase Tube Feed Rate by (mL): 25     Every 6 hours  Until Goal Tube Feed Rate (mL per Hour): 45  Tube Feed Duration (in Hours): 24  Tube Feed Start Time: 12:00  Entered: Mar 29 2023 11:45AM  
This patient has been assessed with a concern for Malnutrition and has been determined to have a diagnosis/diagnoses of Severe protein-calorie malnutrition.    This patient is being managed with:   Diet NPO-  Entered: Mar 23 2023  1:32PM      This patient has been assessed with a concern for Malnutrition and has been determined to have a diagnosis/diagnoses of Severe protein-calorie malnutrition.    This patient is being managed with:   Diet NPO-  Entered: Mar 23 2023  1:32PM    
This patient has been assessed with a concern for Malnutrition and has been determined to have a diagnosis/diagnoses of Severe protein-calorie malnutrition.    This patient is being managed with:   Diet NPO with Tube Feed-  Tube Feeding Modality: Nasogastric  Jevity 1.5 Guillermo (JEVITY1.5RTH)  Total Volume for 24 Hours (mL): 1080  Continuous  Starting Tube Feed Rate {mL per Hour}: 20  Increase Tube Feed Rate by (mL): 25     Every 6 hours  Until Goal Tube Feed Rate (mL per Hour): 45  Tube Feed Duration (in Hours): 24  Tube Feed Start Time: 12:00  Entered: Mar 29 2023 11:45AM  
This patient has been assessed with a concern for Malnutrition and has been determined to have a diagnosis/diagnoses of Severe protein-calorie malnutrition.    This patient is being managed with:   Diet NPO with Tube Feed-  Tube Feeding Modality: Nasogastric  Jevity 1.5 Guillermo (JEVITY1.5RTH)  Total Volume for 24 Hours (mL): 1080  Continuous  Starting Tube Feed Rate {mL per Hour}: 20  Increase Tube Feed Rate by (mL): 25     Every 6 hours  Until Goal Tube Feed Rate (mL per Hour): 45  Tube Feed Duration (in Hours): 24  Tube Feed Start Time: 12:00  Entered: Mar 29 2023 11:45AM  
This patient has been assessed with a concern for Malnutrition and has been determined to have a diagnosis/diagnoses of Severe protein-calorie malnutrition.    This patient is being managed with:   Diet NPO-  Entered: Mar 23 2023  1:32PM  
This patient has been assessed with a concern for Malnutrition and has been determined to have a diagnosis/diagnoses of Severe protein-calorie malnutrition.    This patient is being managed with:   Diet NPO after Midnight-     NPO Start Date: 02-Apr-2023   NPO Start Time: 23:59  Except Medications  Entered: Apr 2 2023  9:20AM    Diet NPO with Tube Feed-  Tube Feeding Modality: Nasogastric  Jevity 1.5 Guillermo (JEVITY1.5RTH)  Total Volume for 24 Hours (mL): 1080  Continuous  Starting Tube Feed Rate {mL per Hour}: 20  Increase Tube Feed Rate by (mL): 25     Every 6 hours  Until Goal Tube Feed Rate (mL per Hour): 45  Tube Feed Duration (in Hours): 24  Tube Feed Start Time: 12:00  Entered: Mar 29 2023 11:45AM  
This patient has been assessed with a concern for Malnutrition and has been determined to have a diagnosis/diagnoses of Severe protein-calorie malnutrition.    This patient is being managed with:   Diet NPO with Tube Feed-  Tube Feeding Modality: Nasogastric  Jevity 1.5 Guillermo (JEVITY1.5RTH)  Total Volume for 24 Hours (mL): 720  Continuous  Starting Tube Feed Rate {mL per Hour}: 10  Increase Tube Feed Rate by (mL): 10     Every 6 hours  Until Goal Tube Feed Rate (mL per Hour): 30  Tube Feed Duration (in Hours): 24  Tube Feed Start Time: 14:00  Entered: Apr 11 2023  3:22PM  
This patient has been assessed with a concern for Malnutrition and has been determined to have a diagnosis/diagnoses of Severe protein-calorie malnutrition.    This patient is being managed with:   Diet NPO-  Entered: Mar 21 2023 12:12PM    
This patient has been assessed with a concern for Malnutrition and has been determined to have a diagnosis/diagnoses of Severe protein-calorie malnutrition.    This patient is being managed with:   Diet NPO with Tube Feed-  Tube Feeding Modality: Nasogastric  Jevity 1.5 Guillermo (JEVITY1.5RTH)  Total Volume for 24 Hours (mL): 720  Continuous  Starting Tube Feed Rate {mL per Hour}: 10  Increase Tube Feed Rate by (mL): 10     Every 6 hours  Until Goal Tube Feed Rate (mL per Hour): 30  Tube Feed Duration (in Hours): 24  Tube Feed Start Time: 14:00  Entered: Apr 11 2023  3:22PM  
This patient has been assessed with a concern for Malnutrition and has been determined to have a diagnosis/diagnoses of Severe protein-calorie malnutrition.    This patient is being managed with:   Diet NPO with Tube Feed-  Tube Feeding Modality: Nasogastric  Jevity 1.5 Guillermo (JEVITY1.5RTH)  Total Volume for 24 Hours (mL): 1080  Continuous  Starting Tube Feed Rate {mL per Hour}: 20  Increase Tube Feed Rate by (mL): 25     Every 6 hours  Until Goal Tube Feed Rate (mL per Hour): 45  Tube Feed Duration (in Hours): 24  Tube Feed Start Time: 12:00  Entered: Mar 29 2023 11:45AM  
This patient has been assessed with a concern for Malnutrition and has been determined to have a diagnosis/diagnoses of Severe protein-calorie malnutrition.    This patient is being managed with:   Diet NPO with Tube Feed-  Tube Feeding Modality: Nasogastric  Jevity 1.5 Guillermo (JEVITY1.5RTH)  Total Volume for 24 Hours (mL): 1080  Continuous  Starting Tube Feed Rate {mL per Hour}: 20  Increase Tube Feed Rate by (mL): 25     Every 6 hours  Until Goal Tube Feed Rate (mL per Hour): 45  Tube Feed Duration (in Hours): 24  Tube Feed Start Time: 12:00  Entered: Mar 29 2023 11:45AM  
This patient has been assessed with a concern for Malnutrition and has been determined to have a diagnosis/diagnoses of Severe protein-calorie malnutrition.    This patient is being managed with:   Diet NPO with Tube Feed-  Tube Feeding Modality: Nasogastric  Jevity 1.5 Guillermo (JEVITY1.5RTH)  Total Volume for 24 Hours (mL): 720  Continuous  Starting Tube Feed Rate {mL per Hour}: 10  Increase Tube Feed Rate by (mL): 10     Every 6 hours  Until Goal Tube Feed Rate (mL per Hour): 30  Tube Feed Duration (in Hours): 24  Tube Feed Start Time: 14:00  Entered: Apr 7 2023  1:44PM  
This patient has been assessed with a concern for Malnutrition and has been determined to have a diagnosis/diagnoses of Severe protein-calorie malnutrition.    This patient is being managed with:   Diet NPO with Tube Feed-  Tube Feeding Modality: Nasogastric  Jevity 1.5 Guillermo (JEVITY1.5RTH)  Total Volume for 24 Hours (mL): 1080  Continuous  Starting Tube Feed Rate {mL per Hour}: 20  Increase Tube Feed Rate by (mL): 25     Every 6 hours  Until Goal Tube Feed Rate (mL per Hour): 45  Tube Feed Duration (in Hours): 24  Tube Feed Start Time: 12:00  Entered: Mar 29 2023 11:45AM  
This patient has been assessed with a concern for Malnutrition and has been determined to have a diagnosis/diagnoses of Severe protein-calorie malnutrition.    This patient is being managed with:   Diet NPO-  Entered: Apr 10 2023  7:00AM  
This patient has been assessed with a concern for Malnutrition and has been determined to have a diagnosis/diagnoses of Severe protein-calorie malnutrition.    This patient is being managed with:   Diet NPO-  Entered: Mar 23 2023  1:32PM  
This patient has been assessed with a concern for Malnutrition and has been determined to have a diagnosis/diagnoses of Severe protein-calorie malnutrition.    This patient is being managed with:   Diet NPO with Tube Feed-  Tube Feeding Modality: Nasogastric  Jevity 1.5 Guillermo (JEVITY1.5RTH)  Total Volume for 24 Hours (mL): 720  Continuous  Starting Tube Feed Rate {mL per Hour}: 10  Increase Tube Feed Rate by (mL): 10     Every 6 hours  Until Goal Tube Feed Rate (mL per Hour): 30  Tube Feed Duration (in Hours): 24  Tube Feed Start Time: 14:00  Entered: Apr 11 2023  3:22PM  
This patient has been assessed with a concern for Malnutrition and has been determined to have a diagnosis/diagnoses of Severe protein-calorie malnutrition.    This patient is being managed with:   Diet NPO with Tube Feed-  Tube Feeding Modality: Nasogastric  Jevity 1.5 Guillermo (JEVITY1.5RTH)  Total Volume for 24 Hours (mL): 1080  Continuous  Starting Tube Feed Rate {mL per Hour}: 20  Increase Tube Feed Rate by (mL): 25     Every 6 hours  Until Goal Tube Feed Rate (mL per Hour): 45  Tube Feed Duration (in Hours): 24  Tube Feed Start Time: 12:00  Entered: Mar 29 2023 11:45AM  
This patient has been assessed with a concern for Malnutrition and has been determined to have a diagnosis/diagnoses of Severe protein-calorie malnutrition.    This patient is being managed with:   Diet NPO-  Entered: Mar 23 2023  1:32PM  
This patient has been assessed with a concern for Malnutrition and has been determined to have a diagnosis/diagnoses of Severe protein-calorie malnutrition.    This patient is being managed with:   Diet NPO with Tube Feed-  Tube Feeding Modality: Nasogastric  Jevity 1.5 Guillermo (JEVITY1.5RTH)  Total Volume for 24 Hours (mL): 1080  Continuous  Starting Tube Feed Rate {mL per Hour}: 20  Increase Tube Feed Rate by (mL): 25     Every 6 hours  Until Goal Tube Feed Rate (mL per Hour): 45  Tube Feed Duration (in Hours): 24  Tube Feed Start Time: 12:00  Entered: Mar 29 2023 11:45AM  
This patient has been assessed with a concern for Malnutrition and has been determined to have a diagnosis/diagnoses of Severe protein-calorie malnutrition.    This patient is being managed with:   Diet NPO after Midnight-     NPO Start Date: 02-Apr-2023   NPO Start Time: 23:59  Except Medications  Entered: Apr 2 2023  9:20AM    Diet NPO with Tube Feed-  Tube Feeding Modality: Nasogastric  Jevity 1.5 Guillermo (JEVITY1.5RTH)  Total Volume for 24 Hours (mL): 1080  Continuous  Starting Tube Feed Rate {mL per Hour}: 20  Increase Tube Feed Rate by (mL): 25     Every 6 hours  Until Goal Tube Feed Rate (mL per Hour): 45  Tube Feed Duration (in Hours): 24  Tube Feed Start Time: 12:00  Entered: Mar 29 2023 11:45AM  
This patient has been assessed with a concern for Malnutrition and has been determined to have a diagnosis/diagnoses of Severe protein-calorie malnutrition.    This patient is being managed with:   Diet NPO-  Entered: Apr 10 2023  7:00AM

## 2023-04-14 NOTE — PROGRESS NOTE ADULT - NSPROGADDITIONALINFOA_GEN_ALL_CORE
Pt's daughter updated on 3/27 and 3/28
Discussed with daughter Lashonda over the phone.  She wishes to trial him on antibiotics and see if he responds, but understands the likelihood of repeat aspiration events.  He is not able to undergo PEG, currently NGT still in place. TFs held temporarily for repeat aspiration event, acute encephalopathy. Will trial TFs again likely tomorrow 4/11.  Lashonda is still thinking about home hospice but wants to address the infection first and foremost.  She is also considering inpatient hospice as she says she may not be able to take care of him at home. She says this would not be ideal if the facility is a great distance from her home.
High risk of inpatient mortality.  Patient unfortunately not a candidate for discharge to home at present due to high supplemental O2 requirements.  Daughter does not wish to withdraw antibiotics. Does not qualify at present for inpatient hospice.  Undergoing work-up and treatment of MRSA bacteremia but limited interventions/investigations per family request. Likely patient would not tolerate these procedures due to blood pressure and respiratory status.
Daughter aware of poor prognosis, agrees to hospice referral
Spoke and updated patient daughter on 3/27

## 2023-04-14 NOTE — PROGRESS NOTE ADULT - ASSESSMENT
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 Found to have MRSA bacteremia.    Impression  #MRSA bacteremia  #Presence of TAVR, PPM–concern for infection/source of bacteremia  #Acute hypoxic respiratory failure status post piperacillin/tazobactam course for possible pneumonia  #Fever  #HAYLIE  #Ongoing use of vancomycin  TTE on 4/12 without evidence for endocarditis  Repeat Bcx positive as well   CT head on 4/12 with evidence for CVA, concern for septic emboli  Vancomycin trough 19.3    Recommendations  Continue vancomycin at 750 mg daily, follow level  Obtain repeat blood cultures every 48 hours until clear, follow pending blood cultures - now with persistent positive cultures  Obtain KULWINDER if within goals of care and if feasible  Concern for uncontrolled source involving cardiac hardware given persistent bacteremia   Overall, prognosis guarded–goals of care discussions per primary team  Follow fever curve and WBC count    Sebas Garcia MD  Division of Infectious Diseases

## 2023-04-14 NOTE — PROGRESS NOTE ADULT - SUBJECTIVE AND OBJECTIVE BOX
[facility]  Esau ArtisDO  Available via MS Teams  In house pager [phone]    SUBJECTIVE / OVERNIGHT EVENTS:  Yesterday O2 requirements escalated for hypoxia, now on NRB.  This morning does not have acute complaints.  Daughter Lashonda at bedside.   He still cannot really see the TV as he could before.    ADDITIONAL REVIEW OF SYSTEMS:    MEDICATIONS  (STANDING):  dextrose 50% Injectable 12.5 Gram(s) IV Push once  dextrose 50% Injectable 25 Gram(s) IV Push once  dextrose 50% Injectable 25 Gram(s) IV Push once  dextrose 50% Injectable 25 milliLiter(s) IV Push once  dextrose Oral Gel 15 Gram(s) Oral once  digoxin     Tablet 125 MICROGram(s) Oral <User Schedule>  doxazosin 1 milliGRAM(s) Oral at bedtime  glucagon  Injectable 1 milliGRAM(s) IntraMuscular once  influenza  Vaccine (HIGH DOSE) 0.7 milliLiter(s) IntraMuscular once  metoprolol tartrate 100 milliGRAM(s) Enteral Tube two times a day  vancomycin  IVPB 750 milliGRAM(s) IV Intermittent every 24 hours    MEDICATIONS  (PRN):  acetaminophen     Tablet .. 650 milliGRAM(s) Oral every 6 hours PRN Mild Pain (1 - 3), Moderate Pain (4 - 6)  guaiFENesin Oral Liquid (Sugar-Free) 100 milliGRAM(s) Oral every 6 hours PRN Cough      I&O's Summary    13 Apr 2023 07:01  -  14 Apr 2023 07:00  --------------------------------------------------------  IN: 1270 mL / OUT: 450 mL / NET: 820 mL    14 Apr 2023 07:01  -  14 Apr 2023 12:52  --------------------------------------------------------  IN: 250 mL / OUT: 0 mL / NET: 250 mL        PHYSICAL EXAM:  Vital Signs Last 24 Hrs  T(C): 36.4 (14 Apr 2023 04:48), Max: 37.1 (13 Apr 2023 17:44)  T(F): 97.6 (14 Apr 2023 04:48), Max: 98.7 (13 Apr 2023 17:44)  HR: 89 (14 Apr 2023 04:48) (60 - 105)  BP: 91/55 (14 Apr 2023 04:48) (86/48 - 104/59)  BP(mean): --  RR: 18 (14 Apr 2023 04:48) (16 - 18)  SpO2: 100% (14 Apr 2023 04:48) (91% - 100%)    Parameters below as of 14 Apr 2023 04:48  Patient On (Oxygen Delivery Method): mask, nonrebreather  O2 Flow (L/min): 15    CONSTITUTIONAL: NAD, well-developed, well-groomed; wearing NRB  EYES: PERRLA; conjunctiva and sclera clear  ENMT: Moist oral mucosa, no pharyngeal injection or exudates; normal dentition; NGT in place  NECK: Supple, no palpable masses; no thyromegaly  RESPIRATORY: Normal respiratory effort; lungs are clear to auscultation bilaterally  CARDIOVASCULAR: Regular rate and rhythm, normal S1 and S2, no murmur/rub/gallop; No lower extremity edema; Peripheral pulses are 2+ bilaterally  ABDOMEN: Nontender to palpation, normoactive bowel sounds, no rebound/guarding; No hepatosplenomegaly  MUSCULOSKELETAL:  no clubbing or cyanosis of digits; no joint swelling or tenderness to palpation  PSYCH: A+O to person, place, and time; affect appropriate  NEUROLOGY: left eye vision deficit - cannot indicate how many fingers I hold up; sensation intact  SKIN: No rashes; no palpable lesions    LABS:                        7.8    18.06 )-----------( 203      ( 14 Apr 2023 06:42 )             26.2     04-14    136  |  94<L>  |  63<H>  ----------------------------<  131<H>  4.4   |  31  |  1.92<H>    Ca    8.1<L>      14 Apr 2023 06:42  Phos  2.7     04-14  Mg     2.40     04-14                Culture - Blood (collected 13 Apr 2023 05:52)  Source: .Blood Blood-Peripheral  Preliminary Report (14 Apr 2023 12:34):    No growth to date.      SARS-CoV-2: NotDetec (10 Apr 2023 08:01)  COVID-19 PCR: NotDetec (02 Apr 2023 09:37)  COVID-19 PCR: NotDetec (29 Mar 2023 19:32)  SARS-CoV-2: NotDetec (19 Mar 2023 19:31)      RADIOLOGY & ADDITIONAL TESTS:  New Results Reviewed Today:   New Imaging Personally Reviewed Today:  New Electrocardiogram Personally Reviewed Today:  Prior or Outpatient Records Reviewed Today:    COMMUNICATION:  Care Discussed with Consultants/Other Providers and Details of Discussion: d/w medicine NP  Discussions with Patient/Family: d/w daughter Lashonda while at bedside

## 2023-04-14 NOTE — DISCHARGE NOTE PROVIDER - NSDCFUSCHEDAPPT_GEN_ALL_CORE_FT
[facility]  ELECTR[address] [address]  Scheduled Appointment: 05/23/2023

## 2023-04-14 NOTE — PROGRESS NOTE ADULT - PROVIDER SPECIALTY LIST ADULT
Pulmonology
Pulmonology
Hospitalist
Infectious Disease
Infectious Disease
Pulmonology
Pulmonology
Surgery
Gastroenterology
Hospitalist
Hospitalist
Infectious Disease
Surgery
Urology
Urology
Hospitalist
Pulmonology
Hospitalist

## 2023-04-14 NOTE — DISCHARGE NOTE PROVIDER - HOSPITAL COURSE
Patient is a 89yo M with PMH of severe AS s/p TAVR, Afib on coumadin, s/p PPM, HTN, HLD, prior TIA, BPH, and remote esophageal ca s/p chemo and RT, remote EBV infection with residual Bell’s palsy, who presented initially with hemoptysis and dysphagia. CT chest was consistent with RLL PNA likely from aspiration, completing a course of Zosyn. He remained hospitalized for episodes of acute encephalopathy, hypoglycemia, fever, and hypoxia. Dysphagia was worked up and he could not tolerate po; NGT was placed but PEG could not be offered by GI or surgery due to patient’s anatomy. His daughter had been agreeable to discharge to hospice with pleasure feeding. His condition continued to decline and daughter wished to continue current medical interventions. He was found to have MRSA bacteremia, seen by ID, started on vancomycin. TTE did not reveal vegetations; EP evaluated given presence of cardiac hardware – PPM and prosthetic valve. Daughter wished to defer further investigative invasive measures such as KULWINDER. His respiratory status continued to decline and he required NRB mask. He also developed episodes of hypotension likely related to his other acute conditions.

## 2023-04-14 NOTE — DISCHARGE NOTE PROVIDER - NSDCMRMEDTOKEN_GEN_ALL_CORE_FT
allopurinol 100 mg oral tablet: 1 tab(s) orally once a day  Coumadin 1 mg oral tablet: 1 tab(s) orally once a day  digoxin 125 mcg (0.125 mg) oral tablet: 1 tab(s) orally 3 times a week Th/Sa/Mo  Entresto 24 mg-26 mg oral tablet: 1 tab(s) orally 2 times a day  Flomax 0.4 mg oral capsule: 2 cap(s) orally once a day  Lasix 20 mg oral tablet: 1 tab(s) orally once a day  Lipitor 10 mg oral tablet: 1 tab(s) orally once a day  Lopressor 100 mg oral tablet: 1 tab(s) orally 2 times a day  Proscar 5 mg oral tablet: 1 tab(s) orally once a day

## 2023-04-14 NOTE — PROGRESS NOTE ADULT - PROBLEM SELECTOR PLAN 3
- fever, leukocytosis, AMS, hypoxia, in setting of new MRSA bacteremia since 4/10  - less likely due to aspiration PNA, though this may be source of bacteremia  - pyuria on UA with numerous bacteria, though potentially from indwelling catheter with colonization  - ID following  - on vancomycin - dose by level  - mental status acutely worsened morning of 4/10, now improving on examination and per family  - episodes of hypotension, monitor closely, may offer IV hydration but respiratory status is tenuous

## 2023-04-14 NOTE — PROGRESS NOTE ADULT - PROBLEM SELECTOR PROBLEM 1
Dysphagia
Dysphagia
Hemoptysis
Acute respiratory failure with hypoxia
Hemoptysis
Acute respiratory failure with hypoxia
Dysphagia
Acute respiratory failure with hypoxia
Acute respiratory failure with hypoxia
Hemoptysis
Dysphagia
Hemoptysis
Hemoptysis
Dysphagia
Dysphagia
Acute respiratory failure with hypoxia

## 2023-04-15 NOTE — PROVIDER CONTACT NOTE (OTHER) - NAME OF MD/NP/PA/DO NOTIFIED:
Jed Dasilva
Shannon Combs
ACP Jj Bundy
Munson (ACP)
ACP [place], Mackenzie
ACP Jj Bundy
MICHAEL, Cindy Pillai
Petty Ocasio
Orin Espana
irena Hooper

## 2023-04-15 NOTE — CHART NOTE - NSCHARTNOTEFT_GEN_A_CORE
Patient unresponsive to verbal/noxious stimuli.  Pupils fixed and dilated. No spontaneous breathing. No palpable carotid/radial pulse. No heart sounds. No breath sounds.  Time of Death: 4/15/23 11:25 am  Attending notified.  Family notified. Autopsy declined.

## 2023-04-15 NOTE — PROVIDER CONTACT NOTE (OTHER) - ASSESSMENT
BP 86/44 and HR 78   pt asymptomatic resting in bed
Patient is asymptomatic and other vitals are stable
Patient is asymptomatic and other vitals stable
pt reports feeling fine, VS in flowsheet.
Patient A&Ox3-4, VS as documented. Patient denies chest pain and SOB. Patient on 4L NC.
Patient A&Ox4, VS as documented, AFIB on tele. Blood glucose monitored. Meds given as per orders, tolerated well. Safety/fall protocols maintained, call bell within reach, hourly rounding, will continue to monitor.
Pt was diaphoretic but expresses feeling fine.
RN and PCA unable to get AM labs drawn
pt /50 and HR 65  pt resting comfortably in bed at this time.   pt due for Digoxin at this time

## 2023-04-15 NOTE — PROVIDER CONTACT NOTE (OTHER) - DATE AND TIME:
13-Apr-2023 02:56
14-Apr-2023 17:13
24-Mar-2023 07:29
04-Apr-2023 20:45
10-Apr-2023 20:00
15-Apr-2023 10:00
26-Mar-2023 10:19
12-Apr-2023 05:05
22-Mar-2023 19:25
31-Mar-2023 04:40

## 2023-04-15 NOTE — PROVIDER CONTACT NOTE (OTHER) - SITUATION
pt /50 and HR 65  pt due for Digoxin at this time
Patient going into Rapid afib, non-sustained, converts back to afib w/ paced rhythm
Patients blood pressure 96/38. Patient tired and slightly lethargic but arouseable.
BP 86/44
Patient o2 86% on 4L NC.
Patients blood pressure 90/55
Pt VS elevated after blood transfusion, see flowsheet
Pt presented with low blood and O2 saturation. see flow sheet for reference.
RN and PCA unable to get AM labs drawn

## 2023-04-15 NOTE — CHART NOTE - NSCHARTNOTESELECT_GEN_ALL_CORE
Event Note
Follow Up/Nutrition Services
GI/Event Note
ACP/Event Note
Event Note
Interventional Radiology
Nutrition Services
Nutrition Services
RRT/Event Note
pulmonary pocus note

## 2023-04-15 NOTE — RAPID RESPONSE TEAM SUMMARY - NSSITUATIONBACKGROUNDRRT_GEN_ALL_CORE
89yo M with PMH of severe AS s/p TAVR, Afib on coumadin, s/p PPM, HTN, HLD, prior TIA, BPH, and remote esophageal ca s/p chemo and RT, remote EBV infection with residual Bell’s palsy, who presented initially with hemoptysis and dysphagia. [facility] [place] called initially for unresponsiveness. Chest compressions initiated. On arrival, patient was confirmed to be DNR/DNI; MOLST form signed and filled in chart. Chest compressions were stopped. PEA on monitor. NO medications given. Patient without audible breath sounds, heart sounds, absent corneal reflex, pupils fixed and dilated. Patient pronounced dead at 11:22 AM. Primary team at bedside to call patient's family to notify. 
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 RRT called for AMS.    When entered the room pt is lethargic and has T 103 FS 57. An amp D50 given w/ FS elevated to 140, tylenol given. Pt BP stable protecting airway W/ 6L NC satting in mid 90s. Initiated sepsis workup. Pt has high risk of aspiration and TF stopped. CXR ordered by primary team PA.     Pt's AMS is likely lethargy iso sepsis vs hypoglycemia vs both. Pt still lethargic during RRT but hemodynamically stable. RRT ended after sepsis workup finished. Pt needs to be re-evaluated after abx and fluids given.

## 2023-04-15 NOTE — DISCHARGE NOTE FOR THE EXPIRED PATIENT - HOSPITAL COURSE
Acute respiratory failure with hypoxia.   ·  Plan: Likely d/t acute on chronic HFpEF, now w/ worsening hypoxia 4/6 likely d/t aspiration    CXR w/ increasing pulmonary vascular congestion. Unchanged moderate bilateral pleural effusions with adjacent atelectasis. Offer lasix PRN.  As per pulm; no urgent need for thoracentesis at this time   Supplemental O2, monitor O2 sats  O2 requirements fluctuating, currently requiring NRB - cannot tolerate BIPAP, may consider HFNC.  Repeat CXR to assess if he needs additional diuresis though clear on auscultation.     Problem/Plan - 2:  ·  Problem: MRSA bacteremia.   ·  Plan: - BCx 4/10 with gram positive cocci, MRSA by PCR  - started on vancomycin; f/u culture sensitivities  - ID consulted  - repeat TTE without vegetations  - EP consulted given presence of cardiac hardware - has PPM and has prosthetic AVR  - on discussion between EP and daughter (and on my own discussion with the daughter) decision made to defer further invasive testing such as KULWINDER or replacement of hardware, would opt for conservative measures  - repeat Cx q48h until cleared  - appreciate ID input about potential duration of abx if no potential treatment of the source  - family do not wish to withdraw antibiotics  - if long-term abx planned, would need clearance of BCx before line placement.     Problem/Plan - 3:  ·  Problem: Sepsis due to methicillin resistant Staphylococcus aureus (MRSA) with metabolic encephalopathy.   ·  Plan: - fever, leukocytosis, AMS, hypoxia, in setting of new MRSA bacteremia since 4/10  - less likely due to aspiration PNA, though this may be source of bacteremia  - pyuria on UA with numerous bacteria, though potentially from indwelling catheter with colonization  - ID following  - on vancomycin - dose by level  - mental status acutely worsened morning of 4/10, now improving on examination and per family  - episodes of hypotension, monitor closely, may offer IV hydration but respiratory status is tenuous.

## 2023-04-15 NOTE — PROVIDER CONTACT NOTE (OTHER) - REASON
12 beats of vtach
PT BP elevated after blood transfusion
Patient going into Rapid afib, non-sustained
Patients blood pressure 90/55
BP
Patient o2 86% on 4L NC.
RN and PCA unable to get AM labs drawn
Digoxin
Patients blood pressure 96/38
pt blood pressure low and O2 saturation. See flow sheet for for 4:36 am

## 2023-04-16 LAB
GRAM STN FLD: SIGNIFICANT CHANGE UP
SPECIMEN SOURCE: SIGNIFICANT CHANGE UP

## 2023-04-18 LAB
-  AMPICILLIN/SULBACTAM: SIGNIFICANT CHANGE UP
-  CEFAZOLIN: SIGNIFICANT CHANGE UP
-  CLINDAMYCIN: SIGNIFICANT CHANGE UP
-  DAPTOMYCIN: SIGNIFICANT CHANGE UP
-  ERYTHROMYCIN: SIGNIFICANT CHANGE UP
-  GENTAMICIN: SIGNIFICANT CHANGE UP
-  LINEZOLID: SIGNIFICANT CHANGE UP
-  OXACILLIN: SIGNIFICANT CHANGE UP
-  PENICILLIN: SIGNIFICANT CHANGE UP
-  RIFAMPIN: SIGNIFICANT CHANGE UP
-  TETRACYCLINE: SIGNIFICANT CHANGE UP
-  TRIMETHOPRIM/SULFAMETHOXAZOLE: SIGNIFICANT CHANGE UP
-  VANCOMYCIN: SIGNIFICANT CHANGE UP
CULTURE RESULTS: SIGNIFICANT CHANGE UP
METHOD TYPE: SIGNIFICANT CHANGE UP
SPECIMEN SOURCE: SIGNIFICANT CHANGE UP

## 2023-04-21 LAB
CULTURE RESULTS: SIGNIFICANT CHANGE UP
ORGANISM # SPEC MICROSCOPIC CNT: SIGNIFICANT CHANGE UP
ORGANISM # SPEC MICROSCOPIC CNT: SIGNIFICANT CHANGE UP
SPECIMEN SOURCE: SIGNIFICANT CHANGE UP

## 2023-05-23 ENCOUNTER — APPOINTMENT (OUTPATIENT)
Dept: ELECTROPHYSIOLOGY | Facility: CLINIC | Age: 88
End: 2023-05-23

## 2023-06-06 NOTE — ED PROVIDER NOTE - TIMING
gradual onset Bed/Stretcher in lowest position, wheels locked, appropriate side rails in place/Call bell, personal items and telephone in reach/Instruct patient to call for assistance before getting out of bed/chair/stretcher/Non-slip footwear applied when patient is off stretcher/[place] to call system/Physically safe environment - no spills, clutter or unnecessary equipment/Purposeful proactive rounding/Room/bathroom lighting operational, light cord in reach

## 2023-09-11 ENCOUNTER — APPOINTMENT (OUTPATIENT)
Dept: ELECTROPHYSIOLOGY | Facility: CLINIC | Age: 88
End: 2023-09-11

## 2023-09-20 NOTE — ED ADULT NURSE NOTE - NS ED NURSE RECORD ANOTHER VITAL SIGN
Frank Vazquez was seen and treated in our emergency department on 9/19/2023. Diagnosis:     Viky Mcnamara  may return to work on return date. He may return on this date: 09/20/2023    Patient was seen w/ illness since Monday (9/18) to 9/20. He's resolving symptoms and should be good to return this evening 9/20     If you have any questions or concerns, please don't hesitate to call.       Joe Litten, MD    ______________________________           _______________          _______________  Hospital Representative                              Date                                Time Yes

## 2023-12-11 ENCOUNTER — APPOINTMENT (OUTPATIENT)
Dept: ELECTROPHYSIOLOGY | Facility: CLINIC | Age: 88
End: 2023-12-11

## 2024-05-09 NOTE — ASU DISCHARGE PLAN (ADULT/PEDIATRIC) - PROVIDER TOKENS
Addended by: TAHIRA OSWALD on: 5/9/2024 12:00 AM     Modules accepted: Level of Service    
PROVIDER:[TOKEN:[[id number]],SCHEDULEDAPPT:[09/30/2020],SCHEDULEDAPPTTIME:[01:00 PM],ESTABLISHEDPATIENT:[T]]

## 2024-05-21 NOTE — ED PROVIDER NOTE - TEMPLATE, MLM
Spoke to patient's son to remind them of upcoming appointment.  Patient made aware of time / date of appointment as well as location.   
Orthopedic

## 2025-03-18 NOTE — PROGRESS NOTE ADULT - PROBLEM SELECTOR PROBLEM 10
Pastoral Care Progress Note  CH in consult with CM and provider, delayed CH visit today.  CH remains available.         03/18/25 1300   Clinical Encounter Type   Visited With Patient not available        Hypernatremia

## 2025-05-21 NOTE — ED PROVIDER NOTE - CPE EDP CARDIAC NORM

## 2025-06-20 NOTE — HISTORY OF PRESENT ILLNESS
[Home] : at home, [unfilled] , at the time of the visit. [Medical Office: ([facility])___] : at the medical office located in  [Verbal consent obtained from patient] : the patient, [unfilled] [FreeTextEntry1] : 89 year old M w hx of macular degeneration, BPH with urinary retention x 4 years ago, was then able to void, then since May has need a catheter \par Flomax 0.8 mg qhs (1 month) and Finasteride x 4 years\par In May 2022-  cc \par Catheter changed 6/24/22 - then 7/14/22\par \par He lives at home with his wife.  Daughter lives about 2 miles \par [facility] \par \par \par UDS 7/14/22\par cath placed with 0 drained \par FILLING\par capacity 295\par DO\par No THIEN\par normal compliance\par \par EMPTYING\par flow 2.8\par pdet 22\par pvr 255\par \par a new # 16 Fr. Chang catheter was inserted \par \par  Normal vision: sees adequately in most situations; can see medication labels, newsprint